# Patient Record
Sex: MALE | Race: WHITE | NOT HISPANIC OR LATINO
[De-identification: names, ages, dates, MRNs, and addresses within clinical notes are randomized per-mention and may not be internally consistent; named-entity substitution may affect disease eponyms.]

---

## 2017-01-24 ENCOUNTER — RECORD ABSTRACTING (OUTPATIENT)
Age: 62
End: 2017-01-24

## 2017-01-24 DIAGNOSIS — Z84.0 FAMILY HISTORY OF DISEASES OF THE SKIN AND SUBCUTANEOUS TISSUE: ICD-10-CM

## 2017-01-24 DIAGNOSIS — M19.90 UNSPECIFIED OSTEOARTHRITIS, UNSPECIFIED SITE: ICD-10-CM

## 2017-01-24 DIAGNOSIS — E11.9 TYPE 2 DIABETES MELLITUS W/OUT COMPLICATIONS: ICD-10-CM

## 2017-01-24 DIAGNOSIS — Z78.9 OTHER SPECIFIED HEALTH STATUS: ICD-10-CM

## 2017-01-24 DIAGNOSIS — R21 RASH AND OTHER NONSPECIFIC SKIN ERUPTION: ICD-10-CM

## 2017-02-05 ENCOUNTER — INPATIENT (INPATIENT)
Facility: HOSPITAL | Age: 62
LOS: 2 days | Discharge: HOME HEALTH | End: 2017-02-08
Attending: INTERNAL MEDICINE | Admitting: OBSTETRICS & GYNECOLOGY
Payer: MEDICARE

## 2017-02-05 VITALS
DIASTOLIC BLOOD PRESSURE: 77 MMHG | SYSTOLIC BLOOD PRESSURE: 135 MMHG | RESPIRATION RATE: 18 BRPM | HEART RATE: 96 BPM | TEMPERATURE: 98 F | OXYGEN SATURATION: 100 %

## 2017-02-05 DIAGNOSIS — Z95.9 PRESENCE OF CARDIAC AND VASCULAR IMPLANT AND GRAFT, UNSPECIFIED: Chronic | ICD-10-CM

## 2017-02-05 DIAGNOSIS — Z95.1 PRESENCE OF AORTOCORONARY BYPASS GRAFT: Chronic | ICD-10-CM

## 2017-02-05 LAB
BASOPHILS # BLD AUTO: 0.1 K/UL — SIGNIFICANT CHANGE UP (ref 0–0.2)
BASOPHILS NFR BLD AUTO: 0.7 % — SIGNIFICANT CHANGE UP (ref 0–2)
D DIMER BLD IA.RAPID-MCNC: 194 NG/ML DDU — SIGNIFICANT CHANGE UP
EOSINOPHIL # BLD AUTO: 0.2 K/UL — SIGNIFICANT CHANGE UP (ref 0–0.5)
EOSINOPHIL NFR BLD AUTO: 1.8 % — SIGNIFICANT CHANGE UP (ref 0–6)
HCT VFR BLD CALC: 40.9 % — SIGNIFICANT CHANGE UP (ref 39–50)
HGB BLD-MCNC: 13.7 G/DL — SIGNIFICANT CHANGE UP (ref 13–17)
LYMPHOCYTES # BLD AUTO: 1.2 K/UL — SIGNIFICANT CHANGE UP (ref 1–3.3)
LYMPHOCYTES # BLD AUTO: 10 % — LOW (ref 13–44)
MCHC RBC-ENTMCNC: 26.3 PG — LOW (ref 27–34)
MCHC RBC-ENTMCNC: 33.5 GM/DL — SIGNIFICANT CHANGE UP (ref 32–36)
MCV RBC AUTO: 78.5 FL — LOW (ref 80–100)
MONOCYTES # BLD AUTO: 0.4 K/UL — SIGNIFICANT CHANGE UP (ref 0–0.9)
MONOCYTES NFR BLD AUTO: 3.6 % — SIGNIFICANT CHANGE UP (ref 2–14)
NEUTROPHILS # BLD AUTO: 9.8 K/UL — HIGH (ref 1.8–7.4)
NEUTROPHILS NFR BLD AUTO: 83.9 % — HIGH (ref 43–77)
PLATELET # BLD AUTO: 172 K/UL — SIGNIFICANT CHANGE UP (ref 150–400)
RBC # BLD: 5.21 M/UL — SIGNIFICANT CHANGE UP (ref 4.2–5.8)
RBC # FLD: 14.6 % — HIGH (ref 10.3–14.5)
TROPONIN I SERPL-MCNC: <0.015 NG/ML — SIGNIFICANT CHANGE UP (ref 0.01–0.04)
TROPONIN I SERPL-MCNC: <0.015 NG/ML — SIGNIFICANT CHANGE UP (ref 0.01–0.04)
WBC # BLD: 11.7 K/UL — HIGH (ref 3.8–10.5)
WBC # FLD AUTO: 11.7 K/UL — HIGH (ref 3.8–10.5)

## 2017-02-05 PROCEDURE — 93010 ELECTROCARDIOGRAM REPORT: CPT

## 2017-02-05 PROCEDURE — 99285 EMERGENCY DEPT VISIT HI MDM: CPT

## 2017-02-05 PROCEDURE — 71010: CPT | Mod: 26

## 2017-02-05 RX ORDER — INSULIN GLARGINE 100 [IU]/ML
25 INJECTION, SOLUTION SUBCUTANEOUS
Qty: 0 | Refills: 0 | Status: DISCONTINUED | OUTPATIENT
Start: 2017-02-05 | End: 2017-02-08

## 2017-02-05 RX ORDER — INSULIN LISPRO 100/ML
VIAL (ML) SUBCUTANEOUS
Qty: 0 | Refills: 0 | Status: DISCONTINUED | OUTPATIENT
Start: 2017-02-05 | End: 2017-02-08

## 2017-02-05 RX ORDER — ATORVASTATIN CALCIUM 80 MG/1
40 TABLET, FILM COATED ORAL AT BEDTIME
Qty: 0 | Refills: 0 | Status: DISCONTINUED | OUTPATIENT
Start: 2017-02-05 | End: 2017-02-08

## 2017-02-05 RX ORDER — MONTELUKAST 4 MG/1
10 TABLET, CHEWABLE ORAL AT BEDTIME
Qty: 0 | Refills: 0 | Status: DISCONTINUED | OUTPATIENT
Start: 2017-02-05 | End: 2017-02-08

## 2017-02-05 RX ORDER — DEXTROSE 50 % IN WATER 50 %
12.5 SYRINGE (ML) INTRAVENOUS ONCE
Qty: 0 | Refills: 0 | Status: DISCONTINUED | OUTPATIENT
Start: 2017-02-05 | End: 2017-02-08

## 2017-02-05 RX ORDER — DEXTROSE 50 % IN WATER 50 %
25 SYRINGE (ML) INTRAVENOUS ONCE
Qty: 0 | Refills: 0 | Status: DISCONTINUED | OUTPATIENT
Start: 2017-02-05 | End: 2017-02-08

## 2017-02-05 RX ORDER — ENOXAPARIN SODIUM 100 MG/ML
40 INJECTION SUBCUTANEOUS EVERY 24 HOURS
Qty: 0 | Refills: 0 | Status: DISCONTINUED | OUTPATIENT
Start: 2017-02-05 | End: 2017-02-08

## 2017-02-05 RX ORDER — LOSARTAN POTASSIUM 100 MG/1
25 TABLET, FILM COATED ORAL DAILY
Qty: 0 | Refills: 0 | Status: DISCONTINUED | OUTPATIENT
Start: 2017-02-05 | End: 2017-02-08

## 2017-02-05 RX ORDER — NITROGLYCERIN 6.5 MG
1 CAPSULE, EXTENDED RELEASE ORAL ONCE
Qty: 0 | Refills: 0 | Status: DISCONTINUED | OUTPATIENT
Start: 2017-02-05 | End: 2017-02-08

## 2017-02-05 RX ORDER — DEXTROSE 50 % IN WATER 50 %
1 SYRINGE (ML) INTRAVENOUS ONCE
Qty: 0 | Refills: 0 | Status: DISCONTINUED | OUTPATIENT
Start: 2017-02-05 | End: 2017-02-08

## 2017-02-05 RX ORDER — SERTRALINE 25 MG/1
50 TABLET, FILM COATED ORAL
Qty: 0 | Refills: 0 | Status: DISCONTINUED | OUTPATIENT
Start: 2017-02-05 | End: 2017-02-08

## 2017-02-05 RX ORDER — CLONAZEPAM 1 MG
0.5 TABLET ORAL
Qty: 0 | Refills: 0 | Status: DISCONTINUED | OUTPATIENT
Start: 2017-02-05 | End: 2017-02-08

## 2017-02-05 RX ORDER — NITROGLYCERIN 6.5 MG
0.4 CAPSULE, EXTENDED RELEASE ORAL
Qty: 0 | Refills: 0 | Status: DISCONTINUED | OUTPATIENT
Start: 2017-02-05 | End: 2017-02-08

## 2017-02-05 RX ORDER — METOPROLOL TARTRATE 50 MG
100 TABLET ORAL DAILY
Qty: 0 | Refills: 0 | Status: DISCONTINUED | OUTPATIENT
Start: 2017-02-05 | End: 2017-02-08

## 2017-02-05 RX ORDER — PANTOPRAZOLE SODIUM 20 MG/1
40 TABLET, DELAYED RELEASE ORAL
Qty: 0 | Refills: 0 | Status: DISCONTINUED | OUTPATIENT
Start: 2017-02-05 | End: 2017-02-08

## 2017-02-05 RX ORDER — ONDANSETRON 8 MG/1
4 TABLET, FILM COATED ORAL ONCE
Qty: 0 | Refills: 0 | Status: COMPLETED | OUTPATIENT
Start: 2017-02-05 | End: 2017-02-05

## 2017-02-05 RX ORDER — CLOPIDOGREL BISULFATE 75 MG/1
75 TABLET, FILM COATED ORAL DAILY
Qty: 0 | Refills: 0 | Status: DISCONTINUED | OUTPATIENT
Start: 2017-02-05 | End: 2017-02-08

## 2017-02-05 RX ORDER — ACETAMINOPHEN 500 MG
650 TABLET ORAL EVERY 6 HOURS
Qty: 0 | Refills: 0 | Status: DISCONTINUED | OUTPATIENT
Start: 2017-02-05 | End: 2017-02-08

## 2017-02-05 RX ORDER — FLUTICASONE PROPIONATE AND SALMETEROL 50; 250 UG/1; UG/1
1 POWDER ORAL; RESPIRATORY (INHALATION)
Qty: 0 | Refills: 0 | Status: DISCONTINUED | OUTPATIENT
Start: 2017-02-05 | End: 2017-02-08

## 2017-02-05 RX ORDER — RANOLAZINE 500 MG/1
1000 TABLET, FILM COATED, EXTENDED RELEASE ORAL
Qty: 0 | Refills: 0 | Status: DISCONTINUED | OUTPATIENT
Start: 2017-02-05 | End: 2017-02-08

## 2017-02-05 RX ORDER — SODIUM CHLORIDE 9 MG/ML
3 INJECTION INTRAMUSCULAR; INTRAVENOUS; SUBCUTANEOUS ONCE
Qty: 0 | Refills: 0 | Status: COMPLETED | OUTPATIENT
Start: 2017-02-05 | End: 2017-02-05

## 2017-02-05 RX ORDER — TRAZODONE HCL 50 MG
150 TABLET ORAL AT BEDTIME
Qty: 0 | Refills: 0 | Status: DISCONTINUED | OUTPATIENT
Start: 2017-02-05 | End: 2017-02-08

## 2017-02-05 RX ORDER — ASPIRIN/CALCIUM CARB/MAGNESIUM 324 MG
325 TABLET ORAL ONCE
Qty: 0 | Refills: 0 | Status: COMPLETED | OUTPATIENT
Start: 2017-02-05 | End: 2017-02-05

## 2017-02-05 RX ORDER — MORPHINE SULFATE 50 MG/1
4 CAPSULE, EXTENDED RELEASE ORAL ONCE
Qty: 0 | Refills: 0 | Status: DISCONTINUED | OUTPATIENT
Start: 2017-02-05 | End: 2017-02-05

## 2017-02-05 RX ORDER — ASPIRIN/CALCIUM CARB/MAGNESIUM 324 MG
81 TABLET ORAL DAILY
Qty: 0 | Refills: 0 | Status: DISCONTINUED | OUTPATIENT
Start: 2017-02-05 | End: 2017-02-07

## 2017-02-05 RX ORDER — GLUCAGON INJECTION, SOLUTION 0.5 MG/.1ML
1 INJECTION, SOLUTION SUBCUTANEOUS ONCE
Qty: 0 | Refills: 0 | Status: DISCONTINUED | OUTPATIENT
Start: 2017-02-05 | End: 2017-02-08

## 2017-02-05 RX ORDER — SODIUM CHLORIDE 9 MG/ML
1000 INJECTION, SOLUTION INTRAVENOUS
Qty: 0 | Refills: 0 | Status: DISCONTINUED | OUTPATIENT
Start: 2017-02-05 | End: 2017-02-08

## 2017-02-05 RX ORDER — FUROSEMIDE 40 MG
40 TABLET ORAL
Qty: 0 | Refills: 0 | Status: DISCONTINUED | OUTPATIENT
Start: 2017-02-05 | End: 2017-02-08

## 2017-02-05 RX ADMIN — Medication 325 MILLIGRAM(S): at 10:37

## 2017-02-05 RX ADMIN — ONDANSETRON 4 MILLIGRAM(S): 8 TABLET, FILM COATED ORAL at 12:05

## 2017-02-05 RX ADMIN — RANOLAZINE 1000 MILLIGRAM(S): 500 TABLET, FILM COATED, EXTENDED RELEASE ORAL at 23:51

## 2017-02-05 RX ADMIN — SODIUM CHLORIDE 3 MILLILITER(S): 9 INJECTION INTRAMUSCULAR; INTRAVENOUS; SUBCUTANEOUS at 10:37

## 2017-02-05 RX ADMIN — MORPHINE SULFATE 4 MILLIGRAM(S): 50 CAPSULE, EXTENDED RELEASE ORAL at 12:35

## 2017-02-05 RX ADMIN — RANOLAZINE 1000 MILLIGRAM(S): 500 TABLET, FILM COATED, EXTENDED RELEASE ORAL at 23:48

## 2017-02-05 RX ADMIN — PANTOPRAZOLE SODIUM 40 MILLIGRAM(S): 20 TABLET, DELAYED RELEASE ORAL at 16:34

## 2017-02-05 RX ADMIN — Medication 2: at 18:27

## 2017-02-05 RX ADMIN — MONTELUKAST 10 MILLIGRAM(S): 4 TABLET, CHEWABLE ORAL at 21:50

## 2017-02-05 RX ADMIN — INSULIN GLARGINE 25 UNIT(S): 100 INJECTION, SOLUTION SUBCUTANEOUS at 23:47

## 2017-02-05 RX ADMIN — LOSARTAN POTASSIUM 25 MILLIGRAM(S): 100 TABLET, FILM COATED ORAL at 16:33

## 2017-02-05 RX ADMIN — ENOXAPARIN SODIUM 40 MILLIGRAM(S): 100 INJECTION SUBCUTANEOUS at 18:28

## 2017-02-05 RX ADMIN — Medication 150 MILLIGRAM(S): at 23:46

## 2017-02-05 RX ADMIN — Medication 0.4 MILLIGRAM(S): at 20:03

## 2017-02-05 RX ADMIN — Medication 650 MILLIGRAM(S): at 21:13

## 2017-02-05 RX ADMIN — MORPHINE SULFATE 4 MILLIGRAM(S): 50 CAPSULE, EXTENDED RELEASE ORAL at 12:05

## 2017-02-05 RX ADMIN — SERTRALINE 50 MILLIGRAM(S): 25 TABLET, FILM COATED ORAL at 20:40

## 2017-02-05 RX ADMIN — Medication 40 MILLIGRAM(S): at 18:18

## 2017-02-05 RX ADMIN — Medication 650 MILLIGRAM(S): at 20:48

## 2017-02-05 RX ADMIN — Medication 0.4 MILLIGRAM(S): at 10:37

## 2017-02-05 RX ADMIN — Medication 0.5 MILLIGRAM(S): at 18:18

## 2017-02-05 RX ADMIN — ATORVASTATIN CALCIUM 40 MILLIGRAM(S): 80 TABLET, FILM COATED ORAL at 21:50

## 2017-02-05 RX ADMIN — Medication 100 MILLIGRAM(S): at 16:34

## 2017-02-05 NOTE — ED PROVIDER NOTE - CONSTITUTIONAL, MLM
normal... Well appearing, well nourished, awake, alert, oriented to person, place, time/situation and in no apparent distress. Obese, no acute distress

## 2017-02-05 NOTE — H&P ADULT - PMH
COPD (chronic obstructive pulmonary disease)    Coronary artery disease involving coronary bypass graft of native heart with angina pectoris    DM (diabetes mellitus)    HTN (hypertension)

## 2017-02-05 NOTE — H&P ADULT - NSHPLABSRESULTS_GEN_ALL_CORE
13.7   11.7  )-----------( 172      ( 05 Feb 2017 10:17 )             40.9       CBC Full  -  ( 05 Feb 2017 10:17 )  WBC Count : 11.7 K/uL  Hemoglobin : 13.7 g/dL  Hematocrit : 40.9 %  Platelet Count - Automated : 172 K/uL  Mean Cell Volume : 78.5 fl  Mean Cell Hemoglobin : 26.3 pg  Mean Cell Hemoglobin Concentration : 33.5 gm/dL  Auto Neutrophil # : 9.8 K/uL  Auto Lymphocyte # : 1.2 K/uL  Auto Monocyte # : 0.4 K/uL  Auto Eosinophil # : 0.2 K/uL  Auto Basophil # : 0.1 K/uL  Auto Neutrophil % : 83.9 %  Auto Lymphocyte % : 10.0 %  Auto Monocyte % : 3.6 %  Auto Eosinophil % : 1.8 %  Auto Basophil % : 0.7 %      05 Feb 2017 12:21    139    |  104    |  12     ----------------------------<  186    4.1     |  24     |  0.92     Ca    8.3        05 Feb 2017 12:21    TPro  6.7    /  Alb  3.3    /  TBili  0.3    /  DBili  x      /  AST  14     /  ALT  29     /  AlkPhos  60     05 Feb 2017 12:21      PT/INR - ( 05 Feb 2017 12:21 )   PT: 12.8 sec;   INR: 1.16 ratio         PTT - ( 05 Feb 2017 12:21 )  PTT:26.8 sec        LIVER FUNCTIONS - ( 05 Feb 2017 12:21 )  Alb: 3.3 g/dL / Pro: 6.7 gm/dL / ALK PHOS: 60 U/L / ALT: 29 U/L / AST: 14 U/L / GGT: x                 CARDIAC MARKERS ( 05 Feb 2017 12:21 )  <0.015 ng/mL / x     / x     / x     / x

## 2017-02-05 NOTE — ED PROVIDER NOTE - OBJECTIVE STATEMENT
62 y/o M PMHx 3 vessel CABG, 2 stents, DM, HTN, HLD, COPD, presents to the ED c/o CP since today morning. The pt provides he started having the pain about 2 hours ago, which was across the chest, which was achy in nature, not associated with radiation or n/v. The pt notes that he has never had this type of pain before, even when he got the stents. Pt quit smoking 16 years ago, quit etoh 6 years ago. PMD Dr. Ruggiero, Cards Dr. Richards. No h/o abd pain, nvd, cough, headache, chills, fever, dizziness or urinary incontinence.

## 2017-02-05 NOTE — ED PROVIDER NOTE - NS ED MD SCRIBE ATTENDING SCRIBE SECTIONS
PHYSICAL EXAM/RESULTS/HIV/HISTORY OF PRESENT ILLNESS/PAST MEDICAL/SURGICAL/SOCIAL HISTORY/REVIEW OF SYSTEMS

## 2017-02-05 NOTE — ED PROVIDER NOTE - PMH
<<----- Click to add NO pertinent Past Medical History COPD (chronic obstructive pulmonary disease)    DM (diabetes mellitus)    HTN (hypertension)

## 2017-02-05 NOTE — H&P ADULT - ASSESSMENT
IMP:    60 y/o male with HTN, DM, HL, depression/anxiety and CAD s/p 3V CABG and PCI x 2 presents with constant CP with negative w/u thus far    Plan:    CP--cont wtih ASA/plavix/BBx/statin and ARB--Trend Trop--check TTE--Cards consult with dr warren    HTN--cont with BP meds    HL--cont with statin    DM--cont with lantus and FS with insulin coverage--DASH/DM diet    Depression/anxiety--cont SSRI and benzo    DVT prophy--LMWH and ambulation IMP:    62 y/o male with HTN, DM, HL, depression/anxiety and CAD s/p 3V CABG and PCI x 2 presents with constant CP with negative w/u thus far    Plan:    CP--cont wtih ASA/plavix/BBx/statin and ARB--Trend Trop--check TTE--Cards consult with dr warren    HTN--cont with BP meds    HL--cont with statin    DM--cont with lantus and FS with insulin coverage--DASH/DM diet.  Hold synjardy while in hospital    Depression/anxiety--cont SSRI and benzo    DVT prophy--LMWH and ambulation

## 2017-02-05 NOTE — H&P ADULT - NSHPPHYSICALEXAM_GEN_ALL_CORE
Vital Signs Last 24 Hrs  T(C): 36.8, Max: 36.8 (02-05 @ 09:56)  T(F): 98.2, Max: 98.2 (02-05 @ 09:56)  HR: 89 (89 - 96)  BP: 121/60 (121/60 - 135/77)  BP(mean): --  RR: 18 (18 - 18)  SpO2: 97% (97% - 100%)

## 2017-02-06 LAB
ANION GAP SERPL CALC-SCNC: 8 MMOL/L — SIGNIFICANT CHANGE UP (ref 5–17)
BASOPHILS # BLD AUTO: 0.1 K/UL — SIGNIFICANT CHANGE UP (ref 0–0.2)
BASOPHILS NFR BLD AUTO: 0.7 % — SIGNIFICANT CHANGE UP (ref 0–2)
BUN SERPL-MCNC: 15 MG/DL — SIGNIFICANT CHANGE UP (ref 7–23)
CALCIUM SERPL-MCNC: 8.6 MG/DL — SIGNIFICANT CHANGE UP (ref 8.5–10.1)
CHLORIDE SERPL-SCNC: 102 MMOL/L — SIGNIFICANT CHANGE UP (ref 96–108)
CO2 SERPL-SCNC: 31 MMOL/L — SIGNIFICANT CHANGE UP (ref 22–31)
CREAT SERPL-MCNC: 0.94 MG/DL — SIGNIFICANT CHANGE UP (ref 0.5–1.3)
EOSINOPHIL # BLD AUTO: 0.1 K/UL — SIGNIFICANT CHANGE UP (ref 0–0.5)
EOSINOPHIL NFR BLD AUTO: 1.6 % — SIGNIFICANT CHANGE UP (ref 0–6)
GLUCOSE SERPL-MCNC: 99 MG/DL — SIGNIFICANT CHANGE UP (ref 70–99)
HBA1C BLD-MCNC: 7.1 % — HIGH (ref 4–5.6)
HCT VFR BLD CALC: 39 % — SIGNIFICANT CHANGE UP (ref 39–50)
HGB BLD-MCNC: 12.5 G/DL — LOW (ref 13–17)
LYMPHOCYTES # BLD AUTO: 2.3 K/UL — SIGNIFICANT CHANGE UP (ref 1–3.3)
LYMPHOCYTES # BLD AUTO: 25.5 % — SIGNIFICANT CHANGE UP (ref 13–44)
MCHC RBC-ENTMCNC: 25.8 PG — LOW (ref 27–34)
MCHC RBC-ENTMCNC: 32.1 GM/DL — SIGNIFICANT CHANGE UP (ref 32–36)
MCV RBC AUTO: 80.6 FL — SIGNIFICANT CHANGE UP (ref 80–100)
MONOCYTES # BLD AUTO: 0.7 K/UL — SIGNIFICANT CHANGE UP (ref 0–0.9)
MONOCYTES NFR BLD AUTO: 7.8 % — SIGNIFICANT CHANGE UP (ref 2–14)
NEUTROPHILS # BLD AUTO: 5.8 K/UL — SIGNIFICANT CHANGE UP (ref 1.8–7.4)
NEUTROPHILS NFR BLD AUTO: 64.3 % — SIGNIFICANT CHANGE UP (ref 43–77)
NT-PROBNP SERPL-SCNC: 74 PG/ML — SIGNIFICANT CHANGE UP (ref 0–125)
PLATELET # BLD AUTO: 147 K/UL — LOW (ref 150–400)
POTASSIUM SERPL-MCNC: 3.9 MMOL/L — SIGNIFICANT CHANGE UP (ref 3.5–5.3)
POTASSIUM SERPL-SCNC: 3.9 MMOL/L — SIGNIFICANT CHANGE UP (ref 3.5–5.3)
RBC # BLD: 4.84 M/UL — SIGNIFICANT CHANGE UP (ref 4.2–5.8)
RBC # FLD: 14.9 % — HIGH (ref 10.3–14.5)
SODIUM SERPL-SCNC: 141 MMOL/L — SIGNIFICANT CHANGE UP (ref 135–145)
TROPONIN I SERPL-MCNC: <0.015 NG/ML — SIGNIFICANT CHANGE UP (ref 0.01–0.04)
TROPONIN I SERPL-MCNC: <0.015 NG/ML — SIGNIFICANT CHANGE UP (ref 0.01–0.04)
WBC # BLD: 9 K/UL — SIGNIFICANT CHANGE UP (ref 3.8–10.5)
WBC # FLD AUTO: 9 K/UL — SIGNIFICANT CHANGE UP (ref 3.8–10.5)

## 2017-02-06 PROCEDURE — 93010 ELECTROCARDIOGRAM REPORT: CPT

## 2017-02-06 PROCEDURE — 71010: CPT | Mod: 26

## 2017-02-06 PROCEDURE — 93306 TTE W/DOPPLER COMPLETE: CPT | Mod: 26

## 2017-02-06 RX ADMIN — PANTOPRAZOLE SODIUM 40 MILLIGRAM(S): 20 TABLET, DELAYED RELEASE ORAL at 06:37

## 2017-02-06 RX ADMIN — Medication 40 MILLIGRAM(S): at 18:21

## 2017-02-06 RX ADMIN — SERTRALINE 50 MILLIGRAM(S): 25 TABLET, FILM COATED ORAL at 18:21

## 2017-02-06 RX ADMIN — MONTELUKAST 10 MILLIGRAM(S): 4 TABLET, CHEWABLE ORAL at 21:31

## 2017-02-06 RX ADMIN — Medication 150 MILLIGRAM(S): at 21:31

## 2017-02-06 RX ADMIN — FLUTICASONE PROPIONATE AND SALMETEROL 1 DOSE(S): 50; 250 POWDER ORAL; RESPIRATORY (INHALATION) at 12:02

## 2017-02-06 RX ADMIN — Medication 100 MILLIGRAM(S): at 06:37

## 2017-02-06 RX ADMIN — RANOLAZINE 1000 MILLIGRAM(S): 500 TABLET, FILM COATED, EXTENDED RELEASE ORAL at 18:21

## 2017-02-06 RX ADMIN — Medication: at 12:51

## 2017-02-06 RX ADMIN — Medication 0.5 MILLIGRAM(S): at 06:37

## 2017-02-06 RX ADMIN — Medication 0.5 MILLIGRAM(S): at 18:21

## 2017-02-06 RX ADMIN — Medication 81 MILLIGRAM(S): at 11:06

## 2017-02-06 RX ADMIN — ATORVASTATIN CALCIUM 40 MILLIGRAM(S): 80 TABLET, FILM COATED ORAL at 21:31

## 2017-02-06 RX ADMIN — FLUTICASONE PROPIONATE AND SALMETEROL 1 DOSE(S): 50; 250 POWDER ORAL; RESPIRATORY (INHALATION) at 18:33

## 2017-02-06 RX ADMIN — LOSARTAN POTASSIUM 25 MILLIGRAM(S): 100 TABLET, FILM COATED ORAL at 06:32

## 2017-02-06 RX ADMIN — ENOXAPARIN SODIUM 40 MILLIGRAM(S): 100 INJECTION SUBCUTANEOUS at 18:22

## 2017-02-06 RX ADMIN — SERTRALINE 50 MILLIGRAM(S): 25 TABLET, FILM COATED ORAL at 06:34

## 2017-02-06 RX ADMIN — Medication 40 MILLIGRAM(S): at 06:38

## 2017-02-06 RX ADMIN — CLOPIDOGREL BISULFATE 75 MILLIGRAM(S): 75 TABLET, FILM COATED ORAL at 11:06

## 2017-02-06 NOTE — CONSULT NOTE ADULT - SUBJECTIVE AND OBJECTIVE BOX
HPI:  60 y/o male with HTN, DM, HL, depression/anxiety and CAD s/p 3V CABG at [facility] in May 2016 and PCI & staent 2016 & Oct 2016 , he has ad YUE to [place] to LAD & YUE to RCA  presents with c/o chest pain for one day, this pain  was constant  non radiating and was associated with SOB. This pain was like a pressure   & lasted for a few hours & resolved. On admission his  Trop negative  EKG--NSR no acute ST elevation. CXR--no infiltrates or edema.  d-dimer negative. Pt had negative CTA chest as out pt recently. he was given ASA/nitro and mso4 He is now  awake and alert, NAD, he states  he gets SOB with mild to moderate exertion & at times has chest tightness with exertion. He is now pain free & is comfortable.  He denies any cough or fever. No recent travel , this pain was not pleuritic or positional.       PAST MEDICAL & SURGICAL HISTORY:  Coronary artery disease involving coronary bypass graft of native heart with angina pectoris  COPD (chronic obstructive pulmonary disease)  DM (diabetes mellitus)  HTN (hypertension)  S/P several  stents last YUE to RCA Oct 2016  S/P CABG x 3      PREVIOUS DIAGNOSTIC TESTING:      ECHO  FINDINGS:    STRESS  FINDINGS:    CATHETERIZATION  FINDINGS:    MEDICATIONS  (STANDING):  nitroglycerin    2% Ointment 1Inch(s) Transdermal once  LORazepam     Tablet 1milliGRAM(s) Oral once  aspirin  chewable 81milliGRAM(s) Oral daily  losartan 25milliGRAM(s) Oral daily  ranolazine 1000milliGRAM(s) Oral two times a day  clonazePAM Tablet 0.5milliGRAM(s) Oral two times a day  sertraline 50milliGRAM(s) Oral two times a day  traZODone 150milliGRAM(s) Oral at bedtime  insulin glargine Injectable (LANTUS) 25Unit(s) SubCutaneous two times a day  atorvastatin 40milliGRAM(s) Oral at bedtime  clopidogrel Tablet 75milliGRAM(s) Oral daily  metoprolol succinate ER 100milliGRAM(s) Oral daily  fluticasone / salmeterol 100-50 MICROgram(s) Diskus 1Dose(s) Inhalation two times a day  furosemide    Tablet 40milliGRAM(s) Oral two times a day  montelukast 10milliGRAM(s) Oral at bedtime  pantoprazole    Tablet 40milliGRAM(s) Oral before breakfast  enoxaparin Injectable 40milliGRAM(s) SubCutaneous every 24 hours  insulin lispro (HumaLOG) corrective regimen sliding scale  SubCutaneous three times a day before meals  dextrose 5%. 1000milliLiter(s) IV Continuous <Continuous>  dextrose 50% Injectable 12.5Gram(s) IV Push once  dextrose 50% Injectable 25Gram(s) IV Push once  dextrose 50% Injectable 25Gram(s) IV Push once    MEDICATIONS  (PRN):  nitroglycerin     SubLingual 0.4milliGRAM(s) SubLingual every 5 minutes PRN Chest Pain  acetaminophen   Tablet 650milliGRAM(s) Oral every 6 hours PRN For Temp greater than 38 C (100.4 F)  acetaminophen   Tablet. 650milliGRAM(s) Oral every 6 hours PRN Mild Pain (1 - 3)  dextrose Gel 1Dose(s) Oral once PRN Blood Glucose LESS THAN 70 milliGRAM(s)/deciliter  glucagon  Injectable 1milliGRAM(s) IntraMuscular once PRN Glucose LESS THAN 70 milligrams/deciliter      FAMILY HISTORY:  Father  at 62 with colon cancer & mother at 83 with COPD      SOCIAL HISTORY:  He denies any h/o smoking or ETOH consumption    REVIEW OF SYSTEM:  Pertinent items are noted in HPI.  Constitutional	Negative for chills, fevers, sweats.    Eyes: 	Negative for visual disturbance.  Ears, nose, mouth, throat, and face: Negative for epistaxis, nasal congestion, sore throat and tinnitus.  Neck:	Negative for enlargement, pain and difficulty in swallowing  Respiration : Negative for cough, pleuritic chest pain and wheezing  Cardiovascular: Notable for chest pain & dyspnea as described above.    Gastrointestinal : Negative for abdominal pain, diarrhea, nausea and vomiting  Genitourinary: Negative for dysuria, frequency and urinary incontinence .  Skin: Negative for  rash, pruritus, swelling, dryness .  	  Hematologic/lymphatic: Negative for bleeding and easy bruising  Musculoskeletal: Negative for arthralgias, back pain and muscle weakness.  Neurological: Negative for dizziness, headaches, seizures and tremors  Behavioral/Psych: Negative for mood change, depression.  Endocrine:	Negative for blurry vision, polydipsia and polyuria, diaphoresis.   Allergic/Immunologic:	Negative for anaphylaxis, angioedema and urticaria.      Vital Signs Last 24 Hrs  T(C): 36.6, Max: 36.8 ( @ 09:56)  T(F): 97.9, Max: 98.2 ( @ 09:56)  HR: 61 (60 - 96)  BP: 98/58 (90/57 - 135/77)  BP(mean): --  RR: 18 (18 - 18)  SpO2: 99% (97% - 100%)    I&O's Summary    PHYSICAL EXAM  General Appearance: cooperative, no acute distress,   HEENT: PERRL, conjunctiva clear, EOM's intact, non injected pharynx, no exudate, TM   normal  Neck: Supple, , no adenopathy, thyroid: not enlarged, no carotid bruit or JVD  Back: Symmetric, no  tenderness soft tissue tenderness  Lungs: Clear to auscultation bilateral adventitious breath sounds, normal   expiratory phase  Heart: Regular rate and rhythm, S1, S2 normal, no murmur, rub or gallop  Abdomen: Soft, non-tender, bowel sounds active , no hepatosplenomegaly  Extremities: no cyanosis or edema, no joint swelling  Skin: Skin color, texture normal, no rashes   Neurologic: Alert and oriented X3 , cranial nerves intact, sensory and motor normal,        INTERPRETATION OF TELEMETRY: NSR    ECG: NSR  NSST changes V1 to V3        LABS:                          12.5   9.0   )-----------( 147      ( 2017 06:19 )             39.0     2017 06:19    141    |  102    |  15     ----------------------------<  99     3.9     |  31     |  0.94     Ca    8.6        2017 06:19    TPro  6.7    /  Alb  3.3    /  TBili  0.3    /  DBili  x      /  AST  14     /  ALT  29     /  AlkPhos  60     2017 12:21    CARDIAC MARKERS ( 2017 03:27 )  <0.015 ng/mL / x     / x     / x     / x      CARDIAC MARKERS ( 2017 19:42 )  <0.015 ng/mL / x     / x     / x     / x      CARDIAC MARKERS ( 2017 15:31 )  <0.015 ng/mL / x     / x     / x     / x      CARDIAC MARKERS ( 2017 12:21 )  <0.015 ng/mL / x     / x     / x     / x            Pro BNP  --  @ 14:08  D Dimer  194  @ 14:08    PT/INR - ( 2017 12:21 )   PT: 12.8 sec;   INR: 1.16 ratio         PTT - ( 2017 12:21 )  PTT:26.8 sec          RADIOLOGY & ADDITIONAL STUDIES:    Comparison:  Chest x-ray 2016    Sternal wires again noted. The cardiacsilhouette is within normal   limits. The lungs are clear. No pleural effusion.    IMPRESSION: No acute disease unchanged

## 2017-02-06 NOTE — CONSULT NOTE ADULT - ASSESSMENT
1 Recurrent chest pain some of the episodes of are suggestive of angina.  2 dyspnea with mild to moderate exertion.  3 CAD s/p CABG & multiple stents .  4 HTN  Suggest  Observe patient on telemetry.  Continue with current cardiac medications.  Follow-up with CPK, troponin I and EKG.  Due to recurrent chest pain or SOB, Patient was advised cardiac catheterization, i had  lengthy discussion with the patient and  risks benefits and alternatives were discussed at length with the patient. Patient consents for the procedure.    Discussed with Dr Arrieta.

## 2017-02-06 NOTE — PROGRESS NOTE ADULT - SUBJECTIVE AND OBJECTIVE BOX
PMD- Dr Ruggiero	    History of Present Illness:  Chief Complaint/Reason for Admission: Chest Pain	  History of Present Illness: 	  60 y/o male with HTN, DM, HL, depression/anxiety and CAD s/p 3V CABG at [facility] in May 2016 and PCI by dr Arrieta presents with constant CP since 0400 today. Constant, non radiating and SOB. Trop negative x 1. EKG--NSR no acute ST elevation. CXR--no infiltrates or edema.  d-dimer negative. Pt had negative CTA chest as outpt recently. he was given ASA/nitro and mso4  On my exam, awake and alert, NAD, c/o CP. denies fevers, chills or hemoptysis. + near syncope     2/6- chart reviewed. Pt c/o intermittent midsternal chest pain at rest, worse w/ palpation.  Sob w/ exertion.      REVIEW OF SYSTEMS:  General: No weakness, fevers, chills  HEENT: No HA, neck pain, no visual changes, no hearing loss   Resp: No cough, wheezing, hemoptysis; No shortness of breath  CV: +chest pain, dyspnea w/ exertion  GI: No abdominal pain, no n/v/d, no blood in stool  : No f/u/d  Neuro: No weakness or numbness  MSK: No myalgias, arthralgias  SKIN: No itching, rashes, lesions  All other ROS negative unless indicated above.      Physical Exam:  Vital Signs Last 24 Hrs T(C): 36.3, Max: 36.8 (02-05 @ 16:41) T(F): 97.4, Max: 98.2 (02-05 @ 16:41) HR: 60 (60 - 89) BP: 103/- (90/57 - 124/64) RR: 18 (18 - 18) SpO2: 98% (97% - 99%)   PHYSICAL EXAM: General: NAD, comfortable Neuro: AAOx3, no focal deficits HEENT: NCAT, PERRLA, EOMI,  Neck: Soft and supple, No JVD Respiratory: CTA b/l, no w/r/r Cardiovascular: S1 and S2, RRR, no m/g/r Chest: midsternal scar, tenderness w/ palpation Gastrointestinal: +BS, soft, NTND, no rebound/guarding Extremities: No c/c/e Vascular: 2+ peripheral pulses Musculoskeletal: 5/5 strength b/l UE and LE, sensation intact Skin: No rashes    LABS: All Labs Reviewed:                      12.5  9.0   )-----------( 147      ( 06 Feb 2017 06:19 )            39.0   06 Feb 2017 06:19  141    |  102    |  15    ----------------------------<  99    3.9     |  31     |  0.94   Ca    8.6        06 Feb 2017 06:19  TPro  6.7    /  Alb  3.3    /  TBili  0.3    /  DBili  x      /  AST  14     /  ALT  29     /  AlkPhos  60     05 Feb 2017 12:21  PT/INR - ( 05 Feb 2017 12:21 )   PT: 12.8 sec;   INR: 1.16 ratio      PTT - ( 05 Feb 2017 12:21 )  PTT:26.8 sec CARDIAC MARKERS ( 06 Feb 2017 12:02 ) <0.015 ng/mL / x     / x     / x     / x     CARDIAC MARKERS ( 06 Feb 2017 03:27 ) <0.015 ng/mL / x     / x     / x     / x     CARDIAC MARKERS ( 05 Feb 2017 19:42 ) <0.015 ng/mL / x     / x     / x     / x     CARDIAC MARKERS ( 05 Feb 2017 15:31 ) <0.015 ng/mL / x     / x     / x     / x     CARDIAC MARKERS ( 05 Feb 2017 12:21 ) <0.015 ng/mL / x     / x     / x     / x      Normal sinus rhythm 95bpm Cannot rule out Inferior infarct (cited on or before 02-OCT-2016) Nonspecific ST abnormality Abnormal ECG When compared with ECG of 04-DEC-2    CHEST SINGLE VIEW FRONTAL     02/05/2017  No acute disease unchanged.   	     Home Medications:  * Patient Currently Takes Medications as of 05-Feb-2017 15:23 documented in Prescription Writer ·  Plavix 75 mg oral tablet: Last Dose Taken:  , 1 tab(s) orally once a day ·  Ranexa 1000 mg oral tablet, extended release: Last Dose Taken:  , 1 tab(s) orally 2 times a day ·  Singulair 10 mg oral tablet: Last Dose Taken:  , 1 tab(s) orally once a day (at bedtime) ·  aspirin 81 mg oral tablet, chewable: Last Dose Taken:  , 1 tab(s) orally once a day ·  Synjardy 12.5 mg-1000 mg oral tablet: Last Dose Taken:  , 1 tab(s) orally 2 times a day ·  Protonix 40 mg oral delayed release tablet: Last Dose Taken:  , 1 tab(s) orally once a day ·  Cozaar 25 mg oral tablet: Last Dose Taken:  , 1 tab(s) orally once a day ·  clonazePAM 0.5 mg oral tablet: Last Dose Taken:  , 0.5 milligram(s) orally 2 times a day ·  Lipitor 40 mg oral tablet: Last Dose Taken:  , 1 tab(s) orally once a day (at bedtime) ·  Lasix 40 mg oral tablet: Last Dose Taken:  , 1 tab(s) orally 2 times a day ·  traZODone 150 mg oral tablet: Last Dose Taken:  , 1 tab(s) orally once a day (at bedtime) ·  metoprolol succinate 100 mg oral tablet, extended release: Last Dose Taken:  , 1 tab(s) orally once a day ·  sertraline 50 mg oral tablet: Last Dose Taken:  , 1 tab(s) orally 2 times a day ·  Lantus 100 units/mL subcutaneous solution: Last Dose Taken:  , 25 unit(s) subcutaneous 2 times a day ·  Symbicort 80 mcg-4.5 mcg/inh inhalation aerosol: Last Dose Taken:  , 2 puff(s) inhaled once a day  MEDICATIONS  (STANDING): nitroglycerin    2% Ointment 1Inch(s) Transdermal once LORazepam     Tablet 1milliGRAM(s) Oral once aspirin  chewable 81milliGRAM(s) Oral daily losartan 25milliGRAM(s) Oral daily ranolazine 1000milliGRAM(s) Oral two times a day clonazePAM Tablet 0.5milliGRAM(s) Oral two times a day sertraline 50milliGRAM(s) Oral two times a day traZODone 150milliGRAM(s) Oral at bedtime insulin glargine Injectable (LANTUS) 25Unit(s) SubCutaneous two times a day atorvastatin 40milliGRAM(s) Oral at bedtime clopidogrel Tablet 75milliGRAM(s) Oral daily metoprolol succinate ER 100milliGRAM(s) Oral daily fluticasone / salmeterol 100-50 MICROgram(s) Diskus 1Dose(s) Inhalation two times a day furosemide    Tablet 40milliGRAM(s) Oral two times a day montelukast 10milliGRAM(s) Oral at bedtime pantoprazole    Tablet 40milliGRAM(s) Oral before breakfast enoxaparin Injectable 40milliGRAM(s) SubCutaneous every 24 hours insulin lispro (HumaLOG) corrective regimen sliding scale  SubCutaneous three times a day before meals  MEDICATIONS  (PRN): nitroglycerin     SubLingual 0.4milliGRAM(s) SubLingual every 5 minutes PRN Chest Pain acetaminophen   Tablet 650milliGRAM(s) Oral every 6 hours PRN For Temp greater than 38 C (100.4 F) acetaminophen   Tablet. 650milliGRAM(s) Oral every 6 hours PRN Mild Pain (1 - 3) dextrose Gel 1Dose(s) Oral once PRN Blood Glucose LESS THAN 70 milliGRAM(s)/deciliter glucagon  Injectable 1milliGRAM(s) IntraMuscular once PRN Glucose LESS THAN 70 milligrams/deciliter

## 2017-02-06 NOTE — CHART NOTE - NSCHARTNOTEFT_GEN_A_CORE
called by rn for patient having chest pain     60 y/o male with HTN, DM, HL, depression/anxiety and CAD s/p 3V CABG and PCI x 2 presents with constant CP    patient seen and evaluated bedside. Patient described pain as 8/10 non radiating and increasing with deep breaths. states that pain is "like my ribs are coming apart" like he had when had cardiac surgery back in may 2016.     physical   general - aaox3   cv- +s1/s2  pulm- cta b/l no wheezing no rhonchi  abd- soft nontender bs+    Vital Signs Last 24 Hrs  T(C): 36.4, Max: 36.8 (02-05 @ 09:56)  T(F): 97.6, Max: 98.2 (02-05 @ 09:56)  HR: 60 (60 - 96)  BP: 114/70 (114/70 - 135/77)  BP(mean): --  RR: 18 (18 - 18)  SpO2: 97% (97% - 100%)    assessment and plan   #chest pain   - cxr stat  - ekg stat- unchanged from previous ekg  - pain management via nsaids or morphine as needed  - currently patient is not in pain     d/w Dr Moses - Attending Hospitalist and Dr Minor PGY3 called by rn for patient having chest pain     62 y/o male with HTN, DM, HL, depression/anxiety and CAD s/p 3V CABG and PCI x 2 presents with constant CP    patient seen and evaluated bedside. Patient described pain as 8/10 non radiating and increasing with deep breaths. states that pain is "like my ribs are coming apart" like he had when had cardiac surgery back in may 2016. chest pain reproducible on exam with palpation of sternal area and ribs.    physical   general - aaox3   cv- +s1/s2  pulm- cta b/l no wheezing no rhonchi  abd- soft nontender bs+    Vital Signs Last 24 Hrs  T(C): 36.4, Max: 36.8 (02-05 @ 09:56)  T(F): 97.6, Max: 98.2 (02-05 @ 09:56)  HR: 60 (60 - 96)  BP: 114/70 (114/70 - 135/77)  BP(mean): --  RR: 18 (18 - 18)  SpO2: 97% (97% - 100%)    assessment and plan   #chest pain   - cxr stat  - ekg stat- unchanged from previous ekg  - pain management via nsaids or morphine as needed  - currently patient is not in pain     d/w Dr Moses - Attending Hospitalist and Dr Minor PGY3

## 2017-02-06 NOTE — PROGRESS NOTE ADULT - ASSESSMENT
62 y/o male with HTN, DM, HL, depression/anxiety and CAD s/p 3V CABG and PCI x 2 presents with constant CP with negative     *chest pain r/o ACS  -in pt w/ CAD s/p CABG 5/2016,  PCI w/ YUE July and Oct. 2016  - troponins negative  - continue aspirin, plavix, statin, BB, ARB  - Cardio f/u appreciated- plan for cath     *HTN  - controlled, cont home meds    *T2D  - cont lantus and ISS (hold home med)   - diabetic diet, fingersticks     *depression/anxiety--cont SSRI and benzo    *dvt px-LMWH and ambulation

## 2017-02-07 DIAGNOSIS — I25.709 ATHEROSCLEROSIS OF CORONARY ARTERY BYPASS GRAFT(S), UNSPECIFIED, WITH UNSPECIFIED ANGINA PECTORIS: ICD-10-CM

## 2017-02-07 LAB
ANION GAP SERPL CALC-SCNC: 8 MMOL/L — SIGNIFICANT CHANGE UP (ref 5–17)
BLD GP AB SCN SERPL QL: SIGNIFICANT CHANGE UP
BUN SERPL-MCNC: 18 MG/DL — SIGNIFICANT CHANGE UP (ref 7–23)
CALCIUM SERPL-MCNC: 9 MG/DL — SIGNIFICANT CHANGE UP (ref 8.5–10.1)
CHLORIDE SERPL-SCNC: 104 MMOL/L — SIGNIFICANT CHANGE UP (ref 96–108)
CO2 SERPL-SCNC: 31 MMOL/L — SIGNIFICANT CHANGE UP (ref 22–31)
CREAT SERPL-MCNC: 1.01 MG/DL — SIGNIFICANT CHANGE UP (ref 0.5–1.3)
GLUCOSE SERPL-MCNC: 145 MG/DL — HIGH (ref 70–99)
HCT VFR BLD CALC: 39.5 % — SIGNIFICANT CHANGE UP (ref 39–50)
HGB BLD-MCNC: 12.6 G/DL — LOW (ref 13–17)
MCHC RBC-ENTMCNC: 25.7 PG — LOW (ref 27–34)
MCHC RBC-ENTMCNC: 31.9 GM/DL — LOW (ref 32–36)
MCV RBC AUTO: 80.4 FL — SIGNIFICANT CHANGE UP (ref 80–100)
PLATELET # BLD AUTO: 148 K/UL — LOW (ref 150–400)
POTASSIUM SERPL-MCNC: 4.1 MMOL/L — SIGNIFICANT CHANGE UP (ref 3.5–5.3)
POTASSIUM SERPL-SCNC: 4.1 MMOL/L — SIGNIFICANT CHANGE UP (ref 3.5–5.3)
RBC # BLD: 4.91 M/UL — SIGNIFICANT CHANGE UP (ref 4.2–5.8)
RBC # FLD: 14.9 % — HIGH (ref 10.3–14.5)
SODIUM SERPL-SCNC: 143 MMOL/L — SIGNIFICANT CHANGE UP (ref 135–145)
TYPE + AB SCN PNL BLD: SIGNIFICANT CHANGE UP
WBC # BLD: 7.5 K/UL — SIGNIFICANT CHANGE UP (ref 3.8–10.5)
WBC # FLD AUTO: 7.5 K/UL — SIGNIFICANT CHANGE UP (ref 3.8–10.5)

## 2017-02-07 PROCEDURE — 93010 ELECTROCARDIOGRAM REPORT: CPT

## 2017-02-07 RX ORDER — ASPIRIN/CALCIUM CARB/MAGNESIUM 324 MG
325 TABLET ORAL DAILY
Qty: 0 | Refills: 0 | Status: DISCONTINUED | OUTPATIENT
Start: 2017-02-07 | End: 2017-02-07

## 2017-02-07 RX ORDER — SODIUM CHLORIDE 9 MG/ML
1000 INJECTION INTRAMUSCULAR; INTRAVENOUS; SUBCUTANEOUS
Qty: 0 | Refills: 0 | Status: DISCONTINUED | OUTPATIENT
Start: 2017-02-07 | End: 2017-02-08

## 2017-02-07 RX ORDER — ASPIRIN/CALCIUM CARB/MAGNESIUM 324 MG
325 TABLET ORAL DAILY
Qty: 0 | Refills: 0 | Status: DISCONTINUED | OUTPATIENT
Start: 2017-02-07 | End: 2017-02-08

## 2017-02-07 RX ADMIN — Medication 81 MILLIGRAM(S): at 10:37

## 2017-02-07 RX ADMIN — INSULIN GLARGINE 25 UNIT(S): 100 INJECTION, SOLUTION SUBCUTANEOUS at 21:19

## 2017-02-07 RX ADMIN — Medication 40 MILLIGRAM(S): at 05:50

## 2017-02-07 RX ADMIN — RANOLAZINE 1000 MILLIGRAM(S): 500 TABLET, FILM COATED, EXTENDED RELEASE ORAL at 16:58

## 2017-02-07 RX ADMIN — Medication 150 MILLIGRAM(S): at 21:20

## 2017-02-07 RX ADMIN — RANOLAZINE 1000 MILLIGRAM(S): 500 TABLET, FILM COATED, EXTENDED RELEASE ORAL at 05:49

## 2017-02-07 RX ADMIN — Medication 2: at 17:50

## 2017-02-07 RX ADMIN — Medication 0.5 MILLIGRAM(S): at 05:52

## 2017-02-07 RX ADMIN — Medication 100 MILLIGRAM(S): at 05:50

## 2017-02-07 RX ADMIN — SERTRALINE 50 MILLIGRAM(S): 25 TABLET, FILM COATED ORAL at 16:58

## 2017-02-07 RX ADMIN — MONTELUKAST 10 MILLIGRAM(S): 4 TABLET, CHEWABLE ORAL at 21:30

## 2017-02-07 RX ADMIN — CLOPIDOGREL BISULFATE 75 MILLIGRAM(S): 75 TABLET, FILM COATED ORAL at 10:37

## 2017-02-07 RX ADMIN — PANTOPRAZOLE SODIUM 40 MILLIGRAM(S): 20 TABLET, DELAYED RELEASE ORAL at 06:07

## 2017-02-07 RX ADMIN — Medication 0.5 MILLIGRAM(S): at 16:58

## 2017-02-07 RX ADMIN — ATORVASTATIN CALCIUM 40 MILLIGRAM(S): 80 TABLET, FILM COATED ORAL at 21:36

## 2017-02-07 RX ADMIN — SERTRALINE 50 MILLIGRAM(S): 25 TABLET, FILM COATED ORAL at 05:49

## 2017-02-07 RX ADMIN — SODIUM CHLORIDE 75 MILLILITER(S): 9 INJECTION INTRAMUSCULAR; INTRAVENOUS; SUBCUTANEOUS at 11:20

## 2017-02-07 RX ADMIN — ENOXAPARIN SODIUM 40 MILLIGRAM(S): 100 INJECTION SUBCUTANEOUS at 21:35

## 2017-02-07 RX ADMIN — Medication 40 MILLIGRAM(S): at 16:58

## 2017-02-07 RX ADMIN — LOSARTAN POTASSIUM 25 MILLIGRAM(S): 100 TABLET, FILM COATED ORAL at 05:49

## 2017-02-07 NOTE — PROGRESS NOTE ADULT - ASSESSMENT
Right femoral cath site with Perclose closure. No bleeding, no hematoma. Pedal pulses palpable    60 y/o male with HTN, DM, HL, depression/anxiety and CAD s/p 3V CABG at [facility] in May 2016 and PCI & stent July 2016 & Oct 2016 , he has ad YUE to [place] to LAD & YUE to RCA  presents with c/o chest pain for one day, this pain  was constant  non radiating and was associated with SOB.   Now S/P  PCI of OM2

## 2017-02-07 NOTE — PROGRESS NOTE ADULT - ASSESSMENT
60 y/o male with HTN, DM, HL, depression/anxiety and CAD s/p 3V CABG and PCI x 2 presents with constant CP with negative     *chest pain r/o ACS  -in pt w/ CAD s/p CABG 5/2016,  PCI w/ YUE July and Oct. 2016  - troponins negative  - continue aspirin, plavix, statin, BB, ARB  - Cardio f/u appreciated- plan for cath     *HTN  - controlled, cont home meds    *T2D  - cont lantus and ISS (hold home med)   - diabetic diet, fingersticks     *depression/anxiety--cont SSRI and benzo    *dvt px-LMWH and ambulation 62 y/o male with HTN, DM, HL, depression/anxiety and CAD s/p 3V CABG and PCI x 2 presents with constant CP with negative     *chest pain S/P PCI of OM2   -in pt w/ CAD s/p CABG 5/2016,  PCI w/ YUE July and Oct. 2016  - troponins negative  - continue aspirin, plavix, statin, BB, ARB  - Discussed with Dr warren increase ASA 325mg  - cardio FU appreciated     *HTN  - controlled, cont home meds    *T2D  - cont lantus and ISS (hold home med)   - diabetic diet, fingersticks     *depression/anxiety--cont SSRI and benzo    *dvt px-LMWH and ambulation

## 2017-02-07 NOTE — PROGRESS NOTE ADULT - SUBJECTIVE AND OBJECTIVE BOX
60 y/o male with HTN, DM, HL, depression/anxiety and CAD s/p 3V CABG at [facility] in May 2016 and PCI & staent July 2016 & Oct 2016 , he has ad YUE to [place] to LAD & YUE to [facility]  presents with c/o chest pain for one day, this pain  was constant  non radiating and was associated with SOB. This pain was like a pressure   & lasted for a few hours & resolved. On admission his  Trop negative  EKG--NSR no acute ST elevation. CXR--no infiltrates or edema.  d-dimer negative. Pt had negative CTA chest as out pt recently. he was given ASA/nitro and mso4 He is now  awake and alert, NAD, he states  he gets SOB with mild to moderate exertion & at times has chest tightness with exertion. He is now pain free & is comfortable.  He denies any cough or fever. No recent travel , this pain was not pleuritic or positional.  02/07/17 He feels better, but he still has chest tightness on & off . he is in NSR on tele & is for cardiac cath today. He is pain free now.      PAST MEDICAL & SURGICAL HISTORY:  Coronary artery disease involving coronary bypass graft of native heart with angina pectoris  COPD (chronic obstructive pulmonary disease)  DM (diabetes mellitus)  HTN (hypertension)  S/P arterial stent  S/P CABG x 3          MEDICATIONS  (STANDING):  nitroglycerin    2% Ointment 1Inch(s) Transdermal once  LORazepam     Tablet 1milliGRAM(s) Oral once  aspirin  chewable 81milliGRAM(s) Oral daily  losartan 25milliGRAM(s) Oral daily  ranolazine 1000milliGRAM(s) Oral two times a day  clonazePAM Tablet 0.5milliGRAM(s) Oral two times a day  sertraline 50milliGRAM(s) Oral two times a day  traZODone 150milliGRAM(s) Oral at bedtime  insulin glargine Injectable (LANTUS) 25Unit(s) SubCutaneous two times a day  atorvastatin 40milliGRAM(s) Oral at bedtime  clopidogrel Tablet 75milliGRAM(s) Oral daily  metoprolol succinate ER 100milliGRAM(s) Oral daily  fluticasone / salmeterol 100-50 MICROgram(s) Diskus 1Dose(s) Inhalation two times a day  furosemide    Tablet 40milliGRAM(s) Oral two times a day  montelukast 10milliGRAM(s) Oral at bedtime  pantoprazole    Tablet 40milliGRAM(s) Oral before breakfast  enoxaparin Injectable 40milliGRAM(s) SubCutaneous every 24 hours  insulin lispro (HumaLOG) corrective regimen sliding scale  SubCutaneous three times a day before meals  dextrose 5%. 1000milliLiter(s) IV Continuous <Continuous>  dextrose 50% Injectable 12.5Gram(s) IV Push once  dextrose 50% Injectable 25Gram(s) IV Push once  dextrose 50% Injectable 25Gram(s) IV Push once    MEDICATIONS  (PRN):  nitroglycerin     SubLingual 0.4milliGRAM(s) SubLingual every 5 minutes PRN Chest Pain  acetaminophen   Tablet 650milliGRAM(s) Oral every 6 hours PRN For Temp greater than 38 C (100.4 F)  acetaminophen   Tablet. 650milliGRAM(s) Oral every 6 hours PRN Mild Pain (1 - 3)  dextrose Gel 1Dose(s) Oral once PRN Blood Glucose LESS THAN 70 milliGRAM(s)/deciliter  glucagon  Injectable 1milliGRAM(s) IntraMuscular once PRN Glucose LESS THAN 70 milligrams/deciliter      ROS:     A comprehensive review of systems was performed and pertinent items are noted in the history above. A detailed ROS is as follows:  Constitutional	Negative for chills, fevers, sweats.    Eyes: 	Negative for visual disturbance.  Ears, nose, mouth, throat, and face: Negative for epistaxis, nasal congestion, sore throat and tinnitus.  Neck:	Negative for enlargement, pain and difficulty in swallowing  Respiration : Negative for cough, dyspnea on exertion, pleuritic chest pain and wheezing  Cardiovascular: Negative  dyspnea and palpitations , but he has episodes of chest tightness.    Gastrointestinal : Negative for abdominal pain, diarrhea, nausea and vomiting  Genitourinary: Negative for dysuria, frequency and urinary incontinence .  Skin: Negative for  rash, pruritus, swelling, dryness .  	  Hematologic/lymphatic: Negative for bleeding and easy bruising  Musculoskeletal: Negative for arthralgias, back pain and muscle weakness.  Neurological: Negative for dizziness, headaches, seizures and tremors  Behavioral/Psych: Negative for mood change, depression.  Endocrine:	Negative for blurry vision, polydipsia and polyuria, diaphoresis.   Allergic/Immunologic:	Negative for anaphylaxis, angioedema and urticaria.          Vital Signs Last 24 Hrs  T(C): 36.3, Max: 36.4 (02-06 @ 16:49)  T(F): 97.4, Max: 97.5 (02-06 @ 16:49)  HR: 66 (60 - 66)  BP: 97/56 (90/52 - 105/61)  BP(mean): --  RR: 16 (16 - 18)  SpO2: 95% (94% - 98%)        PHYSICAL EXAM:    General Appearance:   alert, well appearing, and in no distress and oriented to person, place, and time  HEENT:    PERRLA, conjunctiva clear, EOMs intact, non injected pharynx, no exudate, TM normal.   Neck:    Supple, no adenopathy, thyroid: not enlarged. No JVD. Carotid Bruit: absent bilateral  Back:    Symmetric, no costovertebral angle fullness or tenderness, no soft tissue tenderness.  Chest:   clear to auscultation, no wheezes, rales or rhonchi, symmetric air entry, no tachypnea, retractions or cyanosis  Heart:    CVS exam BP noted to be well controlled today.  S1, S2 normal, no gallop, no murmur.  Abdomen:    Soft, non-tender, bowel sounds active, no hepatosplenomegaly. No palpable masses. No aortic pulsation. Aorta not palpable, No bruit audible.  Extremities:     no cyanosis, no edema, no joint swelling. Radial pulses normal, femoral artery pulses normal, pedal pulses normal both DP's and PT's   Skin:     Skin color, texture normal, no rashes .  Neurologic:     Alert and oriented X3 , cranial nerves intact, sensory and motor normal.      INTERPRETATION OF TELEMETRY: NSR    ECG:Pending         LABS:                          12.6   7.5   )-----------( 148      ( 07 Feb 2017 05:17 )             39.5     07 Feb 2017 05:17    143    |  104    |  18     ----------------------------<  145    4.1     |  31     |  1.01     Ca    9.0        07 Feb 2017 05:17    TPro  6.7    /  Alb  3.3    /  TBili  0.3    /  DBili  x      /  AST  14     /  ALT  29     /  AlkPhos  60     05 Feb 2017 12:21    CARDIAC MARKERS ( 06 Feb 2017 12:02 )  <0.015 ng/mL / x     / x     / x     / x      CARDIAC MARKERS ( 06 Feb 2017 03:27 )  <0.015 ng/mL / x     / x     / x     / x      CARDIAC MARKERS ( 05 Feb 2017 19:42 )  <0.015 ng/mL / x     / x     / x     / x      CARDIAC MARKERS ( 05 Feb 2017 15:31 )  <0.015 ng/mL / x     / x     / x     / x      CARDIAC MARKERS ( 05 Feb 2017 12:21 )  <0.015 ng/mL / x     / x     / x     / x            Pro BNP  74 02-06 @ 12:02  D Dimer  -- 02-06 @ 12:02  Pro BNP  -- 02-05 @ 14:08  D Dimer  194 02-05 @ 14:08    PT/INR - ( 05 Feb 2017 12:21 )   PT: 12.8 sec;   INR: 1.16 ratio         PTT - ( 05 Feb 2017 12:21 )  PTT:26.8 sec          RADIOLOGY & ADDITIONAL STUDIES:  Patient is a 61y old  Male who presents with a chief complaint of Chest Pain (05 Feb 2017 15:43)      HPI:  60 y/o male with HTN, DM, HL, depression/anxiety and CAD s/p 3V CABG at [facility] in May 2016 and PCI by dr warren presents with constant CP since 0400 today. Constant, non radiating and SOB. Trop negative x 1. EKG--NSR no acute ST elevation. CXR--no infiltrates or edema.  d-dimer negative. Pt had negative CTA chest as outpt recently. he was given ASA/nitro and mso4  On my exam, awake and alert, NAD, c/o CP. denies fevers, chills or hemoptysis. + near syncope (05 Feb 2017 15:43)      PAST MEDICAL & SURGICAL HISTORY:  Coronary artery disease involving coronary bypass graft of native heart with angina pectoris  COPD (chronic obstructive pulmonary disease)  DM (diabetes mellitus)  HTN (hypertension)  S/P arterial stent  S/P CABG x 3      PREVIOUS DIAGNOSTIC TESTING:      ECHO  FINDINGS:    STRESS  FINDINGS:    CATHETERIZATION  FINDINGS:    MEDICATIONS  (STANDING):  nitroglycerin    2% Ointment 1Inch(s) Transdermal once  LORazepam     Tablet 1milliGRAM(s) Oral once  aspirin  chewable 81milliGRAM(s) Oral daily  losartan 25milliGRAM(s) Oral daily  ranolazine 1000milliGRAM(s) Oral two times a day  clonazePAM Tablet 0.5milliGRAM(s) Oral two times a day  sertraline 50milliGRAM(s) Oral two times a day  traZODone 150milliGRAM(s) Oral at bedtime  insulin glargine Injectable (LANTUS) 25Unit(s) SubCutaneous two times a day  atorvastatin 40milliGRAM(s) Oral at bedtime  clopidogrel Tablet 75milliGRAM(s) Oral daily  metoprolol succinate ER 100milliGRAM(s) Oral daily  fluticasone / salmeterol 100-50 MICROgram(s) Diskus 1Dose(s) Inhalation two times a day  furosemide    Tablet 40milliGRAM(s) Oral two times a day  montelukast 10milliGRAM(s) Oral at bedtime  pantoprazole    Tablet 40milliGRAM(s) Oral before breakfast  enoxaparin Injectable 40milliGRAM(s) SubCutaneous every 24 hours  insulin lispro (HumaLOG) corrective regimen sliding scale  SubCutaneous three times a day before meals  dextrose 5%. 1000milliLiter(s) IV Continuous <Continuous>  dextrose 50% Injectable 12.5Gram(s) IV Push once  dextrose 50% Injectable 25Gram(s) IV Push once  dextrose 50% Injectable 25Gram(s) IV Push once    MEDICATIONS  (PRN):  nitroglycerin     SubLingual 0.4milliGRAM(s) SubLingual every 5 minutes PRN Chest Pain  acetaminophen   Tablet 650milliGRAM(s) Oral every 6 hours PRN For Temp greater than 38 C (100.4 F)  acetaminophen   Tablet. 650milliGRAM(s) Oral every 6 hours PRN Mild Pain (1 - 3)  dextrose Gel 1Dose(s) Oral once PRN Blood Glucose LESS THAN 70 milliGRAM(s)/deciliter  glucagon  Injectable 1milliGRAM(s) IntraMuscular once PRN Glucose LESS THAN 70 milligrams/deciliter      ROS:     A comprehensive review of systems was performed and pertinent items are noted in the history above. A detailed ROS is as follows:  Constitutional	Negative for chills, fevers, sweats.    Eyes: 	Negative for visual disturbance.  Ears, nose, mouth, throat, and face: Negative for epistaxis, nasal congestion, sore throat and tinnitus.  Neck:	Negative for enlargement, pain and difficulty in swallowing  Respiration : Negative for cough, dyspnea on exertion, pleuritic chest pain and wheezing  Cardiovascular: Negative for chest pain, dyspnea and palpitations    Gastrointestinal : Negative for abdominal pain, diarrhea, nausea and vomiting  Genitourinary: Negative for dysuria, frequency and urinary incontinence .  Skin: Negative for  rash, pruritus, swelling, dryness .  	  Hematologic/lymphatic: Negative for bleeding and easy bruising  Musculoskeletal: Negative for arthralgias, back pain and muscle weakness.  Neurological: Negative for dizziness, headaches, seizures and tremors  Behavioral/Psych: Negative for mood change, depression.  Endocrine:	Negative for blurry vision, polydipsia and polyuria, diaphoresis.   Allergic/Immunologic:	Negative for anaphylaxis, angioedema and urticaria.          Vital Signs Last 24 Hrs  T(C): 36.3, Max: 36.4 (02-06 @ 16:49)  T(F): 97.4, Max: 97.5 (02-06 @ 16:49)  HR: 66 (60 - 66)  BP: 97/56 (90/52 - 105/61)  BP(mean): --  RR: 16 (16 - 18)  SpO2: 95% (94% - 98%)    I&O's Summary      PHYSICAL EXAM:    General Appearance:   alert, well appearing, and in no distress and oriented to person, place, and time  HEENT:    PERRLA, conjunctiva clear, EOMs intact, non injected pharynx, no exudate, TM normal.   Neck:    Supple, no adenopathy, thyroid: not enlarged. No JVD. Carotid Bruit: absent bilateral  Back:    Symmetric, no costovertebral angle fullness or tenderness, no soft tissue tenderness.  Chest:   clear to auscultation, no wheezes, rales or rhonchi, symmetric air entry, no tachypnea, retractions or cyanosis  Heart:    CVS exam BP noted to be well controlled today.  S1, S2 normal, no gallop, no murmur.  Abdomen:    Soft, non-tender, bowel sounds active, no hepatosplenomegaly. No palpable masses. No aortic pulsation. Aorta not palpable, No bruit audible.  Extremities:     no cyanosis, no edema, no joint swelling. Radial pulses normal, femoral artery pulses normal, pedal pulses normal both DP's and PT's   Skin:     Skin color, texture normal, no rashes .  Neurologic:     Alert and oriented X3 , cranial nerves intact, sensory and motor normal.      INTERPRETATION OF TELEMETRY:        ECG:        LABS:                          12.6   7.5   )-----------( 148      ( 07 Feb 2017 05:17 )             39.5     07 Feb 2017 05:17    143    |  104    |  18     ----------------------------<  145    4.1     |  31     |  1.01     Ca    9.0        07 Feb 2017 05:17    TPro  6.7    /  Alb  3.3    /  TBili  0.3    /  DBili  x      /  AST  14     /  ALT  29     /  AlkPhos  60     05 Feb 2017 12:21    CARDIAC MARKERS ( 06 Feb 2017 12:02 )  <0.015 ng/mL / x     / x     / x     / x      CARDIAC MARKERS ( 06 Feb 2017 03:27 )  <0.015 ng/mL / x     / x     / x     / x      CARDIAC MARKERS ( 05 Feb 2017 19:42 )  <0.015 ng/mL / x     / x     / x     / x      CARDIAC MARKERS ( 05 Feb 2017 15:31 )  <0.015 ng/mL / x     / x     / x     / x      CARDIAC MARKERS ( 05 Feb 2017 12:21 )  <0.015 ng/mL / x     / x     / x     / x            Pro BNP  74 02-06 @ 12:02  D Dimer  -- 02-06 @ 12:02  Pro BNP  -- 02-05 @ 14:08  D Dimer  194 02-05 @ 14:08    PT/INR - ( 05 Feb 2017 12:21 )   PT: 12.8 sec;   INR: 1.16 ratio         PTT - ( 05 Feb 2017 12:21 )  PTT:26.8 sec          RADIOLOGY & ADDITIONAL STUDIES:

## 2017-02-08 ENCOUNTER — TRANSCRIPTION ENCOUNTER (OUTPATIENT)
Age: 62
End: 2017-02-08

## 2017-02-08 VITALS — HEART RATE: 71 BPM | SYSTOLIC BLOOD PRESSURE: 102 MMHG | DIASTOLIC BLOOD PRESSURE: 60 MMHG

## 2017-02-08 LAB
ANION GAP SERPL CALC-SCNC: 7 MMOL/L — SIGNIFICANT CHANGE UP (ref 5–17)
BASOPHILS # BLD AUTO: 0 K/UL — SIGNIFICANT CHANGE UP (ref 0–0.2)
BASOPHILS NFR BLD AUTO: 0.6 % — SIGNIFICANT CHANGE UP (ref 0–2)
BUN SERPL-MCNC: 19 MG/DL — SIGNIFICANT CHANGE UP (ref 7–23)
CALCIUM SERPL-MCNC: 8.9 MG/DL — SIGNIFICANT CHANGE UP (ref 8.5–10.1)
CHLORIDE SERPL-SCNC: 104 MMOL/L — SIGNIFICANT CHANGE UP (ref 96–108)
CO2 SERPL-SCNC: 32 MMOL/L — HIGH (ref 22–31)
CREAT SERPL-MCNC: 0.98 MG/DL — SIGNIFICANT CHANGE UP (ref 0.5–1.3)
EOSINOPHIL # BLD AUTO: 0.1 K/UL — SIGNIFICANT CHANGE UP (ref 0–0.5)
EOSINOPHIL NFR BLD AUTO: 1.8 % — SIGNIFICANT CHANGE UP (ref 0–6)
GLUCOSE SERPL-MCNC: 172 MG/DL — HIGH (ref 70–99)
HCT VFR BLD CALC: 36.6 % — LOW (ref 39–50)
HGB BLD-MCNC: 11.9 G/DL — LOW (ref 13–17)
LYMPHOCYTES # BLD AUTO: 1.1 K/UL — SIGNIFICANT CHANGE UP (ref 1–3.3)
LYMPHOCYTES # BLD AUTO: 14.9 % — SIGNIFICANT CHANGE UP (ref 13–44)
MAGNESIUM SERPL-MCNC: 1.8 MG/DL — SIGNIFICANT CHANGE UP (ref 1.8–2.4)
MCHC RBC-ENTMCNC: 26 PG — LOW (ref 27–34)
MCHC RBC-ENTMCNC: 32.6 GM/DL — SIGNIFICANT CHANGE UP (ref 32–36)
MCV RBC AUTO: 79.7 FL — LOW (ref 80–100)
MONOCYTES # BLD AUTO: 0.7 K/UL — SIGNIFICANT CHANGE UP (ref 0–0.9)
MONOCYTES NFR BLD AUTO: 9.2 % — SIGNIFICANT CHANGE UP (ref 2–14)
NEUTROPHILS # BLD AUTO: 5.2 K/UL — SIGNIFICANT CHANGE UP (ref 1.8–7.4)
NEUTROPHILS NFR BLD AUTO: 73.5 % — SIGNIFICANT CHANGE UP (ref 43–77)
PHOSPHATE SERPL-MCNC: 3.4 MG/DL — SIGNIFICANT CHANGE UP (ref 2.5–4.5)
PLATELET # BLD AUTO: 134 K/UL — LOW (ref 150–400)
POTASSIUM SERPL-MCNC: 4.2 MMOL/L — SIGNIFICANT CHANGE UP (ref 3.5–5.3)
POTASSIUM SERPL-SCNC: 4.2 MMOL/L — SIGNIFICANT CHANGE UP (ref 3.5–5.3)
RBC # BLD: 4.59 M/UL — SIGNIFICANT CHANGE UP (ref 4.2–5.8)
RBC # FLD: 14.9 % — HIGH (ref 10.3–14.5)
SODIUM SERPL-SCNC: 143 MMOL/L — SIGNIFICANT CHANGE UP (ref 135–145)
WBC # BLD: 7.1 K/UL — SIGNIFICANT CHANGE UP (ref 3.8–10.5)
WBC # FLD AUTO: 7.1 K/UL — SIGNIFICANT CHANGE UP (ref 3.8–10.5)

## 2017-02-08 PROCEDURE — 93010 ELECTROCARDIOGRAM REPORT: CPT

## 2017-02-08 RX ORDER — TRAZODONE HCL 50 MG
1 TABLET ORAL
Qty: 0 | Refills: 0 | COMMUNITY

## 2017-02-08 RX ORDER — ISOSORBIDE MONONITRATE 60 MG/1
30 TABLET, EXTENDED RELEASE ORAL DAILY
Qty: 0 | Refills: 0 | Status: DISCONTINUED | OUTPATIENT
Start: 2017-02-08 | End: 2017-02-08

## 2017-02-08 RX ORDER — MONTELUKAST 4 MG/1
1 TABLET, CHEWABLE ORAL
Qty: 0 | Refills: 0 | COMMUNITY

## 2017-02-08 RX ORDER — ASPIRIN/CALCIUM CARB/MAGNESIUM 324 MG
81 TABLET ORAL DAILY
Qty: 0 | Refills: 0 | Status: DISCONTINUED | OUTPATIENT
Start: 2017-02-08 | End: 2017-02-08

## 2017-02-08 RX ADMIN — Medication 81 MILLIGRAM(S): at 11:59

## 2017-02-08 RX ADMIN — INSULIN GLARGINE 25 UNIT(S): 100 INJECTION, SOLUTION SUBCUTANEOUS at 08:07

## 2017-02-08 RX ADMIN — Medication 40 MILLIGRAM(S): at 05:43

## 2017-02-08 RX ADMIN — RANOLAZINE 1000 MILLIGRAM(S): 500 TABLET, FILM COATED, EXTENDED RELEASE ORAL at 05:42

## 2017-02-08 RX ADMIN — Medication 2: at 11:57

## 2017-02-08 RX ADMIN — Medication 40 MILLIGRAM(S): at 17:29

## 2017-02-08 RX ADMIN — RANOLAZINE 1000 MILLIGRAM(S): 500 TABLET, FILM COATED, EXTENDED RELEASE ORAL at 17:29

## 2017-02-08 RX ADMIN — LOSARTAN POTASSIUM 25 MILLIGRAM(S): 100 TABLET, FILM COATED ORAL at 08:07

## 2017-02-08 RX ADMIN — Medication 2: at 08:06

## 2017-02-08 RX ADMIN — Medication 2: at 17:29

## 2017-02-08 RX ADMIN — PANTOPRAZOLE SODIUM 40 MILLIGRAM(S): 20 TABLET, DELAYED RELEASE ORAL at 08:08

## 2017-02-08 RX ADMIN — SERTRALINE 50 MILLIGRAM(S): 25 TABLET, FILM COATED ORAL at 17:28

## 2017-02-08 RX ADMIN — Medication 100 MILLIGRAM(S): at 05:43

## 2017-02-08 RX ADMIN — SERTRALINE 50 MILLIGRAM(S): 25 TABLET, FILM COATED ORAL at 05:43

## 2017-02-08 RX ADMIN — CLOPIDOGREL BISULFATE 75 MILLIGRAM(S): 75 TABLET, FILM COATED ORAL at 11:58

## 2017-02-08 NOTE — DISCHARGE NOTE ADULT - CARE PLAN
Principal Discharge DX:	Chest pain, unspecified type  Goal:	med management.  Instructions for follow-up, activity and diet:	Follow up with your cardiologist.  Secondary Diagnosis:	Chronic obstructive pulmonary disease, unspecified COPD type  Goal:	continue home meds.  Instructions for follow-up, activity and diet:	follow up with your pcp and pulmonologist.  Secondary Diagnosis:	Coronary artery disease involving coronary bypass graft of native heart with angina pectoris  Goal:	med management  Instructions for follow-up, activity and diet:	Follow up with your cardiologist.  Secondary Diagnosis:	Essential hypertension  Goal:	med management  Instructions for follow-up, activity and diet:	Follow up with your pcp.  Secondary Diagnosis:	S/P CABG x 3  Goal:	med management  Instructions for follow-up, activity and diet:	Follow up with your cardiologist.  Secondary Diagnosis:	DM (diabetes mellitus)  Goal:	med management  Instructions for follow-up, activity and diet:	Follow up with your cardiologist.

## 2017-02-08 NOTE — PROGRESS NOTE ADULT - SUBJECTIVE AND OBJECTIVE BOX
Cardiology NP     Patient is a 61y old  Male who presents with a chief complaint of Chest Pain (05 Feb 2017 15:43)      HPI:  60 y/o male with HTN, DM, HL, depression/anxiety and CAD s/p 3V CABG at [facility] in May 2016 and PCI by dr warren presents with constant CP since 0400 today. Constant, non radiating and SOB. Trop negative x 1. EKG--NSR no acute ST elevation. CXR--no infiltrates or edema.  d-dimer negative. Pt had negative CTA chest as outpt recently. he was given ASA/nitro and mso4  On my exam, awake and alert, NAD, c/o CP. denies fevers, chills or hemoptysis. + near syncope (05 Feb 2017 15:43)      PAST MEDICAL & SURGICAL HISTORY:  Coronary artery disease involving coronary bypass graft of native heart with angina pectoris  COPD (chronic obstructive pulmonary disease)  DM (diabetes mellitus)  HTN (hypertension)  S/P arterial stent  S/P CABG x 3      MEDICATIONS  (STANDING):  nitroglycerin    2% Ointment 1Inch(s) Transdermal once  LORazepam     Tablet 1milliGRAM(s) Oral once  losartan 25milliGRAM(s) Oral daily  ranolazine 1000milliGRAM(s) Oral two times a day  clonazePAM Tablet 0.5milliGRAM(s) Oral two times a day  sertraline 50milliGRAM(s) Oral two times a day  traZODone 150milliGRAM(s) Oral at bedtime  insulin glargine Injectable (LANTUS) 25Unit(s) SubCutaneous two times a day  atorvastatin 40milliGRAM(s) Oral at bedtime  clopidogrel Tablet 75milliGRAM(s) Oral daily  metoprolol succinate ER 100milliGRAM(s) Oral daily  fluticasone / salmeterol 100-50 MICROgram(s) Diskus 1Dose(s) Inhalation two times a day  furosemide    Tablet 40milliGRAM(s) Oral two times a day  montelukast 10milliGRAM(s) Oral at bedtime  pantoprazole    Tablet 40milliGRAM(s) Oral before breakfast  enoxaparin Injectable 40milliGRAM(s) SubCutaneous every 24 hours  insulin lispro (HumaLOG) corrective regimen sliding scale  SubCutaneous three times a day before meals  dextrose 5%. 1000milliLiter(s) IV Continuous <Continuous>  dextrose 50% Injectable 12.5Gram(s) IV Push once  dextrose 50% Injectable 25Gram(s) IV Push once  dextrose 50% Injectable 25Gram(s) IV Push once  sodium chloride 0.9%. 1000milliLiter(s) IV Continuous <Continuous>  aspirin enteric coated 325milliGRAM(s) Oral daily    MEDICATIONS  (PRN):  nitroglycerin     SubLingual 0.4milliGRAM(s) SubLingual every 5 minutes PRN Chest Pain  acetaminophen   Tablet 650milliGRAM(s) Oral every 6 hours PRN For Temp greater than 38 C (100.4 F)  acetaminophen   Tablet. 650milliGRAM(s) Oral every 6 hours PRN Mild Pain (1 - 3)  dextrose Gel 1Dose(s) Oral once PRN Blood Glucose LESS THAN 70 milliGRAM(s)/deciliter  glucagon  Injectable 1milliGRAM(s) IntraMuscular once PRN Glucose LESS THAN 70 milligrams/deciliter      Allergies    No Known Allergies    Intolerances        REVIEW OF SYSTEMS: As mentioned in HPI all others Negative     Vital Signs Last 24 Hrs  T(C): 36.2, Max: 36.3 (02-07 @ 08:00)  T(F): 97.1, Max: 97.4 (02-07 @ 08:00)  HR: 69 (60 - 71)  BP: 109/67 (89/46 - 121/67)  BP(mean): 77 (22 - 83)  RR: 17 (14 - 19)  SpO2: 94% (92% - 100%)    PHYSICAL EXAM:    NERVOUS SYSTEM:  Alert & Oriented X3,   CHEST/LUNG: Clear to percussion bilaterally; No rales, rhonchi, wheezing, or rubs  HEART: Regular rate and rhythm; No murmurs, rubs, or gallops  ABDOMEN: Soft, Nontender, Nondistended; Bowel sounds present  EXTREMITIES:  2+ Peripheral Pulses, No clubbing, cyanosis, or edema  SKIN: right femoral cath site without bleeding or hematoma.    LABS:                        11.9   7.1   )-----------( 134      ( 08 Feb 2017 04:56 )             36.6     08 Feb 2017 04:56    143    |  104    |  19     ----------------------------<  172    4.2     |  32     |  0.98     Ca    8.9        08 Feb 2017 04:56  Phos  3.4       08 Feb 2017 04:56  Mg     1.8       08 Feb 2017 04:56

## 2017-02-08 NOTE — DISCHARGE NOTE ADULT - CARE PROVIDER_API CALL
Antonino Lozoya (MD), Cardiovascular Disease; Internal Medicine  [address] [place] [address]  Phone: [phone]  Fax: [phone]    Stanley Ruggiero), Internal Medicine  [address] [place] [address]  Phone: [phone]  Fax: [phone]

## 2017-02-08 NOTE — DISCHARGE NOTE ADULT - PLAN OF CARE
med management. Follow up with your cardiologist. continue home meds. follow up with your pcp and pulmonologist. med management Follow up with your pcp.

## 2017-02-08 NOTE — PROGRESS NOTE ADULT - SUBJECTIVE AND OBJECTIVE BOX
62 y/o male with HTN, DM, HL, depression/anxiety and CAD s/p 3V CABG at [facility] in May 2016 and PCI & staent July 2016 & Oct 2016 , he has ad YUE to [place] to LAD & YUE to RCA  presents with c/o chest pain for one day, this pain  was constant  non radiating and was associated with SOB. This pain was like a pressure   & lasted for a few hours & resolved. On admission his  Trop negative  EKG--NSR no acute ST elevation. CXR--no infiltrates or edema.  d-dimer negative. Pt had negative CTA chest as out pt recently. he was given ASA/nitro and mso4 He is now  awake and alert, NAD, he states  he gets SOB with mild to moderate exertion & at times has chest tightness with exertion. He is now pain free & is comfortable.  He denies any cough or fever. No recent travel , this pain was not pleuritic or positional.  02/07/17 He feels better, but he still has chest tightness on & off . he is in NSR on tele & is for cardiac cath today. He is pain free now.  02/08/17 He is s/p stent yesterday, he is stable. No further chest pain or SOB. He is in NSR on tele.      PAST MEDICAL & SURGICAL HISTORY:  Coronary artery disease involving coronary bypass graft of native heart with angina pectoris  COPD (chronic obstructive pulmonary disease)  DM (diabetes mellitus)  HTN (hypertension)  S/P arterial stent  S/P CABG x 3          MEDICATIONS  (STANDING):  LORazepam     Tablet 1milliGRAM(s) Oral once  losartan 25milliGRAM(s) Oral daily  ranolazine 1000milliGRAM(s) Oral two times a day  clonazePAM Tablet 0.5milliGRAM(s) Oral two times a day  sertraline 50milliGRAM(s) Oral two times a day  traZODone 150milliGRAM(s) Oral at bedtime  insulin glargine Injectable (LANTUS) 25Unit(s) SubCutaneous two times a day  atorvastatin 40milliGRAM(s) Oral at bedtime  clopidogrel Tablet 75milliGRAM(s) Oral daily  metoprolol succinate ER 100milliGRAM(s) Oral daily  fluticasone / salmeterol 100-50 MICROgram(s) Diskus 1Dose(s) Inhalation two times a day  furosemide    Tablet 40milliGRAM(s) Oral two times a day  montelukast 10milliGRAM(s) Oral at bedtime  pantoprazole    Tablet 40milliGRAM(s) Oral before breakfast  enoxaparin Injectable 40milliGRAM(s) SubCutaneous every 24 hours  insulin lispro (HumaLOG) corrective regimen sliding scale  SubCutaneous three times a day before meals  dextrose 5%. 1000milliLiter(s) IV Continuous <Continuous>  dextrose 50% Injectable 12.5Gram(s) IV Push once  dextrose 50% Injectable 25Gram(s) IV Push once  dextrose 50% Injectable 25Gram(s) IV Push once  sodium chloride 0.9%. 1000milliLiter(s) IV Continuous <Continuous>  isosorbide   mononitrate ER Tablet (IMDUR) 30milliGRAM(s) Oral daily  aspirin enteric coated 81milliGRAM(s) Oral daily    MEDICATIONS  (PRN):  nitroglycerin     SubLingual 0.4milliGRAM(s) SubLingual every 5 minutes PRN Chest Pain  acetaminophen   Tablet 650milliGRAM(s) Oral every 6 hours PRN For Temp greater than 38 C (100.4 F)  acetaminophen   Tablet. 650milliGRAM(s) Oral every 6 hours PRN Mild Pain (1 - 3)  dextrose Gel 1Dose(s) Oral once PRN Blood Glucose LESS THAN 70 milliGRAM(s)/deciliter  glucagon  Injectable 1milliGRAM(s) IntraMuscular once PRN Glucose LESS THAN 70 milligrams/deciliter      ROS:     A comprehensive review of systems was performed and pertinent items are noted in the history above. A detailed ROS is as follows:  Constitutional	Negative for chills, fevers, sweats.    Eyes: 	Negative for visual disturbance.  Ears, nose, mouth, throat, and face: Negative for epistaxis, nasal congestion, sore throat and tinnitus.  Neck:	Negative for enlargement, pain and difficulty in swallowing  Respiration : Negative for cough, dyspnea on exertion, pleuritic chest pain and wheezing  Cardiovascular: Negative for chest pain, dyspnea and palpitations    Gastrointestinal : Negative for abdominal pain, diarrhea, nausea and vomiting  Genitourinary: Negative for dysuria, frequency and urinary incontinence .  Skin: Negative for  rash, pruritus, swelling, dryness .  	  Hematologic/lymphatic: Negative for bleeding and easy bruising  Musculoskeletal: Negative for arthralgias, back pain and muscle weakness.  Neurological: Negative for dizziness, headaches, seizures and tremors  Behavioral/Psych: Negative for mood change, depression.  Endocrine:	Negative for blurry vision, polydipsia and polyuria, diaphoresis.   Allergic/Immunologic:	Negative for anaphylaxis, angioedema and urticaria.          Vital Signs Last 24 Hrs  T(C): 36.2, Max: 36.3 (02-07 @ 17:06)  T(F): 97.1, Max: 97.4 (02-07 @ 17:06)  HR: 79 (60 - 79)  BP: 109/67 (89/46 - 121/67)  BP(mean): 77 (22 - 83)  RR: 18 (14 - 19)  SpO2: 98% (92% - 100%)          PHYSICAL EXAM:    General Appearance:   alert, well appearing, and in no distress and oriented to person, place, and time  HEENT:    PERRLA, conjunctiva clear, EOMs intact, non injected pharynx, no exudate, TM normal.   Neck:    Supple, no adenopathy, thyroid: not enlarged. No JVD. Carotid Bruit: absent bilateral  Back:    Symmetric, no costovertebral angle fullness or tenderness, no soft tissue tenderness.  Chest:   clear to auscultation, no wheezes, rales or rhonchi, symmetric air entry, no tachypnea, retractions or cyanosis  Heart:    CVS exam BP noted to be well controlled today.  S1, S2 normal, no gallop, no murmur.  Abdomen:    Soft, non-tender, bowel sounds active, no hepatosplenomegaly. No palpable masses. No aortic pulsation. Aorta not palpable, No bruit audible.  Extremities:     no cyanosis, no edema, no joint swelling. Radial pulses normal, femoral artery pulses normal, pedal pulses normal both DP's and PT's  , right groin normal.  Skin:     Skin color, texture normal, no rashes .  Neurologic:     Alert and oriented X3 , cranial nerves intact, sensory and motor normal.      INTERPRETATION OF TELEMETRY: NSR            LABS:                          11.9   7.1   )-----------( 134      ( 08 Feb 2017 04:56 )             36.6     08 Feb 2017 04:56    143    |  104    |  19     ----------------------------<  172    4.2     |  32     |  0.98     Ca    8.9        08 Feb 2017 04:56  Phos  3.4       08 Feb 2017 04:56  Mg     1.8       08 Feb 2017 04:56      CARDIAC MARKERS ( 06 Feb 2017 12:02 )  <0.015 ng/mL / x     / x     / x     / x        ECHO   The left atrium is normal.   Estimated left ventricular ejection fraction is 55-60 %.   The left ventricle is normal in size.   Trace mitral regurgitation is present.   A pericardial effusion is not present.   Pleural effusion - no evidence of pleural effusion.   Pulmonic valve not well seen, probably normal pulmonic valve function.   The right atrium is normal.   The right ventricle is normal in size.   Trace tricuspid valve regurgitation is present.    Pro BNP  74 02-06 @ 12:02  D Dimer  -- 02-06 @ 12:02  Pro BNP  -- 02-05 @ 14:08  D Dimer  194 02-05 @ 14:08              RADIOLOGY & ADDITIONAL STUDIES:

## 2017-02-08 NOTE — DISCHARGE NOTE ADULT - MEDICATION SUMMARY - MEDICATIONS TO TAKE
I will START or STAY ON the medications listed below when I get home from the hospital:    aspirin 81 mg oral tablet, chewable  -- 1 tab(s) by mouth once a day  -- Indication: For Coronary artery disease involving coronary bypass graft of native heart with angina pectoris    Cozaar 25 mg oral tablet  -- 1 tab(s) by mouth once a day  -- Indication: For Coronary artery disease involving coronary bypass graft of native heart with angina pectoris    Ranexa 1000 mg oral tablet, extended release  -- 1 tab(s) by mouth 2 times a day  -- Indication: For Chest pain    clonazePAM 0.5 mg oral tablet  -- 0.5 milligram(s) by mouth 2 times a day  -- Indication: For Depression/anxiety.    sertraline 50 mg oral tablet  -- 1 tab(s) by mouth 2 times a day  -- Indication: For Depression/anxiety.    Lantus 100 units/mL subcutaneous solution  -- 25 unit(s) subcutaneous 2 times a day  -- Indication: For DM (diabetes mellitus)    Synjardy 12.5 mg-1000 mg oral tablet  -- 1 tab(s) by mouth 2 times a day  -- Indication: For DM (diabetes mellitus)    Lipitor 40 mg oral tablet  -- 1 tab(s) by mouth once a day (at bedtime)  -- Indication: For Hyperlipidemia    Plavix 75 mg oral tablet  -- 1 tab(s) by mouth once a day  -- Indication: For Coronary artery disease involving coronary bypass graft of native heart with angina pectoris    metoprolol succinate 100 mg oral tablet, extended release  -- 1 tab(s) by mouth once a day  -- Indication: For Coronary artery disease involving coronary bypass graft of native heart with angina pectoris    Symbicort 80 mcg-4.5 mcg/inh inhalation aerosol  -- 2 puff(s) inhaled once a day  -- Indication: For COPD (chronic obstructive pulmonary disease)    Lasix 40 mg oral tablet  -- 1 tab(s) by mouth 2 times a day  -- Indication: For Coronary artery disease involving coronary bypass graft of native heart with angina pectoris    Protonix 40 mg oral delayed release tablet  -- 1 tab(s) by mouth once a day  -- Indication: For gerd

## 2017-02-08 NOTE — PROGRESS NOTE ADULT - ASSESSMENT
Patient now s/p PCI /YUE to the OM2    - Procedure ,outcome and followup care reviewed with patient  - AM labs and EKG reviewed  - continue ASA/ Plavix  - continue statin, ARB, B'blocker  - possible DC home today   - f/u with Dr Lozoya in 4-7 days  -

## 2017-02-08 NOTE — DISCHARGE NOTE ADULT - MEDICATION SUMMARY - MEDICATIONS TO CHANGE
I will SWITCH the dose or number of times a day I take the medications listed below when I get home from the hospital:    Singulair 10 mg oral tablet  -- 1 tab(s) by mouth once a day (at bedtime)

## 2017-02-08 NOTE — DISCHARGE NOTE ADULT - PATIENT PORTAL LINK FT
“You can access the [employer] Patient Portal, offered by [facility], by registering with the following website: [email or web address]”

## 2017-02-08 NOTE — DISCHARGE NOTE ADULT - SECONDARY DIAGNOSIS.
Chronic obstructive pulmonary disease, unspecified COPD type Coronary artery disease involving coronary bypass graft of native heart with angina pectoris Essential hypertension S/P CABG x 3 DM (diabetes mellitus)

## 2017-02-08 NOTE — PROGRESS NOTE ADULT - SUBJECTIVE AND OBJECTIVE BOX
PMD- Dr Ruggiero Cardio - Dr Lozoya/Dr Arrieta	    History of Present Illness:  Chief Complaint/Reason for Admission: Chest Pain	  History of Present Illness: 	  60 y/o male with HTN, DM, HL, depression/anxiety and CAD s/p 3V CABG at [facility] in May 2016 and PCI by dr Arrieta presents with constant CP since 0400 today. Constant, non radiating and SOB. Trop negative x 1. EKG--NSR no acute ST elevation. CXR--no infiltrates or edema.  d-dimer negative. Pt had negative CTA chest as outpt recently. he was given ASA/nitro and mso4  On my exam, awake and alert, NAD, c/o CP. denies fevers, chills or hemoptysis. + near syncope     2/6- chart reviewed. Pt c/o intermittent midsternal chest pain at rest, worse w/ palpation.  Sob w/ exertion.      2/7 - seen in PACU S/P revascularization of left cirmcumflex. Patient has no complaints.     2/8: Pt post stent placement.  Feels very weak and fatigued.  Pt is not comfortable going home.  Unable to determine ambulation status at this time.  Pt on multiple potential sedating medications.  No fever, chills, + dizziness, weakness, and lethargy.        REVIEW OF SYSTEMS:  General: + weakness/fatigue/dizziness, no fevers, chills  HEENT: No HA, neck pain, no visual changes, no hearing loss   Resp: No cough, wheezing, hemoptysis; No shortness of breath  CV: +chest pain, dyspnea w/ exertion  GI: No abdominal pain, no n/v/d, no blood in stool  : No f/u/d  Neuro: No weakness or numbness  MSK: No myalgias, arthralgias  SKIN: No itching, rashes, lesions  All other ROS negative unless indicated above.      Physical Exam:  Vital Signs Last 24 Hrs T(C): 36.2, Max: 36.3 (02-07 @ 17:06) T(F): 97.1, Max: 97.4 (02-07 @ 17:06) HR: 77 (60 - 79) BP: 98/69 (89/46 - 121/67) BP(mean): 75 (22 - 83) RR: 22 (14 - 22) SpO2: 93% (92% - 98%)  PHYSICAL EXAM: General: NAD, comfortable Neuro: AAOx3, no focal deficits HEENT: NCAT, PERRLA, EOMI,  Neck: Soft and supple, No JVD Respiratory: CTA b/l, no w/r/r Cardiovascular: S1 and S2, RRR, no m/g/r Chest: midsternal scar, tenderness w/ palpation Gastrointestinal: +BS, soft, NTND, no rebound/guarding Extremities: No c/c/e Vascular: 2+ peripheral pulses Musculoskeletal: 5/5 strength b/l UE and LE, sensation intact Skin: No rashes    LABS: All Labs Reviewed:                        11.9  7.1   )-----------( 134      ( 08 Feb 2017 04:56 )            36.6   08 Feb 2017 04:56  143    |  104    |  19    ----------------------------<  172   4.2     |  32     |  0.98   Ca    8.9        08 Feb 2017 04:56 Phos  3.4       08 Feb 2017 04:56 Mg     1.8       08 Feb 2017 04:56   CAPILLARY BLOOD GLUCOSE         Normal sinus rhythm 95bpm Cannot rule out Inferior infarct (cited on or before 02-OCT-2016) Nonspecific ST abnormality Abnormal ECG When compared with ECG of 04-DEC-2    CHEST SINGLE VIEW FRONTAL     02/05/2017  No acute disease unchanged.   	  MEDICATIONS  (STANDING):  losartan 25milliGRAM(s) Oral daily  ranolazine 1000milliGRAM(s) Oral two times a day  clonazePAM Tablet 0.5milliGRAM(s) Oral two times a day  sertraline 50milliGRAM(s) Oral two times a day  insulin glargine Injectable (LANTUS) 25Unit(s) SubCutaneous two times a day  atorvastatin 40milliGRAM(s) Oral at bedtime  clopidogrel Tablet 75milliGRAM(s) Oral daily  metoprolol succinate ER 100milliGRAM(s) Oral daily  fluticasone / salmeterol 100-50 MICROgram(s) Diskus 1Dose(s) Inhalation two times a day  furosemide    Tablet 40milliGRAM(s) Oral two times a day  pantoprazole    Tablet 40milliGRAM(s) Oral before breakfast  enoxaparin Injectable 40milliGRAM(s) SubCutaneous every 24 hours  insulin lispro (HumaLOG) corrective regimen sliding scale  SubCutaneous three times a day before meals  dextrose 5%. 1000milliLiter(s) IV Continuous <Continuous>  dextrose 50% Injectable 12.5Gram(s) IV Push once  dextrose 50% Injectable 25Gram(s) IV Push once  dextrose 50% Injectable 25Gram(s) IV Push once  sodium chloride 0.9%. 1000milliLiter(s) IV Continuous <Continuous>  isosorbide   mononitrate ER Tablet (IMDUR) 30milliGRAM(s) Oral daily  aspirin enteric coated 81milliGRAM(s) Oral daily    MEDICATIONS  (PRN):  nitroglycerin     SubLingual 0.4milliGRAM(s) SubLingual every 5 minutes PRN Chest Pain  acetaminophen   Tablet 650milliGRAM(s) Oral every 6 hours PRN For Temp greater than 38 C (100.4 F)  acetaminophen   Tablet. 650milliGRAM(s) Oral every 6 hours PRN Mild Pain (1 - 3)  dextrose Gel 1Dose(s) Oral once PRN Blood Glucose LESS THAN 70 milliGRAM(s)/deciliter  glucagon  Injectable 1milliGRAM(s) IntraMuscular once PRN Glucose LESS THAN 70 milligrams/deciliter        Assessment and Plan:   · Assessment		  		  60 y/o male with HTN, DM, HL, depression/anxiety and CAD s/p 3V CABG and PCI x 2 presents with constant CP:    *chest pain S/P PCI of OM2   -in pt w/ CAD s/p CABG 5/2016,  PCI w/ YUE July and Oct. 2016  - troponins negative  - continue aspirin, plavix, statin, BB, ARB  - Discussed with Dr arrieta increase ASA 325mg  - cardio FU appreciated   -imdur added.  Todays dose held secondary to hypotensionn.  - f/u with Dr Lozoya in 4-7 day    weakness/lethargy: Deconditioning vs med induced  -PT consult  -hold benzos and PM trazadone.  -hold pm montelukast    *HTN  - controlled, cont home meds    *T2D  - cont lantus and ISS (hold home med)   - diabetic diet, fingersticks     *depression/anxiety--cont SSRI and hold benzos    *dvt px-LMWH and ambulation    dispo:  -pending PT consult.  Pt too weak/lethargic for discharge today.  60 y/o male with HTN, DM, HL, depression/anxiety and CAD s/p 3V CABG and PCI x 2 presents with constant CP with negative     *chest pain r/o ACS  -in pt w/ CAD s/p CABG 5/2016,  PCI w/ YUE July and Oct. 2016  - troponins negative  - continue aspirin, plavix, statin, BB, ARB  - Cardio f/u appreciated- plan for cath     *HTN  - controlled, cont home meds    *T2D  - cont lantus and ISS (hold home med)   - diabetic diet, fingersticks     *depression/anxiety--cont SSRI and benzo    *dvt px-LMWH and ambulation    Attending Attestation:   I was physically present for the key portions of the evaluation and management (E/M) service provided.  I agree with the above history, physical, and plan which I have reviewed and edited where appropriate.     3540 minutes spent on total encounter; more than 50% of the visit was spent counseling and/or coordinating care by the attending physician.

## 2017-02-08 NOTE — DISCHARGE NOTE ADULT - HOSPITAL COURSE
60 y/o male with HTN, DM, HL, depression/anxiety and CAD s/p 3V CABG at [facility] in May 2016 and PCI by dr Arrieta presents with constant CP since 0400 today. Constant, non radiating and SOB. Trop negative x 1. EKG--NSR no acute ST elevation. CXR--no infiltrates or edema.  d-dimer negative. Pt had negative CTA chest as outpt recently. he was given ASA/nitro and mso4  On my exam, awake and alert, NAD, c/o CP. denies fevers, chills or hemoptysis. + near syncope     2/6- chart reviewed. Pt c/o intermittent midsternal chest pain at rest, worse w/ palpation.  Sob w/ exertion.      2/7 - seen in PACU S/P revascularization of left cirmcumflex. Patient has no complaints.     2/8: Pt post stent placement.  Feels very weak and fatigued.  Pt is not comfortable going home.  Unable to determine ambulation status at this time.  Pt on multiple potential sedating medications.  No fever, chills, + dizziness, weakness, and lethargy.

## 2017-02-08 NOTE — PROGRESS NOTE ADULT - ASSESSMENT
No further angina. He is s/p stent.   CAD s/p CABG & multiple stents .   HTN  Suggest  Observe patient on telemetry.  Continue with current cardiac medications.  Start Imdur 30 mg PO QD  he was educated on meds / diet.  Ambulate..   Discussed with Dr Arrieta.

## 2017-02-10 PROBLEM — E11.9 TYPE 2 DIABETES MELLITUS WITHOUT COMPLICATIONS: Chronic | Status: ACTIVE | Noted: 2017-02-05

## 2017-02-10 PROBLEM — I10 ESSENTIAL (PRIMARY) HYPERTENSION: Chronic | Status: ACTIVE | Noted: 2017-02-05

## 2017-02-10 PROBLEM — J44.9 CHRONIC OBSTRUCTIVE PULMONARY DISEASE, UNSPECIFIED: Chronic | Status: ACTIVE | Noted: 2017-02-05

## 2017-02-13 ENCOUNTER — APPOINTMENT (OUTPATIENT)
Dept: DERMATOLOGY | Facility: CLINIC | Age: 62
End: 2017-02-13

## 2017-02-13 DIAGNOSIS — Z79.84 LONG TERM (CURRENT) USE OF ORAL HYPOGLYCEMIC DRUGS: ICD-10-CM

## 2017-02-13 DIAGNOSIS — F41.8 OTHER SPECIFIED ANXIETY DISORDERS: ICD-10-CM

## 2017-02-13 DIAGNOSIS — Z95.1 PRESENCE OF AORTOCORONARY BYPASS GRAFT: ICD-10-CM

## 2017-02-13 DIAGNOSIS — R07.9 CHEST PAIN, UNSPECIFIED: ICD-10-CM

## 2017-02-13 DIAGNOSIS — Z95.5 PRESENCE OF CORONARY ANGIOPLASTY IMPLANT AND GRAFT: ICD-10-CM

## 2017-02-13 DIAGNOSIS — I10 ESSENTIAL (PRIMARY) HYPERTENSION: ICD-10-CM

## 2017-02-13 DIAGNOSIS — Z79.4 LONG TERM (CURRENT) USE OF INSULIN: ICD-10-CM

## 2017-02-13 DIAGNOSIS — Z87.891 PERSONAL HISTORY OF NICOTINE DEPENDENCE: ICD-10-CM

## 2017-02-13 DIAGNOSIS — Z79.02 LONG TERM (CURRENT) USE OF ANTITHROMBOTICS/ANTIPLATELETS: ICD-10-CM

## 2017-02-13 DIAGNOSIS — Z79.51 LONG TERM (CURRENT) USE OF INHALED STEROIDS: ICD-10-CM

## 2017-02-13 DIAGNOSIS — J44.9 CHRONIC OBSTRUCTIVE PULMONARY DISEASE, UNSPECIFIED: ICD-10-CM

## 2017-02-13 DIAGNOSIS — I25.119 ATHEROSCLEROTIC HEART DISEASE OF NATIVE CORONARY ARTERY WITH UNSPECIFIED ANGINA PECTORIS: ICD-10-CM

## 2017-02-13 DIAGNOSIS — E11.9 TYPE 2 DIABETES MELLITUS WITHOUT COMPLICATIONS: ICD-10-CM

## 2017-02-14 ENCOUNTER — APPOINTMENT (OUTPATIENT)
Dept: DERMATOLOGY | Facility: CLINIC | Age: 62
End: 2017-02-14

## 2017-02-14 ENCOUNTER — RECORD ABSTRACTING (OUTPATIENT)
Age: 62
End: 2017-02-14

## 2017-02-17 ENCOUNTER — APPOINTMENT (OUTPATIENT)
Dept: DERMATOLOGY | Facility: CLINIC | Age: 62
End: 2017-02-17

## 2017-02-17 RX ORDER — PEN NEEDLE, DIABETIC 29 G X1/2"
32G X 4 MM NEEDLE, DISPOSABLE MISCELLANEOUS
Qty: 100 | Refills: 0 | Status: ACTIVE | COMMUNITY
Start: 2016-12-07

## 2017-02-17 RX ORDER — PREDNISONE 5 MG/1
5 TABLET ORAL
Qty: 30 | Refills: 0 | Status: DISCONTINUED | COMMUNITY
Start: 2017-01-31

## 2017-02-17 RX ORDER — PREDNISONE 10 MG/1
10 TABLET ORAL
Qty: 20 | Refills: 0 | Status: DISCONTINUED | COMMUNITY
Start: 2016-09-09

## 2017-02-17 RX ORDER — AZITHROMYCIN 250 MG/1
250 TABLET, FILM COATED ORAL
Qty: 6 | Refills: 0 | Status: DISCONTINUED | COMMUNITY
Start: 2016-09-22

## 2017-02-17 RX ORDER — SIMVASTATIN 80 MG/1
80 TABLET, FILM COATED ORAL
Refills: 0 | Status: DISCONTINUED | COMMUNITY
End: 2017-02-17

## 2017-02-17 RX ORDER — METHYLPREDNISOLONE 4 MG/1
4 TABLET ORAL
Qty: 21 | Refills: 0 | Status: DISCONTINUED | COMMUNITY
Start: 2017-01-20

## 2017-02-17 RX ORDER — SERTRALINE HYDROCHLORIDE 100 MG/1
100 TABLET, FILM COATED ORAL
Qty: 30 | Refills: 0 | Status: DISCONTINUED | COMMUNITY
Start: 2017-01-14

## 2017-02-20 ENCOUNTER — APPOINTMENT (OUTPATIENT)
Dept: DERMATOLOGY | Facility: CLINIC | Age: 62
End: 2017-02-20

## 2017-02-27 ENCOUNTER — APPOINTMENT (OUTPATIENT)
Dept: DERMATOLOGY | Facility: CLINIC | Age: 62
End: 2017-02-27

## 2017-03-04 ENCOUNTER — EMERGENCY (EMERGENCY)
Facility: HOSPITAL | Age: 62
LOS: 0 days | Discharge: HOME | End: 2017-03-04
Attending: EMERGENCY MEDICINE | Admitting: EMERGENCY MEDICINE
Payer: MEDICARE

## 2017-03-04 VITALS
HEART RATE: 60 BPM | DIASTOLIC BLOOD PRESSURE: 79 MMHG | RESPIRATION RATE: 18 BRPM | OXYGEN SATURATION: 100 % | SYSTOLIC BLOOD PRESSURE: 108 MMHG | TEMPERATURE: 98 F

## 2017-03-04 VITALS
SYSTOLIC BLOOD PRESSURE: 109 MMHG | HEART RATE: 58 BPM | TEMPERATURE: 98 F | OXYGEN SATURATION: 100 % | RESPIRATION RATE: 18 BRPM | HEIGHT: 67 IN | WEIGHT: 220.02 LBS | DIASTOLIC BLOOD PRESSURE: 81 MMHG

## 2017-03-04 DIAGNOSIS — Z95.9 PRESENCE OF CARDIAC AND VASCULAR IMPLANT AND GRAFT, UNSPECIFIED: Chronic | ICD-10-CM

## 2017-03-04 DIAGNOSIS — E11.65 TYPE 2 DIABETES MELLITUS WITH HYPERGLYCEMIA: ICD-10-CM

## 2017-03-04 DIAGNOSIS — I25.10 ATHEROSCLEROTIC HEART DISEASE OF NATIVE CORONARY ARTERY WITHOUT ANGINA PECTORIS: ICD-10-CM

## 2017-03-04 DIAGNOSIS — I10 ESSENTIAL (PRIMARY) HYPERTENSION: ICD-10-CM

## 2017-03-04 DIAGNOSIS — Z95.1 PRESENCE OF AORTOCORONARY BYPASS GRAFT: Chronic | ICD-10-CM

## 2017-03-04 DIAGNOSIS — Z95.1 PRESENCE OF AORTOCORONARY BYPASS GRAFT: ICD-10-CM

## 2017-03-04 DIAGNOSIS — J44.9 CHRONIC OBSTRUCTIVE PULMONARY DISEASE, UNSPECIFIED: ICD-10-CM

## 2017-03-04 LAB
ALBUMIN SERPL ELPH-MCNC: 4 G/DL — SIGNIFICANT CHANGE UP (ref 3.3–5)
ALP SERPL-CCNC: 82 U/L — SIGNIFICANT CHANGE UP (ref 40–120)
ALT FLD-CCNC: 33 U/L — SIGNIFICANT CHANGE UP (ref 12–78)
ANION GAP SERPL CALC-SCNC: 10 MMOL/L — SIGNIFICANT CHANGE UP (ref 5–17)
AST SERPL-CCNC: 25 U/L — SIGNIFICANT CHANGE UP (ref 15–37)
BASOPHILS # BLD AUTO: 0.1 K/UL — SIGNIFICANT CHANGE UP (ref 0–0.2)
BASOPHILS NFR BLD AUTO: 0.7 % — SIGNIFICANT CHANGE UP (ref 0–2)
BILIRUB SERPL-MCNC: 0.5 MG/DL — SIGNIFICANT CHANGE UP (ref 0.2–1.2)
BUN SERPL-MCNC: 14 MG/DL — SIGNIFICANT CHANGE UP (ref 7–23)
CALCIUM SERPL-MCNC: 8.7 MG/DL — SIGNIFICANT CHANGE UP (ref 8.5–10.1)
CHLORIDE SERPL-SCNC: 104 MMOL/L — SIGNIFICANT CHANGE UP (ref 96–108)
CO2 SERPL-SCNC: 26 MMOL/L — SIGNIFICANT CHANGE UP (ref 22–31)
CREAT SERPL-MCNC: 1.14 MG/DL — SIGNIFICANT CHANGE UP (ref 0.5–1.3)
EOSINOPHIL # BLD AUTO: 0.1 K/UL — SIGNIFICANT CHANGE UP (ref 0–0.5)
EOSINOPHIL NFR BLD AUTO: 1 % — SIGNIFICANT CHANGE UP (ref 0–6)
GLUCOSE SERPL-MCNC: 229 MG/DL — HIGH (ref 70–99)
HCT VFR BLD CALC: 37.7 % — LOW (ref 39–50)
HGB BLD-MCNC: 12.6 G/DL — LOW (ref 13–17)
LIDOCAIN IGE QN: 104 U/L — SIGNIFICANT CHANGE UP (ref 73–393)
LYMPHOCYTES # BLD AUTO: 1.2 K/UL — SIGNIFICANT CHANGE UP (ref 1–3.3)
LYMPHOCYTES # BLD AUTO: 12.2 % — LOW (ref 13–44)
MAGNESIUM SERPL-MCNC: 1.9 MG/DL — SIGNIFICANT CHANGE UP (ref 1.8–2.4)
MCHC RBC-ENTMCNC: 26.1 PG — LOW (ref 27–34)
MCHC RBC-ENTMCNC: 33.4 GM/DL — SIGNIFICANT CHANGE UP (ref 32–36)
MCV RBC AUTO: 78.1 FL — LOW (ref 80–100)
MONOCYTES # BLD AUTO: 0.6 K/UL — SIGNIFICANT CHANGE UP (ref 0–0.9)
MONOCYTES NFR BLD AUTO: 6.7 % — SIGNIFICANT CHANGE UP (ref 2–14)
NEUTROPHILS # BLD AUTO: 7.6 K/UL — HIGH (ref 1.8–7.4)
NEUTROPHILS NFR BLD AUTO: 79.4 % — HIGH (ref 43–77)
PLATELET # BLD AUTO: 169 K/UL — SIGNIFICANT CHANGE UP (ref 150–400)
POTASSIUM SERPL-MCNC: 3.9 MMOL/L — SIGNIFICANT CHANGE UP (ref 3.5–5.3)
POTASSIUM SERPL-SCNC: 3.9 MMOL/L — SIGNIFICANT CHANGE UP (ref 3.5–5.3)
PROT SERPL-MCNC: 7.6 GM/DL — SIGNIFICANT CHANGE UP (ref 6–8.3)
RBC # BLD: 4.83 M/UL — SIGNIFICANT CHANGE UP (ref 4.2–5.8)
RBC # FLD: 14.6 % — HIGH (ref 10.3–14.5)
SODIUM SERPL-SCNC: 140 MMOL/L — SIGNIFICANT CHANGE UP (ref 135–145)
TROPONIN I SERPL-MCNC: <0.015 NG/ML — SIGNIFICANT CHANGE UP (ref 0.01–0.04)
WBC # BLD: 9.5 K/UL — SIGNIFICANT CHANGE UP (ref 3.8–10.5)
WBC # FLD AUTO: 9.5 K/UL — SIGNIFICANT CHANGE UP (ref 3.8–10.5)

## 2017-03-04 PROCEDURE — 71010: CPT | Mod: 26

## 2017-03-04 PROCEDURE — 70450 CT HEAD/BRAIN W/O DYE: CPT | Mod: 26

## 2017-03-04 PROCEDURE — 99284 EMERGENCY DEPT VISIT MOD MDM: CPT

## 2017-03-04 NOTE — ED PROVIDER NOTE - DETAILS:
I, Amber Maldonado, performed the initial face to face bedside interview with this patient regarding history of present illness, review of symptoms and relevant past medical, social and family history.  I completed an independent physical examination.  I was the initial provider who evaluated this patient.   The history, relevant review of systems, past medical and surgical history, medical decision making, and physical examination was documented by the scribe in my presence and I attest to the accuracy of the documentation.

## 2017-03-04 NOTE — ED PROVIDER NOTE - NEUROLOGICAL, MLM
Alert and oriented, no focal deficits, no motor or sensory deficits. Alert and oriented, no focal deficits, no motor or sensory deficits. CN II to XII intact, no shaking or seizure like activity.

## 2017-03-04 NOTE — ED ADULT TRIAGE NOTE - CHIEF COMPLAINT QUOTE
pt woke up sweaty and checked his BGM and said it was 60, then he took sugar tablets at home, PTA BGM was 100.

## 2017-03-04 NOTE — ED PROVIDER NOTE - MUSCULOSKELETAL, MLM
Spine appears normal, range of motion is not limited, no muscle or joint tenderness No reproducible chest wall tenderness. Spine appears normal, range of motion is not limited, no muscle or joint tenderness

## 2017-03-04 NOTE — ED PROVIDER NOTE - NS ED MD SCRIBE ATTENDING SCRIBE SECTIONS
PROGRESS NOTE/HIV/PAST MEDICAL/SURGICAL/SOCIAL HISTORY/INTAKE ASSESSMENT/SCREENINGS/PHYSICAL EXAM/DISPOSITION/HISTORY OF PRESENT ILLNESS/RESULTS/CONSULTATIONS/SHIFT CHANGE/REVIEW OF SYSTEMS

## 2017-03-04 NOTE — ED PROVIDER NOTE - CONSTITUTIONAL, MLM
normal... Well appearing, well nourished, awake, alert, oriented to person, place, time/situation and in no apparent distress. Well appearing, well nourished, obese, awake, alert, oriented to person, place, time/situation and in no apparent distress.

## 2017-03-04 NOTE — ED PROVIDER NOTE - CHPI ED SYMPTOMS NEG
no chills/no diaphoresis/no vomiting/no dizziness/no cough/no back pain/no shortness of breath/no syncope/no nausea/no fever no diaphoresis/no vomiting/no dizziness/no fever/no cough/no syncope/no nausea/no chills/no back pain

## 2017-03-04 NOTE — ED PROVIDER NOTE - OBJECTIVE STATEMENT
61 y/o M PMHx DM, HTN, HLD, anxiety, on plavix, ASA,  has stents, presents to the ED c/o right wrist shaking and hypoglycemia. The pt provides that he had an episode of hypoglycemia at 6pm, and tonight he had an episode of right hand shakes. Pt notes his sugar was 114. Pt states that he has occasional on and off cp.  No h/o syncope, loc, headache, fever, chills, dizziness, abd pain, nvd, cough, sob, or urinary incontinence.

## 2017-03-06 ENCOUNTER — APPOINTMENT (OUTPATIENT)
Dept: DERMATOLOGY | Facility: CLINIC | Age: 62
End: 2017-03-06

## 2017-03-13 ENCOUNTER — APPOINTMENT (OUTPATIENT)
Dept: DERMATOLOGY | Facility: CLINIC | Age: 62
End: 2017-03-13

## 2017-03-16 ENCOUNTER — RX RENEWAL (OUTPATIENT)
Age: 62
End: 2017-03-16

## 2017-03-20 ENCOUNTER — APPOINTMENT (OUTPATIENT)
Dept: DERMATOLOGY | Facility: CLINIC | Age: 62
End: 2017-03-20

## 2017-03-27 ENCOUNTER — APPOINTMENT (OUTPATIENT)
Dept: DERMATOLOGY | Facility: CLINIC | Age: 62
End: 2017-03-27

## 2017-03-27 ENCOUNTER — RX RENEWAL (OUTPATIENT)
Age: 62
End: 2017-03-27

## 2017-03-31 ENCOUNTER — RX RENEWAL (OUTPATIENT)
Age: 62
End: 2017-03-31

## 2017-04-03 ENCOUNTER — APPOINTMENT (OUTPATIENT)
Dept: DERMATOLOGY | Facility: CLINIC | Age: 62
End: 2017-04-03

## 2017-04-10 ENCOUNTER — APPOINTMENT (OUTPATIENT)
Dept: DERMATOLOGY | Facility: CLINIC | Age: 62
End: 2017-04-10

## 2017-04-11 ENCOUNTER — EMERGENCY (EMERGENCY)
Facility: HOSPITAL | Age: 62
LOS: 0 days | Discharge: LEFT BEFORE DISCHARGE | End: 2017-04-12
Attending: EMERGENCY MEDICINE | Admitting: EMERGENCY MEDICINE
Payer: MEDICARE

## 2017-04-11 VITALS — HEIGHT: 67 IN | WEIGHT: 237 LBS

## 2017-04-11 DIAGNOSIS — Z95.1 PRESENCE OF AORTOCORONARY BYPASS GRAFT: Chronic | ICD-10-CM

## 2017-04-11 DIAGNOSIS — Z95.1 PRESENCE OF AORTOCORONARY BYPASS GRAFT: ICD-10-CM

## 2017-04-11 DIAGNOSIS — R60.0 LOCALIZED EDEMA: ICD-10-CM

## 2017-04-11 DIAGNOSIS — I25.10 ATHEROSCLEROTIC HEART DISEASE OF NATIVE CORONARY ARTERY WITHOUT ANGINA PECTORIS: ICD-10-CM

## 2017-04-11 DIAGNOSIS — Z95.9 PRESENCE OF CARDIAC AND VASCULAR IMPLANT AND GRAFT, UNSPECIFIED: Chronic | ICD-10-CM

## 2017-04-11 DIAGNOSIS — R06.02 SHORTNESS OF BREATH: ICD-10-CM

## 2017-04-11 DIAGNOSIS — Z95.5 PRESENCE OF CORONARY ANGIOPLASTY IMPLANT AND GRAFT: ICD-10-CM

## 2017-04-11 DIAGNOSIS — E78.5 HYPERLIPIDEMIA, UNSPECIFIED: ICD-10-CM

## 2017-04-11 LAB
ALBUMIN SERPL ELPH-MCNC: 3.4 G/DL — SIGNIFICANT CHANGE UP (ref 3.3–5)
ALP SERPL-CCNC: 72 U/L — SIGNIFICANT CHANGE UP (ref 40–120)
ALT FLD-CCNC: 24 U/L — SIGNIFICANT CHANGE UP (ref 12–78)
ANION GAP SERPL CALC-SCNC: 8 MMOL/L — SIGNIFICANT CHANGE UP (ref 5–17)
APTT BLD: 27.5 SEC — SIGNIFICANT CHANGE UP (ref 27.5–37.4)
AST SERPL-CCNC: 20 U/L — SIGNIFICANT CHANGE UP (ref 15–37)
BASOPHILS # BLD AUTO: 0 K/UL — SIGNIFICANT CHANGE UP (ref 0–0.2)
BASOPHILS NFR BLD AUTO: 0.7 % — SIGNIFICANT CHANGE UP (ref 0–2)
BILIRUB SERPL-MCNC: 0.4 MG/DL — SIGNIFICANT CHANGE UP (ref 0.2–1.2)
BUN SERPL-MCNC: 11 MG/DL — SIGNIFICANT CHANGE UP (ref 7–23)
CALCIUM SERPL-MCNC: 8.7 MG/DL — SIGNIFICANT CHANGE UP (ref 8.5–10.1)
CHLORIDE SERPL-SCNC: 108 MMOL/L — SIGNIFICANT CHANGE UP (ref 96–108)
CK SERPL-CCNC: 153 U/L — SIGNIFICANT CHANGE UP (ref 26–308)
CO2 SERPL-SCNC: 27 MMOL/L — SIGNIFICANT CHANGE UP (ref 22–31)
CREAT SERPL-MCNC: 0.91 MG/DL — SIGNIFICANT CHANGE UP (ref 0.5–1.3)
EOSINOPHIL # BLD AUTO: 0.1 K/UL — SIGNIFICANT CHANGE UP (ref 0–0.5)
EOSINOPHIL NFR BLD AUTO: 1.7 % — SIGNIFICANT CHANGE UP (ref 0–6)
GLUCOSE SERPL-MCNC: 86 MG/DL — SIGNIFICANT CHANGE UP (ref 70–99)
HCT VFR BLD CALC: 27.8 % — LOW (ref 39–50)
HGB BLD-MCNC: 9.5 G/DL — LOW (ref 13–17)
INR BLD: 1.03 RATIO — SIGNIFICANT CHANGE UP (ref 0.88–1.16)
LYMPHOCYTES # BLD AUTO: 1.2 K/UL — SIGNIFICANT CHANGE UP (ref 1–3.3)
LYMPHOCYTES # BLD AUTO: 25.8 % — SIGNIFICANT CHANGE UP (ref 13–44)
MCHC RBC-ENTMCNC: 27.2 PG — SIGNIFICANT CHANGE UP (ref 27–34)
MCHC RBC-ENTMCNC: 34.1 GM/DL — SIGNIFICANT CHANGE UP (ref 32–36)
MCV RBC AUTO: 79.9 FL — LOW (ref 80–100)
MONOCYTES # BLD AUTO: 0.6 K/UL — SIGNIFICANT CHANGE UP (ref 0–0.9)
MONOCYTES NFR BLD AUTO: 11.8 % — SIGNIFICANT CHANGE UP (ref 2–14)
NEUTROPHILS # BLD AUTO: 2.9 K/UL — SIGNIFICANT CHANGE UP (ref 1.8–7.4)
NEUTROPHILS NFR BLD AUTO: 60 % — SIGNIFICANT CHANGE UP (ref 43–77)
NT-PROBNP SERPL-SCNC: 172 PG/ML — HIGH (ref 0–125)
PLAT MORPH BLD: NORMAL — SIGNIFICANT CHANGE UP
PLATELET # BLD AUTO: 125 K/UL — LOW (ref 150–400)
POTASSIUM SERPL-MCNC: 3.9 MMOL/L — SIGNIFICANT CHANGE UP (ref 3.5–5.3)
POTASSIUM SERPL-SCNC: 3.9 MMOL/L — SIGNIFICANT CHANGE UP (ref 3.5–5.3)
PROT SERPL-MCNC: 6.8 GM/DL — SIGNIFICANT CHANGE UP (ref 6–8.3)
PROTHROM AB SERPL-ACNC: 11.1 SEC — SIGNIFICANT CHANGE UP (ref 9.8–12.7)
RBC # BLD: 3.48 M/UL — LOW (ref 4.2–5.8)
RBC # FLD: 16.3 % — HIGH (ref 10.3–14.5)
RBC BLD AUTO: SIGNIFICANT CHANGE UP
SODIUM SERPL-SCNC: 143 MMOL/L — SIGNIFICANT CHANGE UP (ref 135–145)
TROPONIN I SERPL-MCNC: <0.015 NG/ML — SIGNIFICANT CHANGE UP (ref 0.01–0.04)
WBC # BLD: 4.8 K/UL — SIGNIFICANT CHANGE UP (ref 3.8–10.5)
WBC # FLD AUTO: 4.8 K/UL — SIGNIFICANT CHANGE UP (ref 3.8–10.5)

## 2017-04-11 PROCEDURE — 71010: CPT | Mod: 26

## 2017-04-11 PROCEDURE — 93010 ELECTROCARDIOGRAM REPORT: CPT

## 2017-04-11 PROCEDURE — 99284 EMERGENCY DEPT VISIT MOD MDM: CPT | Mod: 25

## 2017-04-11 RX ORDER — SODIUM CHLORIDE 9 MG/ML
3 INJECTION INTRAMUSCULAR; INTRAVENOUS; SUBCUTANEOUS ONCE
Qty: 0 | Refills: 0 | Status: COMPLETED | OUTPATIENT
Start: 2017-04-11 | End: 2017-04-11

## 2017-04-11 RX ADMIN — SODIUM CHLORIDE 3 MILLILITER(S): 9 INJECTION INTRAMUSCULAR; INTRAVENOUS; SUBCUTANEOUS at 22:53

## 2017-04-11 NOTE — ED ADULT NURSE NOTE - OBJECTIVE STATEMENT
patient presents in ED with multiple complaints; complains of bilateral LE swelling; chest pain ; SOB

## 2017-04-12 VITALS
DIASTOLIC BLOOD PRESSURE: 65 MMHG | OXYGEN SATURATION: 100 % | SYSTOLIC BLOOD PRESSURE: 125 MMHG | RESPIRATION RATE: 18 BRPM | TEMPERATURE: 98 F | HEART RATE: 72 BPM

## 2017-04-12 LAB
APPEARANCE UR: CLEAR — SIGNIFICANT CHANGE UP
BILIRUB UR-MCNC: NEGATIVE — SIGNIFICANT CHANGE UP
COLOR SPEC: YELLOW — SIGNIFICANT CHANGE UP
DIFF PNL FLD: NEGATIVE — SIGNIFICANT CHANGE UP
GLUCOSE UR QL: NEGATIVE MG/DL — SIGNIFICANT CHANGE UP
KETONES UR-MCNC: NEGATIVE — SIGNIFICANT CHANGE UP
LEUKOCYTE ESTERASE UR-ACNC: NEGATIVE — SIGNIFICANT CHANGE UP
NITRITE UR-MCNC: NEGATIVE — SIGNIFICANT CHANGE UP
PH UR: 6.5 — SIGNIFICANT CHANGE UP (ref 4.8–8)
PROT UR-MCNC: NEGATIVE MG/DL — SIGNIFICANT CHANGE UP
SP GR SPEC: 1 — LOW (ref 1.01–1.02)
TROPONIN I SERPL-MCNC: <0.015 NG/ML — SIGNIFICANT CHANGE UP (ref 0.01–0.04)
TROPONIN I SERPL-MCNC: <0.015 NG/ML — SIGNIFICANT CHANGE UP (ref 0.01–0.04)
UROBILINOGEN FLD QL: NEGATIVE MG/DL — SIGNIFICANT CHANGE UP

## 2017-04-12 PROCEDURE — 71260 CT THORAX DX C+: CPT | Mod: 26

## 2017-04-12 PROCEDURE — 74177 CT ABD & PELVIS W/CONTRAST: CPT | Mod: 26

## 2017-04-12 RX ORDER — CLONAZEPAM 1 MG
1 TABLET ORAL ONCE
Qty: 0 | Refills: 0 | Status: DISCONTINUED | OUTPATIENT
Start: 2017-04-12 | End: 2017-04-12

## 2017-04-12 RX ADMIN — Medication 1 MILLIGRAM(S): at 05:33

## 2017-04-12 NOTE — ED ADULT NURSE REASSESSMENT NOTE - NS ED NURSE REASSESS COMMENT FT1
patient eloped. alert and oriented did not want to stay. patient left before speaking with an MD. IV was removed prior to elopement. patient ambulated out of ED with steady gait.

## 2017-04-12 NOTE — ED PROVIDER NOTE - CONSTITUTIONAL, MLM
normal... White male adult appears older than stated age. No respiratory distress, no sentence shortening, mildly ill appearing.

## 2017-04-12 NOTE — ED PROVIDER NOTE - NS ED MD SCRIBE ATTENDING SCRIBE SECTIONS
PAST MEDICAL/SURGICAL/SOCIAL HISTORY/PHYSICAL EXAM/REVIEW OF SYSTEMS/DISPOSITION/PROGRESS NOTE/RESULTS/HISTORY OF PRESENT ILLNESS

## 2017-04-12 NOTE — ED PROVIDER NOTE - MEDICAL DECISION MAKING DETAILS
63 yo M, PMH CAD, DM, COPD, ambulatory c/o'ing painless, nondiscolored edema LLE > RLE & of LUE, + recent wt. gain.  Labs, CXR, CT C/A/P. 61 yo M, PMH CAD, DM, COPD, ambulatory c/o'ing painless, nondiscolored edema LLE > RLE & of LUE, + recent wt. gain.  + recent cough,  no recent worsening of chr. SOB.  Labs (incl. serial Deysi, BNP), EKG, CXR, CT C/A/P.

## 2017-04-12 NOTE — ED PROVIDER NOTE - PROGRESS NOTE DETAILS
Received signout from Dr. Cuenca at 0600.  Pt waiting for CT chest, abdomen results.  Pt just eloped from emergency department, took out IV and walked out, prior to results.

## 2017-04-12 NOTE — ED PROVIDER NOTE - OBJECTIVE STATEMENT
Exam begun 22:25. 61 y/o WM hx of CAD, HTN, HLD, s/p CABG, w/ coronary stents, DM, COPD, presents with c/o LUE, LE edema. +Chronic SOB w/o worsening, MARRERO, no orthopnea. No recent CP, abd pain, fever, loss of appetite. +Increased dry cough. Reports 10 pound weight gain in past month. PCP Dr. Ruggiero, Cardio Dr. Lozoya. allergy: penicillin

## 2017-04-12 NOTE — ED PROVIDER NOTE - DETAILS:
The scribe's documentation has been prepared under my direction and personally reviewed by me in its entirety. I confirm that the note above accurately reflects all work, treatment, procedures, and medical decision making performed by me (Dr. Mclaughlin).

## 2017-04-12 NOTE — ED ADULT NURSE REASSESSMENT NOTE - NS ED NURSE REASSESS COMMENT FT1
patient resting comfortably. Cardiac Monitoring in place. patient updated on plan of care. denies any pain at this time. patient complained about feeling anxious; communicated with Dr. Cuenca; Klonopin administered as ordered. will continue to monitor.

## 2017-04-12 NOTE — ED PROVIDER NOTE - MUSCULOSKELETAL, MLM
Spine appears normal, range of motion is not limited, no muscle or joint tenderness. DE [place] FUENTE x4. No focal tenderness. Mild distal LUE edema. No right arm or RUE edema. Mild b/l pre-tibial ankle and feet edema, left greater than right.

## 2017-04-17 ENCOUNTER — APPOINTMENT (OUTPATIENT)
Dept: DERMATOLOGY | Facility: CLINIC | Age: 62
End: 2017-04-17

## 2017-05-31 ENCOUNTER — APPOINTMENT (OUTPATIENT)
Dept: DERMATOLOGY | Facility: CLINIC | Age: 62
End: 2017-05-31

## 2017-05-31 VITALS — WEIGHT: 221 LBS | BODY MASS INDEX: 34.69 KG/M2 | HEIGHT: 67 IN

## 2017-05-31 DIAGNOSIS — L82.1 OTHER SEBORRHEIC KERATOSIS: ICD-10-CM

## 2017-05-31 RX ORDER — CLOBETASOL PROPIONATE 0.5 MG/ML
0.05 SOLUTION TOPICAL TWICE DAILY
Qty: 50 | Refills: 3 | Status: ACTIVE | COMMUNITY
Start: 2017-02-17 | End: 1900-01-01

## 2017-05-31 RX ORDER — METOPROLOL TARTRATE 50 MG/1
50 TABLET, FILM COATED ORAL
Qty: 60 | Refills: 0 | Status: DISCONTINUED | COMMUNITY
Start: 2017-05-15

## 2017-05-31 RX ORDER — HYDROCODONE BITARTRATE AND ACETAMINOPHEN 5; 325 MG/1; MG/1
5-325 TABLET ORAL
Qty: 10 | Refills: 0 | Status: DISCONTINUED | COMMUNITY
Start: 2017-03-01

## 2017-05-31 RX ORDER — SULFAMETHOXAZOLE AND TRIMETHOPRIM 800; 160 MG/1; MG/1
800-160 TABLET ORAL
Qty: 14 | Refills: 0 | Status: DISCONTINUED | COMMUNITY
Start: 2017-03-07

## 2017-05-31 RX ORDER — CLONAZEPAM 0.5 MG/1
0.5 TABLET ORAL
Qty: 60 | Refills: 0 | Status: DISCONTINUED | COMMUNITY
Start: 2017-05-13

## 2017-06-05 ENCOUNTER — APPOINTMENT (OUTPATIENT)
Dept: DERMATOLOGY | Facility: CLINIC | Age: 62
End: 2017-06-05

## 2017-06-12 ENCOUNTER — APPOINTMENT (OUTPATIENT)
Dept: DERMATOLOGY | Facility: CLINIC | Age: 62
End: 2017-06-12

## 2017-06-19 ENCOUNTER — APPOINTMENT (OUTPATIENT)
Dept: DERMATOLOGY | Facility: CLINIC | Age: 62
End: 2017-06-19

## 2017-06-26 ENCOUNTER — APPOINTMENT (OUTPATIENT)
Dept: DERMATOLOGY | Facility: CLINIC | Age: 62
End: 2017-06-26

## 2017-07-03 ENCOUNTER — APPOINTMENT (OUTPATIENT)
Dept: DERMATOLOGY | Facility: CLINIC | Age: 62
End: 2017-07-03

## 2017-07-17 ENCOUNTER — APPOINTMENT (OUTPATIENT)
Dept: DERMATOLOGY | Facility: CLINIC | Age: 62
End: 2017-07-17

## 2017-07-17 RX ORDER — INSULIN ASPART 100 [IU]/ML
100 INJECTION, SOLUTION INTRAVENOUS; SUBCUTANEOUS
Qty: 15 | Refills: 0 | Status: COMPLETED | COMMUNITY
Start: 2016-08-29 | End: 2017-07-17

## 2017-07-17 RX ORDER — METOPROLOL SUCCINATE 100 MG/1
100 TABLET, EXTENDED RELEASE ORAL
Qty: 30 | Refills: 0 | Status: COMPLETED | COMMUNITY
Start: 2016-10-14 | End: 2017-07-17

## 2017-07-17 RX ORDER — SERTRALINE HYDROCHLORIDE 50 MG/1
50 TABLET, FILM COATED ORAL
Qty: 30 | Refills: 0 | Status: COMPLETED | COMMUNITY
Start: 2016-10-06 | End: 2017-07-17

## 2017-07-17 RX ORDER — CLONAZEPAM 0.5 MG/1
0.5 TABLET, ORALLY DISINTEGRATING ORAL
Qty: 30 | Refills: 0 | Status: COMPLETED | COMMUNITY
Start: 2016-08-20 | End: 2017-07-17

## 2017-07-17 RX ORDER — CLINDAMYCIN HYDROCHLORIDE 150 MG/1
150 CAPSULE ORAL
Qty: 28 | Refills: 0 | Status: COMPLETED | COMMUNITY
Start: 2017-05-10 | End: 2017-07-17

## 2017-07-17 RX ORDER — CLOBETASOL PROPIONATE 0.5 MG/G
0.05 OINTMENT TOPICAL
Qty: 60 | Refills: 0 | Status: COMPLETED | COMMUNITY
Start: 2015-12-15 | End: 2017-07-17

## 2017-07-17 RX ORDER — PANTOPRAZOLE SODIUM 40 MG/1
40 TABLET, DELAYED RELEASE ORAL
Refills: 0 | Status: COMPLETED | COMMUNITY
End: 2017-07-17

## 2017-07-17 RX ORDER — CLOBETASOL PROPIONATE 0.5 MG/G
0.05 GEL TOPICAL
Qty: 1 | Refills: 1 | Status: COMPLETED | COMMUNITY
Start: 2017-03-27 | End: 2017-07-17

## 2017-07-17 RX ORDER — ASPIRIN 325 MG/1
325 TABLET, FILM COATED ORAL
Refills: 0 | Status: DISCONTINUED | COMMUNITY
End: 2017-07-17

## 2017-07-17 RX ORDER — INSULIN GLARGINE 100 [IU]/ML
100 INJECTION, SOLUTION SUBCUTANEOUS
Qty: 75 | Refills: 0 | Status: COMPLETED | COMMUNITY
Start: 2016-11-15 | End: 2017-07-17

## 2017-07-17 RX ORDER — INSULIN ASPART 100 [IU]/ML
INJECTION, SOLUTION INTRAVENOUS; SUBCUTANEOUS
Refills: 0 | Status: COMPLETED | COMMUNITY
End: 2017-07-17

## 2017-07-17 RX ORDER — METOPROLOL SUCCINATE 200 MG/1
200 TABLET, EXTENDED RELEASE ORAL
Refills: 0 | Status: COMPLETED | COMMUNITY
End: 2017-07-17

## 2017-07-17 RX ORDER — RANOLAZINE 1000 MG/1
1000 TABLET, FILM COATED, EXTENDED RELEASE ORAL
Qty: 60 | Refills: 0 | Status: COMPLETED | COMMUNITY
Start: 2016-11-22 | End: 2017-07-17

## 2017-07-17 RX ORDER — METFORMIN HYDROCHLORIDE 1000 MG/1
1000 TABLET, EXTENDED RELEASE ORAL
Qty: 90 | Refills: 0 | Status: COMPLETED | COMMUNITY
Start: 2016-09-19 | End: 2017-07-17

## 2017-07-24 ENCOUNTER — APPOINTMENT (OUTPATIENT)
Dept: DERMATOLOGY | Facility: CLINIC | Age: 62
End: 2017-07-24

## 2017-07-31 ENCOUNTER — APPOINTMENT (OUTPATIENT)
Dept: DERMATOLOGY | Facility: CLINIC | Age: 62
End: 2017-07-31
Payer: MEDICARE

## 2017-07-31 PROCEDURE — 96910 PHOTCHMTX TAR&UVB/PTRLTM&UVB: CPT

## 2017-08-07 ENCOUNTER — APPOINTMENT (OUTPATIENT)
Dept: DERMATOLOGY | Facility: CLINIC | Age: 62
End: 2017-08-07
Payer: MEDICARE

## 2017-08-07 PROCEDURE — 96910 PHOTCHMTX TAR&UVB/PTRLTM&UVB: CPT

## 2017-08-14 ENCOUNTER — APPOINTMENT (OUTPATIENT)
Dept: DERMATOLOGY | Facility: CLINIC | Age: 62
End: 2017-08-14
Payer: MEDICARE

## 2017-08-14 PROCEDURE — 96910 PHOTCHMTX TAR&UVB/PTRLTM&UVB: CPT

## 2017-08-21 ENCOUNTER — APPOINTMENT (OUTPATIENT)
Dept: DERMATOLOGY | Facility: CLINIC | Age: 62
End: 2017-08-21
Payer: MEDICARE

## 2017-08-21 PROCEDURE — 96910 PHOTCHMTX TAR&UVB/PTRLTM&UVB: CPT

## 2017-08-28 ENCOUNTER — APPOINTMENT (OUTPATIENT)
Dept: DERMATOLOGY | Facility: CLINIC | Age: 62
End: 2017-08-28
Payer: MEDICARE

## 2017-08-28 PROCEDURE — 96910 PHOTCHMTX TAR&UVB/PTRLTM&UVB: CPT

## 2017-08-30 ENCOUNTER — EMERGENCY (EMERGENCY)
Facility: HOSPITAL | Age: 62
LOS: 0 days | Discharge: HOME | End: 2017-08-30
Attending: EMERGENCY MEDICINE | Admitting: EMERGENCY MEDICINE
Payer: MEDICARE

## 2017-08-30 VITALS
RESPIRATION RATE: 16 BRPM | SYSTOLIC BLOOD PRESSURE: 123 MMHG | OXYGEN SATURATION: 99 % | TEMPERATURE: 99 F | DIASTOLIC BLOOD PRESSURE: 70 MMHG | HEART RATE: 71 BPM

## 2017-08-30 VITALS — WEIGHT: 225.09 LBS

## 2017-08-30 DIAGNOSIS — M79.673 PAIN IN UNSPECIFIED FOOT: ICD-10-CM

## 2017-08-30 DIAGNOSIS — I10 ESSENTIAL (PRIMARY) HYPERTENSION: ICD-10-CM

## 2017-08-30 DIAGNOSIS — M79.675 PAIN IN LEFT TOE(S): ICD-10-CM

## 2017-08-30 DIAGNOSIS — Z95.1 PRESENCE OF AORTOCORONARY BYPASS GRAFT: ICD-10-CM

## 2017-08-30 DIAGNOSIS — I25.709 ATHEROSCLEROSIS OF CORONARY ARTERY BYPASS GRAFT(S), UNSPECIFIED, WITH UNSPECIFIED ANGINA PECTORIS: ICD-10-CM

## 2017-08-30 DIAGNOSIS — Z95.1 PRESENCE OF AORTOCORONARY BYPASS GRAFT: Chronic | ICD-10-CM

## 2017-08-30 DIAGNOSIS — M79.674 PAIN IN RIGHT TOE(S): ICD-10-CM

## 2017-08-30 DIAGNOSIS — Z79.4 LONG TERM (CURRENT) USE OF INSULIN: ICD-10-CM

## 2017-08-30 DIAGNOSIS — Z79.02 LONG TERM (CURRENT) USE OF ANTITHROMBOTICS/ANTIPLATELETS: ICD-10-CM

## 2017-08-30 DIAGNOSIS — Z79.82 LONG TERM (CURRENT) USE OF ASPIRIN: ICD-10-CM

## 2017-08-30 DIAGNOSIS — J44.9 CHRONIC OBSTRUCTIVE PULMONARY DISEASE, UNSPECIFIED: ICD-10-CM

## 2017-08-30 DIAGNOSIS — E11.9 TYPE 2 DIABETES MELLITUS WITHOUT COMPLICATIONS: ICD-10-CM

## 2017-08-30 DIAGNOSIS — Z95.9 PRESENCE OF CARDIAC AND VASCULAR IMPLANT AND GRAFT, UNSPECIFIED: Chronic | ICD-10-CM

## 2017-08-30 PROCEDURE — 99283 EMERGENCY DEPT VISIT LOW MDM: CPT

## 2017-08-30 NOTE — ED ADULT TRIAGE NOTE - CHIEF COMPLAINT QUOTE
pt states that he is a diabetic, has been seen at the podiatrist office multiple times for color changes to two toes, one on each foot, pt states podiatrist states the feet are normal however pt is concerned and would like to be evalued

## 2017-08-30 NOTE — ED STATDOCS - PROGRESS NOTE DETAILS
63 yo male with a PMH of DM, CABG, CAD, 3 stents, on plavix, HTN presents with left 4th toe pain and right 3rd toe pain. Seen by podiatrist 2 days ago and had xray which were unremarkable. No erythema, color changes noted b/l. 2+ DP pulses b/l. -Tyrone Saleh PA-C

## 2017-08-30 NOTE — ED STATDOCS - ATTENDING CONTRIBUTION TO CARE
I, Tae Jewell, performed the initial face to face bedside interview with this patient regarding history of present illness, review of symptoms and relevant past medical, social and family history.  I completed an independent physical examination.  I was the initial provider who evaluated this patient. I have signed out the follow up of any pending tests (i.e. labs, radiological studies) to the ACP.  I have communicated the patient’s plan of care and disposition with the ACP.  The history, relevant review of systems, past medical and surgical history, medical decision making, and physical examination was documented by the scribe in my presence and I attest to the accuracy of the documentation.

## 2017-08-30 NOTE — ED STATDOCS - OBJECTIVE STATEMENT
61 y/o M with PMHx of DM presents to the ED c/o pain to 4th digit on left foot and 3rd digit on right foot starting a month ago. Pt states that these toes are darker in color than the other toes.  Pt saw his podiatrist and was told everything was fine. Pt was concerned that pain was persistent. Pt then called his endocrinologist who told him to come to the ED. Dr. Ramos, podiatry in [facility].

## 2017-09-05 ENCOUNTER — APPOINTMENT (OUTPATIENT)
Dept: DERMATOLOGY | Facility: CLINIC | Age: 62
End: 2017-09-05
Payer: MEDICARE

## 2017-09-05 PROCEDURE — 96910 PHOTCHMTX TAR&UVB/PTRLTM&UVB: CPT

## 2017-09-11 ENCOUNTER — APPOINTMENT (OUTPATIENT)
Dept: DERMATOLOGY | Facility: CLINIC | Age: 62
End: 2017-09-11
Payer: MEDICARE

## 2017-09-11 PROCEDURE — 96910 PHOTCHMTX TAR&UVB/PTRLTM&UVB: CPT

## 2017-09-12 ENCOUNTER — APPOINTMENT (OUTPATIENT)
Dept: DERMATOLOGY | Facility: CLINIC | Age: 62
End: 2017-09-12

## 2017-09-19 ENCOUNTER — APPOINTMENT (OUTPATIENT)
Dept: DERMATOLOGY | Facility: CLINIC | Age: 62
End: 2017-09-19
Payer: MEDICARE

## 2017-09-19 PROCEDURE — 96910 PHOTCHMTX TAR&UVB/PTRLTM&UVB: CPT

## 2017-09-25 ENCOUNTER — APPOINTMENT (OUTPATIENT)
Dept: DERMATOLOGY | Facility: CLINIC | Age: 62
End: 2017-09-25

## 2017-09-26 ENCOUNTER — APPOINTMENT (OUTPATIENT)
Dept: DERMATOLOGY | Facility: CLINIC | Age: 62
End: 2017-09-26

## 2017-09-27 ENCOUNTER — APPOINTMENT (OUTPATIENT)
Dept: DERMATOLOGY | Facility: CLINIC | Age: 62
End: 2017-09-27
Payer: MEDICARE

## 2017-09-27 PROCEDURE — 96910 PHOTCHMTX TAR&UVB/PTRLTM&UVB: CPT

## 2017-10-02 ENCOUNTER — APPOINTMENT (OUTPATIENT)
Dept: DERMATOLOGY | Facility: CLINIC | Age: 62
End: 2017-10-02
Payer: MEDICARE

## 2017-10-02 PROCEDURE — 96910 PHOTCHMTX TAR&UVB/PTRLTM&UVB: CPT

## 2017-10-06 ENCOUNTER — APPOINTMENT (OUTPATIENT)
Dept: DERMATOLOGY | Facility: CLINIC | Age: 62
End: 2017-10-06
Payer: MEDICARE

## 2017-10-06 DIAGNOSIS — L81.4 OTHER MELANIN HYPERPIGMENTATION: ICD-10-CM

## 2017-10-06 PROCEDURE — 99213 OFFICE O/P EST LOW 20 MIN: CPT

## 2017-10-16 ENCOUNTER — APPOINTMENT (OUTPATIENT)
Dept: DERMATOLOGY | Facility: CLINIC | Age: 62
End: 2017-10-16
Payer: MEDICARE

## 2017-10-16 DIAGNOSIS — D48.5 NEOPLASM OF UNCERTAIN BEHAVIOR OF SKIN: ICD-10-CM

## 2017-10-16 PROCEDURE — 96910 PHOTCHMTX TAR&UVB/PTRLTM&UVB: CPT

## 2017-10-16 PROCEDURE — 11302 SHAVE SKIN LESION 1.1-2.0 CM: CPT | Mod: 59

## 2017-10-16 RX ORDER — LOSARTAN POTASSIUM 25 MG/1
25 TABLET, FILM COATED ORAL
Qty: 30 | Refills: 0 | Status: DISCONTINUED | COMMUNITY
Start: 2016-11-29 | End: 2017-10-16

## 2017-10-16 RX ORDER — CLOBETASOL PROPIONATE 0.05 %
0.05 SOLUTION, NON-ORAL TOPICAL
Refills: 0 | Status: DISCONTINUED | COMMUNITY
End: 2017-10-16

## 2017-10-16 RX ORDER — ASPIRIN 81 MG
81 TABLET, DELAYED RELEASE (ENTERIC COATED) ORAL
Refills: 0 | Status: DISCONTINUED | COMMUNITY
End: 2017-10-16

## 2017-10-16 RX ORDER — CLOBETASOL PROPIONATE 0.05 G/100ML
0.05 SHAMPOO TOPICAL
Refills: 0 | Status: DISCONTINUED | COMMUNITY
End: 2017-10-16

## 2017-10-20 LAB — CORE LAB BIOPSY: NORMAL

## 2017-10-23 ENCOUNTER — APPOINTMENT (OUTPATIENT)
Dept: DERMATOLOGY | Facility: CLINIC | Age: 62
End: 2017-10-23
Payer: MEDICARE

## 2017-10-23 PROCEDURE — 96910 PHOTCHMTX TAR&UVB/PTRLTM&UVB: CPT

## 2017-10-30 ENCOUNTER — APPOINTMENT (OUTPATIENT)
Dept: DERMATOLOGY | Facility: CLINIC | Age: 62
End: 2017-10-30

## 2017-11-01 ENCOUNTER — APPOINTMENT (OUTPATIENT)
Dept: DERMATOLOGY | Facility: CLINIC | Age: 62
End: 2017-11-01
Payer: MEDICARE

## 2017-11-01 PROCEDURE — 96910 PHOTCHMTX TAR&UVB/PTRLTM&UVB: CPT

## 2017-11-01 RX ORDER — ALFUZOSIN HYDROCHLORIDE 10 MG/1
10 TABLET, EXTENDED RELEASE ORAL
Qty: 30 | Refills: 0 | Status: COMPLETED | COMMUNITY
Start: 2017-05-24 | End: 2017-11-01

## 2017-11-06 ENCOUNTER — APPOINTMENT (OUTPATIENT)
Dept: DERMATOLOGY | Facility: CLINIC | Age: 62
End: 2017-11-06
Payer: MEDICARE

## 2017-11-06 PROCEDURE — 96910 PHOTCHMTX TAR&UVB/PTRLTM&UVB: CPT

## 2017-11-08 ENCOUNTER — TRANSCRIPTION ENCOUNTER (OUTPATIENT)
Age: 62
End: 2017-11-08

## 2017-11-08 ENCOUNTER — EMERGENCY (EMERGENCY)
Facility: HOSPITAL | Age: 62
LOS: 0 days | Discharge: HOME | End: 2017-11-08
Attending: EMERGENCY MEDICINE | Admitting: EMERGENCY MEDICINE
Payer: MEDICARE

## 2017-11-08 VITALS
HEIGHT: 67 IN | DIASTOLIC BLOOD PRESSURE: 72 MMHG | RESPIRATION RATE: 17 BRPM | TEMPERATURE: 98 F | SYSTOLIC BLOOD PRESSURE: 111 MMHG | HEART RATE: 70 BPM | OXYGEN SATURATION: 99 % | WEIGHT: 220.02 LBS

## 2017-11-08 VITALS
HEART RATE: 113 BPM | TEMPERATURE: 98 F | DIASTOLIC BLOOD PRESSURE: 61 MMHG | RESPIRATION RATE: 17 BRPM | SYSTOLIC BLOOD PRESSURE: 108 MMHG | OXYGEN SATURATION: 100 %

## 2017-11-08 DIAGNOSIS — Z95.1 PRESENCE OF AORTOCORONARY BYPASS GRAFT: Chronic | ICD-10-CM

## 2017-11-08 DIAGNOSIS — Z95.9 PRESENCE OF CARDIAC AND VASCULAR IMPLANT AND GRAFT, UNSPECIFIED: Chronic | ICD-10-CM

## 2017-11-08 LAB
ALBUMIN SERPL ELPH-MCNC: 3.9 G/DL — SIGNIFICANT CHANGE UP (ref 3.3–5)
ALP SERPL-CCNC: 52 U/L — SIGNIFICANT CHANGE UP (ref 40–120)
ALT FLD-CCNC: 30 U/L — SIGNIFICANT CHANGE UP (ref 12–78)
ANION GAP SERPL CALC-SCNC: 8 MMOL/L — SIGNIFICANT CHANGE UP (ref 5–17)
AST SERPL-CCNC: 25 U/L — SIGNIFICANT CHANGE UP (ref 15–37)
BASOPHILS # BLD AUTO: 0.1 K/UL — SIGNIFICANT CHANGE UP (ref 0–0.2)
BASOPHILS NFR BLD AUTO: 0.8 % — SIGNIFICANT CHANGE UP (ref 0–2)
BILIRUB SERPL-MCNC: 0.6 MG/DL — SIGNIFICANT CHANGE UP (ref 0.2–1.2)
BUN SERPL-MCNC: 17 MG/DL — SIGNIFICANT CHANGE UP (ref 7–23)
CALCIUM SERPL-MCNC: 9.1 MG/DL — SIGNIFICANT CHANGE UP (ref 8.5–10.1)
CHLORIDE SERPL-SCNC: 105 MMOL/L — SIGNIFICANT CHANGE UP (ref 96–108)
CK SERPL-CCNC: 108 U/L — SIGNIFICANT CHANGE UP (ref 26–308)
CO2 SERPL-SCNC: 27 MMOL/L — SIGNIFICANT CHANGE UP (ref 22–31)
CREAT SERPL-MCNC: 1.12 MG/DL — SIGNIFICANT CHANGE UP (ref 0.5–1.3)
EOSINOPHIL # BLD AUTO: 0.1 K/UL — SIGNIFICANT CHANGE UP (ref 0–0.5)
EOSINOPHIL NFR BLD AUTO: 1.3 % — SIGNIFICANT CHANGE UP (ref 0–6)
GLUCOSE SERPL-MCNC: 75 MG/DL — SIGNIFICANT CHANGE UP (ref 70–99)
HCT VFR BLD CALC: 37.7 % — LOW (ref 39–50)
HGB BLD-MCNC: 12.6 G/DL — LOW (ref 13–17)
LYMPHOCYTES # BLD AUTO: 1.2 K/UL — SIGNIFICANT CHANGE UP (ref 1–3.3)
LYMPHOCYTES # BLD AUTO: 17.7 % — SIGNIFICANT CHANGE UP (ref 13–44)
MCHC RBC-ENTMCNC: 26.8 PG — LOW (ref 27–34)
MCHC RBC-ENTMCNC: 33.3 GM/DL — SIGNIFICANT CHANGE UP (ref 32–36)
MCV RBC AUTO: 80.3 FL — SIGNIFICANT CHANGE UP (ref 80–100)
MONOCYTES # BLD AUTO: 0.6 K/UL — SIGNIFICANT CHANGE UP (ref 0–0.9)
MONOCYTES NFR BLD AUTO: 8.4 % — SIGNIFICANT CHANGE UP (ref 2–14)
NEUTROPHILS # BLD AUTO: 4.8 K/UL — SIGNIFICANT CHANGE UP (ref 1.8–7.4)
NEUTROPHILS NFR BLD AUTO: 71.8 % — SIGNIFICANT CHANGE UP (ref 43–77)
NT-PROBNP SERPL-SCNC: 34 PG/ML — SIGNIFICANT CHANGE UP (ref 0–125)
PLATELET # BLD AUTO: 170 K/UL — SIGNIFICANT CHANGE UP (ref 150–400)
POTASSIUM SERPL-MCNC: 3.5 MMOL/L — SIGNIFICANT CHANGE UP (ref 3.5–5.3)
POTASSIUM SERPL-SCNC: 3.5 MMOL/L — SIGNIFICANT CHANGE UP (ref 3.5–5.3)
PROT SERPL-MCNC: 7.7 GM/DL — SIGNIFICANT CHANGE UP (ref 6–8.3)
RBC # BLD: 4.69 M/UL — SIGNIFICANT CHANGE UP (ref 4.2–5.8)
RBC # FLD: 13.5 % — SIGNIFICANT CHANGE UP (ref 10.3–14.5)
SODIUM SERPL-SCNC: 140 MMOL/L — SIGNIFICANT CHANGE UP (ref 135–145)
TROPONIN I SERPL-MCNC: <0.015 NG/ML — SIGNIFICANT CHANGE UP (ref 0.01–0.04)
WBC # BLD: 6.7 K/UL — SIGNIFICANT CHANGE UP (ref 3.8–10.5)
WBC # FLD AUTO: 6.7 K/UL — SIGNIFICANT CHANGE UP (ref 3.8–10.5)

## 2017-11-08 PROCEDURE — 71020: CPT | Mod: 26

## 2017-11-08 PROCEDURE — 93010 ELECTROCARDIOGRAM REPORT: CPT

## 2017-11-08 PROCEDURE — 99285 EMERGENCY DEPT VISIT HI MDM: CPT

## 2017-11-08 RX ORDER — CLONAZEPAM 1 MG
1 TABLET ORAL ONCE
Qty: 0 | Refills: 0 | Status: DISCONTINUED | OUTPATIENT
Start: 2017-11-08 | End: 2017-11-08

## 2017-11-08 RX ORDER — CLONAZEPAM 1 MG
0.5 TABLET ORAL ONCE
Qty: 0 | Refills: 0 | Status: DISCONTINUED | OUTPATIENT
Start: 2017-11-08 | End: 2017-11-08

## 2017-11-08 RX ADMIN — Medication 0.5 MILLIGRAM(S): at 13:06

## 2017-11-08 NOTE — ED PROVIDER NOTE - PROGRESS NOTE DETAILS
AJM: labs normal. admit to tele for acs rule out AJM: pt refusing to stay for admission. verbalized understanding of leaving risks MI, stroke, death, significantly disability. pt refuses to stay

## 2017-11-08 NOTE — ED PROVIDER NOTE - CONSTITUTIONAL, MLM
normal... Anxious appearing, elderly male, awake, alert, oriented to person, place, time/situation and in no apparent distress.

## 2017-11-08 NOTE — ED PROVIDER NOTE - OBJECTIVE STATEMENT
63 y/o M with a PMHx of CABGx3, CAD with stent R circumflex, DM, COPD, HTN, anxiety, presents to the ED c/o chest pressure over the past x4 days. Pt states that he has been experiencing worsening SOB, anxiety, MARRERO, with dizziness, heavy sensation. Pt states that his sx are intermittent, currently anxious, concerned there is an issue with his heart again and denies fever, cough, chills, NVD, abd pain, extremity swelling or any other acute c/o at this time. Pt states that he failed stress test requiring him to receive stent. Pt states that he feels as if his BGL is low.

## 2017-11-08 NOTE — ED PROVIDER NOTE - MEDICAL DECISION MAKING DETAILS
65 y/o M c/o chest pressure starting x4 days ago, PMHx CABGx3, CAD with stent with plans to receive EKG, labs, CBC, r/o anxiety vs ACS for cardiac workup

## 2017-11-10 DIAGNOSIS — I25.10 ATHEROSCLEROTIC HEART DISEASE OF NATIVE CORONARY ARTERY WITHOUT ANGINA PECTORIS: ICD-10-CM

## 2017-11-10 DIAGNOSIS — Z95.1 PRESENCE OF AORTOCORONARY BYPASS GRAFT: ICD-10-CM

## 2017-11-10 DIAGNOSIS — Z95.5 PRESENCE OF CORONARY ANGIOPLASTY IMPLANT AND GRAFT: ICD-10-CM

## 2017-11-10 DIAGNOSIS — F41.9 ANXIETY DISORDER, UNSPECIFIED: ICD-10-CM

## 2017-11-10 DIAGNOSIS — J44.9 CHRONIC OBSTRUCTIVE PULMONARY DISEASE, UNSPECIFIED: ICD-10-CM

## 2017-11-10 DIAGNOSIS — Z79.84 LONG TERM (CURRENT) USE OF ORAL HYPOGLYCEMIC DRUGS: ICD-10-CM

## 2017-11-10 DIAGNOSIS — Z79.82 LONG TERM (CURRENT) USE OF ASPIRIN: ICD-10-CM

## 2017-11-10 DIAGNOSIS — E11.9 TYPE 2 DIABETES MELLITUS WITHOUT COMPLICATIONS: ICD-10-CM

## 2017-11-10 DIAGNOSIS — I10 ESSENTIAL (PRIMARY) HYPERTENSION: ICD-10-CM

## 2017-11-10 DIAGNOSIS — R07.9 CHEST PAIN, UNSPECIFIED: ICD-10-CM

## 2017-11-10 DIAGNOSIS — Z79.02 LONG TERM (CURRENT) USE OF ANTITHROMBOTICS/ANTIPLATELETS: ICD-10-CM

## 2017-11-10 DIAGNOSIS — R06.02 SHORTNESS OF BREATH: ICD-10-CM

## 2017-11-13 ENCOUNTER — APPOINTMENT (OUTPATIENT)
Dept: DERMATOLOGY | Facility: CLINIC | Age: 62
End: 2017-11-13
Payer: MEDICARE

## 2017-11-13 PROCEDURE — 96910 PHOTCHMTX TAR&UVB/PTRLTM&UVB: CPT

## 2017-11-20 ENCOUNTER — APPOINTMENT (OUTPATIENT)
Dept: DERMATOLOGY | Facility: CLINIC | Age: 62
End: 2017-11-20

## 2017-11-27 ENCOUNTER — APPOINTMENT (OUTPATIENT)
Dept: DERMATOLOGY | Facility: CLINIC | Age: 62
End: 2017-11-27
Payer: MEDICARE

## 2017-11-27 PROCEDURE — 96910 PHOTCHMTX TAR&UVB/PTRLTM&UVB: CPT

## 2017-12-04 ENCOUNTER — APPOINTMENT (OUTPATIENT)
Dept: DERMATOLOGY | Facility: CLINIC | Age: 62
End: 2017-12-04
Payer: MEDICARE

## 2017-12-04 PROCEDURE — 96910 PHOTCHMTX TAR&UVB/PTRLTM&UVB: CPT

## 2017-12-05 RX ORDER — CLOBETASOL PROPIONATE 0.5 MG/G
0.05 GEL TOPICAL
Qty: 1 | Refills: 1 | Status: ACTIVE | COMMUNITY
Start: 2017-12-05

## 2017-12-11 ENCOUNTER — APPOINTMENT (OUTPATIENT)
Dept: DERMATOLOGY | Facility: CLINIC | Age: 62
End: 2017-12-11

## 2017-12-18 ENCOUNTER — APPOINTMENT (OUTPATIENT)
Dept: DERMATOLOGY | Facility: CLINIC | Age: 62
End: 2017-12-18
Payer: MEDICARE

## 2017-12-18 VITALS — WEIGHT: 225 LBS | BODY MASS INDEX: 35.31 KG/M2 | HEIGHT: 67 IN

## 2017-12-18 DIAGNOSIS — L40.9 PSORIASIS, UNSPECIFIED: ICD-10-CM

## 2017-12-18 PROCEDURE — 99213 OFFICE O/P EST LOW 20 MIN: CPT

## 2017-12-18 RX ORDER — BUDESONIDE AND FORMOTEROL FUMARATE DIHYDRATE 80; 4.5 UG/1; UG/1
80-4.5 AEROSOL RESPIRATORY (INHALATION)
Qty: 10 | Refills: 0 | Status: COMPLETED | COMMUNITY
Start: 2016-12-05 | End: 2017-12-18

## 2017-12-18 RX ORDER — ALBUTEROL SULFATE 90 UG/1
108 (90 BASE) AEROSOL, METERED RESPIRATORY (INHALATION)
Qty: 18 | Refills: 0 | Status: COMPLETED | COMMUNITY
Start: 2016-10-11 | End: 2017-12-18

## 2017-12-18 RX ORDER — MONTELUKAST SODIUM 10 MG/1
10 TABLET, FILM COATED ORAL
Refills: 0 | Status: COMPLETED | COMMUNITY
End: 2017-12-18

## 2017-12-18 RX ORDER — OXYCODONE HYDROCHLORIDE AND ACETAMINOPHEN 5; 325 MG/1; MG/1
5-325 TABLET ORAL
Refills: 0 | Status: COMPLETED | COMMUNITY
End: 2017-12-18

## 2017-12-18 RX ORDER — CLOBETASOL PROPIONATE 0.5 MG/G
0.05 OINTMENT TOPICAL TWICE DAILY
Qty: 60 | Refills: 2 | Status: DISCONTINUED | COMMUNITY
Start: 2017-03-13 | End: 2017-12-18

## 2017-12-18 RX ORDER — QUETIAPINE FUMARATE 100 MG/1
100 TABLET ORAL
Qty: 30 | Refills: 0 | Status: COMPLETED | COMMUNITY
Start: 2017-04-15 | End: 2017-12-18

## 2018-02-13 ENCOUNTER — EMERGENCY (EMERGENCY)
Facility: HOSPITAL | Age: 63
LOS: 0 days | Discharge: HOME | End: 2018-02-13
Attending: EMERGENCY MEDICINE | Admitting: EMERGENCY MEDICINE
Payer: MEDICARE

## 2018-02-13 VITALS
SYSTOLIC BLOOD PRESSURE: 124 MMHG | DIASTOLIC BLOOD PRESSURE: 73 MMHG | RESPIRATION RATE: 16 BRPM | OXYGEN SATURATION: 100 % | HEART RATE: 81 BPM | TEMPERATURE: 99 F | WEIGHT: 220.02 LBS | HEIGHT: 67 IN

## 2018-02-13 VITALS
SYSTOLIC BLOOD PRESSURE: 123 MMHG | DIASTOLIC BLOOD PRESSURE: 78 MMHG | RESPIRATION RATE: 16 BRPM | OXYGEN SATURATION: 98 % | HEART RATE: 80 BPM | TEMPERATURE: 99 F

## 2018-02-13 DIAGNOSIS — Z95.9 PRESENCE OF CARDIAC AND VASCULAR IMPLANT AND GRAFT, UNSPECIFIED: Chronic | ICD-10-CM

## 2018-02-13 DIAGNOSIS — Z95.1 PRESENCE OF AORTOCORONARY BYPASS GRAFT: Chronic | ICD-10-CM

## 2018-02-13 DIAGNOSIS — I25.810 ATHEROSCLEROSIS OF CORONARY ARTERY BYPASS GRAFT(S) WITHOUT ANGINA PECTORIS: ICD-10-CM

## 2018-02-13 DIAGNOSIS — J44.9 CHRONIC OBSTRUCTIVE PULMONARY DISEASE, UNSPECIFIED: ICD-10-CM

## 2018-02-13 DIAGNOSIS — J10.1 INFLUENZA DUE TO OTHER IDENTIFIED INFLUENZA VIRUS WITH OTHER RESPIRATORY MANIFESTATIONS: ICD-10-CM

## 2018-02-13 DIAGNOSIS — I10 ESSENTIAL (PRIMARY) HYPERTENSION: ICD-10-CM

## 2018-02-13 DIAGNOSIS — Z79.4 LONG TERM (CURRENT) USE OF INSULIN: ICD-10-CM

## 2018-02-13 DIAGNOSIS — Z95.5 PRESENCE OF CORONARY ANGIOPLASTY IMPLANT AND GRAFT: ICD-10-CM

## 2018-02-13 DIAGNOSIS — E11.9 TYPE 2 DIABETES MELLITUS WITHOUT COMPLICATIONS: ICD-10-CM

## 2018-02-13 DIAGNOSIS — Z79.82 LONG TERM (CURRENT) USE OF ASPIRIN: ICD-10-CM

## 2018-02-13 DIAGNOSIS — R05 COUGH: ICD-10-CM

## 2018-02-13 LAB
ALBUMIN SERPL ELPH-MCNC: 3.9 G/DL — SIGNIFICANT CHANGE UP (ref 3.3–5)
ALP SERPL-CCNC: 49 U/L — SIGNIFICANT CHANGE UP (ref 40–120)
ALT FLD-CCNC: 29 U/L — SIGNIFICANT CHANGE UP (ref 12–78)
ANION GAP SERPL CALC-SCNC: 8 MMOL/L — SIGNIFICANT CHANGE UP (ref 5–17)
APTT BLD: 26.7 SEC — LOW (ref 27.5–37.4)
AST SERPL-CCNC: 24 U/L — SIGNIFICANT CHANGE UP (ref 15–37)
BASOPHILS # BLD AUTO: 0 K/UL — SIGNIFICANT CHANGE UP (ref 0–0.2)
BASOPHILS NFR BLD AUTO: 0.7 % — SIGNIFICANT CHANGE UP (ref 0–2)
BILIRUB SERPL-MCNC: 0.5 MG/DL — SIGNIFICANT CHANGE UP (ref 0.2–1.2)
BUN SERPL-MCNC: 11 MG/DL — SIGNIFICANT CHANGE UP (ref 7–23)
CALCIUM SERPL-MCNC: 8.9 MG/DL — SIGNIFICANT CHANGE UP (ref 8.5–10.1)
CHLORIDE SERPL-SCNC: 103 MMOL/L — SIGNIFICANT CHANGE UP (ref 96–108)
CK SERPL-CCNC: 283 U/L — SIGNIFICANT CHANGE UP (ref 26–308)
CO2 SERPL-SCNC: 26 MMOL/L — SIGNIFICANT CHANGE UP (ref 22–31)
CREAT SERPL-MCNC: 1.11 MG/DL — SIGNIFICANT CHANGE UP (ref 0.5–1.3)
EOSINOPHIL # BLD AUTO: 0.1 K/UL — SIGNIFICANT CHANGE UP (ref 0–0.5)
EOSINOPHIL NFR BLD AUTO: 2 % — SIGNIFICANT CHANGE UP (ref 0–6)
FLUBV RNA SPEC QL NAA+PROBE: DETECTED
GLUCOSE SERPL-MCNC: 251 MG/DL — HIGH (ref 70–99)
HCT VFR BLD CALC: 36.5 % — LOW (ref 39–50)
HGB BLD-MCNC: 11.9 G/DL — LOW (ref 13–17)
INR BLD: 1.13 RATIO — SIGNIFICANT CHANGE UP (ref 0.88–1.16)
LYMPHOCYTES # BLD AUTO: 0.6 K/UL — LOW (ref 1–3.3)
LYMPHOCYTES # BLD AUTO: 9.4 % — LOW (ref 13–44)
MCHC RBC-ENTMCNC: 25.3 PG — LOW (ref 27–34)
MCHC RBC-ENTMCNC: 32.6 GM/DL — SIGNIFICANT CHANGE UP (ref 32–36)
MCV RBC AUTO: 77.8 FL — LOW (ref 80–100)
MONOCYTES # BLD AUTO: 0.8 K/UL — SIGNIFICANT CHANGE UP (ref 0–0.9)
MONOCYTES NFR BLD AUTO: 11.9 % — SIGNIFICANT CHANGE UP (ref 2–14)
NEUTROPHILS # BLD AUTO: 5.2 K/UL — SIGNIFICANT CHANGE UP (ref 1.8–7.4)
NEUTROPHILS NFR BLD AUTO: 76 % — SIGNIFICANT CHANGE UP (ref 43–77)
NT-PROBNP SERPL-SCNC: 199 PG/ML — HIGH (ref 0–125)
PLATELET # BLD AUTO: 123 K/UL — LOW (ref 150–400)
POTASSIUM SERPL-MCNC: 3.8 MMOL/L — SIGNIFICANT CHANGE UP (ref 3.5–5.3)
POTASSIUM SERPL-SCNC: 3.8 MMOL/L — SIGNIFICANT CHANGE UP (ref 3.5–5.3)
PROT SERPL-MCNC: 7.7 GM/DL — SIGNIFICANT CHANGE UP (ref 6–8.3)
PROTHROM AB SERPL-ACNC: 12.2 SEC — SIGNIFICANT CHANGE UP (ref 9.8–12.7)
RAPID RVP RESULT: DETECTED
RBC # BLD: 4.69 M/UL — SIGNIFICANT CHANGE UP (ref 4.2–5.8)
RBC # FLD: 13.8 % — SIGNIFICANT CHANGE UP (ref 10.3–14.5)
SODIUM SERPL-SCNC: 137 MMOL/L — SIGNIFICANT CHANGE UP (ref 135–145)
TROPONIN I SERPL-MCNC: <0.015 NG/ML — SIGNIFICANT CHANGE UP (ref 0.01–0.04)
WBC # BLD: 6.8 K/UL — SIGNIFICANT CHANGE UP (ref 3.8–10.5)
WBC # FLD AUTO: 6.8 K/UL — SIGNIFICANT CHANGE UP (ref 3.8–10.5)

## 2018-02-13 PROCEDURE — 99285 EMERGENCY DEPT VISIT HI MDM: CPT | Mod: 25

## 2018-02-13 PROCEDURE — 99283 EMERGENCY DEPT VISIT LOW MDM: CPT | Mod: 25

## 2018-02-13 PROCEDURE — 71045 X-RAY EXAM CHEST 1 VIEW: CPT | Mod: 26

## 2018-02-13 PROCEDURE — 99053 MED SERV 10PM-8AM 24 HR FAC: CPT

## 2018-02-13 PROCEDURE — 93010 ELECTROCARDIOGRAM REPORT: CPT

## 2018-02-13 RX ORDER — CLONAZEPAM 1 MG
0.5 TABLET ORAL
Qty: 0 | Refills: 0 | COMMUNITY

## 2018-02-13 RX ORDER — IPRATROPIUM/ALBUTEROL SULFATE 18-103MCG
3 AEROSOL WITH ADAPTER (GRAM) INHALATION
Qty: 0 | Refills: 0 | Status: COMPLETED | OUTPATIENT
Start: 2018-02-13 | End: 2018-02-13

## 2018-02-13 RX ORDER — SERTRALINE 25 MG/1
1 TABLET, FILM COATED ORAL
Qty: 0 | Refills: 0 | COMMUNITY

## 2018-02-13 RX ORDER — ASPIRIN/CALCIUM CARB/MAGNESIUM 324 MG
325 TABLET ORAL ONCE
Qty: 0 | Refills: 0 | Status: COMPLETED | OUTPATIENT
Start: 2018-02-13 | End: 2018-02-13

## 2018-02-13 RX ORDER — BUDESONIDE AND FORMOTEROL FUMARATE DIHYDRATE 160; 4.5 UG/1; UG/1
2 AEROSOL RESPIRATORY (INHALATION)
Qty: 0 | Refills: 0 | COMMUNITY

## 2018-02-13 RX ORDER — MORPHINE SULFATE 50 MG/1
4 CAPSULE, EXTENDED RELEASE ORAL ONCE
Qty: 0 | Refills: 0 | Status: DISCONTINUED | OUTPATIENT
Start: 2018-02-13 | End: 2018-02-13

## 2018-02-13 RX ORDER — METOPROLOL TARTRATE 50 MG
1 TABLET ORAL
Qty: 0 | Refills: 0 | COMMUNITY

## 2018-02-13 RX ORDER — ASPIRIN/CALCIUM CARB/MAGNESIUM 324 MG
1 TABLET ORAL
Qty: 0 | Refills: 0 | COMMUNITY

## 2018-02-13 RX ORDER — SODIUM CHLORIDE 9 MG/ML
3 INJECTION INTRAMUSCULAR; INTRAVENOUS; SUBCUTANEOUS ONCE
Qty: 0 | Refills: 0 | Status: COMPLETED | OUTPATIENT
Start: 2018-02-13 | End: 2018-02-13

## 2018-02-13 RX ORDER — RANOLAZINE 500 MG/1
1 TABLET, FILM COATED, EXTENDED RELEASE ORAL
Qty: 0 | Refills: 0 | COMMUNITY

## 2018-02-13 RX ORDER — SODIUM CHLORIDE 9 MG/ML
1000 INJECTION INTRAMUSCULAR; INTRAVENOUS; SUBCUTANEOUS ONCE
Qty: 0 | Refills: 0 | Status: COMPLETED | OUTPATIENT
Start: 2018-02-13 | End: 2018-02-13

## 2018-02-13 RX ORDER — LOSARTAN POTASSIUM 100 MG/1
1 TABLET, FILM COATED ORAL
Qty: 0 | Refills: 0 | COMMUNITY

## 2018-02-13 RX ORDER — ONDANSETRON 8 MG/1
4 TABLET, FILM COATED ORAL ONCE
Qty: 0 | Refills: 0 | Status: COMPLETED | OUTPATIENT
Start: 2018-02-13 | End: 2018-02-13

## 2018-02-13 RX ORDER — INSULIN GLARGINE 100 [IU]/ML
25 INJECTION, SOLUTION SUBCUTANEOUS
Qty: 0 | Refills: 0 | COMMUNITY

## 2018-02-13 RX ADMIN — SODIUM CHLORIDE 1000 MILLILITER(S): 9 INJECTION INTRAMUSCULAR; INTRAVENOUS; SUBCUTANEOUS at 06:05

## 2018-02-13 RX ADMIN — Medication 125 MILLIGRAM(S): at 05:51

## 2018-02-13 RX ADMIN — Medication 3 MILLILITER(S): at 05:00

## 2018-02-13 RX ADMIN — Medication 3 MILLILITER(S): at 05:45

## 2018-02-13 RX ADMIN — ONDANSETRON 4 MILLIGRAM(S): 8 TABLET, FILM COATED ORAL at 05:51

## 2018-02-13 RX ADMIN — Medication 325 MILLIGRAM(S): at 05:51

## 2018-02-13 RX ADMIN — MORPHINE SULFATE 4 MILLIGRAM(S): 50 CAPSULE, EXTENDED RELEASE ORAL at 05:51

## 2018-02-13 RX ADMIN — Medication 75 MILLIGRAM(S): at 08:04

## 2018-02-13 RX ADMIN — SODIUM CHLORIDE 3 MILLILITER(S): 9 INJECTION INTRAMUSCULAR; INTRAVENOUS; SUBCUTANEOUS at 05:52

## 2018-02-13 RX ADMIN — Medication 3 MILLILITER(S): at 05:20

## 2018-02-13 NOTE — ED ADULT NURSE REASSESSMENT NOTE - NS ED NURSE REASSESS COMMENT FT1
Patient received in bed 5 sitting in chair. Alert and oriented. Says he feels horrible. Advised patient he was positive for the flu and that he will start on tamiflu and go home. VSS. Sat 97%.

## 2018-02-13 NOTE — ED PROVIDER NOTE - OBJECTIVE STATEMENT
61 yo male biba from home with ho copd, cad, stents on plavix, pw acute onset of myalgias, headache and increased cough. Pt states he saw his pulmonologist 2 days ago and was feeling well until tonight.

## 2018-02-13 NOTE — ED PROVIDER NOTE - PROGRESS NOTE DETAILS
Sign out from Dr. JARAMILLO Flu B positive.  Pt nontoxic appearing in no acute distress, sats 98%.  Advised to call pcp or pulmonologist tomm for f/u and to return if worse.  Pt agrees with discharge and plan.

## 2018-02-13 NOTE — ED ADULT NURSE NOTE - OBJECTIVE STATEMENT
Pt reports to Ed complaining of cough and generalized body aches. Pt states that he was at Dr. Stock's office yesterday, but they did not test him for the flu.

## 2018-02-13 NOTE — ED PROVIDER NOTE - MEDICAL DECISION MAKING DETAILS
pt with increased cough and myalgias, yovanny influenza will lab, ekg, chest xray and rvp, albuterol and steroids

## 2018-02-28 ENCOUNTER — APPOINTMENT (OUTPATIENT)
Dept: DERMATOLOGY | Facility: CLINIC | Age: 63
End: 2018-02-28

## 2018-03-21 ENCOUNTER — EMERGENCY (EMERGENCY)
Facility: HOSPITAL | Age: 63
LOS: 0 days | Discharge: HOME | End: 2018-03-21
Attending: EMERGENCY MEDICINE | Admitting: EMERGENCY MEDICINE
Payer: MEDICARE

## 2018-03-21 VITALS
SYSTOLIC BLOOD PRESSURE: 122 MMHG | OXYGEN SATURATION: 98 % | DIASTOLIC BLOOD PRESSURE: 74 MMHG | HEART RATE: 68 BPM | RESPIRATION RATE: 16 BRPM

## 2018-03-21 VITALS — HEIGHT: 67 IN | WEIGHT: 218.04 LBS

## 2018-03-21 DIAGNOSIS — E11.65 TYPE 2 DIABETES MELLITUS WITH HYPERGLYCEMIA: ICD-10-CM

## 2018-03-21 DIAGNOSIS — Z95.5 PRESENCE OF CORONARY ANGIOPLASTY IMPLANT AND GRAFT: ICD-10-CM

## 2018-03-21 DIAGNOSIS — I10 ESSENTIAL (PRIMARY) HYPERTENSION: ICD-10-CM

## 2018-03-21 DIAGNOSIS — Z95.1 PRESENCE OF AORTOCORONARY BYPASS GRAFT: Chronic | ICD-10-CM

## 2018-03-21 DIAGNOSIS — E78.5 HYPERLIPIDEMIA, UNSPECIFIED: ICD-10-CM

## 2018-03-21 DIAGNOSIS — E86.0 DEHYDRATION: ICD-10-CM

## 2018-03-21 DIAGNOSIS — Z79.4 LONG TERM (CURRENT) USE OF INSULIN: ICD-10-CM

## 2018-03-21 DIAGNOSIS — I25.10 ATHEROSCLEROTIC HEART DISEASE OF NATIVE CORONARY ARTERY WITHOUT ANGINA PECTORIS: ICD-10-CM

## 2018-03-21 DIAGNOSIS — J44.9 CHRONIC OBSTRUCTIVE PULMONARY DISEASE, UNSPECIFIED: ICD-10-CM

## 2018-03-21 DIAGNOSIS — Z95.9 PRESENCE OF CARDIAC AND VASCULAR IMPLANT AND GRAFT, UNSPECIFIED: Chronic | ICD-10-CM

## 2018-03-21 LAB
ALBUMIN SERPL ELPH-MCNC: 3.7 G/DL — SIGNIFICANT CHANGE UP (ref 3.3–5)
ALP SERPL-CCNC: 51 U/L — SIGNIFICANT CHANGE UP (ref 40–120)
ALT FLD-CCNC: 22 U/L — SIGNIFICANT CHANGE UP (ref 12–78)
ANION GAP SERPL CALC-SCNC: 6 MMOL/L — SIGNIFICANT CHANGE UP (ref 5–17)
APPEARANCE UR: CLEAR — SIGNIFICANT CHANGE UP
AST SERPL-CCNC: 14 U/L — LOW (ref 15–37)
BASE EXCESS BLDV CALC-SCNC: 2.5 MMOL/L — HIGH (ref -2–2)
BASOPHILS # BLD AUTO: 0.02 K/UL — SIGNIFICANT CHANGE UP (ref 0–0.2)
BASOPHILS NFR BLD AUTO: 0.3 % — SIGNIFICANT CHANGE UP (ref 0–2)
BILIRUB SERPL-MCNC: 0.4 MG/DL — SIGNIFICANT CHANGE UP (ref 0.2–1.2)
BILIRUB UR-MCNC: NEGATIVE — SIGNIFICANT CHANGE UP
BUN SERPL-MCNC: 17 MG/DL — SIGNIFICANT CHANGE UP (ref 7–23)
CALCIUM SERPL-MCNC: 9 MG/DL — SIGNIFICANT CHANGE UP (ref 8.5–10.1)
CHLORIDE SERPL-SCNC: 102 MMOL/L — SIGNIFICANT CHANGE UP (ref 96–108)
CO2 SERPL-SCNC: 28 MMOL/L — SIGNIFICANT CHANGE UP (ref 22–31)
COLOR SPEC: YELLOW — SIGNIFICANT CHANGE UP
CREAT SERPL-MCNC: 1.25 MG/DL — SIGNIFICANT CHANGE UP (ref 0.5–1.3)
DIFF PNL FLD: NEGATIVE — SIGNIFICANT CHANGE UP
EOSINOPHIL # BLD AUTO: 0.06 K/UL — SIGNIFICANT CHANGE UP (ref 0–0.5)
EOSINOPHIL NFR BLD AUTO: 0.8 % — SIGNIFICANT CHANGE UP (ref 0–6)
GLUCOSE BLDC GLUCOMTR-MCNC: 292 MG/DL — HIGH (ref 70–99)
GLUCOSE BLDC GLUCOMTR-MCNC: 335 MG/DL — HIGH (ref 70–99)
GLUCOSE BLDC GLUCOMTR-MCNC: 89 MG/DL — SIGNIFICANT CHANGE UP (ref 70–99)
GLUCOSE SERPL-MCNC: 331 MG/DL — HIGH (ref 70–99)
GLUCOSE UR QL: 1000 MG/DL
HCO3 BLDV-SCNC: 28 MMOL/L — SIGNIFICANT CHANGE UP (ref 21–29)
HCT VFR BLD CALC: 36.5 % — LOW (ref 39–50)
HGB BLD-MCNC: 11.9 G/DL — LOW (ref 13–17)
IMM GRANULOCYTES NFR BLD AUTO: 0.6 % — SIGNIFICANT CHANGE UP (ref 0–1.5)
KETONES UR-MCNC: NEGATIVE — SIGNIFICANT CHANGE UP
LACTATE SERPL-SCNC: 1.9 MMOL/L — SIGNIFICANT CHANGE UP (ref 0.7–2)
LACTATE SERPL-SCNC: 2.7 MMOL/L — HIGH (ref 0.7–2)
LACTATE SERPL-SCNC: 2.8 MMOL/L — HIGH (ref 0.7–2)
LACTATE SERPL-SCNC: 2.8 MMOL/L — HIGH (ref 0.7–2)
LEUKOCYTE ESTERASE UR-ACNC: NEGATIVE — SIGNIFICANT CHANGE UP
LYMPHOCYTES # BLD AUTO: 1.1 K/UL — SIGNIFICANT CHANGE UP (ref 1–3.3)
LYMPHOCYTES # BLD AUTO: 15.5 % — SIGNIFICANT CHANGE UP (ref 13–44)
MCHC RBC-ENTMCNC: 25.8 PG — LOW (ref 27–34)
MCHC RBC-ENTMCNC: 32.6 GM/DL — SIGNIFICANT CHANGE UP (ref 32–36)
MCV RBC AUTO: 79 FL — LOW (ref 80–100)
MONOCYTES # BLD AUTO: 0.46 K/UL — SIGNIFICANT CHANGE UP (ref 0–0.9)
MONOCYTES NFR BLD AUTO: 6.5 % — SIGNIFICANT CHANGE UP (ref 2–14)
NEUTROPHILS # BLD AUTO: 5.4 K/UL — SIGNIFICANT CHANGE UP (ref 1.8–7.4)
NEUTROPHILS NFR BLD AUTO: 76.3 % — SIGNIFICANT CHANGE UP (ref 43–77)
NITRITE UR-MCNC: NEGATIVE — SIGNIFICANT CHANGE UP
NRBC # BLD: 0 /100 WBCS — SIGNIFICANT CHANGE UP (ref 0–0)
PCO2 BLDV: 51 MMHG — HIGH (ref 35–50)
PH BLDV: 7.36 — SIGNIFICANT CHANGE UP (ref 7.35–7.45)
PH UR: 6.5 — SIGNIFICANT CHANGE UP (ref 5–8)
PLATELET # BLD AUTO: 130 K/UL — LOW (ref 150–400)
PO2 BLDV: 48 MMHG — HIGH (ref 25–45)
POTASSIUM SERPL-MCNC: 4.4 MMOL/L — SIGNIFICANT CHANGE UP (ref 3.5–5.3)
POTASSIUM SERPL-SCNC: 4.4 MMOL/L — SIGNIFICANT CHANGE UP (ref 3.5–5.3)
PROT SERPL-MCNC: 7 GM/DL — SIGNIFICANT CHANGE UP (ref 6–8.3)
PROT UR-MCNC: NEGATIVE MG/DL — SIGNIFICANT CHANGE UP
RBC # BLD: 4.62 M/UL — SIGNIFICANT CHANGE UP (ref 4.2–5.8)
RBC # FLD: 14.8 % — HIGH (ref 10.3–14.5)
SAO2 % BLDV: 78 % — SIGNIFICANT CHANGE UP (ref 67–88)
SODIUM SERPL-SCNC: 136 MMOL/L — SIGNIFICANT CHANGE UP (ref 135–145)
SP GR SPEC: 1.01 — SIGNIFICANT CHANGE UP (ref 1.01–1.02)
UROBILINOGEN FLD QL: NEGATIVE MG/DL — SIGNIFICANT CHANGE UP
WBC # BLD: 7.08 K/UL — SIGNIFICANT CHANGE UP (ref 3.8–10.5)
WBC # FLD AUTO: 7.08 K/UL — SIGNIFICANT CHANGE UP (ref 3.8–10.5)

## 2018-03-21 PROCEDURE — 71046 X-RAY EXAM CHEST 2 VIEWS: CPT | Mod: 26

## 2018-03-21 PROCEDURE — 99284 EMERGENCY DEPT VISIT MOD MDM: CPT

## 2018-03-21 RX ORDER — INSULIN NPH HUM/REG INSULIN HM 70-30/ML
25 VIAL (ML) SUBCUTANEOUS
Qty: 1 | Refills: 0
Start: 2018-03-21

## 2018-03-21 RX ORDER — INSULIN NPH HUM/REG INSULIN HM 70-30/ML
45 VIAL (ML) SUBCUTANEOUS
Qty: 0 | Refills: 0 | DISCHARGE
Start: 2018-03-21

## 2018-03-21 RX ORDER — SODIUM CHLORIDE 9 MG/ML
2000 INJECTION INTRAMUSCULAR; INTRAVENOUS; SUBCUTANEOUS ONCE
Qty: 0 | Refills: 0 | Status: COMPLETED | OUTPATIENT
Start: 2018-03-21 | End: 2018-03-21

## 2018-03-21 RX ORDER — AZITHROMYCIN 500 MG/1
500 TABLET, FILM COATED ORAL ONCE
Qty: 0 | Refills: 0 | Status: COMPLETED | OUTPATIENT
Start: 2018-03-21 | End: 2018-03-21

## 2018-03-21 RX ORDER — AZITHROMYCIN 500 MG/1
1 TABLET, FILM COATED ORAL
Qty: 3 | Refills: 0 | OUTPATIENT
Start: 2018-03-21 | End: 2018-03-23

## 2018-03-21 RX ORDER — INSULIN LISPRO 100/ML
35 VIAL (ML) SUBCUTANEOUS ONCE
Qty: 0 | Refills: 0 | Status: COMPLETED | OUTPATIENT
Start: 2018-03-21 | End: 2018-03-21

## 2018-03-21 RX ORDER — SODIUM CHLORIDE 9 MG/ML
2000 INJECTION, SOLUTION INTRAVENOUS ONCE
Qty: 0 | Refills: 0 | Status: COMPLETED | OUTPATIENT
Start: 2018-03-21 | End: 2018-03-21

## 2018-03-21 RX ADMIN — SODIUM CHLORIDE 2000 MILLILITER(S): 9 INJECTION, SOLUTION INTRAVENOUS at 09:02

## 2018-03-21 RX ADMIN — Medication 35 UNIT(S): at 10:59

## 2018-03-21 RX ADMIN — AZITHROMYCIN 255 MILLIGRAM(S): 500 TABLET, FILM COATED ORAL at 13:47

## 2018-03-21 RX ADMIN — SODIUM CHLORIDE 2000 MILLILITER(S): 9 INJECTION INTRAMUSCULAR; INTRAVENOUS; SUBCUTANEOUS at 13:47

## 2018-03-21 RX ADMIN — Medication 35 UNIT(S): at 12:44

## 2018-03-21 NOTE — ED ADULT NURSE NOTE - OBJECTIVE STATEMENT
ran out of lantus and humalog  free samples given by endocrinologist dr perlman, patient states he contacted MD and did not get return call.  patient was in [facility] recently for manic/depressive episode.  states he has been compliant with going to therapist which has helped him.  Does admit to depression, no suicidal thoughts at this time.

## 2018-03-21 NOTE — ED ADULT TRIAGE NOTE - CHIEF COMPLAINT QUOTE
Pt reports that his glucometer is showing high BGM. Pt denies symptoms at this time. Pt reports that he ran out of samples of lantus and humalog two days ago and was unable to see MD.

## 2018-03-21 NOTE — ED PROVIDER NOTE - OBJECTIVE STATEMENT
62 y/o M hx IDDM, HTN, HLD, CAD s/p CABG/stents, COPD p/w hyperglycemia. Pt reports he ahd insurance change and could not afford lantus/humalog, was taking samples from Endo Dr Campbell, and ran out 2-3 days ago. Admits to mild polydipsia/polyuria, denies F/C/S/N/V/D, CP/SOB. Pt reports cough for the last 2 weeks, was seeing pulm and given breathing treamtnets/steroids

## 2018-03-21 NOTE — ED PROVIDER NOTE - PROGRESS NOTE DETAILS
Lactate within normal range after fluid replacement and patient requesting discharge.  Pt tolerating PO in ED. Jaswinder Jiménez DO

## 2018-03-21 NOTE — ED ADULT NURSE REASSESSMENT NOTE - COMFORT CARE
4pm rounding complete, vitals done no other asistance needed
plan of care explained/ambulated to bathroom/meal provided
meal provided/plan of care explained/assisted to bathroom
plan of care explained/assisted to bedpan
plan of care explained/po fluids offered/assisted to bathroom/meal provided

## 2018-03-22 LAB
B-OH-BUTYR SERPL-SCNC: 0.1 MMOL/L — SIGNIFICANT CHANGE UP
CULTURE RESULTS: SIGNIFICANT CHANGE UP
SPECIMEN SOURCE: SIGNIFICANT CHANGE UP

## 2018-04-09 ENCOUNTER — APPOINTMENT (OUTPATIENT)
Dept: DERMATOLOGY | Facility: CLINIC | Age: 63
End: 2018-04-09

## 2018-04-16 ENCOUNTER — APPOINTMENT (OUTPATIENT)
Dept: DERMATOLOGY | Facility: CLINIC | Age: 63
End: 2018-04-16

## 2018-06-23 ENCOUNTER — INPATIENT (INPATIENT)
Facility: HOSPITAL | Age: 63
LOS: 2 days | Discharge: HOME | End: 2018-06-26
Attending: INTERNAL MEDICINE | Admitting: INTERNAL MEDICINE
Payer: MEDICARE

## 2018-06-23 VITALS — WEIGHT: 220.02 LBS | HEIGHT: 67 IN

## 2018-06-23 DIAGNOSIS — Z95.1 PRESENCE OF AORTOCORONARY BYPASS GRAFT: Chronic | ICD-10-CM

## 2018-06-23 DIAGNOSIS — Z95.9 PRESENCE OF CARDIAC AND VASCULAR IMPLANT AND GRAFT, UNSPECIFIED: Chronic | ICD-10-CM

## 2018-06-23 LAB
ALBUMIN SERPL ELPH-MCNC: 3.7 G/DL — SIGNIFICANT CHANGE UP (ref 3.3–5)
ALP SERPL-CCNC: 61 U/L — SIGNIFICANT CHANGE UP (ref 40–120)
ALT FLD-CCNC: 28 U/L — SIGNIFICANT CHANGE UP (ref 12–78)
ANION GAP SERPL CALC-SCNC: 12 MMOL/L — SIGNIFICANT CHANGE UP (ref 5–17)
APTT BLD: 23.3 SEC — LOW (ref 27.5–37.4)
AST SERPL-CCNC: 20 U/L — SIGNIFICANT CHANGE UP (ref 15–37)
BASOPHILS # BLD AUTO: 0.02 K/UL — SIGNIFICANT CHANGE UP (ref 0–0.2)
BASOPHILS NFR BLD AUTO: 0.3 % — SIGNIFICANT CHANGE UP (ref 0–2)
BILIRUB SERPL-MCNC: 0.4 MG/DL — SIGNIFICANT CHANGE UP (ref 0.2–1.2)
BUN SERPL-MCNC: 15 MG/DL — SIGNIFICANT CHANGE UP (ref 7–23)
CALCIUM SERPL-MCNC: 8.8 MG/DL — SIGNIFICANT CHANGE UP (ref 8.5–10.1)
CHLORIDE SERPL-SCNC: 103 MMOL/L — SIGNIFICANT CHANGE UP (ref 96–108)
CO2 SERPL-SCNC: 22 MMOL/L — SIGNIFICANT CHANGE UP (ref 22–31)
CREAT SERPL-MCNC: 1.35 MG/DL — HIGH (ref 0.5–1.3)
EOSINOPHIL # BLD AUTO: 0.07 K/UL — SIGNIFICANT CHANGE UP (ref 0–0.5)
EOSINOPHIL NFR BLD AUTO: 0.9 % — SIGNIFICANT CHANGE UP (ref 0–6)
GLUCOSE BLDC GLUCOMTR-MCNC: 151 MG/DL — HIGH (ref 70–99)
GLUCOSE BLDC GLUCOMTR-MCNC: 221 MG/DL — HIGH (ref 70–99)
GLUCOSE SERPL-MCNC: 277 MG/DL — HIGH (ref 70–99)
HCT VFR BLD CALC: 33 % — LOW (ref 39–50)
HGB BLD-MCNC: 11.1 G/DL — LOW (ref 13–17)
IMM GRANULOCYTES NFR BLD AUTO: 0.5 % — SIGNIFICANT CHANGE UP (ref 0–1.5)
INR BLD: 1.1 RATIO — SIGNIFICANT CHANGE UP (ref 0.88–1.16)
LYMPHOCYTES # BLD AUTO: 0.89 K/UL — LOW (ref 1–3.3)
LYMPHOCYTES # BLD AUTO: 11.6 % — LOW (ref 13–44)
MCHC RBC-ENTMCNC: 25.5 PG — LOW (ref 27–34)
MCHC RBC-ENTMCNC: 33.6 GM/DL — SIGNIFICANT CHANGE UP (ref 32–36)
MCV RBC AUTO: 75.7 FL — LOW (ref 80–100)
MONOCYTES # BLD AUTO: 0.61 K/UL — SIGNIFICANT CHANGE UP (ref 0–0.9)
MONOCYTES NFR BLD AUTO: 7.9 % — SIGNIFICANT CHANGE UP (ref 2–14)
NEUTROPHILS # BLD AUTO: 6.05 K/UL — SIGNIFICANT CHANGE UP (ref 1.8–7.4)
NEUTROPHILS NFR BLD AUTO: 78.8 % — HIGH (ref 43–77)
NRBC # BLD: 0 /100 WBCS — SIGNIFICANT CHANGE UP (ref 0–0)
NT-PROBNP SERPL-SCNC: 47 PG/ML — SIGNIFICANT CHANGE UP (ref 0–125)
PLATELET # BLD AUTO: 147 K/UL — LOW (ref 150–400)
POTASSIUM SERPL-MCNC: 3.3 MMOL/L — LOW (ref 3.5–5.3)
POTASSIUM SERPL-SCNC: 3.3 MMOL/L — LOW (ref 3.5–5.3)
PROT SERPL-MCNC: 7.3 GM/DL — SIGNIFICANT CHANGE UP (ref 6–8.3)
PROTHROM AB SERPL-ACNC: 11.9 SEC — SIGNIFICANT CHANGE UP (ref 9.8–12.7)
RBC # BLD: 4.36 M/UL — SIGNIFICANT CHANGE UP (ref 4.2–5.8)
RBC # FLD: 13.8 % — SIGNIFICANT CHANGE UP (ref 10.3–14.5)
SODIUM SERPL-SCNC: 137 MMOL/L — SIGNIFICANT CHANGE UP (ref 135–145)
TROPONIN I SERPL-MCNC: 0.04 NG/ML — SIGNIFICANT CHANGE UP (ref 0.01–0.04)
TROPONIN I SERPL-MCNC: 0.06 NG/ML — HIGH (ref 0.01–0.04)
TROPONIN I SERPL-MCNC: 0.06 NG/ML — HIGH (ref 0.01–0.04)
WBC # BLD: 7.68 K/UL — SIGNIFICANT CHANGE UP (ref 3.8–10.5)
WBC # FLD AUTO: 7.68 K/UL — SIGNIFICANT CHANGE UP (ref 3.8–10.5)

## 2018-06-23 PROCEDURE — 71045 X-RAY EXAM CHEST 1 VIEW: CPT | Mod: 26

## 2018-06-23 PROCEDURE — 71275 CT ANGIOGRAPHY CHEST: CPT | Mod: 26

## 2018-06-23 PROCEDURE — 99285 EMERGENCY DEPT VISIT HI MDM: CPT

## 2018-06-23 PROCEDURE — 93010 ELECTROCARDIOGRAM REPORT: CPT

## 2018-06-23 RX ORDER — SENNA PLUS 8.6 MG/1
2 TABLET ORAL AT BEDTIME
Qty: 0 | Refills: 0 | Status: DISCONTINUED | OUTPATIENT
Start: 2018-06-23 | End: 2018-06-26

## 2018-06-23 RX ORDER — PANTOPRAZOLE SODIUM 20 MG/1
40 TABLET, DELAYED RELEASE ORAL
Qty: 0 | Refills: 0 | Status: DISCONTINUED | OUTPATIENT
Start: 2018-06-23 | End: 2018-06-26

## 2018-06-23 RX ORDER — SODIUM CHLORIDE 9 MG/ML
1000 INJECTION, SOLUTION INTRAVENOUS
Qty: 0 | Refills: 0 | Status: DISCONTINUED | OUTPATIENT
Start: 2018-06-23 | End: 2018-06-26

## 2018-06-23 RX ORDER — ATORVASTATIN CALCIUM 80 MG/1
40 TABLET, FILM COATED ORAL AT BEDTIME
Qty: 0 | Refills: 0 | Status: DISCONTINUED | OUTPATIENT
Start: 2018-06-23 | End: 2018-06-26

## 2018-06-23 RX ORDER — INSULIN GLARGINE 100 [IU]/ML
25 INJECTION, SOLUTION SUBCUTANEOUS EVERY MORNING
Qty: 0 | Refills: 0 | Status: DISCONTINUED | OUTPATIENT
Start: 2018-06-24 | End: 2018-06-26

## 2018-06-23 RX ORDER — INSULIN GLARGINE 100 [IU]/ML
25 INJECTION, SOLUTION SUBCUTANEOUS AT BEDTIME
Qty: 0 | Refills: 0 | Status: DISCONTINUED | OUTPATIENT
Start: 2018-06-23 | End: 2018-06-26

## 2018-06-23 RX ORDER — DEXTROSE 50 % IN WATER 50 %
25 SYRINGE (ML) INTRAVENOUS ONCE
Qty: 0 | Refills: 0 | Status: DISCONTINUED | OUTPATIENT
Start: 2018-06-23 | End: 2018-06-26

## 2018-06-23 RX ORDER — DOCUSATE SODIUM 100 MG
100 CAPSULE ORAL THREE TIMES A DAY
Qty: 0 | Refills: 0 | Status: DISCONTINUED | OUTPATIENT
Start: 2018-06-23 | End: 2018-06-26

## 2018-06-23 RX ORDER — FUROSEMIDE 40 MG
1 TABLET ORAL
Qty: 0 | Refills: 0 | COMMUNITY

## 2018-06-23 RX ORDER — TRAZODONE HCL 50 MG
150 TABLET ORAL AT BEDTIME
Qty: 0 | Refills: 0 | Status: DISCONTINUED | OUTPATIENT
Start: 2018-06-23 | End: 2018-06-26

## 2018-06-23 RX ORDER — TAMSULOSIN HYDROCHLORIDE 0.4 MG/1
0.8 CAPSULE ORAL AT BEDTIME
Qty: 0 | Refills: 0 | Status: DISCONTINUED | OUTPATIENT
Start: 2018-06-23 | End: 2018-06-26

## 2018-06-23 RX ORDER — MONTELUKAST 4 MG/1
10 TABLET, CHEWABLE ORAL DAILY
Qty: 0 | Refills: 0 | Status: DISCONTINUED | OUTPATIENT
Start: 2018-06-23 | End: 2018-06-26

## 2018-06-23 RX ORDER — HEPARIN SODIUM 5000 [USP'U]/ML
5000 INJECTION INTRAVENOUS; SUBCUTANEOUS EVERY 12 HOURS
Qty: 0 | Refills: 0 | Status: DISCONTINUED | OUTPATIENT
Start: 2018-06-23 | End: 2018-06-24

## 2018-06-23 RX ORDER — METFORMIN HYDROCHLORIDE 850 MG/1
2 TABLET ORAL
Qty: 0 | Refills: 0 | COMMUNITY

## 2018-06-23 RX ORDER — DEXTROSE 50 % IN WATER 50 %
15 SYRINGE (ML) INTRAVENOUS ONCE
Qty: 0 | Refills: 0 | Status: DISCONTINUED | OUTPATIENT
Start: 2018-06-23 | End: 2018-06-26

## 2018-06-23 RX ORDER — SODIUM CHLORIDE 9 MG/ML
3 INJECTION INTRAMUSCULAR; INTRAVENOUS; SUBCUTANEOUS ONCE
Qty: 0 | Refills: 0 | Status: COMPLETED | OUTPATIENT
Start: 2018-06-23 | End: 2018-06-23

## 2018-06-23 RX ORDER — ONDANSETRON 8 MG/1
4 TABLET, FILM COATED ORAL EVERY 6 HOURS
Qty: 0 | Refills: 0 | Status: DISCONTINUED | OUTPATIENT
Start: 2018-06-23 | End: 2018-06-26

## 2018-06-23 RX ORDER — IPRATROPIUM BROMIDE 0.2 MG/ML
500 SOLUTION, NON-ORAL INHALATION EVERY 6 HOURS
Qty: 0 | Refills: 0 | Status: DISCONTINUED | OUTPATIENT
Start: 2018-06-23 | End: 2018-06-26

## 2018-06-23 RX ORDER — NITROGLYCERIN 6.5 MG
0.4 CAPSULE, EXTENDED RELEASE ORAL
Qty: 0 | Refills: 0 | Status: DISCONTINUED | OUTPATIENT
Start: 2018-06-23 | End: 2018-06-26

## 2018-06-23 RX ORDER — FENOFIBRATE,MICRONIZED 130 MG
145 CAPSULE ORAL DAILY
Qty: 0 | Refills: 0 | Status: DISCONTINUED | OUTPATIENT
Start: 2018-06-23 | End: 2018-06-26

## 2018-06-23 RX ORDER — SODIUM CHLORIDE 9 MG/ML
1000 INJECTION INTRAMUSCULAR; INTRAVENOUS; SUBCUTANEOUS
Qty: 0 | Refills: 0 | Status: DISCONTINUED | OUTPATIENT
Start: 2018-06-23 | End: 2018-06-24

## 2018-06-23 RX ORDER — INSULIN LISPRO 100/ML
VIAL (ML) SUBCUTANEOUS
Qty: 0 | Refills: 0 | Status: DISCONTINUED | OUTPATIENT
Start: 2018-06-23 | End: 2018-06-26

## 2018-06-23 RX ORDER — DEXTROSE 50 % IN WATER 50 %
12.5 SYRINGE (ML) INTRAVENOUS ONCE
Qty: 0 | Refills: 0 | Status: DISCONTINUED | OUTPATIENT
Start: 2018-06-23 | End: 2018-06-26

## 2018-06-23 RX ORDER — CLOPIDOGREL BISULFATE 75 MG/1
75 TABLET, FILM COATED ORAL DAILY
Qty: 0 | Refills: 0 | Status: DISCONTINUED | OUTPATIENT
Start: 2018-06-23 | End: 2018-06-26

## 2018-06-23 RX ORDER — IPRATROPIUM/ALBUTEROL SULFATE 18-103MCG
3 AEROSOL WITH ADAPTER (GRAM) INHALATION EVERY 6 HOURS
Qty: 0 | Refills: 0 | Status: DISCONTINUED | OUTPATIENT
Start: 2018-06-23 | End: 2018-06-26

## 2018-06-23 RX ORDER — METOPROLOL TARTRATE 50 MG
1 TABLET ORAL
Qty: 0 | Refills: 0 | COMMUNITY

## 2018-06-23 RX ORDER — ISOSORBIDE MONONITRATE 60 MG/1
30 TABLET, EXTENDED RELEASE ORAL DAILY
Qty: 0 | Refills: 0 | Status: DISCONTINUED | OUTPATIENT
Start: 2018-06-23 | End: 2018-06-26

## 2018-06-23 RX ORDER — ASPIRIN/CALCIUM CARB/MAGNESIUM 324 MG
162 TABLET ORAL ONCE
Qty: 0 | Refills: 0 | Status: COMPLETED | OUTPATIENT
Start: 2018-06-23 | End: 2018-06-23

## 2018-06-23 RX ORDER — BUDESONIDE AND FORMOTEROL FUMARATE DIHYDRATE 160; 4.5 UG/1; UG/1
2 AEROSOL RESPIRATORY (INHALATION)
Qty: 0 | Refills: 0 | Status: DISCONTINUED | OUTPATIENT
Start: 2018-06-23 | End: 2018-06-26

## 2018-06-23 RX ORDER — DULOXETINE HYDROCHLORIDE 30 MG/1
30 CAPSULE, DELAYED RELEASE ORAL DAILY
Qty: 0 | Refills: 0 | Status: DISCONTINUED | OUTPATIENT
Start: 2018-06-23 | End: 2018-06-26

## 2018-06-23 RX ORDER — POTASSIUM CHLORIDE 20 MEQ
20 PACKET (EA) ORAL ONCE
Qty: 0 | Refills: 0 | Status: COMPLETED | OUTPATIENT
Start: 2018-06-23 | End: 2018-06-23

## 2018-06-23 RX ORDER — SODIUM CHLORIDE 9 MG/ML
500 INJECTION INTRAMUSCULAR; INTRAVENOUS; SUBCUTANEOUS ONCE
Qty: 0 | Refills: 0 | Status: COMPLETED | OUTPATIENT
Start: 2018-06-23 | End: 2018-06-23

## 2018-06-23 RX ORDER — GLUCAGON INJECTION, SOLUTION 0.5 MG/.1ML
1 INJECTION, SOLUTION SUBCUTANEOUS ONCE
Qty: 0 | Refills: 0 | Status: DISCONTINUED | OUTPATIENT
Start: 2018-06-23 | End: 2018-06-26

## 2018-06-23 RX ORDER — METOPROLOL TARTRATE 50 MG
50 TABLET ORAL DAILY
Qty: 0 | Refills: 0 | Status: DISCONTINUED | OUTPATIENT
Start: 2018-06-23 | End: 2018-06-26

## 2018-06-23 RX ADMIN — Medication 162 MILLIGRAM(S): at 14:04

## 2018-06-23 RX ADMIN — Medication 20 MILLIEQUIVALENT(S): at 14:07

## 2018-06-23 RX ADMIN — SODIUM CHLORIDE 50 MILLILITER(S): 9 INJECTION INTRAMUSCULAR; INTRAVENOUS; SUBCUTANEOUS at 23:50

## 2018-06-23 RX ADMIN — SODIUM CHLORIDE 3 MILLILITER(S): 9 INJECTION INTRAMUSCULAR; INTRAVENOUS; SUBCUTANEOUS at 13:47

## 2018-06-23 RX ADMIN — Medication 30 MILLIGRAM(S): at 22:14

## 2018-06-23 RX ADMIN — SODIUM CHLORIDE 500 MILLILITER(S): 9 INJECTION INTRAMUSCULAR; INTRAVENOUS; SUBCUTANEOUS at 13:31

## 2018-06-23 RX ADMIN — TAMSULOSIN HYDROCHLORIDE 0.8 MILLIGRAM(S): 0.4 CAPSULE ORAL at 22:14

## 2018-06-23 RX ADMIN — ATORVASTATIN CALCIUM 40 MILLIGRAM(S): 80 TABLET, FILM COATED ORAL at 22:14

## 2018-06-23 NOTE — H&P ADULT - NSHPLABSRESULTS_GEN_ALL_CORE
Lab Results:  CBC  CBC Full  -  ( 23 Jun 2018 13:17 )  WBC Count : 7.68 K/uL  Hemoglobin : 11.1 g/dL  Hematocrit : 33.0 %  Platelet Count - Automated : 147 K/uL  Mean Cell Volume : 75.7 fl  Mean Cell Hemoglobin : 25.5 pg  Mean Cell Hemoglobin Concentration : 33.6 gm/dL  Auto Neutrophil # : 6.05 K/uL  Auto Lymphocyte # : 0.89 K/uL  Auto Monocyte # : 0.61 K/uL  Auto Eosinophil # : 0.07 K/uL  Auto Basophil # : 0.02 K/uL  Auto Neutrophil % : 78.8 %  Auto Lymphocyte % : 11.6 %  Auto Monocyte % : 7.9 %  Auto Eosinophil % : 0.9 %  Auto Basophil % : 0.3 %    .		Differential:	[] Automated		[] Manual  Chemistry                        11.1   7.68  )-----------( 147      ( 23 Jun 2018 13:17 )             33.0     06-23    137  |  103  |  15  ----------------------------<  277<H>  3.3<L>   |  22  |  1.35<H>    Ca    8.8      23 Jun 2018 13:17    TPro  7.3  /  Alb  3.7  /  TBili  0.4  /  DBili  x   /  AST  20  /  ALT  28  /  AlkPhos  61  06-23    LIVER FUNCTIONS - ( 23 Jun 2018 13:17 )  Alb: 3.7 g/dL / Pro: 7.3 gm/dL / ALK PHOS: 61 U/L / ALT: 28 U/L / AST: 20 U/L / GGT: x           PT/INR - ( 23 Jun 2018 13:17 )   PT: 11.9 sec;   INR: 1.10 ratio         PTT - ( 23 Jun 2018 13:17 )  PTT:23.3 sec      RADIOLOGY RESULTS:    EKG- Sinus tachycardia @128bpm, nonspecific ST/T wave changes    < from: CT Angio Chest PE Protocol w/ IV Cont (06.23.18 @ 16:17) >        IMPRESSION:        1.   No evidence of pulmonary emboli        2.   Otherwise unremarkable CTscan of the chest.     < end of copied text >

## 2018-06-23 NOTE — ED ADULT TRIAGE NOTE - CHIEF COMPLAINT QUOTE
pt states " I don't know whats wrong with me, I get short of breath and shake whenever I do a little work. My eyelid is falling down ( points to right eye), this has been going on for a few months. "

## 2018-06-23 NOTE — ED STATDOCS - PROGRESS NOTE DETAILS
MONROE Scribe for Attending Tino" 62 y/o M with a PMHx of COPD, [facility]- Coronary artery disease involving coronary bypass graft of native heart with angina pectoris, DM, HTN, CAD w/stents- last recent stent one year ago presents to the ED c/o sudden onset of shaking two weeks ago, today with +dyspnea on exertion, SOB at rest. Former smoker-last use 15 years ago. Alcohol use - last use 5 years ago. Patient with obvious distress. Will send pt to the main ED for evaluation.

## 2018-06-23 NOTE — ED PROVIDER NOTE - MEDICAL DECISION MAKING DETAILS
No wheezing, or hypoxia.  SInus tachycardia on EKG and tele.  Labs with elevated troponin.  Pending CTA chest, and admission.  Signout to Dr. Murray.

## 2018-06-23 NOTE — H&P ADULT - ASSESSMENT
62 y/o male with HTN, DM, HL, depression/anxiety and CAD s/p 3V CABG and PCI x 2 presents with constant CP with negative     *Chest pain, elevated troponin and Dyspnea R/O ACS vs ?COPD  - in pt w/ CAD s/p CABG 5/2016,  PCI w/ YUE July and Oct. 2016; PCI of OM2 feb 2018  - CTA - No PE  - continue aspirin, plavix, statin, BB, ARB  - cardio and Pulm consult    - serial troponin and EKG  - FU BNP  - FU ECHO  - Nebs ATC and PRN  - Hold off on Steroids  - no wheezing.    *ARF  -continue IVF  -monitor BUN/Cr    *Hypokalemia  -replete and monitor    *HTN  - controlled, cont home meds    *T2D  - cont lantus and ISS (hold home med)   - diabetic diet, fingersticks     *depression/anxiety  -cont SSRI and benzo    *dvt px-LMWH and ambulation

## 2018-06-23 NOTE — H&P ADULT - HISTORY OF PRESENT ILLNESS
62 y/o male with HTN, DM, HL, depression/anxiety and CAD s/p 3V CABG at [facility] in May 2016 and PCI by dr Arrieta presents to the ER complaining of Chest pain andSOB for about 2 weeks. Pt states that he feels short of breath on exertion. Pt currently uses inhalers 3 times a day. Patient denies any Wheezing, orthopnea, palpitations, N/V.    In the ER, patient received IVF, potassium and ASA. Currently has no CP or dyspnea. Currently sitting comfortably on RA with 100% pulse ox

## 2018-06-23 NOTE — ED PROVIDER NOTE - CARE PLAN
Principal Discharge DX:	COPD (chronic obstructive pulmonary disease) Principal Discharge DX:	Elevated troponin I level

## 2018-06-23 NOTE — H&P ADULT - NSHPREVIEWOFSYSTEMS_GEN_ALL_CORE
REVIEW OF SYSTEMS:    CONSTITUTIONAL: No weakness, fevers or chills  EYES/ENT: No visual changes; vertigo or throat pain   NECK: No pain or stiffness  RESPIRATORY: No cough, wheezing, hemoptysis or +shortness of breath  CARDIOVASCULAR: No chest pain or palpitations  GASTROINTESTINAL: No abdominal or epigastric pain. No nausea, vomiting, or hematemesis; No diarrhea or constipation. No melena or hematochezia.  GENITOURINARY: No dysuria, urinary frequency or hematuria  NEUROLOGICAL: No numbness or weakness  EXTREMITIES: No swelling or tenderness  SKIN: No itching, burning, rashes, or lesions   All other review of systems is negative unless indicated above.

## 2018-06-24 DIAGNOSIS — I21.4 NON-ST ELEVATION (NSTEMI) MYOCARDIAL INFARCTION: ICD-10-CM

## 2018-06-24 LAB
ANION GAP SERPL CALC-SCNC: 6 MMOL/L — SIGNIFICANT CHANGE UP (ref 5–17)
APTT BLD: 40.9 SEC — HIGH (ref 27.5–37.4)
BUN SERPL-MCNC: 10 MG/DL — SIGNIFICANT CHANGE UP (ref 7–23)
CALCIUM SERPL-MCNC: 8.4 MG/DL — LOW (ref 8.5–10.1)
CHLORIDE SERPL-SCNC: 109 MMOL/L — HIGH (ref 96–108)
CHOLEST SERPL-MCNC: 110 MG/DL — SIGNIFICANT CHANGE UP (ref 10–199)
CO2 SERPL-SCNC: 26 MMOL/L — SIGNIFICANT CHANGE UP (ref 22–31)
CREAT SERPL-MCNC: 0.9 MG/DL — SIGNIFICANT CHANGE UP (ref 0.5–1.3)
GLUCOSE BLDC GLUCOMTR-MCNC: 165 MG/DL — HIGH (ref 70–99)
GLUCOSE BLDC GLUCOMTR-MCNC: 170 MG/DL — HIGH (ref 70–99)
GLUCOSE BLDC GLUCOMTR-MCNC: 180 MG/DL — HIGH (ref 70–99)
GLUCOSE BLDC GLUCOMTR-MCNC: 203 MG/DL — HIGH (ref 70–99)
GLUCOSE SERPL-MCNC: 165 MG/DL — HIGH (ref 70–99)
HBA1C BLD-MCNC: 8.6 % — HIGH (ref 4–5.6)
HCT VFR BLD CALC: 31.8 % — LOW (ref 39–50)
HCT VFR BLD CALC: 33 % — LOW (ref 39–50)
HDLC SERPL-MCNC: 29 MG/DL — LOW (ref 40–125)
HGB BLD-MCNC: 10.3 G/DL — LOW (ref 13–17)
HGB BLD-MCNC: 10.9 G/DL — LOW (ref 13–17)
LIPID PNL WITH DIRECT LDL SERPL: 63 MG/DL — SIGNIFICANT CHANGE UP
MAGNESIUM SERPL-MCNC: 1.6 MG/DL — SIGNIFICANT CHANGE UP (ref 1.6–2.6)
MCHC RBC-ENTMCNC: 25.1 PG — LOW (ref 27–34)
MCHC RBC-ENTMCNC: 25.8 PG — LOW (ref 27–34)
MCHC RBC-ENTMCNC: 32.4 GM/DL — SIGNIFICANT CHANGE UP (ref 32–36)
MCHC RBC-ENTMCNC: 33 GM/DL — SIGNIFICANT CHANGE UP (ref 32–36)
MCV RBC AUTO: 77.4 FL — LOW (ref 80–100)
MCV RBC AUTO: 78.2 FL — LOW (ref 80–100)
NRBC # BLD: 0 /100 WBCS — SIGNIFICANT CHANGE UP (ref 0–0)
NRBC # BLD: 0 /100 WBCS — SIGNIFICANT CHANGE UP (ref 0–0)
NT-PROBNP SERPL-SCNC: 116 PG/ML — SIGNIFICANT CHANGE UP (ref 0–125)
PHOSPHATE SERPL-MCNC: 2.8 MG/DL — SIGNIFICANT CHANGE UP (ref 2.5–4.5)
PLATELET # BLD AUTO: 135 K/UL — LOW (ref 150–400)
PLATELET # BLD AUTO: 160 K/UL — SIGNIFICANT CHANGE UP (ref 150–400)
POTASSIUM SERPL-MCNC: 4 MMOL/L — SIGNIFICANT CHANGE UP (ref 3.5–5.3)
POTASSIUM SERPL-SCNC: 4 MMOL/L — SIGNIFICANT CHANGE UP (ref 3.5–5.3)
RBC # BLD: 4.11 M/UL — LOW (ref 4.2–5.8)
RBC # BLD: 4.22 M/UL — SIGNIFICANT CHANGE UP (ref 4.2–5.8)
RBC # FLD: 14.4 % — SIGNIFICANT CHANGE UP (ref 10.3–14.5)
RBC # FLD: 14.6 % — HIGH (ref 10.3–14.5)
SODIUM SERPL-SCNC: 141 MMOL/L — SIGNIFICANT CHANGE UP (ref 135–145)
TOTAL CHOLESTEROL/HDL RATIO MEASUREMENT: 3.8 RATIO — SIGNIFICANT CHANGE UP (ref 3.4–9.6)
TRIGL SERPL-MCNC: 92 MG/DL — SIGNIFICANT CHANGE UP (ref 10–149)
TROPONIN I SERPL-MCNC: 0.02 NG/ML — SIGNIFICANT CHANGE UP (ref 0.01–0.04)
TROPONIN I SERPL-MCNC: <0.015 NG/ML — SIGNIFICANT CHANGE UP (ref 0.01–0.04)
TSH SERPL-MCNC: 1.26 UIU/ML — SIGNIFICANT CHANGE UP (ref 0.36–3.74)
WBC # BLD: 4.3 K/UL — SIGNIFICANT CHANGE UP (ref 3.8–10.5)
WBC # BLD: 5.76 K/UL — SIGNIFICANT CHANGE UP (ref 3.8–10.5)
WBC # FLD AUTO: 4.3 K/UL — SIGNIFICANT CHANGE UP (ref 3.8–10.5)
WBC # FLD AUTO: 5.76 K/UL — SIGNIFICANT CHANGE UP (ref 3.8–10.5)

## 2018-06-24 RX ORDER — HEPARIN SODIUM 5000 [USP'U]/ML
INJECTION INTRAVENOUS; SUBCUTANEOUS
Qty: 25000 | Refills: 0 | Status: DISCONTINUED | OUTPATIENT
Start: 2018-06-24 | End: 2018-06-25

## 2018-06-24 RX ORDER — HEPARIN SODIUM 5000 [USP'U]/ML
5000 INJECTION INTRAVENOUS; SUBCUTANEOUS ONCE
Qty: 0 | Refills: 0 | Status: COMPLETED | OUTPATIENT
Start: 2018-06-24 | End: 2018-06-24

## 2018-06-24 RX ORDER — HEPARIN SODIUM 5000 [USP'U]/ML
6000 INJECTION INTRAVENOUS; SUBCUTANEOUS EVERY 6 HOURS
Qty: 0 | Refills: 0 | Status: DISCONTINUED | OUTPATIENT
Start: 2018-06-24 | End: 2018-06-25

## 2018-06-24 RX ADMIN — CLOPIDOGREL BISULFATE 75 MILLIGRAM(S): 75 TABLET, FILM COATED ORAL at 12:19

## 2018-06-24 RX ADMIN — Medication 3 MILLILITER(S): at 01:41

## 2018-06-24 RX ADMIN — ATORVASTATIN CALCIUM 40 MILLIGRAM(S): 80 TABLET, FILM COATED ORAL at 21:24

## 2018-06-24 RX ADMIN — Medication 2: at 08:02

## 2018-06-24 RX ADMIN — HEPARIN SODIUM 1000 UNIT(S)/HR: 5000 INJECTION INTRAVENOUS; SUBCUTANEOUS at 13:52

## 2018-06-24 RX ADMIN — Medication 30 MILLIGRAM(S): at 05:34

## 2018-06-24 RX ADMIN — HEPARIN SODIUM 5000 UNIT(S): 5000 INJECTION INTRAVENOUS; SUBCUTANEOUS at 13:52

## 2018-06-24 RX ADMIN — Medication 2: at 12:12

## 2018-06-24 RX ADMIN — MONTELUKAST 10 MILLIGRAM(S): 4 TABLET, CHEWABLE ORAL at 12:22

## 2018-06-24 RX ADMIN — DULOXETINE HYDROCHLORIDE 30 MILLIGRAM(S): 30 CAPSULE, DELAYED RELEASE ORAL at 15:49

## 2018-06-24 RX ADMIN — ISOSORBIDE MONONITRATE 30 MILLIGRAM(S): 60 TABLET, EXTENDED RELEASE ORAL at 12:19

## 2018-06-24 RX ADMIN — HEPARIN SODIUM 1200 UNIT(S)/HR: 5000 INJECTION INTRAVENOUS; SUBCUTANEOUS at 21:47

## 2018-06-24 RX ADMIN — Medication 500 MICROGRAM(S): at 20:08

## 2018-06-24 RX ADMIN — TAMSULOSIN HYDROCHLORIDE 0.8 MILLIGRAM(S): 0.4 CAPSULE ORAL at 21:24

## 2018-06-24 RX ADMIN — Medication 500 MICROGRAM(S): at 08:45

## 2018-06-24 RX ADMIN — Medication 145 MILLIGRAM(S): at 12:20

## 2018-06-24 RX ADMIN — INSULIN GLARGINE 25 UNIT(S): 100 INJECTION, SOLUTION SUBCUTANEOUS at 12:15

## 2018-06-24 RX ADMIN — HEPARIN SODIUM 5000 UNIT(S): 5000 INJECTION INTRAVENOUS; SUBCUTANEOUS at 05:34

## 2018-06-24 RX ADMIN — Medication 1 TABLET(S): at 12:19

## 2018-06-24 RX ADMIN — Medication 100 MILLIGRAM(S): at 05:34

## 2018-06-24 RX ADMIN — PANTOPRAZOLE SODIUM 40 MILLIGRAM(S): 20 TABLET, DELAYED RELEASE ORAL at 05:56

## 2018-06-24 RX ADMIN — Medication 500 MICROGRAM(S): at 13:57

## 2018-06-24 RX ADMIN — Medication 50 MILLIGRAM(S): at 05:34

## 2018-06-24 RX ADMIN — Medication 4: at 17:16

## 2018-06-24 RX ADMIN — BUDESONIDE AND FORMOTEROL FUMARATE DIHYDRATE 2 PUFF(S): 160; 4.5 AEROSOL RESPIRATORY (INHALATION) at 20:46

## 2018-06-24 RX ADMIN — Medication 30 MILLIGRAM(S): at 17:16

## 2018-06-24 RX ADMIN — INSULIN GLARGINE 25 UNIT(S): 100 INJECTION, SOLUTION SUBCUTANEOUS at 21:24

## 2018-06-24 NOTE — PROGRESS NOTE ADULT - SUBJECTIVE AND OBJECTIVE BOX
HOSPITALIST PROGRESS NOTE:  SUBJECTIVE:  PCP:   PCP: Dr Ruggiero  cardio- Dr Lozoya  Pulm: Dr Asencio    Chief Complaint: Patient is a 63y old  Male who presents with a chief complaint of Chest Pain and Dyspnea (23 Jun 2018 20:25)    HPI:  60 y/o male with HTN, DM, HL, depression/anxiety and CAD s/p 3V CABG at [facility] in May 2016 and PCI by dr Arrieta presents to the ER complaining of Chest pain andSOB for about 2 weeks. Pt states that he feels short of breath on exertion. Pt currently uses inhalers 3 times a day. Patient denies any Wheezing, orthopnea, palpitations, N/V.    In the ER, patient received IVF, potassium and ASA. Currently has no CP or dyspnea. Currently sitting comfortably on RA with 100% pulse ox (23 Jun 2018 20:25)    6/24:  Above REviewed    Allergies:  penicillin (Unknown)    REVIEW OF SYSTEMS:  See HPI. All other review of systems is negative unless indicated above.     OBJECTIVE  Physical Exam:  Vital Signs:  Height (cm): 170.18 (06-23 @ 12:51)  Weight (kg): 99.8 (06-23 @ 12:51)  BMI (kg/m2): 34.5 (06-23 @ 12:51)  BSA (m2): 2.11 (06-23 @ 12:51)  Vital Signs Last 24 Hrs  T(C): 36.3 (24 Jun 2018 04:33), Max: 36.7 (23 Jun 2018 23:03)  T(F): 97.4 (24 Jun 2018 04:33), Max: 98 (23 Jun 2018 23:03)  HR: 80 (24 Jun 2018 04:33) (68 - 127)  BP: 135/65 (24 Jun 2018 04:33) (117/70 - 135/65)  BP(mean): --  RR: 18 (24 Jun 2018 04:33) (17 - 26)  SpO2: 100% (24 Jun 2018 04:33) (100% - 100%)  I&O's Summary      Constitutional: NAD, awake and alert, well-developed  Neurological: AAO x 3, no focal deficits  HEENT: PERRLA, EOMI, MMM  Neck: Soft and supple, No LAD, No JVD  Respiratory: Breath sounds are clear bilaterally, No wheezing, rales or rhonchi  Cardiovascular: S1 and S2, regular rate and rhythm; no Murmurs, gallops or rubs  Gastrointestinal: Bowel Sounds present, soft, nontender, nondistended, no guarding, no rebound tenderness  Back: No CVA tenderness   Extremities: No peripheral edema  Vascular: 2+ peripheral pulses  Musculoskeletal: 5/5 strength b/l upper and lower extremities  Skin: No rashes  Breast: Deferred  Rectal: Deferred    MEDICATIONS  (STANDING):  atorvastatin 40 milliGRAM(s) Oral at bedtime  buDESOnide 160 MICROgram(s)/formoterol 4.5 MICROgram(s) Inhaler 2 Puff(s) Inhalation two times a day  busPIRone 30 milliGRAM(s) Oral two times a day  clopidogrel Tablet 75 milliGRAM(s) Oral daily  dextrose 5%. 1000 milliLiter(s) (50 mL/Hr) IV Continuous <Continuous>  dextrose 50% Injectable 12.5 Gram(s) IV Push once  dextrose 50% Injectable 25 Gram(s) IV Push once  dextrose 50% Injectable 25 Gram(s) IV Push once  docusate sodium 100 milliGRAM(s) Oral three times a day  DULoxetine 30 milliGRAM(s) Oral daily  fenofibrate Tablet 145 milliGRAM(s) Oral daily  heparin  Injectable 5000 Unit(s) SubCutaneous every 12 hours  insulin glargine Injectable (LANTUS) 25 Unit(s) SubCutaneous every morning  insulin glargine Injectable (LANTUS) 25 Unit(s) SubCutaneous at bedtime  insulin lispro (HumaLOG) corrective regimen sliding scale   SubCutaneous three times a day before meals  ipratropium    for Nebulization 500 MICROGram(s) Nebulizer every 6 hours  isosorbide   mononitrate ER Tablet (IMDUR) 30 milliGRAM(s) Oral daily  metoprolol succinate ER 50 milliGRAM(s) Oral daily  montelukast 10 milliGRAM(s) Oral daily  multivitamin 1 Tablet(s) Oral daily  pantoprazole    Tablet 40 milliGRAM(s) Oral before breakfast  sodium chloride 0.9%. 1000 milliLiter(s) (50 mL/Hr) IV Continuous <Continuous>  tamsulosin 0.8 milliGRAM(s) Oral at bedtime  traZODone 150 milliGRAM(s) Oral at bedtime      LABS: All Labs Reviewed:                        10.3   4.30  )-----------( 135      ( 24 Jun 2018 05:55 )             31.8     06-24    141  |  109<H>  |  10  ----------------------------<  165<H>  4.0   |  26  |  0.90    Ca    8.4<L>      24 Jun 2018 05:55  Phos  2.8     06-24  Mg     1.6     06-24    TPro  7.3  /  Alb  3.7  /  TBili  0.4  /  DBili  x   /  AST  20  /  ALT  28  /  AlkPhos  61  06-23    PT/INR - ( 23 Jun 2018 13:17 )   PT: 11.9 sec;   INR: 1.10 ratio         PTT - ( 23 Jun 2018 13:17 )  PTT:23.3 sec  CARDIAC MARKERS ( 24 Jun 2018 05:55 )  0.018 ng/mL / x     / x     / x     / x      CARDIAC MARKERS ( 23 Jun 2018 22:27 )  0.036 ng/mL / x     / x     / x     / x      CARDIAC MARKERS ( 23 Jun 2018 16:22 )  0.063 ng/mL / x     / x     / x     / x      CARDIAC MARKERS ( 23 Jun 2018 13:17 )  0.056 ng/mL / x     / x     / x     / x          RADIOLOGY/EKG:    Labs, Radiology, Cardiology, and Other Results: Lab Results:  CBC  CBC Full  -  ( 23 Jun 2018 13:17 )  WBC Count : 7.68 K/uL  Hemoglobin : 11.1 g/dL  Hematocrit : 33.0 %  Platelet Count - Automated : 147 K/uL  Mean Cell Volume : 75.7 fl  Mean Cell Hemoglobin : 25.5 pg  Mean Cell Hemoglobin Concentration : 33.6 gm/dL  Auto Neutrophil # : 6.05 K/uL  Auto Lymphocyte # : 0.89 K/uL  Auto Monocyte # : 0.61 K/uL  Auto Eosinophil # : 0.07 K/uL  Auto Basophil # : 0.02 K/uL  Auto Neutrophil % : 78.8 %  Auto Lymphocyte % : 11.6 %  Auto Monocyte % : 7.9 %  Auto Eosinophil % : 0.9 %  Auto Basophil % : 0.3 %   .  Differential: [] Automated  [] Manual  Chemistry                        11.1   7.68  )-----------( 147      ( 23 Jun 2018 13:17 )             33.0    06-23   137  |  103  |  15  ----------------------------<  277<H>  3.3<L>   |  22  |  1.35<H>   Ca    8.8      23 Jun 2018 13:17   TPro  7.3  /  Alb  3.7  /  TBili  0.4  /  DBili  x   /  AST  20  /  ALT  28  /  AlkPhos  61  06-23   LIVER FUNCTIONS - ( 23 Jun 2018 13:17 )  Alb: 3.7 g/dL / Pro: 7.3 gm/dL / ALK PHOS: 61 U/L / ALT: 28 U/L / AST: 20 U/L / GGT: x          PT/INR - ( 23 Jun 2018 13:17 )   PT: 11.9 sec;   INR: 1.10 ratio        PTT - ( 23 Jun 2018 13:17 )  PTT:23.3 sec    RADIOLOGY RESULTS:   EKG- Sinus tachycardia @128bpm, nonspecific ST/T wave changes   < from: CT Angio Chest PE Protocol w/ IV Cont (06.23.18 @ 16:17) >     IMPRESSION:       1.   No evidence of pulmonary emboli       2.   Otherwise unremarkable CTscan of the chest.    < end of copied text > HOSPITALIST PROGRESS NOTE:  SUBJECTIVE:  PCP:   PCP: Dr Ruggiero  cardio- Dr Lozoya  Pulm: Dr Asencio    Chief Complaint: Patient is a 63y old  Male who presents with a chief complaint of Chest Pain and Dyspnea (23 Jun 2018 20:25)    HPI:  62 y/o male with HTN, DM, HL, depression/anxiety and CAD s/p 3V CABG at [facility] in May 2016 and PCI by dr Arrieta presents to the ER complaining of Chest pain andSOB for about 2 weeks. Pt states that he feels short of breath on exertion. Pt currently uses inhalers 3 times a day. Patient denies any Wheezing, orthopnea, palpitations, N/V.    In the ER, patient received IVF, potassium and ASA. Currently has no CP or dyspnea. Currently sitting comfortably on RA with 100% pulse ox (23 Jun 2018 20:25)    6/24:  Above REviewed. NO further CP or dyspnea, tele no alarms    Allergies:  penicillin (Unknown)    REVIEW OF SYSTEMS:  See HPI. All other review of systems is negative unless indicated above.     OBJECTIVE  Physical Exam:  Vital Signs Last 24 Hrs  T(C): 36.3 (24 Jun 2018 04:33), Max: 36.7 (23 Jun 2018 23:03)  T(F): 97.4 (24 Jun 2018 04:33), Max: 98 (23 Jun 2018 23:03)  HR: 80 (24 Jun 2018 04:33) (68 - 127)  BP: 135/65 (24 Jun 2018 04:33) (117/70 - 135/65)  BP(mean): --  RR: 18 (24 Jun 2018 04:33) (17 - 26)  SpO2: 100% (24 Jun 2018 04:33) (100% - 100%)    Constitutional: NAD, awake and alert, well-developed  Neurological: AAO x 3, no focal deficits  HEENT: PERRLA, EOMI, MMM  Neck: Soft and supple, No LAD, No JVD  Respiratory: Breath sounds are clear bilaterally, No wheezing, rales or rhonchi  Cardiovascular: S1 and S2, regular rate and rhythm; no Murmurs, gallops or rubs  Gastrointestinal: Bowel Sounds present, soft, nontender, nondistended, no guarding, no rebound tenderness  Back: No CVA tenderness   Extremities: No peripheral edema  Vascular: 2+ peripheral pulses  Musculoskeletal: 5/5 strength b/l upper and lower extremities  Skin: No rashes  Breast: Deferred  Rectal: Deferred    MEDICATIONS  (STANDING):  atorvastatin 40 milliGRAM(s) Oral at bedtime  buDESOnide 160 MICROgram(s)/formoterol 4.5 MICROgram(s) Inhaler 2 Puff(s) Inhalation two times a day  busPIRone 30 milliGRAM(s) Oral two times a day  clopidogrel Tablet 75 milliGRAM(s) Oral daily  dextrose 5%. 1000 milliLiter(s) (50 mL/Hr) IV Continuous <Continuous>  dextrose 50% Injectable 12.5 Gram(s) IV Push once  dextrose 50% Injectable 25 Gram(s) IV Push once  dextrose 50% Injectable 25 Gram(s) IV Push once  docusate sodium 100 milliGRAM(s) Oral three times a day  DULoxetine 30 milliGRAM(s) Oral daily  fenofibrate Tablet 145 milliGRAM(s) Oral daily  heparin  Injectable 5000 Unit(s) SubCutaneous every 12 hours  insulin glargine Injectable (LANTUS) 25 Unit(s) SubCutaneous every morning  insulin glargine Injectable (LANTUS) 25 Unit(s) SubCutaneous at bedtime  insulin lispro (HumaLOG) corrective regimen sliding scale   SubCutaneous three times a day before meals  ipratropium    for Nebulization 500 MICROGram(s) Nebulizer every 6 hours  isosorbide   mononitrate ER Tablet (IMDUR) 30 milliGRAM(s) Oral daily  metoprolol succinate ER 50 milliGRAM(s) Oral daily  montelukast 10 milliGRAM(s) Oral daily  multivitamin 1 Tablet(s) Oral daily  pantoprazole    Tablet 40 milliGRAM(s) Oral before breakfast  sodium chloride 0.9%. 1000 milliLiter(s) (50 mL/Hr) IV Continuous <Continuous>  tamsulosin 0.8 milliGRAM(s) Oral at bedtime  traZODone 150 milliGRAM(s) Oral at bedtime    Lab Results:  CBC  CBC Full  -  ( 24 Jun 2018 05:55 )  WBC Count : 4.30 K/uL  Hemoglobin : 10.3 g/dL  Hematocrit : 31.8 %  Platelet Count - Automated : 135 K/uL  Mean Cell Volume : 77.4 fl  Mean Cell Hemoglobin : 25.1 pg  Mean Cell Hemoglobin Concentration : 32.4 gm/dL  Auto Neutrophil # : x  Auto Lymphocyte # : x  Auto Monocyte # : x  Auto Eosinophil # : x  Auto Basophil # : x  Auto Neutrophil % : x  Auto Lymphocyte % : x  Auto Monocyte % : x  Auto Eosinophil % : x  Auto Basophil % : x    .		Differential:	[] Automated		[] Manual  Chemistry                        10.3   4.30  )-----------( 135      ( 24 Jun 2018 05:55 )             31.8     06-24    141  |  109<H>  |  10  ----------------------------<  165<H>  4.0   |  26  |  0.90    Ca    8.4<L>      24 Jun 2018 05:55  Phos  2.8     06-24  Mg     1.6     06-24    TPro  7.3  /  Alb  3.7  /  TBili  0.4  /  DBili  x   /  AST  20  /  ALT  28  /  AlkPhos  61  06-23    LIVER FUNCTIONS - ( 23 Jun 2018 13:17 )  Alb: 3.7 g/dL / Pro: 7.3 gm/dL / ALK PHOS: 61 U/L / ALT: 28 U/L / AST: 20 U/L / GGT: x           PT/INR - ( 23 Jun 2018 13:17 )   PT: 11.9 sec;   INR: 1.10 ratio         CARDIAC MARKERS ( 24 Jun 2018 05:55 )  0.018 ng/mL / x     / x     / x     / x      CARDIAC MARKERS ( 23 Jun 2018 22:27 )  0.036 ng/mL / x     / x     / x     / x      CARDIAC MARKERS ( 23 Jun 2018 16:22 )  0.063 ng/mL / x     / x     / x     / x      CARDIAC MARKERS ( 23 Jun 2018 13:17 )  0.056 ng/mL / x     / x     / x     / x          RADIOLOGY/EKG:      	EKG- Sinus tachycardia @128bpm, nonspecific ST/T wave changes    	< from: CT Angio Chest PE Protocol w/ IV Cont (06.23.18 @ 16:17) >        	IMPRESSION:    	    1.   No evidence of pulmonary emboli    	    2.   Otherwise unremarkable CTscan of the chest.     	< end of copied text >

## 2018-06-24 NOTE — CONSULT NOTE ADULT - ASSESSMENT
60 y/o male with HTN, DM, HL, depression/anxiety and CAD s/p 3V CABG at [facility] in May 2016 and PCI by dr Arrieta presents to the ER complaining of Chest pain and SOB for about 2 weeks.  TnI only mildly elevated, however, symptoms have been occurring for 2 weeks and they may be trending down.

## 2018-06-24 NOTE — PROGRESS NOTE ADULT - ASSESSMENT
*Chest pain, elevated troponin and Dyspnea R/O ACS vs ?COPD  - in pt w/ CAD s/p CABG 5/2016,  PCI w/ YUE July and Oct. 2016; PCI of OM2 feb 2018  - CTA - No PE  - continue aspirin, plavix, statin, BB, ARB  - cardio and Pulm consult    - serial troponin and EKG  - FU BNP  - FU ECHO  - Nebs ATC and PRN  - Hold off on Steroids  - no wheezing.    *ARF  -continue IVF  -monitor BUN/Cr    *Hypokalemia  -replete and monitor    *HTN  - controlled, cont home meds    *T2D  - cont lantus and ISS (hold home med)   - diabetic diet, fingersticks     *depression/anxiety  -cont SSRI and benzo    *dvt px-LMWH and ambulation *Chest pain, elevated troponin and Dyspnea R/O ACS vs ?COPD  - in pt w/ CAD s/p CABG 5/2016,  PCI w/ YUE July and Oct. 2016; PCI of OM2 feb 2018  - CTA - No PE  - continue aspirin, plavix, statin, BB, ARB  - Troponin peaked at .06  -   - cardio and Pulm consult    - FU ECHO  - Nebs ATC and PRN  - Hold off on Steroids  - no wheezing.    *ARF - Resolved   -S/P IVF  -monitor BUN/Cr    *Hypokalemia  -replete and monitor    *HTN  - controlled, cont home meds    *T2D  - cont lantus and ISS (hold home med)   - diabetic diet, fingersticks     *depression/anxiety  -cont SSRI and benzo    *dvt px-LMWH and ambulation

## 2018-06-24 NOTE — CONSULT NOTE ADULT - SUBJECTIVE AND OBJECTIVE BOX
CARDIOLOGY AND ELECTROPHYSIOLOGY ASSESSMENT    HPI:  60 y/o male with HTN, DM, HL, depression/anxiety and CAD s/p 3V CABG at [facility] in May 2016 and PCI by dr rArieta presents to the ER complaining of Chest pain andSOB for about 2 weeks. Pt states that he feels short of breath on exertion. Pt currently uses inhalers 3 times a day. Patient denies any Wheezing, orthopnea, palpitations, N/V.    In the ER, patient received IVF, potassium and ASA. Currently has no CP or dyspnea. Currently sitting comfortably on RA with 100% pulse ox (23 Jun 2018 20:25)      PAST MEDICAL & SURGICAL HISTORY:  Coronary artery disease involving coronary bypass graft of native heart with angina pectoris  COPD (chronic obstructive pulmonary disease)  DM (diabetes mellitus)  HTN (hypertension)  S/P arterial stent  S/P CABG x 3      MEDICATIONS  (STANDING):  atorvastatin 40 milliGRAM(s) Oral at bedtime  buDESOnide 160 MICROgram(s)/formoterol 4.5 MICROgram(s) Inhaler 2 Puff(s) Inhalation two times a day  busPIRone 30 milliGRAM(s) Oral two times a day  clopidogrel Tablet 75 milliGRAM(s) Oral daily  dextrose 5%. 1000 milliLiter(s) (50 mL/Hr) IV Continuous <Continuous>  dextrose 50% Injectable 12.5 Gram(s) IV Push once  dextrose 50% Injectable 25 Gram(s) IV Push once  dextrose 50% Injectable 25 Gram(s) IV Push once  docusate sodium 100 milliGRAM(s) Oral three times a day  DULoxetine 30 milliGRAM(s) Oral daily  fenofibrate Tablet 145 milliGRAM(s) Oral daily  heparin  Injectable 5000 Unit(s) SubCutaneous every 12 hours  insulin glargine Injectable (LANTUS) 25 Unit(s) SubCutaneous every morning  insulin glargine Injectable (LANTUS) 25 Unit(s) SubCutaneous at bedtime  insulin lispro (HumaLOG) corrective regimen sliding scale   SubCutaneous three times a day before meals  ipratropium    for Nebulization 500 MICROGram(s) Nebulizer every 6 hours  isosorbide   mononitrate ER Tablet (IMDUR) 30 milliGRAM(s) Oral daily  metoprolol succinate ER 50 milliGRAM(s) Oral daily  montelukast 10 milliGRAM(s) Oral daily  multivitamin 1 Tablet(s) Oral daily  pantoprazole    Tablet 40 milliGRAM(s) Oral before breakfast  tamsulosin 0.8 milliGRAM(s) Oral at bedtime  traZODone 150 milliGRAM(s) Oral at bedtime    MEDICATIONS  (PRN):  ALBUTerol/ipratropium for Nebulization 3 milliLiter(s) Nebulizer every 6 hours PRN Shortness of Breath and/or Wheezing  dextrose 40% Gel 15 Gram(s) Oral once PRN Blood Glucose LESS THAN 70 milliGRAM(s)/deciliter  glucagon  Injectable 1 milliGRAM(s) IntraMuscular once PRN Glucose LESS THAN 70 milligrams/deciliter  nitroglycerin     SubLingual 0.4 milliGRAM(s) SubLingual every 5 minutes PRN Chest Pain  ondansetron Injectable 4 milliGRAM(s) IV Push every 6 hours PRN Nausea  senna 2 Tablet(s) Oral at bedtime PRN Constipation      Allergies    penicillin (Unknown)    Intolerances        SOCIAL HISTORY: Denies tobacco, etoh abuse or illicit drug use    FAMILY HISTORY:  No pertinent family history in first degree relatives      Vital Signs Last 24 Hrs  T(C): 36.3 (24 Jun 2018 04:33), Max: 36.7 (23 Jun 2018 23:03)  T(F): 97.4 (24 Jun 2018 04:33), Max: 98 (23 Jun 2018 23:03)  HR: 72 (24 Jun 2018 08:45) (68 - 127)  BP: 135/65 (24 Jun 2018 04:33) (117/70 - 135/65)  BP(mean): --  RR: 18 (24 Jun 2018 04:33) (17 - 26)  SpO2: 100% (24 Jun 2018 04:33) (100% - 100%)    REVIEW OF SYSTEMS:    CONSTITUTIONAL:  As per HPI.  HEENT:  Eyes:  No diplopia or blurred vision. ENT:  No earache, sore throat or runny nose.  CARDIOVASCULAR:  No pressure, squeezing, strangling, tightness, heaviness or aching about the chest, neck, axilla or epigastrium.  RESPIRATORY:  No cough, shortness of breath, PND or orthopnea.  GASTROINTESTINAL:  No nausea, vomiting or diarrhea.  GENITOURINARY:  No dysuria, frequency or urgency.  MUSCULOSKELETAL:  As per HPI.  SKIN:  No change in skin, hair or nails.  NEUROLOGIC:  No paresthesias, fasciculations, seizures or weakness.  PSYCHIATRIC:  No disorder of thought or mood.  ENDOCRINE:  No heat or cold intolerance, polyuria or polydipsia.  HEMATOLOGICAL:  No easy bruising or bleedings:  .     PHYSICAL EXAMINATION:    GENERAL APPEARANCE:  Pt. is not currently dyspneic, in no distress. Pt. is alert, oriented, and pleasant.  HEENT:  Pupils are normal and react normally. No icterus. Mucous membranes well colored.  NECK:  Supple. No lymphadenopathy. Jugular venous pressure not elevated. Carotids equal.   HEART:   The cardiac impulse has a normal quality. There are no murmurs, rubs or gallops noted  CHEST:  Chest is clear to auscultation. Normal respiratory effort.  ABDOMEN:  Soft and nontender.   EXTREMITIES:  There is no edema.   SKIN:  No rash or significant lesions are noted.    I&O's Summary      LABS:                        10.3   4.30  )-----------( 135      ( 24 Jun 2018 05:55 )             31.8   06-24    141  |  109<H>  |  10  ----------------------------<  165<H>  4.0   |  26  |  0.90    Ca    8.4<L>      24 Jun 2018 05:55  Phos  2.8     06-24  Mg     1.6     06-24    TPro  7.3  /  Alb  3.7  /  TBili  0.4  /  DBili  x   /  AST  20  /  ALT  28  /  AlkPhos  61  06-23  LIVER FUNCTIONS - ( 23 Jun 2018 13:17 )  Alb: 3.7 g/dL / Pro: 7.3 gm/dL / ALK PHOS: 61 U/L / ALT: 28 U/L / AST: 20 U/L / GGT: x           PT/INR - ( 23 Jun 2018 13:17 )   PT: 11.9 sec;   INR: 1.10 ratio         PTT - ( 23 Jun 2018 13:17 )  PTT:23.3 secCARDIAC MARKERS ( 24 Jun 2018 05:55 )  0.018 ng/mL / x     / x     / x     / x      CARDIAC MARKERS ( 23 Jun 2018 22:27 )  0.036 ng/mL / x     / x     / x     / x      CARDIAC MARKERS ( 23 Jun 2018 16:22 )  0.063 ng/mL / x     / x     / x     / x      CARDIAC MARKERS ( 23 Jun 2018 13:17 )  0.056 ng/mL / x     / x     / x     / x        EKG:    < from: 12 Lead ECG (06.23.18 @ 12:58) >  Ventricular Rate 128 BPM    Atrial Rate 128 BPM    P-R Interval 138 ms    QRS Duration 88 ms    Q-T Interval 318 ms    QTC Calculation(Bezet) 464 ms    P Axis 34 degrees    R Axis 26 degrees    T Axis 81 degrees    Diagnosis Line Sinus tachycardia  Possible Left atrial enlargement  Borderline ECG  Confirmed by EDWIN BILL MD ([id number]) on 6/23/2018 6:37:00 PM    < end of copied text >    TELEMETRY:    NSR    CARDIAC TESTS:    < from: Transthoracic Echocardiogram (02.06.17 @ 10:23) >   Impression     Summary     Fibrocalcific changes noted to the aortic valve leaflets with preserved   excursion.   The left atrium is normal.   Estimated left ventricular ejection fraction is 55-60 %.   The left ventricle is normal in size.   Trace mitral regurgitation is present.   A pericardial effusion is not present.   Pleural effusion - no evidence of pleural effusion.   Pulmonic valve not well seen, probably normal pulmonic valve function.   The right atrium is normal.   The right ventricle is normal in size.   Trace tricuspid valve regurgitation is present.     Signature     ----------------------------------------------------------------   Electronically signed by Ninoska Sandoval MD(Interpreting   physician) on 02/07/2017 05:58 PM   ----------------------------------------------------------------    < end of copied text >        RADIOLOGY & ADDITIONAL STUDIES:

## 2018-06-25 LAB
ANION GAP SERPL CALC-SCNC: 6 MMOL/L — SIGNIFICANT CHANGE UP (ref 5–17)
APTT BLD: 43.6 SEC — HIGH (ref 27.5–37.4)
BUN SERPL-MCNC: 11 MG/DL — SIGNIFICANT CHANGE UP (ref 7–23)
CALCIUM SERPL-MCNC: 8.6 MG/DL — SIGNIFICANT CHANGE UP (ref 8.5–10.1)
CHLORIDE SERPL-SCNC: 109 MMOL/L — HIGH (ref 96–108)
CO2 SERPL-SCNC: 26 MMOL/L — SIGNIFICANT CHANGE UP (ref 22–31)
CREAT SERPL-MCNC: 0.94 MG/DL — SIGNIFICANT CHANGE UP (ref 0.5–1.3)
GLUCOSE BLDC GLUCOMTR-MCNC: 107 MG/DL — HIGH (ref 70–99)
GLUCOSE BLDC GLUCOMTR-MCNC: 221 MG/DL — HIGH (ref 70–99)
GLUCOSE BLDC GLUCOMTR-MCNC: 254 MG/DL — HIGH (ref 70–99)
GLUCOSE BLDC GLUCOMTR-MCNC: 293 MG/DL — HIGH (ref 70–99)
GLUCOSE SERPL-MCNC: 216 MG/DL — HIGH (ref 70–99)
HCT VFR BLD CALC: 34.2 % — LOW (ref 39–50)
HGB BLD-MCNC: 11 G/DL — LOW (ref 13–17)
MAGNESIUM SERPL-MCNC: 1.8 MG/DL — SIGNIFICANT CHANGE UP (ref 1.6–2.6)
MCHC RBC-ENTMCNC: 25.2 PG — LOW (ref 27–34)
MCHC RBC-ENTMCNC: 32.2 GM/DL — SIGNIFICANT CHANGE UP (ref 32–36)
MCV RBC AUTO: 78.4 FL — LOW (ref 80–100)
NRBC # BLD: 0 /100 WBCS — SIGNIFICANT CHANGE UP (ref 0–0)
PHOSPHATE SERPL-MCNC: 3.2 MG/DL — SIGNIFICANT CHANGE UP (ref 2.5–4.5)
PLATELET # BLD AUTO: 132 K/UL — LOW (ref 150–400)
POTASSIUM SERPL-MCNC: 4.4 MMOL/L — SIGNIFICANT CHANGE UP (ref 3.5–5.3)
POTASSIUM SERPL-SCNC: 4.4 MMOL/L — SIGNIFICANT CHANGE UP (ref 3.5–5.3)
RBC # BLD: 4.36 M/UL — SIGNIFICANT CHANGE UP (ref 4.2–5.8)
RBC # FLD: 14.3 % — SIGNIFICANT CHANGE UP (ref 10.3–14.5)
SODIUM SERPL-SCNC: 141 MMOL/L — SIGNIFICANT CHANGE UP (ref 135–145)
WBC # BLD: 4.29 K/UL — SIGNIFICANT CHANGE UP (ref 3.8–10.5)
WBC # FLD AUTO: 4.29 K/UL — SIGNIFICANT CHANGE UP (ref 3.8–10.5)

## 2018-06-25 PROCEDURE — 93306 TTE W/DOPPLER COMPLETE: CPT | Mod: 26

## 2018-06-25 PROCEDURE — 93010 ELECTROCARDIOGRAM REPORT: CPT

## 2018-06-25 RX ADMIN — INSULIN GLARGINE 25 UNIT(S): 100 INJECTION, SOLUTION SUBCUTANEOUS at 21:05

## 2018-06-25 RX ADMIN — Medication 6: at 17:09

## 2018-06-25 RX ADMIN — Medication 3 MILLILITER(S): at 19:56

## 2018-06-25 RX ADMIN — Medication 50 MILLIGRAM(S): at 06:15

## 2018-06-25 RX ADMIN — DULOXETINE HYDROCHLORIDE 30 MILLIGRAM(S): 30 CAPSULE, DELAYED RELEASE ORAL at 13:18

## 2018-06-25 RX ADMIN — PANTOPRAZOLE SODIUM 40 MILLIGRAM(S): 20 TABLET, DELAYED RELEASE ORAL at 06:15

## 2018-06-25 RX ADMIN — Medication 500 MICROGRAM(S): at 13:29

## 2018-06-25 RX ADMIN — Medication 4: at 08:03

## 2018-06-25 RX ADMIN — BUDESONIDE AND FORMOTEROL FUMARATE DIHYDRATE 2 PUFF(S): 160; 4.5 AEROSOL RESPIRATORY (INHALATION) at 19:57

## 2018-06-25 RX ADMIN — ATORVASTATIN CALCIUM 40 MILLIGRAM(S): 80 TABLET, FILM COATED ORAL at 21:02

## 2018-06-25 RX ADMIN — Medication 30 MILLIGRAM(S): at 06:16

## 2018-06-25 RX ADMIN — HEPARIN SODIUM 1400 UNIT(S)/HR: 5000 INJECTION INTRAVENOUS; SUBCUTANEOUS at 06:10

## 2018-06-25 RX ADMIN — MONTELUKAST 10 MILLIGRAM(S): 4 TABLET, CHEWABLE ORAL at 13:18

## 2018-06-25 RX ADMIN — Medication 1 TABLET(S): at 13:18

## 2018-06-25 RX ADMIN — ISOSORBIDE MONONITRATE 30 MILLIGRAM(S): 60 TABLET, EXTENDED RELEASE ORAL at 13:18

## 2018-06-25 RX ADMIN — CLOPIDOGREL BISULFATE 75 MILLIGRAM(S): 75 TABLET, FILM COATED ORAL at 08:03

## 2018-06-25 RX ADMIN — Medication 145 MILLIGRAM(S): at 13:18

## 2018-06-25 RX ADMIN — INSULIN GLARGINE 25 UNIT(S): 100 INJECTION, SOLUTION SUBCUTANEOUS at 08:20

## 2018-06-25 NOTE — PROGRESS NOTE ADULT - SUBJECTIVE AND OBJECTIVE BOX
Patient is a 63y old  Male who presents with a chief complaint of Chest Pain and Dyspnea .      HPI:  62 y/o male with HTN, DM, HL, depression/anxiety and CAD s/p 3V CABG at [facility] in May 2016 and PCI & staent July 2016 & Oct 2016 , he has ad YUE to LIMA to LAD & YUE to RCA  July 2016 and October 2016 here at drug-eluting stent to RCA and in March 2017 he had a  drug-eluting stent to OM circumflex,  He now presents with complains of increasing shortness of breath on exertion for the past 2 weeks any symptoms advised and gradually worsening. He also states that when he walks he gets short of breath and has chest tightness. On the day of admission he had chest pain lasting for approximately 15-20 minutes. This pain was not associated with any diaphoresis or dizziness. Since admission he has been pain-free and has been comfortable. His troponin I were mildly elevated. He is now comfortable and is in normal sinus rhythm on telemetry.     CT Angio Chest PE Protocol w/ IV Cont (06.23.18 >  No convincingpulmonary emboli are identified.  This evaluation includes   the main pulmonary artery, its right and left branches, and their   segmental branches. Subsegmental branches are incompletely evaluated but   where seen appear patent.    The lungs are clear.  No pleural fluid is seen.  The trachea and major   bronchi appear patent.    No pathologically enlarged axillary, hilar or mediastinal lymph nodes are   found.  Status post median sternotomy. The heart size is normal.   The   mediastinum, chest wall and thoracic spine appear otherwise unremarkable.    The thyroid gland appears intact.      Limited evaluation of the upper abdomen is unremarkable.      IMPRESSION:        1.   No evidence of pulmonary emboli        2.   Otherwise unremarkable CTscan of the chest.       t >        PAST MEDICAL & SURGICAL HISTORY:  Coronary artery disease involving coronary bypass graft of native heart with angina pectoris  COPD (chronic obstructive pulmonary disease)  DM (diabetes mellitus)  HTN (hypertension)  S/P multiple stents  last was placed in March 2017  S/P CABG x 3          MEDICATIONS  (STANDING):  atorvastatin 40 milliGRAM(s) Oral at bedtime  buDESOnide 160 MICROgram(s)/formoterol 4.5 MICROgram(s) Inhaler 2 Puff(s) Inhalation two times a day  busPIRone 30 milliGRAM(s) Oral two times a day  clopidogrel Tablet 75 milliGRAM(s) Oral daily  dextrose 5%. 1000 milliLiter(s) (50 mL/Hr) IV Continuous <Continuous>  dextrose 50% Injectable 12.5 Gram(s) IV Push once  dextrose 50% Injectable 25 Gram(s) IV Push once  dextrose 50% Injectable 25 Gram(s) IV Push once  docusate sodium 100 milliGRAM(s) Oral three times a day  DULoxetine 30 milliGRAM(s) Oral daily  fenofibrate Tablet 145 milliGRAM(s) Oral daily  heparin  Infusion.  Unit(s)/Hr (10 mL/Hr) IV Continuous <Continuous>  insulin glargine Injectable (LANTUS) 25 Unit(s) SubCutaneous every morning  insulin glargine Injectable (LANTUS) 25 Unit(s) SubCutaneous at bedtime  insulin lispro (HumaLOG) corrective regimen sliding scale   SubCutaneous three times a day before meals  ipratropium    for Nebulization 500 MICROGram(s) Nebulizer every 6 hours  isosorbide   mononitrate ER Tablet (IMDUR) 30 milliGRAM(s) Oral daily  metoprolol succinate ER 50 milliGRAM(s) Oral daily  montelukast 10 milliGRAM(s) Oral daily  multivitamin 1 Tablet(s) Oral daily  pantoprazole    Tablet 40 milliGRAM(s) Oral before breakfast  tamsulosin 0.8 milliGRAM(s) Oral at bedtime  traZODone 150 milliGRAM(s) Oral at bedtime    MEDICATIONS  (PRN):  ALBUTerol/ipratropium for Nebulization 3 milliLiter(s) Nebulizer every 6 hours PRN Shortness of Breath and/or Wheezing  dextrose 40% Gel 15 Gram(s) Oral once PRN Blood Glucose LESS THAN 70 milliGRAM(s)/deciliter  glucagon  Injectable 1 milliGRAM(s) IntraMuscular once PRN Glucose LESS THAN 70 milligrams/deciliter  heparin  Injectable 6000 Unit(s) IV Push every 6 hours PRN For aPTT less than 40  nitroglycerin     SubLingual 0.4 milliGRAM(s) SubLingual every 5 minutes PRN Chest Pain  ondansetron Injectable 4 milliGRAM(s) IV Push every 6 hours PRN Nausea  senna 2 Tablet(s) Oral at bedtime PRN Constipation          REVIEW OF SYSTEM:  Pertinent items are noted in HPI.  Constitutional	Negative for chills, fevers, sweats.    Eyes: 	Negative for visual disturbance.  Ears, nose, mouth, throat, and face: Negative for epistaxis, nasal congestion, sore throat and tinnitus.  Neck:	Negative for enlargement, pain and difficulty in swallowing  Respiration : Negative for cough, dyspnea on exertion, pleuritic chest pain and wheezing  Cardiovascular: Negative for chest pain, dyspnea and palpitations    Gastrointestinal : Negative for abdominal pain, diarrhea, nausea and vomiting  Genitourinary: Negative for dysuria, frequency and urinary incontinence .  Skin: Negative for  rash, pruritus, swelling, dryness .  	  Hematologic/lymphatic: Negative for bleeding and easy bruising  Musculoskeletal: Negative for arthralgias, back pain and muscle weakness.  Neurological: Negative for dizziness, headaches, seizures and tremors  Behavioral/Psych: Negative for mood change, depression.  Endocrine:	Negative for blurry vision, polydipsia and polyuria, diaphoresis.   Allergic/Immunologic:	Negative for anaphylaxis, angioedema and urticaria.      Vital Signs Last 24 Hrs  T(C): 37 (25 Jun 2018 05:02), Max: 37 (25 Jun 2018 05:02)  T(F): 98.6 (25 Jun 2018 05:02), Max: 98.6 (25 Jun 2018 05:02)  HR: 70 (25 Jun 2018 05:02) (66 - 83)  BP: 135/75 (25 Jun 2018 05:02) (117/68 - 135/75)  BP(mean): --  RR: 18 (25 Jun 2018 05:02) (17 - 18)  SpO2: 96% (25 Jun 2018 05:02) (96% - 96%)    I&O's Summary    PHYSICAL EXAM  General Appearance: cooperative, no acute distress,   HEENT: PERRL, conjunctiva clear, EOM's intact, non injected pharynx, no exudate .  Neck: Supple, , no adenopathy, thyroid: not enlarged, no carotid bruit or JVD  Back: Symmetric, no  tenderness,no soft tissue tenderness  Lungs: Clear to auscultation bilateral,no adventitious breath sounds, normal   expiratory phase  Heart: Regular rate and rhythm, S1, S2 normal, grade 1/6 holosystolic murmur, rub or gallop  Abdomen: Soft, non-tender, bowel sounds active , no hepatosplenomegaly  Extremities: no cyanosis or edema, no joint swelling  Skin: Skin color, texture normal, no rashes   Neurologic: Alert and oriented X3 , cranial nerves intact, sensory and motor normal,        INTERPRETATION OF TELEMETRY: NSR    ECG: NSR NSSST changes        LABS:                          11.0   4.29  )-----------( 132      ( 25 Jun 2018 04:10 )             34.2     06-25    141  |  109<H>  |  11  ----------------------------<  216<H>  4.4   |  26  |  0.94    Ca    8.6      25 Jun 2018 04:10  Phos  3.2     06-25  Mg     1.8     06-25    TPro  7.3  /  Alb  3.7  /  TBili  0.4  /  DBili  x   /  AST  20  /  ALT  28  /  AlkPhos  61  06-23    CARDIAC MARKERS ( 24 Jun 2018 14:21 )  <0.015 ng/mL / x     / x     / x     / x      CARDIAC MARKERS ( 24 Jun 2018 05:55 )  0.018 ng/mL / x     / x     / x     / x      CARDIAC MARKERS ( 23 Jun 2018 22:27 )  0.036 ng/mL / x     / x     / x     / x      CARDIAC MARKERS ( 23 Jun 2018 16:22 )  0.063 ng/mL / x     / x     / x     / x      CARDIAC MARKERS ( 23 Jun 2018 13:17 )  0.056 ng/mL / x     / x     / x     / x          Lipid Panel  110  29  63  92    Pro BNP  116 06-24 @ 05:55  D Dimer  -- 06-24 @ 05:55  Pro BNP  47 06-23 @ 13:17  D Dimer  -- 06-23 @ 13:17    PT/INR - ( 23 Jun 2018 13:17 )   PT: 11.9 sec;   INR: 1.10 ratio         PTT - ( 25 Jun 2018 04:10 )  PTT:43.6 sec

## 2018-06-25 NOTE — CDI QUERY NOTE - NSCDIOTHERTXTBX_GEN_ALL_CORE_HH
(1) Cardiology  consult document " NSTEMI"  If you agree with this diagnosis please document it in the medical record, if you disagree please document that as well  Thanks

## 2018-06-25 NOTE — PROGRESS NOTE ADULT - SUBJECTIVE AND OBJECTIVE BOX
HOSPITALIST PROGRESS NOTE:  SUBJECTIVE:  PCP:   PCP: Dr Ruggiero  cardio- Dr Lozoya  Pulm: Dr Asencio    Chief Complaint: Patient is a 63y old  Male who presents with a chief complaint of Chest Pain and Dyspnea (23 Jun 2018 20:25)    HPI:  62 y/o male with HTN, DM, HL, depression/anxiety and CAD s/p 3V CABG at [facility] in May 2016 and PCI by dr Arrieta presents to the ER complaining of Chest pain andSOB for about 2 weeks. Pt states that he feels short of breath on exertion. Pt currently uses inhalers 3 times a day. Patient denies any Wheezing, orthopnea, palpitations, N/V.    In the ER, patient received IVF, potassium and ASA. Currently has no CP or dyspnea. Currently sitting comfortably on RA with 100% pulse ox (23 Jun 2018 20:25)    6/24:  Above REviewed. NO further CP or dyspnea, tele no alarms  6/25:  Seen by Dr Reece yesterday and started on Heparin drip for possible NSTEMI. patient had LHC this am which showed small vessel diease. Currently patient doesnt have any dyspnea or CP    Allergies:  penicillin (Unknown)    REVIEW OF SYSTEMS:  See HPI. All other review of systems is negative unless indicated above.     OBJECTIVE  Physical Exam:  ICU Vital Signs Last 24 Hrs  T(C): 36.6 (25 Jun 2018 16:53), Max: 37 (25 Jun 2018 05:02)  T(F): 97.8 (25 Jun 2018 16:53), Max: 98.6 (25 Jun 2018 05:02)  HR: 76 (25 Jun 2018 16:53) (66 - 83)  BP: 112/59 (25 Jun 2018 16:53) (111/75 - 135/75)  BP(mean): 86 (25 Jun 2018 10:15) (86 - 86)  ABP: --  ABP(mean): --  RR: 18 (25 Jun 2018 13:18) (16 - 18)  SpO2: 97% (25 Jun 2018 16:53) (95% - 100%)      Constitutional: NAD, awake and alert, well-developed  Neurological: AAO x 3, no focal deficits  HEENT: PERRLA, EOMI, MMM  Neck: Soft and supple, No LAD, No JVD  Respiratory: Breath sounds are clear bilaterally, No wheezing, rales or rhonchi  Cardiovascular: S1 and S2, regular rate and rhythm; no Murmurs, gallops or rubs  Gastrointestinal: Bowel Sounds present, soft, nontender, nondistended, no guarding, no rebound tenderness  Back: No CVA tenderness   Extremities: No peripheral edema  Vascular: 2+ peripheral pulses  Musculoskeletal: 5/5 strength b/l upper and lower extremities  Skin: No rashes  Breast: Deferred  Rectal: Deferred    MEDICATIONS  (STANDING):  atorvastatin 40 milliGRAM(s) Oral at bedtime  buDESOnide 160 MICROgram(s)/formoterol 4.5 MICROgram(s) Inhaler 2 Puff(s) Inhalation two times a day  busPIRone 30 milliGRAM(s) Oral two times a day  clopidogrel Tablet 75 milliGRAM(s) Oral daily  dextrose 5%. 1000 milliLiter(s) (50 mL/Hr) IV Continuous <Continuous>  dextrose 50% Injectable 12.5 Gram(s) IV Push once  dextrose 50% Injectable 25 Gram(s) IV Push once  dextrose 50% Injectable 25 Gram(s) IV Push once  docusate sodium 100 milliGRAM(s) Oral three times a day  DULoxetine 30 milliGRAM(s) Oral daily  fenofibrate Tablet 145 milliGRAM(s) Oral daily  heparin  Injectable 5000 Unit(s) SubCutaneous every 12 hours  insulin glargine Injectable (LANTUS) 25 Unit(s) SubCutaneous every morning  insulin glargine Injectable (LANTUS) 25 Unit(s) SubCutaneous at bedtime  insulin lispro (HumaLOG) corrective regimen sliding scale   SubCutaneous three times a day before meals  ipratropium    for Nebulization 500 MICROGram(s) Nebulizer every 6 hours  isosorbide   mononitrate ER Tablet (IMDUR) 30 milliGRAM(s) Oral daily  metoprolol succinate ER 50 milliGRAM(s) Oral daily  montelukast 10 milliGRAM(s) Oral daily  multivitamin 1 Tablet(s) Oral daily  pantoprazole    Tablet 40 milliGRAM(s) Oral before breakfast  sodium chloride 0.9%. 1000 milliLiter(s) (50 mL/Hr) IV Continuous <Continuous>  tamsulosin 0.8 milliGRAM(s) Oral at bedtime  traZODone 150 milliGRAM(s) Oral at bedtime    Lab Results:  CBC  CBC Full  -  ( 25 Jun 2018 04:10 )  WBC Count : 4.29 K/uL  Hemoglobin : 11.0 g/dL  Hematocrit : 34.2 %  Platelet Count - Automated : 132 K/uL  Mean Cell Volume : 78.4 fl  Mean Cell Hemoglobin : 25.2 pg  Mean Cell Hemoglobin Concentration : 32.2 gm/dL  Auto Neutrophil # : x  Auto Lymphocyte # : x  Auto Monocyte # : x  Auto Eosinophil # : x  Auto Basophil # : x  Auto Neutrophil % : x  Auto Lymphocyte % : x  Auto Monocyte % : x  Auto Eosinophil % : x  Auto Basophil % : x    .		Differential:	[] Automated		[] Manual  Chemistry                        11.0   4.29  )-----------( 132      ( 25 Jun 2018 04:10 )             34.2     06-25    141  |  109<H>  |  11  ----------------------------<  216<H>  4.4   |  26  |  0.94    Ca    8.6      25 Jun 2018 04:10  Phos  3.2     06-25  Mg     1.8     06-25        PTT - ( 25 Jun 2018 04:10 )  PTT:43.6 sec  RADIOLOGY RESULTS:     CARDIAC MARKERS ( 24 Jun 2018 05:55 )  0.018 ng/mL / x     / x     / x     / x      CARDIAC MARKERS ( 23 Jun 2018 22:27 )  0.036 ng/mL / x     / x     / x     / x      CARDIAC MARKERS ( 23 Jun 2018 16:22 )  0.063 ng/mL / x     / x     / x     / x      CARDIAC MARKERS ( 23 Jun 2018 13:17 )  0.056 ng/mL / x     / x     / x     / x          RADIOLOGY/EKG:      	EKG- Sinus tachycardia @128bpm, nonspecific ST/T wave changes    	< from: CT Angio Chest PE Protocol w/ IV Cont (06.23.18 @ 16:17) >        	IMPRESSION:    	    1.   No evidence of pulmonary emboli    	    2.   Otherwise unremarkable CTscan of the chest.     	< end of copied text >

## 2018-06-25 NOTE — CONSULT NOTE ADULT - SUBJECTIVE AND OBJECTIVE BOX
Patient is a 63y old  Male who presents with a chief complaint of Chest Pain and Dyspnea (23 Jun 2018 20:25)      HPI:  62 y/o male with HTN, DM, HL, depression/anxiety and CAD s/p 3V CABG at [facility] in May 2016 and PCI by dr Arrieta presents to the ER complaining of Chest pain andSOB for about 2 weeks. Pt states that he feels short of breath on exertion. Pt currently uses inhalers 3 times a day. Patient denies any Wheezing, orthopnea, palpitations, N/V.  In the ER, patient received IVF, potassium and ASA. Currently has no CP or dyspnea. Currently sitting comfortably on RA with 100% pulse ox (23 Jun 2018 20:25)  To have a LHC today     PAST MEDICAL & SURGICAL HISTORY:  Coronary artery disease involving coronary bypass graft of native heart with angina pectoris  COPD (chronic obstructive pulmonary disease)  DM (diabetes mellitus)  HTN (hypertension)  S/P arterial stent  S/P CABG x 3      PREVIOUS DIAGNOSTIC TESTING:      MEDICATIONS  (STANDING):  atorvastatin 40 milliGRAM(s) Oral at bedtime  buDESOnide 160 MICROgram(s)/formoterol 4.5 MICROgram(s) Inhaler 2 Puff(s) Inhalation two times a day  busPIRone 30 milliGRAM(s) Oral two times a day  clopidogrel Tablet 75 milliGRAM(s) Oral daily  dextrose 5%. 1000 milliLiter(s) (50 mL/Hr) IV Continuous <Continuous>  dextrose 50% Injectable 12.5 Gram(s) IV Push once  dextrose 50% Injectable 25 Gram(s) IV Push once  dextrose 50% Injectable 25 Gram(s) IV Push once  docusate sodium 100 milliGRAM(s) Oral three times a day  DULoxetine 30 milliGRAM(s) Oral daily  fenofibrate Tablet 145 milliGRAM(s) Oral daily  heparin  Infusion.  Unit(s)/Hr (10 mL/Hr) IV Continuous <Continuous>  insulin glargine Injectable (LANTUS) 25 Unit(s) SubCutaneous every morning  insulin glargine Injectable (LANTUS) 25 Unit(s) SubCutaneous at bedtime  insulin lispro (HumaLOG) corrective regimen sliding scale   SubCutaneous three times a day before meals  ipratropium    for Nebulization 500 MICROGram(s) Nebulizer every 6 hours  isosorbide   mononitrate ER Tablet (IMDUR) 30 milliGRAM(s) Oral daily  metoprolol succinate ER 50 milliGRAM(s) Oral daily  montelukast 10 milliGRAM(s) Oral daily  multivitamin 1 Tablet(s) Oral daily  pantoprazole    Tablet 40 milliGRAM(s) Oral before breakfast  tamsulosin 0.8 milliGRAM(s) Oral at bedtime  traZODone 150 milliGRAM(s) Oral at bedtime    MEDICATIONS  (PRN):  ALBUTerol/ipratropium for Nebulization 3 milliLiter(s) Nebulizer every 6 hours PRN Shortness of Breath and/or Wheezing  dextrose 40% Gel 15 Gram(s) Oral once PRN Blood Glucose LESS THAN 70 milliGRAM(s)/deciliter  glucagon  Injectable 1 milliGRAM(s) IntraMuscular once PRN Glucose LESS THAN 70 milligrams/deciliter  heparin  Injectable 6000 Unit(s) IV Push every 6 hours PRN For aPTT less than 40  nitroglycerin     SubLingual 0.4 milliGRAM(s) SubLingual every 5 minutes PRN Chest Pain  ondansetron Injectable 4 milliGRAM(s) IV Push every 6 hours PRN Nausea  senna 2 Tablet(s) Oral at bedtime PRN Constipation      FAMILY HISTORY:  No pertinent family history in first degree relatives      SOCIAL HISTORY:  ***    REVIEW OF SYSTEM:  Chest pain/tightness, dyspnea, otherwise 12 point ROS negative     Vital Signs Last 24 Hrs  T(C): 37 (25 Jun 2018 05:02), Max: 37 (25 Jun 2018 05:02)  T(F): 98.6 (25 Jun 2018 05:02), Max: 98.6 (25 Jun 2018 05:02)  HR: 70 (25 Jun 2018 05:02) (66 - 84)  BP: 135/75 (25 Jun 2018 05:02) (117/68 - 135/75)  BP(mean): --  RR: 18 (25 Jun 2018 05:02) (17 - 18)  SpO2: 96% (25 Jun 2018 05:02) (96% - 96%)    I&O's Summary    PHYSICAL EXAM  General Appearance: cooperative, no acute distress,   HEENT: PERRL, conjunctiva clear, EOM's intact, non injected pharynx, no exudate, TM   normal  Neck: Supple, , no adenopathy, thyroid: not enlarged, no carotid bruit or JVD  Back: Symmetric, no  tenderness,no soft tissue tenderness  Lungs: Coarse at the bases   Heart: Regular rate and rhythm, S1, S2 normal, no murmur, rub or gallop  Abdomen: Soft, non-tender, bowel sounds active , no hepatosplenomegaly  Extremities: no cyanosis or edema, no joint swelling  Skin: Skin color, texture normal, no rashes   Neurologic: Alert and oriented X3 , cranial nerves intact, sensory and motor normal,    ECG:    LABS:                          11.0   4.29  )-----------( 132      ( 25 Jun 2018 04:10 )             34.2     06-25    141  |  109<H>  |  11  ----------------------------<  216<H>  4.4   |  26  |  0.94    Ca    8.6      25 Jun 2018 04:10  Phos  3.2     06-25  Mg     1.8     06-25    TPro  7.3  /  Alb  3.7  /  TBili  0.4  /  DBili  x   /  AST  20  /  ALT  28  /  AlkPhos  61  06-23    CARDIAC MARKERS ( 24 Jun 2018 14:21 )  <0.015 ng/mL / x     / x     / x     / x      CARDIAC MARKERS ( 24 Jun 2018 05:55 )  0.018 ng/mL / x     / x     / x     / x      CARDIAC MARKERS ( 23 Jun 2018 22:27 )  0.036 ng/mL / x     / x     / x     / x      CARDIAC MARKERS ( 23 Jun 2018 16:22 )  0.063 ng/mL / x     / x     / x     / x      CARDIAC MARKERS ( 23 Jun 2018 13:17 )  0.056 ng/mL / x     / x     / x     / x          Lipid Panel  110  29  63  92    Pro BNP  116 06-24 @ 05:55  D Dimer  -- 06-24 @ 05:55  Pro BNP  47 06-23 @ 13:17  D Dimer  -- 06-23 @ 13:17    PT/INR - ( 23 Jun 2018 13:17 )   PT: 11.9 sec;   INR: 1.10 ratio         PTT - ( 25 Jun 2018 04:10 )  PTT:43.6 sec          RADIOLOGY & ADDITIONAL STUDIES:  FINDINGS: Comparison is made to prior CT chest of April 12, 2017.    No convincing pulmonary emboli are identified.  This evaluation includes   the main pulmonary artery, its right and left branches, and their   segmental branches. Subsegmental branches are incompletely evaluated but   where seen appear patent.    The lungs are clear.  No pleural fluid is seen.  The trachea and major   bronchi appear patent.    No pathologically enlarged axillary, hilar or mediastinal lymph nodes are   found.  Status post median sternotomy. The heart size is normal.   The   mediastinum, chest wall and thoracic spine appear otherwise unremarkable.    The thyroid gland appears intact.      Limited evaluation of the upper abdomen is unremarkable.      IMPRESSION:        1.   No evidence of pulmonary emboli        2.   Otherwise unremarkable CT scan of the chest. Patient is a 63y old  Male who presents with a chief complaint of Chest Pain and Dyspnea (23 Jun 2018 20:25)      HPI:  60 y/o male with HTN, DM, HL, depression/anxiety and CAD s/p 3V CABG at [facility] in May [facility] and PCI by dr Arrieta presents to the ER complaining of Chest pain andSOB for about 2 weeks. Pt states that he feels short of breath on exertion. Pt currently uses inhalers 3 times a day. Patient denies any Wheezing, orthopnea, palpitations, N/V.  In the ER, patient received IVF, potassium and ASA. Currently has no CP or dyspnea. Currently sitting comfortably on RA with 100% pulse ox (23 Jun 2018 20:25)  To have a LHC today     PAST MEDICAL & SURGICAL HISTORY:  Coronary artery disease involving coronary bypass graft of native heart with angina pectoris  COPD (chronic obstructive pulmonary disease)  DM (diabetes mellitus)  HTN (hypertension)  S/P arterial stent  S/P CABG x 3      PREVIOUS DIAGNOSTIC TESTING:      MEDICATIONS  (STANDING):  atorvastatin 40 milliGRAM(s) Oral at bedtime  buDESOnide 160 MICROgram(s)/formoterol 4.5 MICROgram(s) Inhaler 2 Puff(s) Inhalation two times a day  busPIRone 30 milliGRAM(s) Oral two times a day  clopidogrel Tablet 75 milliGRAM(s) Oral daily  dextrose 5%. 1000 milliLiter(s) (50 mL/Hr) IV Continuous <Continuous>  dextrose 50% Injectable 12.5 Gram(s) IV Push once  dextrose 50% Injectable 25 Gram(s) IV Push once  dextrose 50% Injectable 25 Gram(s) IV Push once  docusate sodium 100 milliGRAM(s) Oral three times a day  DULoxetine 30 milliGRAM(s) Oral daily  fenofibrate Tablet 145 milliGRAM(s) Oral daily  heparin  Infusion.  Unit(s)/Hr (10 mL/Hr) IV Continuous <Continuous>  insulin glargine Injectable (LANTUS) 25 Unit(s) SubCutaneous every morning  insulin glargine Injectable (LANTUS) 25 Unit(s) SubCutaneous at bedtime  insulin lispro (HumaLOG) corrective regimen sliding scale   SubCutaneous three times a day before meals  ipratropium    for Nebulization 500 MICROGram(s) Nebulizer every 6 hours  isosorbide   mononitrate ER Tablet (IMDUR) 30 milliGRAM(s) Oral daily  metoprolol succinate ER 50 milliGRAM(s) Oral daily  montelukast 10 milliGRAM(s) Oral daily  multivitamin 1 Tablet(s) Oral daily  pantoprazole    Tablet 40 milliGRAM(s) Oral before breakfast  tamsulosin 0.8 milliGRAM(s) Oral at bedtime  traZODone 150 milliGRAM(s) Oral at bedtime    MEDICATIONS  (PRN):  ALBUTerol/ipratropium for Nebulization 3 milliLiter(s) Nebulizer every 6 hours PRN Shortness of Breath and/or Wheezing  dextrose 40% Gel 15 Gram(s) Oral once PRN Blood Glucose LESS THAN 70 milliGRAM(s)/deciliter  glucagon  Injectable 1 milliGRAM(s) IntraMuscular once PRN Glucose LESS THAN 70 milligrams/deciliter  heparin  Injectable 6000 Unit(s) IV Push every 6 hours PRN For aPTT less than 40  nitroglycerin     SubLingual 0.4 milliGRAM(s) SubLingual every 5 minutes PRN Chest Pain  ondansetron Injectable 4 milliGRAM(s) IV Push every 6 hours PRN Nausea  senna 2 Tablet(s) Oral at bedtime PRN Constipation      FAMILY HISTORY:  No pertinent family history in first degree relatives      SOCIAL HISTORY:  Lives at home  No occupational exposures but at home, endorses mold exposure     REVIEW OF SYSTEM:  Chest pain/tightness, dyspnea, otherwise 12 point ROS negative     Vital Signs Last 24 Hrs  T(C): 37 (25 Jun 2018 05:02), Max: 37 (25 Jun 2018 05:02)  T(F): 98.6 (25 Jun 2018 05:02), Max: 98.6 (25 Jun 2018 05:02)  HR: 70 (25 Jun 2018 05:02) (66 - 84)  BP: 135/75 (25 Jun 2018 05:02) (117/68 - 135/75)  BP(mean): --  RR: 18 (25 Jun 2018 05:02) (17 - 18)  SpO2: 96% (25 Jun 2018 05:02) (96% - 96%)    I&O's Summary    PHYSICAL EXAM  General Appearance: cooperative, no acute distress,   HEENT: PERRL, conjunctiva clear, EOM's intact, non injected pharynx, no exudate, TM   normal  Neck: Supple, , no adenopathy, thyroid: not enlarged, no carotid bruit or JVD  Back: Symmetric, no  tenderness,no soft tissue tenderness  Lungs: Coarse at the bases   Heart: Regular rate and rhythm, S1, S2 normal, no murmur, rub or gallop  Abdomen: Soft, non-tender, bowel sounds active , no hepatosplenomegaly  Extremities: no cyanosis or edema, no joint swelling  Skin: Skin color, texture normal, no rashes   Neurologic: Alert and oriented X3 , cranial nerves intact, sensory and motor normal,    ECG:    LABS:                          11.0   4.29  )-----------( 132      ( 25 Jun 2018 04:10 )             34.2     06-25    141  |  109<H>  |  11  ----------------------------<  216<H>  4.4   |  26  |  0.94    Ca    8.6      25 Jun 2018 04:10  Phos  3.2     06-25  Mg     1.8     06-25    TPro  7.3  /  Alb  3.7  /  TBili  0.4  /  DBili  x   /  AST  20  /  ALT  28  /  AlkPhos  61  06-23    CARDIAC MARKERS ( 24 Jun 2018 14:21 )  <0.015 ng/mL / x     / x     / x     / x      CARDIAC MARKERS ( 24 Jun 2018 05:55 )  0.018 ng/mL / x     / x     / x     / x      CARDIAC MARKERS ( 23 Jun 2018 22:27 )  0.036 ng/mL / x     / x     / x     / x      CARDIAC MARKERS ( 23 Jun 2018 16:22 )  0.063 ng/mL / x     / x     / x     / x      CARDIAC MARKERS ( 23 Jun 2018 13:17 )  0.056 ng/mL / x     / x     / x     / x          Lipid Panel  110  29  63  92    Pro BNP  116 06-24 @ 05:55  D Dimer  -- 06-24 @ 05:55  Pro BNP  47 06-23 @ 13:17  D Dimer  -- 06-23 @ 13:17    PT/INR - ( 23 Jun 2018 13:17 )   PT: 11.9 sec;   INR: 1.10 ratio         PTT - ( 25 Jun 2018 04:10 )  PTT:43.6 sec          RADIOLOGY & ADDITIONAL STUDIES:  FINDINGS: Comparison is made to prior CT chest of April 12, 2017.    No convincing pulmonary emboli are identified.  This evaluation includes   the main pulmonary artery, its right and left branches, and their   segmental branches. Subsegmental branches are incompletely evaluated but   where seen appear patent.    The lungs are clear.  No pleural fluid is seen.  The trachea and major   bronchi appear patent.    No pathologically enlarged axillary, hilar or mediastinal lymph nodes are   found.  Status post median sternotomy. The heart size is normal.   The   mediastinum, chest wall and thoracic spine appear otherwise unremarkable.    The thyroid gland appears intact.      Limited evaluation of the upper abdomen is unremarkable.      IMPRESSION:        1.   No evidence of pulmonary emboli        2.   Otherwise unremarkable CT scan of the chest.

## 2018-06-25 NOTE — PROGRESS NOTE ADULT - ASSESSMENT
Unstable angina possible non-STEMI. He is now comfortable and pain-free.   CAD s/p CABG & multiple stents .   HTN  Suggest  Observe patient on telemetry.  Continue with current cardiac medications.  Due to recurrent chest pain, shortness of breath possible non-STEMI, multiple skin tents in the past, advised cardiac catheterization, risks benefits and nocturnal cardiac catheterization discussed at length with patient and he consents to the procedure.  Discussed with Dr Arrieta.

## 2018-06-25 NOTE — PROGRESS NOTE ADULT - ASSESSMENT
*Chest pain, elevated troponin and Dyspnea 2ndry to ACS/Unstable Angina  - in pt w/ CAD s/p CABG 5/2016,  PCI w/ [facility] July and Oct. 2016; PCI of OM2 feb 2018  - CTA - No PE  - continue aspirin, plavix, statin, BB, ARB  - Troponin peaked at .06; S/P heparin Drip  -   - cardio and Pulm consult    - Nebs ATC and PRN  - Hold off on Steroids  - no wheezing.  - S/P LHC (6/25) -  small vessel disease  - FU ECHO    *ARF - Resolved   -S/P IVF  -monitor BUN/Cr    *Hypokalemia  -replete and monitor    *HTN  - controlled, cont home meds    *T2D  - cont lantus and ISS (hold home med)   - diabetic diet, fingersticks     *depression/anxiety  -cont SSRI and benzo    *dvt px-LMWH and ambulation

## 2018-06-25 NOTE — CONSULT NOTE ADULT - ASSESSMENT
1) Dyspnea  2) Restrictive Ventilatory Defect  3) Hypersensitivity Pneumonitis History  4) Chest Tightness  5) Abnormal CT Chest  6) LENNOX  7) History of CABG (2016 LIMA to LAD) 1) Dyspnea  2) Restrictive Ventilatory Defect  3) Hypersensitivity Pneumonitis History  4) Chest Tightness  5) Abnormal CT Chest  6) LENNOX  7) History of CABG (2016 LIMA to LAD)      62 yo M, questionable history of HP presents with recurrent chest tightness and MARRERO (limited activity)  Endorses having diffuse mold exposure history at his current home  CT Chest suggests findings of air trapping/mosaic attenuation  Continue supportive care in the interim  In addition, has an extensive cardiac history and will be undergoing cardiac cath today  Follow up Cardiology recommendations; appreciate their assistance  Continue CPAP at night for LENNOX  Goal SaO2 >89%  Will continue to monitor

## 2018-06-25 NOTE — PACU DISCHARGE NOTE - COMMENTS
Report given to ministerio Mitchell RN on [facility].  Pt. replaced on cardiac monitor and reading of SR, 60's confirmed w/ [place] [facility] telemetry monitor tech.  Pt transferred to inpt. room on [facility] on cardiac monitor via stretcher accompanied by transport tech.

## 2018-06-26 ENCOUNTER — TRANSCRIPTION ENCOUNTER (OUTPATIENT)
Age: 63
End: 2018-06-26

## 2018-06-26 VITALS
OXYGEN SATURATION: 98 % | HEART RATE: 77 BPM | DIASTOLIC BLOOD PRESSURE: 78 MMHG | TEMPERATURE: 97 F | SYSTOLIC BLOOD PRESSURE: 121 MMHG | RESPIRATION RATE: 17 BRPM

## 2018-06-26 LAB
ANION GAP SERPL CALC-SCNC: 4 MMOL/L — LOW (ref 5–17)
BUN SERPL-MCNC: 10 MG/DL — SIGNIFICANT CHANGE UP (ref 7–23)
CALCIUM SERPL-MCNC: 8.6 MG/DL — SIGNIFICANT CHANGE UP (ref 8.5–10.1)
CHLORIDE SERPL-SCNC: 108 MMOL/L — SIGNIFICANT CHANGE UP (ref 96–108)
CO2 SERPL-SCNC: 28 MMOL/L — SIGNIFICANT CHANGE UP (ref 22–31)
CREAT SERPL-MCNC: 1.01 MG/DL — SIGNIFICANT CHANGE UP (ref 0.5–1.3)
GLUCOSE BLDC GLUCOMTR-MCNC: 141 MG/DL — HIGH (ref 70–99)
GLUCOSE BLDC GLUCOMTR-MCNC: 231 MG/DL — HIGH (ref 70–99)
GLUCOSE SERPL-MCNC: 151 MG/DL — HIGH (ref 70–99)
HCT VFR BLD CALC: 35.5 % — LOW (ref 39–50)
HGB BLD-MCNC: 11.5 G/DL — LOW (ref 13–17)
MAGNESIUM SERPL-MCNC: 1.9 MG/DL — SIGNIFICANT CHANGE UP (ref 1.6–2.6)
MCHC RBC-ENTMCNC: 25.1 PG — LOW (ref 27–34)
MCHC RBC-ENTMCNC: 32.4 GM/DL — SIGNIFICANT CHANGE UP (ref 32–36)
MCV RBC AUTO: 77.3 FL — LOW (ref 80–100)
NRBC # BLD: 0 /100 WBCS — SIGNIFICANT CHANGE UP (ref 0–0)
PHOSPHATE SERPL-MCNC: 3.2 MG/DL — SIGNIFICANT CHANGE UP (ref 2.5–4.5)
PLATELET # BLD AUTO: 161 K/UL — SIGNIFICANT CHANGE UP (ref 150–400)
POTASSIUM SERPL-MCNC: 3.9 MMOL/L — SIGNIFICANT CHANGE UP (ref 3.5–5.3)
POTASSIUM SERPL-SCNC: 3.9 MMOL/L — SIGNIFICANT CHANGE UP (ref 3.5–5.3)
RBC # BLD: 4.59 M/UL — SIGNIFICANT CHANGE UP (ref 4.2–5.8)
RBC # FLD: 14.4 % — SIGNIFICANT CHANGE UP (ref 10.3–14.5)
SODIUM SERPL-SCNC: 140 MMOL/L — SIGNIFICANT CHANGE UP (ref 135–145)
WBC # BLD: 5.01 K/UL — SIGNIFICANT CHANGE UP (ref 3.8–10.5)
WBC # FLD AUTO: 5.01 K/UL — SIGNIFICANT CHANGE UP (ref 3.8–10.5)

## 2018-06-26 PROCEDURE — 93010 ELECTROCARDIOGRAM REPORT: CPT

## 2018-06-26 RX ORDER — ISOSORBIDE MONONITRATE 60 MG/1
1 TABLET, EXTENDED RELEASE ORAL
Qty: 0 | Refills: 0 | COMMUNITY

## 2018-06-26 RX ORDER — RANOLAZINE 500 MG/1
1 TABLET, FILM COATED, EXTENDED RELEASE ORAL
Qty: 60 | Refills: 0
Start: 2018-06-26 | End: 2018-07-25

## 2018-06-26 RX ORDER — RANOLAZINE 500 MG/1
500 TABLET, FILM COATED, EXTENDED RELEASE ORAL
Qty: 0 | Refills: 0 | Status: DISCONTINUED | OUTPATIENT
Start: 2018-06-26 | End: 2018-06-26

## 2018-06-26 RX ORDER — ISOSORBIDE MONONITRATE 60 MG/1
60 TABLET, EXTENDED RELEASE ORAL DAILY
Qty: 0 | Refills: 0 | Status: DISCONTINUED | OUTPATIENT
Start: 2018-06-26 | End: 2018-06-26

## 2018-06-26 RX ORDER — ISOSORBIDE MONONITRATE 60 MG/1
1 TABLET, EXTENDED RELEASE ORAL
Qty: 30 | Refills: 0
Start: 2018-06-26 | End: 2018-07-25

## 2018-06-26 RX ADMIN — CLOPIDOGREL BISULFATE 75 MILLIGRAM(S): 75 TABLET, FILM COATED ORAL at 11:20

## 2018-06-26 RX ADMIN — MONTELUKAST 10 MILLIGRAM(S): 4 TABLET, CHEWABLE ORAL at 11:20

## 2018-06-26 RX ADMIN — Medication 145 MILLIGRAM(S): at 11:20

## 2018-06-26 RX ADMIN — Medication 1 TABLET(S): at 11:20

## 2018-06-26 RX ADMIN — Medication 3 MILLILITER(S): at 02:02

## 2018-06-26 RX ADMIN — PANTOPRAZOLE SODIUM 40 MILLIGRAM(S): 20 TABLET, DELAYED RELEASE ORAL at 05:34

## 2018-06-26 RX ADMIN — Medication 4: at 11:33

## 2018-06-26 RX ADMIN — Medication 500 MICROGRAM(S): at 07:42

## 2018-06-26 RX ADMIN — Medication 50 MILLIGRAM(S): at 05:33

## 2018-06-26 RX ADMIN — INSULIN GLARGINE 25 UNIT(S): 100 INJECTION, SOLUTION SUBCUTANEOUS at 08:12

## 2018-06-26 RX ADMIN — Medication 30 MILLIGRAM(S): at 05:34

## 2018-06-26 RX ADMIN — BUDESONIDE AND FORMOTEROL FUMARATE DIHYDRATE 2 PUFF(S): 160; 4.5 AEROSOL RESPIRATORY (INHALATION) at 07:44

## 2018-06-26 RX ADMIN — DULOXETINE HYDROCHLORIDE 30 MILLIGRAM(S): 30 CAPSULE, DELAYED RELEASE ORAL at 11:20

## 2018-06-26 RX ADMIN — ISOSORBIDE MONONITRATE 60 MILLIGRAM(S): 60 TABLET, EXTENDED RELEASE ORAL at 11:20

## 2018-06-26 NOTE — PROGRESS NOTE ADULT - ASSESSMENT
He had episodes of angina prior to admission. He is  status post cardiac catheterization, he has severe coronary artery disease, LIMA to LAD, saphenous vein graft to RPDA are patent. Stent to ramus is patent. He was advised medical management.   HTN  Suggest  Continue with current cardiac medications.  Increase nitrates. I suggest Imdur 60 mg by mouth once daily.  Start Ranexa 500 mg by mouth twice a day.  Gradual ambulation.  Patient advised to lose weight.  Discussed with Dr Arrieta & hospitalist..

## 2018-06-26 NOTE — DISCHARGE NOTE ADULT - CARE PROVIDER_API CALL
Stanley Ruggiero), Internal Medicine  [address] [place] [address]  Phone: [phone]  Fax: [phone]    Antonino Lozoya (MD), Cardiovascular Disease; Internal Medicine  [address] [place] [address]  Phone: [phone]  Fax: [phone]    MELIDA Stock (), Pulmonary Disease; Sleep Medicine  [address] [place] [address]  Phone: [phone]  Fax: [phone]

## 2018-06-26 NOTE — DISCHARGE NOTE ADULT - PATIENT PORTAL LINK FT
You can access the [facility] Patient Portal, offered by [facility], by registering with the following website: [email or web address]

## 2018-06-26 NOTE — DISCHARGE NOTE ADULT - OTHER SIGNIFICANT FINDINGS
< from: CT Angio Chest PE Protocol w/ IV Cont (06.23.18 @ 16:17) >  IMPRESSION:      1.   No evidence of pulmonary emboli      2.   Otherwise unremarkable CTscan of the chest.      < end of copied text >    < from: Cardiac Cath Lab - Adult (06.25.18 @ 11:18) >     Diagnostic Conclusions     Three Vessel coronary artery disease.   Patent LIMA to LAD. Patent SVG to RPDA.   Occluded SVG to Ramus. Patent Ramus stent.   Patent stents in OM2, RCA.   Normal LV systolic function. Estimated LV ejection fraction is 60 %.   No aortic valvestenosis.    < end of copied text >    < from: Transthoracic Echocardiogram (06.25.18 @ 09:56) >   Summary     Trace mitral regurgitation is present.   Fibrocalcific changes noted to the Aortic valve leaflets with preserved   leaflet excursion. No aortic regurgitation is present.   The tricuspid valve leaflets are well seen and appear thin and pliable   with preserved leaflets excursion. No tricuspid regurgitation noted.   Pulmonic valve not well seen.   Normal appearing left atrium.   Left ventricle endocardium was well visualized with preserved systolic   function. Left ventricle size and structure are within normal   limitations.   Estimated ejection fraction is 50% via bi-plane method.   Normal appearing right atrium.   The right ventricle is normal in size, wall thickness, wall motion, and   contractility based on TAPSE and tissue doppler.   All visualized extra cardiac structures appears to be normal.   No evidence of pericardial effusion.    < end of copied text >

## 2018-06-26 NOTE — DISCHARGE NOTE ADULT - PLAN OF CARE
chest pain free, stable cardiac function take your medications as prescribed - ranexa, imdur  follow up with Dr Lozoya in 1 week optimal cardiac function Take your medications as prescribed  low fat diet  follow up with your cardiologist regularly adequate oxygenation Take your meds as indicated  follow up with your pulmonologist in 1-2 weeks

## 2018-06-26 NOTE — DISCHARGE NOTE ADULT - MEDICATION SUMMARY - MEDICATIONS TO CHANGE
I will SWITCH the dose or number of times a day I take the medications listed below when I get home from the hospital:    isosorbide mononitrate 30 mg oral tablet, extended release  -- 1 tab(s) by mouth once a day (in the morning)

## 2018-06-26 NOTE — DISCHARGE NOTE ADULT - MEDICATION SUMMARY - MEDICATIONS TO TAKE
I will START or STAY ON the medications listed below when I get home from the hospital:    alfuzosin 10 mg oral tablet, extended release  -- 1 tab(s) by mouth once a day  -- Indication: For bph    isosorbide mononitrate 60 mg oral tablet, extended release  -- 1 tab(s) by mouth once a day for unstable angina  -- Indication: For Cad    ranolazine 500 mg oral tablet, extended release  -- 1 tab(s) by mouth 2 times a day for unstable angina  -- Indication: For CAD    traZODone 150 mg oral tablet  -- 1 tab(s) by mouth once a day (at bedtime), As Needed - for insomnia  -- Indication: For depression    DULoxetine 30 mg oral delayed release capsule  -- 1 cap(s) by mouth once a day  -- Indication: For depression    NovoLIN 70/30 subcutaneous suspension  -- 25 unit(s) subcutaneous 2 times a day   -- Do not drink alcoholic beverages when taking this medication.  It is very important that you take or use this exactly as directed.  Do not skip doses or discontinue unless directed by your doctor.  Keep in refrigerator.  Do not freeze.    -- Indication: For diabetes    metFORMIN 1000 mg oral tablet  -- 1 tab(s) by mouth 2 times a day  -- Indication: For diabetes    fenofibrate 145 mg oral tablet  -- 1 tab(s) by mouth once a day  -- Indication: For CHolesterol    Lipitor 40 mg oral tablet  -- 1 tab(s) by mouth once a day (at bedtime)  -- Indication: For CHolesterol    Plavix 75 mg oral tablet  -- 1 tab(s) by mouth once a day  -- Indication: For CAD    Otezla 30 mg oral tablet  -- 1 tab(s) by mouth 2 times a day  -- Indication: For diabetes    busPIRone 30 mg oral tablet  -- 1 tab(s) by mouth once a day  -- Indication: For depression    metoprolol tartrate 50 mg oral tablet  -- 1 tab(s) by mouth once a day  -- Indication: For CAD    Symbicort 160 mcg-4.5 mcg/inh inhalation aerosol  -- 2 puff(s) inhaled 2 times a day            ******Patient states he is using it up to 6 times a day******  -- Indication: For LENNOX    Ventolin HFA 90 mcg/inh inhalation aerosol  -- 2 puff(s) inhaled 4 times a day, As Needed - for bronchospasm  -- Indication: For LENNOX    montelukast 10 mg oral tablet  -- 1 tab(s) by mouth once a day  -- Indication: For LENNOX    TheraTears ophthalmic solution  -- 1 drop(s) to each affected eye 2 times a day  -- Indication: For preventative    Protonix 40 mg oral delayed release tablet  -- 1 tab(s) by mouth once a day  -- Indication: For preventative

## 2018-06-26 NOTE — DISCHARGE NOTE ADULT - HOSPITAL COURSE
PATIENT SEEN, CHART REVIEWED  HPI: 61 year old Man with pmhx HTN, DM, HL, LENNOX, depression/anxiety and CAD s/p 3V CABG at [facility] in May 2016 and PCI presented on 6/23 to ED with c/o a 2 wk hx of worsening chest pain and SOB on exertion.   In the ED CTA was negative for PE, he had mild troponinemia for which heparin drip was initiated. He underwent LHC on 6/25 revealing small vessel disease, no intervention done; ranexa was added and imdur increased for optimal medical mgmt. He remained chest pain free and stable    Today he feels "ok" no further chest pain; no SOB/MARRERO; no palpitatiions/HA/dizziness. He remained stable for discharge home. Pt educated on the post discharge meds, follow up and procedure care. She / He is to make an bettina't to f/u with Dr Lozoya / cardiology within 1 week. Pt verbalized an understanding of and agreed with discharge plans    Vital Signs Last 24 Hrs  T(C): 36.3 (26 Jun 2018 10:37), Max: 36.8 (26 Jun 2018 05:04)  T(F): 97.4 (26 Jun 2018 10:37), Max: 98.2 (26 Jun 2018 05:04)  HR: 77 (26 Jun 2018 10:37) (66 - 78)  BP: 121/78 (26 Jun 2018 10:37) (112/59 - 134/73)  BP(mean): --  RR: 17 (26 Jun 2018 10:37) (17 - 18)  SpO2: 98% (26 Jun 2018 10:37) (94% - 98%)    PHYSICAL EXAM  General Appearance: cooperative, no acute distress,   HEENT: PERRL, conjunctiva clear, EOM's intact, non injected pharynx, no exudate, TM   normal  Neck: Supple, , no adenopathy, thyroid: not enlarged, no carotid bruit or JVD  Back: Symmetric, no  tenderness,no soft tissue tenderness  Lungs: coarse breath sounds, CTA, no wheezing/rhonchi, even an unlabored breathing   CV: RRR, S1S2, no m/c/r   Abdomen: Soft, non-tender, bowel sounds active , no HSM  Extremities: no cyanosis or edema, no joint swelling; cath access site is soft, non tender, no bruise/bleeding/hematoma  Skin: Skin color, texture normal, no rashes   Neurologic: Alert and oriented X3 , cranial nerves intact, sensory and motor normal,    A/P  *Chest pain, elevated troponin and Dyspnea d/t to Unstable Angina  - in pt w/ CAD s/p CABG 5/2016,  PCI w/ YUE July and Oct. 2016; PCI of OM2 feb 2018  - CTA - No PE  - continue aspirin, plavix, statin, BB, ARB  - Troponin peaked at .06; S/P heparin Drip  -   - cardio and Pulm consult    - Nebs ATC and PRN  - Hold off on Steroids  - no wheezing.  - S/P LHC (6/25) -  small vessel disease  - ECHO - LVEF 50%, no significant valvular disease, no wall motion abnormality    *ARF - Resolved   -S/P IVF  -monitor BUN/Cr    *Hypokalemia - resolved    *HTN  - controlled, cont home meds    *T2DM  - con't home meds   - diabetic diet, fingersticks     *depression/anxiety  -cont SSRI and benzo    *dvt px-LMWH and ambulation PATIENT SEEN, CHART REVIEWED  HPI: 61 year old Man with pmhx HTN, DM, HL, LENNOX, depression/anxiety and CAD s/p 3V CABG at [facility] in May 2016 and PCI presented on 6/23 to ED with c/o a 2 wk hx of worsening chest pain and SOB on exertion.   In the ED CTA was negative for PE, he had mild troponinemia for which heparin drip was initiated. He underwent LHC on 6/25 revealing small vessel disease, no intervention done; ranexa was added and imdur increased for optimal medical mgmt. He remained chest pain free and stable    Today he feels "ok" no further chest pain; no SOB/MARRERO; no palpitatiions/HA/dizziness. He remained stable for discharge home. Pt educated on the post discharge meds, follow up and procedure care. She / He is to make an bettina't to f/u with Dr Lozoya / cardiology within 1 week. Pt verbalized an understanding of and agreed with discharge plans    Vital Signs Last 24 Hrs  T(C): 36.3 (26 Jun 2018 10:37), Max: 36.8 (26 Jun 2018 05:04)  T(F): 97.4 (26 Jun 2018 10:37), Max: 98.2 (26 Jun 2018 05:04)  HR: 77 (26 Jun 2018 10:37) (66 - 78)  BP: 121/78 (26 Jun 2018 10:37) (112/59 - 134/73)  BP(mean): --  RR: 17 (26 Jun 2018 10:37) (17 - 18)  SpO2: 98% (26 Jun 2018 10:37) (94% - 98%)    PHYSICAL EXAM  General Appearance: cooperative, no acute distress,   HEENT: PERRL, conjunctiva clear, EOM's intact, non injected pharynx, no exudate, TM   normal  Neck: Supple, , no adenopathy, thyroid: not enlarged, no carotid bruit or JVD  Back: Symmetric, no  tenderness,no soft tissue tenderness  Lungs: coarse breath sounds, CTA, no wheezing/rhonchi, even an unlabored breathing   CV: RRR, S1S2, no m/c/r   Abdomen: Soft, non-tender, bowel sounds active , no HSM  Extremities: no cyanosis or edema, no joint swelling; cath access site is soft, non tender, no bruise/bleeding/hematoma  Skin: Skin color, texture normal, no rashes   Neurologic: Alert and oriented X3 , cranial nerves intact, sensory and motor normal,    A/P  *Chest pain, elevated troponin and Dyspnea d/t to Unstable Angina  - in pt w/ CAD s/p CABG 5/2016,  PCI w/ YUE July and Oct. 2016; PCI of OM2 feb 2018  - CTA - No PE  - continue aspirin, plavix, statin, BB, ARB  - Troponin peaked at .06; S/P heparin Drip  -   - cardio and Pulm consult    - Nebs ATC and PRN  - Hold off on Steroids  - no wheezing.  - S/P LHC (6/25) -  small vessel disease  - ECHO - LVEF 50%, no significant valvular disease, no wall motion abnormality    *ARF - Resolved   -S/P IVF  -monitor BUN/Cr    *Hypokalemia - resolved    *HTN  - controlled, cont home meds    *T2DM  - con't home meds   - diabetic diet, fingersticks     *depression/anxiety  -cont SSRI and benzo    *dvt px-LMWH and ambulation      Attending note: Patient seen and examined. Case reviewed with SILVIO Curran and agree with her assessment and plan.  Patient denies any chest pain.  2D echo: normal LV function.  Cleared by Dr Lozoya for discharge.  Discussed with patient regarding d/c plan.  His pMD was notified.  Spent more than 30 minutes to prepare the discharge. PATIENT SEEN, CHART REVIEWED  HPI: 61 year old Man with pmhx HTN, DM, HL, LENNOX, depression/anxiety and CAD s/p 3V CABG at [facility] in May 2016 and PCI presented on 6/23 to ED with c/o a 2 wk hx of worsening chest pain and SOB on exertion.   In the ED CTA was negative for PE, he had mild troponinemia for which heparin drip was initiated. He underwent LHC on 6/25 revealing small vessel disease, no intervention done; ranexa was added and imdur increased for optimal medical mgmt. He remained chest pain free and stable    Today he feels "ok" no further chest pain; no SOB/MARRERO; no palpitatiions/HA/dizziness. He remained stable for discharge home. Pt educated on the post discharge meds, follow up and procedure care. She / He is to make an bettina't to f/u with Dr Lozoya / cardiology within 1 week. Pt verbalized an understanding of and agreed with discharge plans    Vital Signs Last 24 Hrs  T(C): 36.3 (26 Jun 2018 10:37), Max: 36.8 (26 Jun 2018 05:04)  T(F): 97.4 (26 Jun 2018 10:37), Max: 98.2 (26 Jun 2018 05:04)  HR: 77 (26 Jun 2018 10:37) (66 - 78)  BP: 121/78 (26 Jun 2018 10:37) (112/59 - 134/73)  BP(mean): --  RR: 17 (26 Jun 2018 10:37) (17 - 18)  SpO2: 98% (26 Jun 2018 10:37) (94% - 98%)    PHYSICAL EXAM  General Appearance: cooperative, no acute distress,   HEENT: PERRL, conjunctiva clear, EOM's intact, non injected pharynx, no exudate, TM   normal  Neck: Supple, , no adenopathy, thyroid: not enlarged, no carotid bruit or JVD  Back: Symmetric, no  tenderness,no soft tissue tenderness  Lungs: coarse breath sounds, CTA, no wheezing/rhonchi, even an unlabored breathing   CV: RRR, S1S2, no m/c/r   Abdomen: Soft, non-tender, bowel sounds active , no HSM  Extremities: no cyanosis or edema, no joint swelling; cath access site is soft, non tender, no bruise/bleeding/hematoma  Skin: Skin color, texture normal, no rashes   Neurologic: Alert and oriented X3 , cranial nerves intact, sensory and motor normal,    A/P  *Chest pain, elevated troponin and Dyspnea d/t to NSTEMI  - in pt w/ CAD s/p CABG 5/2016,  PCI w/ YUE July and Oct. 2016; PCI of OM2 feb 2018  - CTA - No PE  - continue aspirin, plavix, statin, BB, ARB  - Troponin peaked at .06; S/P heparin Drip  -   - cardio and Pulm consult    - Nebs ATC and PRN  - Hold off on Steroids  - no wheezing.  - S/P LHC (6/25) -  small vessel disease  - ECHO - LVEF 50%, no significant valvular disease, no wall motion abnormality    *ARF - Resolved   -S/P IVF  -monitor BUN/Cr    *Hypokalemia - resolved    *HTN  - controlled, cont home meds    *T2DM  - con't home meds   - diabetic diet, fingersticks     *depression/anxiety  -cont SSRI and benzo    *dvt px-LMWH and ambulation      Attending note: Patient seen and examined. Case reviewed with SILVIO Curran and agree with her assessment and plan.  Patient denies any chest pain.  2D echo: normal LV function.  Cleared by Dr Lozoya for discharge.  Discussed with patient regarding d/c plan.  His pMD was notified.  Spent more than 30 minutes to prepare the discharge.

## 2018-06-26 NOTE — DISCHARGE NOTE ADULT - SECONDARY DIAGNOSIS.
Coronary artery disease involving coronary bypass graft of native heart with angina pectoris Chronic obstructive pulmonary disease, unspecified COPD type

## 2018-06-26 NOTE — PROGRESS NOTE ADULT - SUBJECTIVE AND OBJECTIVE BOX
HPI:  64 y/o male with HTN, DM, HL, depression/anxiety and CAD s/p 3V CABG at [facility] in May 2016 and PCI & stent  July 2016 & Oct 2016 , he has  YUE to LIMA to LAD & YUE to RCA  July 2016 and October 2016 here at drug-eluting stent to RCA and in March 2017 he had a  drug-eluting stent to OM circumflex,  He was admitted with complains of increasing shortness of breath and chest tightness with exertion. He status post cardiac catheterization ,He has severe three-vessel coronary artery disease, patent LIMA to LAD. Patent saphenous vein graft to RPDA. Occluded saphenous vein graft to ramus. Patent ramus stent. He is now pain-free and is comfortable. He denies any chest pain or shortness of breath. He is in normal sinus rhythm on telemetry.     Cardiac Cath Lab - Adult (06.25.18   pression     Diagnostic Conclusions     Three Vessel coronary artery disease.   Patent LIMA to LAD. Patent SVG to RPDA.   Occluded SVG to Ramus. Patent Ramus stent.   Patent stents in OM2, RCA.   Normal LV systolic function. Estimated LV ejection fraction is 60 %.   No aortic valvestenosis.         CT Angio Chest PE Protocol w/ IV Cont (06.23.18 >  No convincingpulmonary emboli are identified.  This evaluation includes   the main pulmonary artery, its right and left branches, and their   segmental branches. Subsegmental branches are incompletely evaluated but   where seen appear patent.    The lungs are clear.  No pleural fluid is seen.  The trachea and major   bronchi appear patent.    No pathologically enlarged axillary, hilar or mediastinal lymph nodes are   found.  Status post median sternotomy. The heart size is normal.   The   mediastinum, chest wall and thoracic spine appear otherwise unremarkable.    The thyroid gland appears intact.      Limited evaluation of the upper abdomen is unremarkable.      IMPRESSION:        1.   No evidence of pulmonary emboli        2.   Otherwise unremarkable CTscan of the chest.               PAST MEDICAL & SURGICAL HISTORY:  Coronary artery disease involving coronary bypass graft of native heart with angina pectoris  COPD (chronic obstructive pulmonary disease)  DM (diabetes mellitus)  HTN (hypertension)  S/P multiple stents  last was placed in March 2017  S/P CABG x 3    MEDICATIONS  (STANDING):  atorvastatin 40 milliGRAM(s) Oral at bedtime  buDESOnide 160 MICROgram(s)/formoterol 4.5 MICROgram(s) Inhaler 2 Puff(s) Inhalation two times a day  busPIRone 30 milliGRAM(s) Oral two times a day  clopidogrel Tablet 75 milliGRAM(s) Oral daily  dextrose 5%. 1000 milliLiter(s) (50 mL/Hr) IV Continuous <Continuous>  dextrose 50% Injectable 12.5 Gram(s) IV Push once  dextrose 50% Injectable 25 Gram(s) IV Push once  dextrose 50% Injectable 25 Gram(s) IV Push once  docusate sodium 100 milliGRAM(s) Oral three times a day  DULoxetine 30 milliGRAM(s) Oral daily  fenofibrate Tablet 145 milliGRAM(s) Oral daily  insulin glargine Injectable (LANTUS) 25 Unit(s) SubCutaneous every morning  insulin glargine Injectable (LANTUS) 25 Unit(s) SubCutaneous at bedtime  insulin lispro (HumaLOG) corrective regimen sliding scale   SubCutaneous three times a day before meals  ipratropium    for Nebulization 500 MICROGram(s) Nebulizer every 6 hours  isosorbide   mononitrate ER Tablet (IMDUR) 60 milliGRAM(s) Oral daily  metoprolol succinate ER 50 milliGRAM(s) Oral daily  montelukast 10 milliGRAM(s) Oral daily  multivitamin 1 Tablet(s) Oral daily  pantoprazole    Tablet 40 milliGRAM(s) Oral before breakfast  ranolazine 500 milliGRAM(s) Oral two times a day  tamsulosin 0.8 milliGRAM(s) Oral at bedtime  traZODone 150 milliGRAM(s) Oral at bedtime    MEDICATIONS  (PRN):  ALBUTerol/ipratropium for Nebulization 3 milliLiter(s) Nebulizer every 6 hours PRN Shortness of Breath and/or Wheezing  dextrose 40% Gel 15 Gram(s) Oral once PRN Blood Glucose LESS THAN 70 milliGRAM(s)/deciliter  glucagon  Injectable 1 milliGRAM(s) IntraMuscular once PRN Glucose LESS THAN 70 milligrams/deciliter  nitroglycerin     SubLingual 0.4 milliGRAM(s) SubLingual every 5 minutes PRN Chest Pain  ondansetron Injectable 4 milliGRAM(s) IV Push every 6 hours PRN Nausea  senna 2 Tablet(s) Oral at bedtime PRN Constipation          REVIEW OF SYSTEM:  Pertinent items are noted in HPI.  Constitutional	Negative for chills, fevers, sweats.    Eyes: 	Negative for visual disturbance.  Ears, nose, mouth, throat, and face: Negative for epistaxis, nasal congestion, sore throat and tinnitus.  Neck:	Negative for enlargement, pain and difficulty in swallowing  Respiration : Negative for cough, dyspnea on exertion, pleuritic chest pain and wheezing  Cardiovascular: Negative for chest pain, dyspnea and palpitations    Gastrointestinal : Negative for abdominal pain, diarrhea, nausea and vomiting  Genitourinary: Negative for dysuria, frequency and urinary incontinence .  Skin: Negative for  rash, pruritus, swelling, dryness .  	  Hematologic/lymphatic: Negative for bleeding and easy bruising  Musculoskeletal: Negative for arthralgias, back pain and muscle weakness.  Neurological: Negative for dizziness, headaches, seizures and tremors  Behavioral/Psych: Negative for mood change, depression.  Endocrine:	Negative for blurry vision, polydipsia and polyuria, diaphoresis.   Allergic/Immunologic:	Negative for anaphylaxis, angioedema and urticaria.      Vital Signs Last 24 Hrs  T(C): 36.8 (26 Jun 2018 05:04), Max: 36.8 (26 Jun 2018 05:04)  T(F): 98.2 (26 Jun 2018 05:04), Max: 98.2 (26 Jun 2018 05:04)  HR: 76 (26 Jun 2018 07:44) (66 - 78)  BP: 130/79 (26 Jun 2018 05:04) (111/75 - 134/73)  BP(mean): 86 (25 Jun 2018 10:15) (86 - 86)  RR: 18 (25 Jun 2018 13:18) (16 - 18)  SpO2: 96% (26 Jun 2018 05:04) (94% - 100%)    I&O's Summary    PHYSICAL EXAM  General Appearance: cooperative, no acute distress,   HEENT: PERRL, conjunctiva clear, EOM's intact, non injected pharynx, no exudate, TM   normal  Neck: Supple, , no adenopathy, thyroid: not enlarged, no carotid bruit or JVD  Back: Symmetric, no  tenderness,no soft tissue tenderness  Lungs: Clear to auscultation bilateral,no adventitious breath sounds, normal   expiratory phase  Heart: Regular rate and rhythm, S1, S2 normal, no murmur, rub or gallop  Abdomen: Soft, non-tender, bowel sounds active , no hepatosplenomegaly  Extremities: no cyanosis or edema, no joint swelling  Skin: Skin color, texture normal, no rashes   Neurologic: Alert and oriented X3 , cranial nerves intact, sensory and motor normal,        INTERPRETATION OF TELEMETRY:    ECG:        LABS:                          11.5   5.01  )-----------( 161      ( 26 Jun 2018 06:06 )             35.5     06-26    140  |  108  |  10  ----------------------------<  151<H>  3.9   |  28  |  1.01    Ca    8.6      26 Jun 2018 06:06  Phos  3.2     06-26  Mg     1.9     06-26      CARDIAC MARKERS ( 24 Jun 2018 14:21 )  <0.015 ng/mL / x     / x     / x     / x          Lipid Panel  110  29  63  92    Pro BNP  116 06-24 @ 05:55  D Dimer  -- 06-24 @ 05:55  Pro BNP  47 06-23 @ 13:17  D Dimer  -- 06-23 @ 13:17    PTT - ( 25 Jun 2018 04:10 )  PTT:43.6 sec          RADIOLOGY & ADDITIONAL STUDIES:

## 2018-06-26 NOTE — DISCHARGE NOTE ADULT - CARE PLAN
Principal Discharge DX:	Unstable angina  Goal:	chest pain free, stable cardiac function  Assessment and plan of treatment:	take your medications as prescribed - ranexa, imdur  follow up with Dr Lozoya in 1 week  Secondary Diagnosis:	Coronary artery disease involving coronary bypass graft of native heart with angina pectoris  Goal:	optimal cardiac function  Assessment and plan of treatment:	Take your medications as prescribed  low fat diet  follow up with your cardiologist regularly  Secondary Diagnosis:	Chronic obstructive pulmonary disease, unspecified COPD type  Goal:	adequate oxygenation  Assessment and plan of treatment:	Take your meds as indicated  follow up with your pulmonologist in 1-2 weeks

## 2018-07-11 DIAGNOSIS — F41.9 ANXIETY DISORDER, UNSPECIFIED: ICD-10-CM

## 2018-07-11 DIAGNOSIS — Z79.4 LONG TERM (CURRENT) USE OF INSULIN: ICD-10-CM

## 2018-07-11 DIAGNOSIS — E78.5 HYPERLIPIDEMIA, UNSPECIFIED: ICD-10-CM

## 2018-07-11 DIAGNOSIS — Z88.0 ALLERGY STATUS TO PENICILLIN: ICD-10-CM

## 2018-07-11 DIAGNOSIS — I25.10 ATHEROSCLEROTIC HEART DISEASE OF NATIVE CORONARY ARTERY WITHOUT ANGINA PECTORIS: ICD-10-CM

## 2018-07-11 DIAGNOSIS — E87.6 HYPOKALEMIA: ICD-10-CM

## 2018-07-11 DIAGNOSIS — I21.4 NON-ST ELEVATION (NSTEMI) MYOCARDIAL INFARCTION: ICD-10-CM

## 2018-07-11 DIAGNOSIS — I10 ESSENTIAL (PRIMARY) HYPERTENSION: ICD-10-CM

## 2018-07-11 DIAGNOSIS — Z95.5 PRESENCE OF CORONARY ANGIOPLASTY IMPLANT AND GRAFT: ICD-10-CM

## 2018-07-11 DIAGNOSIS — F32.9 MAJOR DEPRESSIVE DISORDER, SINGLE EPISODE, UNSPECIFIED: ICD-10-CM

## 2018-07-11 DIAGNOSIS — N17.9 ACUTE KIDNEY FAILURE, UNSPECIFIED: ICD-10-CM

## 2018-07-11 DIAGNOSIS — G47.33 OBSTRUCTIVE SLEEP APNEA (ADULT) (PEDIATRIC): ICD-10-CM

## 2018-07-11 DIAGNOSIS — M19.90 UNSPECIFIED OSTEOARTHRITIS, UNSPECIFIED SITE: ICD-10-CM

## 2018-07-11 DIAGNOSIS — R07.9 CHEST PAIN, UNSPECIFIED: ICD-10-CM

## 2018-07-11 DIAGNOSIS — Z87.891 PERSONAL HISTORY OF NICOTINE DEPENDENCE: ICD-10-CM

## 2018-07-11 DIAGNOSIS — I25.810 ATHEROSCLEROSIS OF CORONARY ARTERY BYPASS GRAFT(S) WITHOUT ANGINA PECTORIS: ICD-10-CM

## 2018-07-11 DIAGNOSIS — J44.9 CHRONIC OBSTRUCTIVE PULMONARY DISEASE, UNSPECIFIED: ICD-10-CM

## 2018-09-18 NOTE — ED ADULT NURSE NOTE - BREATH SOUNDS, RIGHT
----- Message from Bel Michael sent at 9/18/2018  3:44 PM CDT -----  Contact: self  Pt called in about wanting to see about Xray. Pt stated that he did all these testing before. Pt would like the nurse to give him a call back        Pt can be reached at [phone] ty   clear

## 2018-11-30 NOTE — ED ADULT NURSE NOTE - NS ED PATIENT SAFETY CONCERN
Refer To    SPECIALISTS           DERMATOLOGY REFER TO:  [facility], [facility], Dr. Jose Juan Mcghee, Dr. Fabrizio Fonseca, Dr. Gerard Dos Santos, Dr. Nicho Umanzor, , [address], [place] Ph: [phone]     Consultation for opinion Referral for treatment   Reason for Referral: patient with infected lesions generalized   # of Visits: 1     Signatures   Electronically signed by : TITA GONZALES MD; Oct 12 2018  8:52PM CST    
No

## 2019-05-16 NOTE — ED PROVIDER NOTE - CPE EDP GASTRO NORM
Last office visit: 19     Previous blood pressure: 91/59      Mm Hg     Previous heart rate:  60    bpm    Time of readin:45 pm     Morning medications were taken at: morning meds @ 9:00 am,     Today's blood pressure:  118/76     mm Hg    Today's heart rate:   99    bpm     Ordering Provider: Dr. Scanlon    Results sent to team # :Team 3    Additional comments:                 
normal...

## 2019-06-18 NOTE — H&P ADULT - NSHPROSALLOTHERNEGRD_GEN_ALL_CORE
Urine Pregnancy Test Result: negative
All other review of systems negative, except as noted in HPI
Detail Level: None
Performed By: LUIS

## 2019-11-07 NOTE — ED ADULT NURSE NOTE - NSSISCREENINGQ4_ED_A_ED
Patient:   SAPNA DEWEY            MRN: [id number]            FIN: [id number]              Age:   84 years     Sex:  MALE     :  35   Associated Diagnoses:   None   Author:   JORDYN DAMICO     Gastroenterology Progress Note  Chief Complaint: CVA  Reason for Consult: emesis  Subjective: EGD with gastric ulcer. Feeling constipated. Feels no appetite for food.     Vitals between:   2019 09:06:07   TO   2019 09:06:07                   LAST RESULT MINIMUM MAXIMUM  Temperature 37.2 36.1 37.2  Heart Rate 64 64 89  Respiratory Rate 16 16 18  NISBP           124 107 146  NIDBP           71 56 76  NIMBP           89 81 98  SpO2                    95 94 98    Physical Exam:   Constitutional: AOx3, NAD sitting up in bed  HEENT: NCAT, no conjunctival pallor, no scleral icterus  Neck: trachea midline  Cardiovascular: HRRR  Pulmonary: CTAB  Gastrointestinal: abd soft, nontender, nondistended  Extremities: no edema present  Skin: warm and dry  Neurological: no facial droop  Labs    No Qualifying Labs are resulted on this patient in the last 24 hours  Endoscopy:  EGD   IMPRESSION:  1) 4 mm nodule in the antrum  2) Normal esophagus  3) Normal duodenum  Gastric biopsies taken for h pylori. Normal motility pattern.  Assessment and Plan  83-year-old male past medical history of glioblastoma multiform status post surgical resection and chemotherapy in  now presents with CVA, in the rehab unit.   GI being consulted for episodes of nausea and decreased appetite. EGD  with small gastric ulcer rosa isela III. On PPI. Feeling a little better, although he is now constipated.  1. Gastric Ulcer rosa isela III: biopsied on EGD. On PPI now   2. Decreased appetite - this is likely a combination of gastric ulcer in conjunction with the stroke and subsequent hospitalization for CVA  3. Constipation   Plan:  -Continue PPI QD  -Miralax qd, I added colace BID. Encourage ambulation  -await path results. May or not  need repeat EGD for surveillance of gastric ulcer based on path results  -Stable for discharge from GI standpoint   Thank you for this consult. Please feel free to page with questions.  Attending addendum to follow.  Mitchell Krause DO  GI Fellow, PGY 4  Patient seen and independently examined with GI team agree all findings and chart personally reviewed.   I was present during the key elements of the history, examination and decision making.  I agree with the documented recommendations presented by the GI team/house staff.  Please see GI team Fellow/resident/student note for details:  Thank you allowing us to participate in the care of this interesting patient.   I independently reviewed all imaging and lab tests pertinent the consultation request.       Agree with stated  Small PUD  Symptoms likely central   Bowel regimen  Dr. Alonzo Bates,   [facility]  [phone]  (timing of the patient encounter may not correlate with documentation time)   No

## 2020-02-03 NOTE — ED PROVIDER NOTE - RESPIRATORY, MLM
Pt BIBA from girlfriend's house, pt bit by neighbors dog to R buttock, puncture wound to area, swelling noted. Pt does not know vaccination status of dog, as per pt no tetanus shot.
Breath sounds clear and equal bilaterally.

## 2020-02-21 ENCOUNTER — EMERGENCY (EMERGENCY)
Facility: HOSPITAL | Age: 65
LOS: 0 days | Discharge: LEFT BEFORE DISCHARGE | End: 2020-02-21
Attending: FAMILY MEDICINE
Payer: MEDICARE

## 2020-02-21 VITALS
OXYGEN SATURATION: 96 % | HEART RATE: 61 BPM | RESPIRATION RATE: 18 BRPM | SYSTOLIC BLOOD PRESSURE: 106 MMHG | DIASTOLIC BLOOD PRESSURE: 65 MMHG | WEIGHT: 220.02 LBS | TEMPERATURE: 98 F

## 2020-02-21 VITALS
SYSTOLIC BLOOD PRESSURE: 132 MMHG | TEMPERATURE: 98 F | RESPIRATION RATE: 16 BRPM | OXYGEN SATURATION: 99 % | DIASTOLIC BLOOD PRESSURE: 69 MMHG | HEART RATE: 70 BPM

## 2020-02-21 DIAGNOSIS — R11.0 NAUSEA: ICD-10-CM

## 2020-02-21 DIAGNOSIS — Z87.891 PERSONAL HISTORY OF NICOTINE DEPENDENCE: ICD-10-CM

## 2020-02-21 DIAGNOSIS — I25.10 ATHEROSCLEROTIC HEART DISEASE OF NATIVE CORONARY ARTERY WITHOUT ANGINA PECTORIS: ICD-10-CM

## 2020-02-21 DIAGNOSIS — R19.7 DIARRHEA, UNSPECIFIED: ICD-10-CM

## 2020-02-21 DIAGNOSIS — E11.9 TYPE 2 DIABETES MELLITUS WITHOUT COMPLICATIONS: ICD-10-CM

## 2020-02-21 DIAGNOSIS — Z95.9 PRESENCE OF CARDIAC AND VASCULAR IMPLANT AND GRAFT, UNSPECIFIED: Chronic | ICD-10-CM

## 2020-02-21 DIAGNOSIS — Z79.4 LONG TERM (CURRENT) USE OF INSULIN: ICD-10-CM

## 2020-02-21 DIAGNOSIS — Z79.51 LONG TERM (CURRENT) USE OF INHALED STEROIDS: ICD-10-CM

## 2020-02-21 DIAGNOSIS — J44.9 CHRONIC OBSTRUCTIVE PULMONARY DISEASE, UNSPECIFIED: ICD-10-CM

## 2020-02-21 DIAGNOSIS — Z79.899 OTHER LONG TERM (CURRENT) DRUG THERAPY: ICD-10-CM

## 2020-02-21 DIAGNOSIS — R39.15 URGENCY OF URINATION: ICD-10-CM

## 2020-02-21 DIAGNOSIS — F32.9 MAJOR DEPRESSIVE DISORDER, SINGLE EPISODE, UNSPECIFIED: ICD-10-CM

## 2020-02-21 DIAGNOSIS — Z95.1 PRESENCE OF AORTOCORONARY BYPASS GRAFT: ICD-10-CM

## 2020-02-21 DIAGNOSIS — R42 DIZZINESS AND GIDDINESS: ICD-10-CM

## 2020-02-21 DIAGNOSIS — I10 ESSENTIAL (PRIMARY) HYPERTENSION: ICD-10-CM

## 2020-02-21 DIAGNOSIS — Z88.0 ALLERGY STATUS TO PENICILLIN: ICD-10-CM

## 2020-02-21 DIAGNOSIS — R53.1 WEAKNESS: ICD-10-CM

## 2020-02-21 DIAGNOSIS — Z95.1 PRESENCE OF AORTOCORONARY BYPASS GRAFT: Chronic | ICD-10-CM

## 2020-02-21 DIAGNOSIS — Z95.5 PRESENCE OF CORONARY ANGIOPLASTY IMPLANT AND GRAFT: ICD-10-CM

## 2020-02-21 DIAGNOSIS — R55 SYNCOPE AND COLLAPSE: ICD-10-CM

## 2020-02-21 PROBLEM — I25.709 ATHEROSCLEROSIS OF CORONARY ARTERY BYPASS GRAFT(S), UNSPECIFIED, WITH UNSPECIFIED ANGINA PECTORIS: Chronic | Status: ACTIVE | Noted: 2017-02-05

## 2020-02-21 LAB
ALBUMIN SERPL ELPH-MCNC: 3.7 G/DL — SIGNIFICANT CHANGE UP (ref 3.3–5)
ALP SERPL-CCNC: 63 U/L — SIGNIFICANT CHANGE UP (ref 40–120)
ALT FLD-CCNC: 42 U/L — SIGNIFICANT CHANGE UP (ref 12–78)
ANION GAP SERPL CALC-SCNC: 9 MMOL/L — SIGNIFICANT CHANGE UP (ref 5–17)
APPEARANCE UR: CLEAR — SIGNIFICANT CHANGE UP
AST SERPL-CCNC: 37 U/L — SIGNIFICANT CHANGE UP (ref 15–37)
BILIRUB SERPL-MCNC: 0.8 MG/DL — SIGNIFICANT CHANGE UP (ref 0.2–1.2)
BILIRUB UR-MCNC: NEGATIVE — SIGNIFICANT CHANGE UP
BUN SERPL-MCNC: 14 MG/DL — SIGNIFICANT CHANGE UP (ref 7–23)
CALCIUM SERPL-MCNC: 8.9 MG/DL — SIGNIFICANT CHANGE UP (ref 8.5–10.1)
CHLORIDE SERPL-SCNC: 106 MMOL/L — SIGNIFICANT CHANGE UP (ref 96–108)
CO2 SERPL-SCNC: 24 MMOL/L — SIGNIFICANT CHANGE UP (ref 22–31)
COLOR SPEC: YELLOW — SIGNIFICANT CHANGE UP
CREAT SERPL-MCNC: 0.98 MG/DL — SIGNIFICANT CHANGE UP (ref 0.5–1.3)
DIFF PNL FLD: NEGATIVE — SIGNIFICANT CHANGE UP
GLUCOSE SERPL-MCNC: 124 MG/DL — HIGH (ref 70–99)
GLUCOSE UR QL: NEGATIVE MG/DL — SIGNIFICANT CHANGE UP
HCT VFR BLD CALC: 36.1 % — LOW (ref 39–50)
HGB BLD-MCNC: 12 G/DL — LOW (ref 13–17)
KETONES UR-MCNC: NEGATIVE — SIGNIFICANT CHANGE UP
LEUKOCYTE ESTERASE UR-ACNC: NEGATIVE — SIGNIFICANT CHANGE UP
MCHC RBC-ENTMCNC: 28.6 PG — SIGNIFICANT CHANGE UP (ref 27–34)
MCHC RBC-ENTMCNC: 33.2 GM/DL — SIGNIFICANT CHANGE UP (ref 32–36)
MCV RBC AUTO: 86.2 FL — SIGNIFICANT CHANGE UP (ref 80–100)
NITRITE UR-MCNC: NEGATIVE — SIGNIFICANT CHANGE UP
PH UR: 6.5 — SIGNIFICANT CHANGE UP (ref 5–8)
PLATELET # BLD AUTO: 116 K/UL — LOW (ref 150–400)
POTASSIUM SERPL-MCNC: 4.2 MMOL/L — SIGNIFICANT CHANGE UP (ref 3.5–5.3)
POTASSIUM SERPL-SCNC: 4.2 MMOL/L — SIGNIFICANT CHANGE UP (ref 3.5–5.3)
PROT SERPL-MCNC: 6.9 GM/DL — SIGNIFICANT CHANGE UP (ref 6–8.3)
PROT UR-MCNC: 30 MG/DL
RBC # BLD: 4.19 M/UL — LOW (ref 4.2–5.8)
RBC # FLD: 17.8 % — HIGH (ref 10.3–14.5)
SODIUM SERPL-SCNC: 139 MMOL/L — SIGNIFICANT CHANGE UP (ref 135–145)
SP GR SPEC: 1.01 — SIGNIFICANT CHANGE UP (ref 1.01–1.02)
TROPONIN I SERPL-MCNC: <0.015 NG/ML — SIGNIFICANT CHANGE UP (ref 0.01–0.04)
UROBILINOGEN FLD QL: NEGATIVE MG/DL — SIGNIFICANT CHANGE UP
WBC # BLD: 7.9 K/UL — SIGNIFICANT CHANGE UP (ref 3.8–10.5)
WBC # FLD AUTO: 7.9 K/UL — SIGNIFICANT CHANGE UP (ref 3.8–10.5)

## 2020-02-21 PROCEDURE — 85027 COMPLETE CBC AUTOMATED: CPT

## 2020-02-21 PROCEDURE — 36415 COLL VENOUS BLD VENIPUNCTURE: CPT

## 2020-02-21 PROCEDURE — 70450 CT HEAD/BRAIN W/O DYE: CPT

## 2020-02-21 PROCEDURE — 71045 X-RAY EXAM CHEST 1 VIEW: CPT

## 2020-02-21 PROCEDURE — 70450 CT HEAD/BRAIN W/O DYE: CPT | Mod: 26

## 2020-02-21 PROCEDURE — 71045 X-RAY EXAM CHEST 1 VIEW: CPT | Mod: 26

## 2020-02-21 PROCEDURE — 93010 ELECTROCARDIOGRAM REPORT: CPT

## 2020-02-21 PROCEDURE — G0378: CPT

## 2020-02-21 PROCEDURE — 81001 URINALYSIS AUTO W/SCOPE: CPT

## 2020-02-21 PROCEDURE — 99285 EMERGENCY DEPT VISIT HI MDM: CPT

## 2020-02-21 PROCEDURE — 93005 ELECTROCARDIOGRAM TRACING: CPT

## 2020-02-21 PROCEDURE — 84484 ASSAY OF TROPONIN QUANT: CPT | Mod: 59

## 2020-02-21 PROCEDURE — 99285 EMERGENCY DEPT VISIT HI MDM: CPT | Mod: 25

## 2020-02-21 PROCEDURE — 82962 GLUCOSE BLOOD TEST: CPT

## 2020-02-21 PROCEDURE — 80053 COMPREHEN METABOLIC PANEL: CPT

## 2020-02-21 RX ORDER — SODIUM CHLORIDE 9 MG/ML
1000 INJECTION INTRAMUSCULAR; INTRAVENOUS; SUBCUTANEOUS ONCE
Refills: 0 | Status: COMPLETED | OUTPATIENT
Start: 2020-02-21 | End: 2020-02-21

## 2020-02-21 RX ADMIN — SODIUM CHLORIDE 1000 MILLILITER(S): 9 INJECTION INTRAMUSCULAR; INTRAVENOUS; SUBCUTANEOUS at 14:54

## 2020-02-21 RX ADMIN — SODIUM CHLORIDE 1000 MILLILITER(S): 9 INJECTION INTRAMUSCULAR; INTRAVENOUS; SUBCUTANEOUS at 18:21

## 2020-02-21 NOTE — ED ADULT NURSE REASSESSMENT NOTE - NS ED NURSE REASSESS COMMENT FT1
pt states "Im fine, I don't want to be in the hospital anymore" Pt alert and oriented x4. Dr angel made aware. dr angel explained risks/benefits of leaving AMA.

## 2020-02-21 NOTE — ED ADULT NURSE NOTE - NSIMPLEMENTINTERV_GEN_ALL_ED
Implemented All Fall Risk Interventions:  [place] to call system. Call bell, personal items and telephone within reach. Instruct patient to call for assistance. Room bathroom lighting operational. Non-slip footwear when patient is off stretcher. Physically safe environment: no spills, clutter or unnecessary equipment. Stretcher in lowest position, wheels locked, appropriate side rails in place. Provide visual cue, wrist band, yellow gown, etc. Monitor gait and stability. Monitor for mental status changes and reorient to person, place, and time. Review medications for side effects contributing to fall risk. Reinforce activity limits and safety measures with patient and family.

## 2020-02-21 NOTE — ED STATDOCS - PROGRESS NOTE DETAILS
Say Valentin for attending Dr. Farfan: 66 y/o male with a PMHx of CAD with stents x3, COPD, DM, HTN presents to the ED c/o dizziness. Pt was being seen at [facility] for therapy when he became dizziness and nauseous. Denies bloody stools, dark stools. No other complaints at this time. When moving from EMS stretcher to stretcher in ED, pt unsteady on his feet. Will send pt to main ED for further evaluation.

## 2020-02-21 NOTE — ED ADULT NURSE NOTE - CHIEF COMPLAINT QUOTE
pt presents to ED due to dizziness with lightheadedness pt was being seen at [facility] for therapy when he became lightheaded denies Chest Pain

## 2020-02-21 NOTE — ED PROVIDER NOTE - PATIENT PORTAL LINK FT
You can access the FollowMyHealth Patient Portal offered by [facility] by registering at the following website: [email or web address]. By joining [employer]’s FollowMyHealth portal, you will also be able to view your health information using other applications (apps) compatible with our system.

## 2020-02-21 NOTE — ED PROVIDER NOTE - NSFOLLOWUPINSTRUCTIONS_ED_ALL_ED_FT
You are signing against medical advise. You are welcome to return at any time. Follow up with your medical doctor.

## 2020-02-21 NOTE — ED PROVIDER NOTE - OBJECTIVE STATEMENT
64 y/o male with a PMHx of DM, depression, COPD, CAD, HTN, CABG 3 stents presents to the ED c/o dizziness/lightheaded and weakness starting at 12. +nausea +SOB Pt was going to his therapist and felt fine going there but then started feeling tired. Pt takes Klonopin and has been off of it for a week until he saw the doctor yesterday who renewed his prescription. Pt has never had these symptoms before. Pt has some urinary issues where he has to urinate and gets up frequently to go to the bathroom but then can't urinate. Pt states he feels fine laying down but when he gets up he gets lightheaded again. Pt states he has been having diarrhea and a lot of gas for the past 3 week. No recent abx use.  Cardio: Dr. Reid   PCP: Dr. Ruggiero  Urology: Dr. Bhardwaj

## 2020-02-21 NOTE — ED PROVIDER NOTE - CARE PROVIDER_API CALL
Antonino Lozoya (MD)  Cardiovascular Disease; Internal Medicine  [address] [place] [address]  Phone: [phone]  Fax: [phone]  Follow Up Time:

## 2020-02-21 NOTE — ED PROVIDER NOTE - CLINICAL SUMMARY MEDICAL DECISION MAKING FREE TEXT BOX
Pt with hx of DM, cardiac stents, HTN, COPD here with sudden onset of dizziness. Pt has also been having diarrhea recently. Pt appears dry r/o orthostatic HTN, CVA, arrythmia. Plan CT, labs, and orthostatics bladder scan as pt is having difficulty urinating.

## 2020-02-21 NOTE — ED ADULT TRIAGE NOTE - CHIEF COMPLAINT QUOTE
pt presents to ED due to dizziness with lightheadedness pt was being seen at [facility] for therapy when he became lightheaded denies Chest Pain

## 2020-02-21 NOTE — ED PROVIDER NOTE - PROGRESS NOTE DETAILS
Pt states he feels better and wishes to go home. Understands risks including arrythmia, death. etc. Laura TIJERINA

## 2020-07-08 ENCOUNTER — EMERGENCY (EMERGENCY)
Facility: HOSPITAL | Age: 65
LOS: 0 days | Discharge: HOME | End: 2020-07-08
Attending: EMERGENCY MEDICINE
Payer: MEDICARE

## 2020-07-08 VITALS
SYSTOLIC BLOOD PRESSURE: 149 MMHG | DIASTOLIC BLOOD PRESSURE: 83 MMHG | RESPIRATION RATE: 18 BRPM | HEART RATE: 96 BPM | OXYGEN SATURATION: 100 %

## 2020-07-08 VITALS
SYSTOLIC BLOOD PRESSURE: 163 MMHG | OXYGEN SATURATION: 100 % | HEART RATE: 90 BPM | DIASTOLIC BLOOD PRESSURE: 86 MMHG | TEMPERATURE: 99 F | RESPIRATION RATE: 18 BRPM

## 2020-07-08 DIAGNOSIS — R53.1 WEAKNESS: ICD-10-CM

## 2020-07-08 DIAGNOSIS — Z95.5 PRESENCE OF CORONARY ANGIOPLASTY IMPLANT AND GRAFT: ICD-10-CM

## 2020-07-08 DIAGNOSIS — R50.9 FEVER, UNSPECIFIED: ICD-10-CM

## 2020-07-08 DIAGNOSIS — Z79.2 LONG TERM (CURRENT) USE OF ANTIBIOTICS: ICD-10-CM

## 2020-07-08 DIAGNOSIS — R63.0 ANOREXIA: ICD-10-CM

## 2020-07-08 DIAGNOSIS — Z95.1 PRESENCE OF AORTOCORONARY BYPASS GRAFT: Chronic | ICD-10-CM

## 2020-07-08 DIAGNOSIS — Z03.818 ENCOUNTER FOR OBSERVATION FOR SUSPECTED EXPOSURE TO OTHER BIOLOGICAL AGENTS RULED OUT: ICD-10-CM

## 2020-07-08 DIAGNOSIS — K08.89 OTHER SPECIFIED DISORDERS OF TEETH AND SUPPORTING STRUCTURES: ICD-10-CM

## 2020-07-08 DIAGNOSIS — R06.02 SHORTNESS OF BREATH: ICD-10-CM

## 2020-07-08 DIAGNOSIS — E11.9 TYPE 2 DIABETES MELLITUS WITHOUT COMPLICATIONS: ICD-10-CM

## 2020-07-08 DIAGNOSIS — J44.9 CHRONIC OBSTRUCTIVE PULMONARY DISEASE, UNSPECIFIED: ICD-10-CM

## 2020-07-08 DIAGNOSIS — R19.7 DIARRHEA, UNSPECIFIED: ICD-10-CM

## 2020-07-08 DIAGNOSIS — I10 ESSENTIAL (PRIMARY) HYPERTENSION: ICD-10-CM

## 2020-07-08 DIAGNOSIS — I25.10 ATHEROSCLEROTIC HEART DISEASE OF NATIVE CORONARY ARTERY WITHOUT ANGINA PECTORIS: ICD-10-CM

## 2020-07-08 DIAGNOSIS — Z95.9 PRESENCE OF CARDIAC AND VASCULAR IMPLANT AND GRAFT, UNSPECIFIED: Chronic | ICD-10-CM

## 2020-07-08 DIAGNOSIS — Z87.891 PERSONAL HISTORY OF NICOTINE DEPENDENCE: ICD-10-CM

## 2020-07-08 LAB
ADD ON TEST-SPECIMEN IN LAB: SIGNIFICANT CHANGE UP
ALBUMIN SERPL ELPH-MCNC: 4.2 G/DL — SIGNIFICANT CHANGE UP (ref 3.3–5)
ALP SERPL-CCNC: 63 U/L — SIGNIFICANT CHANGE UP (ref 40–120)
ALT FLD-CCNC: 45 U/L — SIGNIFICANT CHANGE UP (ref 12–78)
ANION GAP SERPL CALC-SCNC: 8 MMOL/L — SIGNIFICANT CHANGE UP (ref 5–17)
APPEARANCE UR: CLEAR — SIGNIFICANT CHANGE UP
AST SERPL-CCNC: 26 U/L — SIGNIFICANT CHANGE UP (ref 15–37)
BILIRUB SERPL-MCNC: 0.6 MG/DL — SIGNIFICANT CHANGE UP (ref 0.2–1.2)
BILIRUB UR-MCNC: NEGATIVE — SIGNIFICANT CHANGE UP
BUN SERPL-MCNC: 12 MG/DL — SIGNIFICANT CHANGE UP (ref 7–23)
CALCIUM SERPL-MCNC: 9.3 MG/DL — SIGNIFICANT CHANGE UP (ref 8.5–10.1)
CHLORIDE SERPL-SCNC: 106 MMOL/L — SIGNIFICANT CHANGE UP (ref 96–108)
CO2 SERPL-SCNC: 24 MMOL/L — SIGNIFICANT CHANGE UP (ref 22–31)
COLOR SPEC: YELLOW — SIGNIFICANT CHANGE UP
CREAT SERPL-MCNC: 0.94 MG/DL — SIGNIFICANT CHANGE UP (ref 0.5–1.3)
DIFF PNL FLD: ABNORMAL
GLUCOSE SERPL-MCNC: 184 MG/DL — HIGH (ref 70–99)
GLUCOSE UR QL: NEGATIVE MG/DL — SIGNIFICANT CHANGE UP
HCT VFR BLD CALC: 38.8 % — LOW (ref 39–50)
HGB BLD-MCNC: 12.9 G/DL — LOW (ref 13–17)
KETONES UR-MCNC: NEGATIVE — SIGNIFICANT CHANGE UP
LEUKOCYTE ESTERASE UR-ACNC: NEGATIVE — SIGNIFICANT CHANGE UP
MAGNESIUM SERPL-MCNC: 2 MG/DL — SIGNIFICANT CHANGE UP (ref 1.6–2.6)
MCHC RBC-ENTMCNC: 26.8 PG — LOW (ref 27–34)
MCHC RBC-ENTMCNC: 33.2 GM/DL — SIGNIFICANT CHANGE UP (ref 32–36)
MCV RBC AUTO: 80.7 FL — SIGNIFICANT CHANGE UP (ref 80–100)
NITRITE UR-MCNC: NEGATIVE — SIGNIFICANT CHANGE UP
NT-PROBNP SERPL-SCNC: 54 PG/ML — SIGNIFICANT CHANGE UP (ref 0–125)
PH UR: 6 — SIGNIFICANT CHANGE UP (ref 5–8)
PLATELET # BLD AUTO: 115 K/UL — LOW (ref 150–400)
POTASSIUM SERPL-MCNC: 3.5 MMOL/L — SIGNIFICANT CHANGE UP (ref 3.5–5.3)
POTASSIUM SERPL-SCNC: 3.5 MMOL/L — SIGNIFICANT CHANGE UP (ref 3.5–5.3)
PROT SERPL-MCNC: 8.1 GM/DL — SIGNIFICANT CHANGE UP (ref 6–8.3)
PROT UR-MCNC: 100 MG/DL
RBC # BLD: 4.81 M/UL — SIGNIFICANT CHANGE UP (ref 4.2–5.8)
RBC # FLD: 14.7 % — HIGH (ref 10.3–14.5)
SODIUM SERPL-SCNC: 138 MMOL/L — SIGNIFICANT CHANGE UP (ref 135–145)
SP GR SPEC: 1.01 — SIGNIFICANT CHANGE UP (ref 1.01–1.02)
TROPONIN I SERPL-MCNC: <0.015 NG/ML — SIGNIFICANT CHANGE UP (ref 0.01–0.04)
TROPONIN I SERPL-MCNC: <0.015 NG/ML — SIGNIFICANT CHANGE UP (ref 0.01–0.04)
UROBILINOGEN FLD QL: NEGATIVE MG/DL — SIGNIFICANT CHANGE UP
WBC # BLD: 6.64 K/UL — SIGNIFICANT CHANGE UP (ref 3.8–10.5)
WBC # FLD AUTO: 6.64 K/UL — SIGNIFICANT CHANGE UP (ref 3.8–10.5)

## 2020-07-08 PROCEDURE — 93005 ELECTROCARDIOGRAM TRACING: CPT

## 2020-07-08 PROCEDURE — 93010 ELECTROCARDIOGRAM REPORT: CPT

## 2020-07-08 PROCEDURE — 83880 ASSAY OF NATRIURETIC PEPTIDE: CPT

## 2020-07-08 PROCEDURE — 71045 X-RAY EXAM CHEST 1 VIEW: CPT | Mod: 26

## 2020-07-08 PROCEDURE — 84484 ASSAY OF TROPONIN QUANT: CPT | Mod: 91

## 2020-07-08 PROCEDURE — 83735 ASSAY OF MAGNESIUM: CPT

## 2020-07-08 PROCEDURE — 85027 COMPLETE CBC AUTOMATED: CPT

## 2020-07-08 PROCEDURE — 99285 EMERGENCY DEPT VISIT HI MDM: CPT

## 2020-07-08 PROCEDURE — 80053 COMPREHEN METABOLIC PANEL: CPT

## 2020-07-08 PROCEDURE — U0003: CPT

## 2020-07-08 PROCEDURE — 71045 X-RAY EXAM CHEST 1 VIEW: CPT

## 2020-07-08 PROCEDURE — 99284 EMERGENCY DEPT VISIT MOD MDM: CPT | Mod: 25

## 2020-07-08 PROCEDURE — 81001 URINALYSIS AUTO W/SCOPE: CPT

## 2020-07-08 PROCEDURE — 74177 CT ABD & PELVIS W/CONTRAST: CPT | Mod: 26

## 2020-07-08 PROCEDURE — 87040 BLOOD CULTURE FOR BACTERIA: CPT | Mod: 91

## 2020-07-08 PROCEDURE — 87086 URINE CULTURE/COLONY COUNT: CPT

## 2020-07-08 PROCEDURE — 74177 CT ABD & PELVIS W/CONTRAST: CPT

## 2020-07-08 PROCEDURE — 83605 ASSAY OF LACTIC ACID: CPT

## 2020-07-08 PROCEDURE — 36415 COLL VENOUS BLD VENIPUNCTURE: CPT

## 2020-07-08 NOTE — ED ADULT TRIAGE NOTE - CHIEF COMPLAINT QUOTE
biba to ed for complaints of SOB. PT denies chest pain and dizziness. Denies fever and chills. PT 02 sat 97% on room air. No distress.

## 2020-07-08 NOTE — ED PROVIDER NOTE - OBJECTIVE STATEMENT
66 y/o male with PMHx of CAD s/p arterial stent, CABG x3, COPD, DM, HTN presents to the ED c/o SOB. Pt endorses subjective fever the past week, feeling fatigued, decreased appetite, few episodes of watery non bloody diarrhea, dental issues on clindamycin 150 mg QID x3 days. PCP Dr. Ruggiero expressed concern for COVID 19. Pt  lives alone, but does encounter others in building laundry. Pt quit smoking 20 years ago, has not had a drink in 5 years, endorses past drug use, not current.

## 2020-07-08 NOTE — ED ADULT NURSE NOTE - OBJECTIVE STATEMENT
pt presents to Ed with complaints of multiple medical complaints. pt came to ED from [facility] ([facility]) with complaints fo SOB. upon arrival pt also endorsing recent fevers, diarrhea, SOB on exertion, pt presents to Ed with complaints of multiple medical complaints. pt came to ED from [facility] ([facility]) with complaints fo SOB. upon arrival pt also endorsing recent fevers, diarrhea, SOB on exertion, and intermittent confusion. 18g placed to left AC and 20 g placed to right hand. labs drawn. EKG performed. Tele and VS monitoring initiated.

## 2020-07-08 NOTE — ED PROVIDER NOTE - PATIENT PORTAL LINK FT
You can access the FollowMyHealth Patient Portal offered by [facility] by registering at the following website: [email or web address]. By joining [facility]’s FollowMyHealth portal, you will also be able to view your health information using other applications (apps) compatible with our system.

## 2020-07-08 NOTE — ED PROVIDER NOTE - NSFOLLOWUPINSTRUCTIONS_ED_ALL_ED_FT
ED evaluation and management discussed with the patient and family (if available) in detail.  Close PMD follow up encouraged.  Strict ED return instructions discussed in detail and patient given the opportunity to ask any questions about their discharge diagnosis and instructions. Patient verbalized understanding.     Abdominal pain improved  Follow w PMD asap

## 2020-07-09 LAB
CULTURE RESULTS: NO GROWTH — SIGNIFICANT CHANGE UP
SARS-COV-2 RNA SPEC QL NAA+PROBE: SIGNIFICANT CHANGE UP
SPECIMEN SOURCE: SIGNIFICANT CHANGE UP

## 2020-10-02 ENCOUNTER — TRANSCRIPTION ENCOUNTER (OUTPATIENT)
Age: 65
End: 2020-10-02

## 2020-10-05 ENCOUNTER — APPOINTMENT (OUTPATIENT)
Dept: CT IMAGING | Facility: CLINIC | Age: 65
End: 2020-10-05

## 2021-01-10 ENCOUNTER — TRANSCRIPTION ENCOUNTER (OUTPATIENT)
Age: 66
End: 2021-01-10

## 2021-03-29 ENCOUNTER — INPATIENT (INPATIENT)
Facility: HOSPITAL | Age: 66
LOS: 0 days | Discharge: HOME | DRG: 313 | End: 2021-03-30
Attending: INTERNAL MEDICINE | Admitting: INTERNAL MEDICINE
Payer: MEDICARE

## 2021-03-29 VITALS
TEMPERATURE: 97 F | OXYGEN SATURATION: 98 % | SYSTOLIC BLOOD PRESSURE: 118 MMHG | WEIGHT: 235.01 LBS | HEIGHT: 66 IN | DIASTOLIC BLOOD PRESSURE: 59 MMHG | HEART RATE: 65 BPM | RESPIRATION RATE: 17 BRPM

## 2021-03-29 DIAGNOSIS — Z95.9 PRESENCE OF CARDIAC AND VASCULAR IMPLANT AND GRAFT, UNSPECIFIED: Chronic | ICD-10-CM

## 2021-03-29 DIAGNOSIS — R07.9 CHEST PAIN, UNSPECIFIED: ICD-10-CM

## 2021-03-29 DIAGNOSIS — Z95.1 PRESENCE OF AORTOCORONARY BYPASS GRAFT: Chronic | ICD-10-CM

## 2021-03-29 LAB
ALBUMIN SERPL ELPH-MCNC: 3.9 G/DL — SIGNIFICANT CHANGE UP (ref 3.3–5)
ALP SERPL-CCNC: 56 U/L — SIGNIFICANT CHANGE UP (ref 40–120)
ALT FLD-CCNC: 153 U/L — HIGH (ref 12–78)
ANION GAP SERPL CALC-SCNC: 9 MMOL/L — SIGNIFICANT CHANGE UP (ref 5–17)
APPEARANCE UR: CLEAR — SIGNIFICANT CHANGE UP
APTT BLD: 28.2 SEC — SIGNIFICANT CHANGE UP (ref 27.5–35.5)
AST SERPL-CCNC: 92 U/L — HIGH (ref 15–37)
BASE EXCESS BLDV CALC-SCNC: -2.8 MMOL/L — LOW (ref -2–2)
BASOPHILS # BLD AUTO: 0.02 K/UL — SIGNIFICANT CHANGE UP (ref 0–0.2)
BASOPHILS NFR BLD AUTO: 0.4 % — SIGNIFICANT CHANGE UP (ref 0–2)
BILIRUB SERPL-MCNC: 1.1 MG/DL — SIGNIFICANT CHANGE UP (ref 0.2–1.2)
BILIRUB UR-MCNC: NEGATIVE — SIGNIFICANT CHANGE UP
BUN SERPL-MCNC: 24 MG/DL — HIGH (ref 7–23)
CALCIUM SERPL-MCNC: 9.2 MG/DL — SIGNIFICANT CHANGE UP (ref 8.5–10.1)
CHLORIDE SERPL-SCNC: 107 MMOL/L — SIGNIFICANT CHANGE UP (ref 96–108)
CO2 SERPL-SCNC: 23 MMOL/L — SIGNIFICANT CHANGE UP (ref 22–31)
COLOR SPEC: YELLOW — SIGNIFICANT CHANGE UP
CREAT SERPL-MCNC: 1.12 MG/DL — SIGNIFICANT CHANGE UP (ref 0.5–1.3)
DIFF PNL FLD: NEGATIVE — SIGNIFICANT CHANGE UP
EOSINOPHIL # BLD AUTO: 0.08 K/UL — SIGNIFICANT CHANGE UP (ref 0–0.5)
EOSINOPHIL NFR BLD AUTO: 1.4 % — SIGNIFICANT CHANGE UP (ref 0–6)
GLUCOSE SERPL-MCNC: 166 MG/DL — HIGH (ref 70–99)
GLUCOSE UR QL: 1000 MG/DL
HCO3 BLDV-SCNC: 21 MMOL/L — SIGNIFICANT CHANGE UP (ref 21–29)
HCT VFR BLD CALC: 35.3 % — LOW (ref 39–50)
HGB BLD-MCNC: 11.3 G/DL — LOW (ref 13–17)
IMM GRANULOCYTES NFR BLD AUTO: 0.4 % — SIGNIFICANT CHANGE UP (ref 0–1.5)
INR BLD: 1.15 RATIO — SIGNIFICANT CHANGE UP (ref 0.88–1.16)
KETONES UR-MCNC: ABNORMAL
LEUKOCYTE ESTERASE UR-ACNC: NEGATIVE — SIGNIFICANT CHANGE UP
LIDOCAIN IGE QN: 107 U/L — SIGNIFICANT CHANGE UP (ref 73–393)
LYMPHOCYTES # BLD AUTO: 0.84 K/UL — LOW (ref 1–3.3)
LYMPHOCYTES # BLD AUTO: 15 % — SIGNIFICANT CHANGE UP (ref 13–44)
MAGNESIUM SERPL-MCNC: 2 MG/DL — SIGNIFICANT CHANGE UP (ref 1.6–2.6)
MCHC RBC-ENTMCNC: 25.8 PG — LOW (ref 27–34)
MCHC RBC-ENTMCNC: 32 GM/DL — SIGNIFICANT CHANGE UP (ref 32–36)
MCV RBC AUTO: 80.6 FL — SIGNIFICANT CHANGE UP (ref 80–100)
MONOCYTES # BLD AUTO: 0.42 K/UL — SIGNIFICANT CHANGE UP (ref 0–0.9)
MONOCYTES NFR BLD AUTO: 7.5 % — SIGNIFICANT CHANGE UP (ref 2–14)
NEUTROPHILS # BLD AUTO: 4.21 K/UL — SIGNIFICANT CHANGE UP (ref 1.8–7.4)
NEUTROPHILS NFR BLD AUTO: 75.3 % — SIGNIFICANT CHANGE UP (ref 43–77)
NITRITE UR-MCNC: NEGATIVE — SIGNIFICANT CHANGE UP
NT-PROBNP SERPL-SCNC: 50 PG/ML — SIGNIFICANT CHANGE UP (ref 0–125)
PCO2 BLDV: 33 MMHG — LOW (ref 42–55)
PH BLDV: 7.42 — SIGNIFICANT CHANGE UP (ref 7.35–7.45)
PH UR: 5 — SIGNIFICANT CHANGE UP (ref 5–8)
PLATELET # BLD AUTO: 114 K/UL — LOW (ref 150–400)
PO2 BLDV: 133 MMHG — HIGH (ref 25–45)
POTASSIUM SERPL-MCNC: 4.3 MMOL/L — SIGNIFICANT CHANGE UP (ref 3.5–5.3)
POTASSIUM SERPL-SCNC: 4.3 MMOL/L — SIGNIFICANT CHANGE UP (ref 3.5–5.3)
PROT SERPL-MCNC: 7 GM/DL — SIGNIFICANT CHANGE UP (ref 6–8.3)
PROT UR-MCNC: 30 MG/DL
PROTHROM AB SERPL-ACNC: 13.4 SEC — SIGNIFICANT CHANGE UP (ref 10.6–13.6)
RAPID RVP RESULT: SIGNIFICANT CHANGE UP
RBC # BLD: 4.38 M/UL — SIGNIFICANT CHANGE UP (ref 4.2–5.8)
RBC # FLD: 19.6 % — HIGH (ref 10.3–14.5)
SAO2 % BLDV: 99 % — HIGH (ref 67–88)
SARS-COV-2 RNA SPEC QL NAA+PROBE: SIGNIFICANT CHANGE UP
SODIUM SERPL-SCNC: 139 MMOL/L — SIGNIFICANT CHANGE UP (ref 135–145)
SP GR SPEC: 1.02 — SIGNIFICANT CHANGE UP (ref 1.01–1.02)
TROPONIN I SERPL-MCNC: <0.015 NG/ML — SIGNIFICANT CHANGE UP (ref 0.01–0.04)
UROBILINOGEN FLD QL: NEGATIVE MG/DL — SIGNIFICANT CHANGE UP
WBC # BLD: 5.59 K/UL — SIGNIFICANT CHANGE UP (ref 3.8–10.5)
WBC # FLD AUTO: 5.59 K/UL — SIGNIFICANT CHANGE UP (ref 3.8–10.5)

## 2021-03-29 PROCEDURE — 84484 ASSAY OF TROPONIN QUANT: CPT | Mod: 91

## 2021-03-29 PROCEDURE — 99285 EMERGENCY DEPT VISIT HI MDM: CPT | Mod: CS

## 2021-03-29 PROCEDURE — 36415 COLL VENOUS BLD VENIPUNCTURE: CPT

## 2021-03-29 PROCEDURE — 83735 ASSAY OF MAGNESIUM: CPT

## 2021-03-29 PROCEDURE — 84100 ASSAY OF PHOSPHORUS: CPT

## 2021-03-29 PROCEDURE — 71045 X-RAY EXAM CHEST 1 VIEW: CPT | Mod: 26

## 2021-03-29 PROCEDURE — 99497 ADVNCD CARE PLAN 30 MIN: CPT | Mod: 25

## 2021-03-29 PROCEDURE — 76705 ECHO EXAM OF ABDOMEN: CPT

## 2021-03-29 PROCEDURE — 80048 BASIC METABOLIC PNL TOTAL CA: CPT

## 2021-03-29 PROCEDURE — 86803 HEPATITIS C AB TEST: CPT

## 2021-03-29 PROCEDURE — 85027 COMPLETE CBC AUTOMATED: CPT

## 2021-03-29 PROCEDURE — 76705 ECHO EXAM OF ABDOMEN: CPT | Mod: 26

## 2021-03-29 PROCEDURE — 99223 1ST HOSP IP/OBS HIGH 75: CPT

## 2021-03-29 PROCEDURE — 80061 LIPID PANEL: CPT

## 2021-03-29 PROCEDURE — 83036 HEMOGLOBIN GLYCOSYLATED A1C: CPT

## 2021-03-29 PROCEDURE — 82962 GLUCOSE BLOOD TEST: CPT

## 2021-03-29 PROCEDURE — 93010 ELECTROCARDIOGRAM REPORT: CPT

## 2021-03-29 PROCEDURE — 93306 TTE W/DOPPLER COMPLETE: CPT

## 2021-03-29 PROCEDURE — 0225U NFCT DS DNA&RNA 21 SARSCOV2: CPT

## 2021-03-29 PROCEDURE — 81001 URINALYSIS AUTO W/SCOPE: CPT

## 2021-03-29 PROCEDURE — 86769 SARS-COV-2 COVID-19 ANTIBODY: CPT

## 2021-03-29 RX ORDER — SODIUM CHLORIDE 9 MG/ML
1000 INJECTION, SOLUTION INTRAVENOUS
Refills: 0 | Status: DISCONTINUED | OUTPATIENT
Start: 2021-03-29 | End: 2021-03-30

## 2021-03-29 RX ORDER — ENOXAPARIN SODIUM 100 MG/ML
40 INJECTION SUBCUTANEOUS EVERY 12 HOURS
Refills: 0 | Status: DISCONTINUED | OUTPATIENT
Start: 2021-03-29 | End: 2021-03-29

## 2021-03-29 RX ORDER — FINASTERIDE 5 MG/1
5 TABLET, FILM COATED ORAL DAILY
Refills: 0 | Status: DISCONTINUED | OUTPATIENT
Start: 2021-03-29 | End: 2021-03-30

## 2021-03-29 RX ORDER — ESCITALOPRAM OXALATE 10 MG/1
20 TABLET, FILM COATED ORAL DAILY
Refills: 0 | Status: DISCONTINUED | OUTPATIENT
Start: 2021-03-29 | End: 2021-03-30

## 2021-03-29 RX ORDER — METOPROLOL TARTRATE 50 MG
1 TABLET ORAL
Qty: 0 | Refills: 0 | DISCHARGE

## 2021-03-29 RX ORDER — INSULIN LISPRO 100/ML
VIAL (ML) SUBCUTANEOUS
Refills: 0 | Status: DISCONTINUED | OUTPATIENT
Start: 2021-03-29 | End: 2021-03-30

## 2021-03-29 RX ORDER — TRAZODONE HCL 50 MG
1 TABLET ORAL
Qty: 0 | Refills: 0 | DISCHARGE

## 2021-03-29 RX ORDER — FENOFIBRATE,MICRONIZED 130 MG
1 CAPSULE ORAL
Qty: 0 | Refills: 0 | DISCHARGE

## 2021-03-29 RX ORDER — NITROGLYCERIN 6.5 MG
0.4 CAPSULE, EXTENDED RELEASE ORAL
Refills: 0 | Status: DISCONTINUED | OUTPATIENT
Start: 2021-03-29 | End: 2021-03-30

## 2021-03-29 RX ORDER — METOPROLOL TARTRATE 50 MG
50 TABLET ORAL
Refills: 0 | Status: DISCONTINUED | OUTPATIENT
Start: 2021-03-29 | End: 2021-03-30

## 2021-03-29 RX ORDER — ALBUTEROL 90 UG/1
2 AEROSOL, METERED ORAL
Qty: 0 | Refills: 0 | DISCHARGE

## 2021-03-29 RX ORDER — DULOXETINE HYDROCHLORIDE 30 MG/1
1 CAPSULE, DELAYED RELEASE ORAL
Qty: 0 | Refills: 0 | DISCHARGE

## 2021-03-29 RX ORDER — INSULIN GLARGINE 100 [IU]/ML
40 INJECTION, SOLUTION SUBCUTANEOUS AT BEDTIME
Refills: 0 | Status: DISCONTINUED | OUTPATIENT
Start: 2021-03-29 | End: 2021-03-29

## 2021-03-29 RX ORDER — GLUCAGON INJECTION, SOLUTION 0.5 MG/.1ML
1 INJECTION, SOLUTION SUBCUTANEOUS ONCE
Refills: 0 | Status: DISCONTINUED | OUTPATIENT
Start: 2021-03-29 | End: 2021-03-30

## 2021-03-29 RX ORDER — INSULIN GLARGINE 100 [IU]/ML
20 INJECTION, SOLUTION SUBCUTANEOUS
Refills: 0 | Status: DISCONTINUED | OUTPATIENT
Start: 2021-03-29 | End: 2021-03-30

## 2021-03-29 RX ORDER — APREMILAST 10-20-30MG
1 KIT ORAL
Qty: 0 | Refills: 0 | DISCHARGE

## 2021-03-29 RX ORDER — DEXTROSE 50 % IN WATER 50 %
25 SYRINGE (ML) INTRAVENOUS ONCE
Refills: 0 | Status: DISCONTINUED | OUTPATIENT
Start: 2021-03-29 | End: 2021-03-30

## 2021-03-29 RX ORDER — METHOTREXATE 2.5 MG/1
10 TABLET ORAL
Refills: 0 | Status: CANCELLED | OUTPATIENT
Start: 2021-04-05 | End: 2021-03-30

## 2021-03-29 RX ORDER — BUDESONIDE AND FORMOTEROL FUMARATE DIHYDRATE 160; 4.5 UG/1; UG/1
2 AEROSOL RESPIRATORY (INHALATION)
Qty: 0 | Refills: 0 | DISCHARGE

## 2021-03-29 RX ORDER — MONTELUKAST 4 MG/1
1 TABLET, CHEWABLE ORAL
Qty: 0 | Refills: 0 | DISCHARGE

## 2021-03-29 RX ORDER — ALFUZOSIN HYDROCHLORIDE 10 MG/1
10 TABLET, EXTENDED RELEASE ORAL AT BEDTIME
Refills: 0 | Status: DISCONTINUED | OUTPATIENT
Start: 2021-03-29 | End: 2021-03-30

## 2021-03-29 RX ORDER — ASPIRIN/CALCIUM CARB/MAGNESIUM 324 MG
325 TABLET ORAL ONCE
Refills: 0 | Status: COMPLETED | OUTPATIENT
Start: 2021-03-29 | End: 2021-03-29

## 2021-03-29 RX ORDER — ISOSORBIDE MONONITRATE 60 MG/1
30 TABLET, EXTENDED RELEASE ORAL DAILY
Refills: 0 | Status: DISCONTINUED | OUTPATIENT
Start: 2021-03-29 | End: 2021-03-30

## 2021-03-29 RX ORDER — ATORVASTATIN CALCIUM 80 MG/1
80 TABLET, FILM COATED ORAL AT BEDTIME
Refills: 0 | Status: DISCONTINUED | OUTPATIENT
Start: 2021-03-29 | End: 2021-03-30

## 2021-03-29 RX ORDER — ONDANSETRON 8 MG/1
4 TABLET, FILM COATED ORAL EVERY 6 HOURS
Refills: 0 | Status: DISCONTINUED | OUTPATIENT
Start: 2021-03-29 | End: 2021-03-30

## 2021-03-29 RX ORDER — GABAPENTIN 400 MG/1
100 CAPSULE ORAL THREE TIMES A DAY
Refills: 0 | Status: DISCONTINUED | OUTPATIENT
Start: 2021-03-29 | End: 2021-03-30

## 2021-03-29 RX ORDER — CLOPIDOGREL BISULFATE 75 MG/1
1 TABLET, FILM COATED ORAL
Qty: 0 | Refills: 0 | DISCHARGE

## 2021-03-29 RX ORDER — HEPARIN SODIUM 5000 [USP'U]/ML
5000 INJECTION INTRAVENOUS; SUBCUTANEOUS EVERY 8 HOURS
Refills: 0 | Status: DISCONTINUED | OUTPATIENT
Start: 2021-03-29 | End: 2021-03-30

## 2021-03-29 RX ORDER — DEXTROSE 50 % IN WATER 50 %
15 SYRINGE (ML) INTRAVENOUS ONCE
Refills: 0 | Status: DISCONTINUED | OUTPATIENT
Start: 2021-03-29 | End: 2021-03-30

## 2021-03-29 RX ORDER — PANTOPRAZOLE SODIUM 20 MG/1
40 TABLET, DELAYED RELEASE ORAL
Refills: 0 | Status: DISCONTINUED | OUTPATIENT
Start: 2021-03-29 | End: 2021-03-30

## 2021-03-29 RX ORDER — FLUTICASONE PROPIONATE 50 MCG
1 SPRAY, SUSPENSION NASAL
Refills: 0 | Status: DISCONTINUED | OUTPATIENT
Start: 2021-03-29 | End: 2021-03-30

## 2021-03-29 RX ORDER — ONDANSETRON 8 MG/1
4 TABLET, FILM COATED ORAL ONCE
Refills: 0 | Status: COMPLETED | OUTPATIENT
Start: 2021-03-29 | End: 2021-03-29

## 2021-03-29 RX ORDER — DEXTROSE 50 % IN WATER 50 %
12.5 SYRINGE (ML) INTRAVENOUS ONCE
Refills: 0 | Status: DISCONTINUED | OUTPATIENT
Start: 2021-03-29 | End: 2021-03-30

## 2021-03-29 RX ORDER — ASPIRIN/CALCIUM CARB/MAGNESIUM 324 MG
81 TABLET ORAL DAILY
Refills: 0 | Status: DISCONTINUED | OUTPATIENT
Start: 2021-03-30 | End: 2021-03-30

## 2021-03-29 RX ORDER — METFORMIN HYDROCHLORIDE 850 MG/1
1 TABLET ORAL
Qty: 0 | Refills: 0 | DISCHARGE

## 2021-03-29 RX ORDER — ACETAMINOPHEN 500 MG
650 TABLET ORAL EVERY 6 HOURS
Refills: 0 | Status: DISCONTINUED | OUTPATIENT
Start: 2021-03-29 | End: 2021-03-30

## 2021-03-29 RX ORDER — ATORVASTATIN CALCIUM 80 MG/1
1 TABLET, FILM COATED ORAL
Qty: 0 | Refills: 0 | DISCHARGE

## 2021-03-29 RX ORDER — SODIUM CHLORIDE 9 MG/ML
1000 INJECTION INTRAMUSCULAR; INTRAVENOUS; SUBCUTANEOUS ONCE
Refills: 0 | Status: COMPLETED | OUTPATIENT
Start: 2021-03-29 | End: 2021-03-29

## 2021-03-29 RX ADMIN — ONDANSETRON 4 MILLIGRAM(S): 8 TABLET, FILM COATED ORAL at 13:06

## 2021-03-29 RX ADMIN — SODIUM CHLORIDE 1000 MILLILITER(S): 9 INJECTION INTRAMUSCULAR; INTRAVENOUS; SUBCUTANEOUS at 13:06

## 2021-03-29 NOTE — ED PROVIDER NOTE - NS ED ROS FT
Review of Systems:  	•	CONSTITUTIONAL: no fever  	•	SKIN: no rash  	•	RESPIRATORY: no shortness of breath  	•	CARDIAC: +chest pain, +lightheadedness  	•	GI:  no abd pain, no vomiting, no diarrhea. +nausea  	•	GENITO-URINARY:  no dysuria  	•	MUSCULOSKELETAL:  no back pain  	•	NEUROLOGIC: no weakness  	•	ALLERGY: no rhinorrhea  	•	PSYSCHIATRIC: appropriate concern about symptoms

## 2021-03-29 NOTE — H&P ADULT - NSICDXPASTMEDICALHX_GEN_ALL_CORE_FT
PAST MEDICAL HISTORY:  COPD (chronic obstructive pulmonary disease)     Coronary artery disease involving coronary bypass graft of native heart with angina pectoris     DM (diabetes mellitus)     HTN (hypertension)

## 2021-03-29 NOTE — ED PROVIDER NOTE - ATTENDING CONTRIBUTION TO CARE
I, Manjit Vora MD,  performed the initial face to face bedside interview with this patient regarding history of present illness, review of symptoms and relevant past medical, social and family history.  I completed an independent physical examination.  I was the initial provider who evaluated this patient. I have signed out the follow up of any pending tests (i.e. labs, radiological studies) to the ACP.  The ACP will complete the work up, interpret tests, administer medications if necessary and reassess the patient for an appropriate disposition.  If questions arise the ACP is expected to contact me for consultation. In the current work-flow I usually don't get to speak to nor reassess patients for final disposition once I sign the case out to the ACP (Unless otherwise specifically documented by me).

## 2021-03-29 NOTE — ED PROVIDER NOTE - OBJECTIVE STATEMENT
Pertinent HPI/PMH/PSH/FHx/SHx and Review of Systems contained within  HPI:  67 y/o male p/w dull CP starting PTA, lasting 30 mins. Pt reports he was at his doctor's office this morning, became lightheaded and felt nauseous. Pt received COVID vaccine on 3/26. PCP: Dr. Ruggiero. Cardio: Dr. Lozoya.   PMH/PSH relevant for: COPD, CAD s/p CABG x3, DM, HTN  ROS negative for: fever, SOB, vomiting, diarrhea, abdominal pain, dysuria    SocialHx: Former smoker

## 2021-03-29 NOTE — H&P ADULT - NEGATIVE NEUROLOGICAL SYMPTOMS
no transient paralysis/no weakness/no paresthesias/no syncope/no vertigo/no loss of sensation/no difficulty walking/no headache/no loss of consciousness/no hemiparesis/no confusion/no facial palsy

## 2021-03-29 NOTE — H&P ADULT - NSICDXFAMILYHX_GEN_ALL_CORE_FT
FAMILY HISTORY:  Patient's father is , at 58 from colon cancer  Patient's mother is , at 83 from copd, dm

## 2021-03-29 NOTE — ED PROVIDER NOTE - PHYSICAL EXAMINATION
*GEN: No acute distress, well appearing   *HEAD: Normocephalic, Atraumatic  *EYES/NOSE: b/l Pupils symmetric & Reactive to ligth, EOMI b/l  *THROAT: airway patent, moist mucous membranes  *NECK: Neck supple  *PULMONARY: No Respiratory distress, symmetric b/l chest rise  *CARDIAC: s1s2, regular rhythm   *ABDOMEN:  Non Tender, Non Distended, soft, no guarding, no rebound, no masses   *BACK: no CVA tenderness, No midline vertebral tenderness to palpation   *EXTREMITIES: symmetric pulses, 2+ DP & radial pulses, no cyanosis, no edema   *SKIN: no rash, no bruising   *NEUROLOGIC: alert,  full active & passive ROM in all 4 extremities, dizziness induced when going from laying to sitting forward  *PSYCH: appropriate concern about symptoms, pleasant

## 2021-03-29 NOTE — ED PROVIDER NOTE - CLINICAL SUMMARY MEDICAL DECISION MAKING FREE TEXT BOX
Pt with near syncope and borderline hypotensive. Pt also had transient episode of CP. Given risk factors, will check labs, hydrate and likely admit.

## 2021-03-29 NOTE — ED PROVIDER NOTE - PROGRESS NOTE DETAILS
65 y/o M presents with near syncope; pt reports nausea, and  this morning. he went to his doctor and felt very lightheaded like he was going to pass out. Pt reports transient episode of dull Cp moderate in intensity. Had covid vaccine on friday. No other complaints at this time. -Stanley Birch PA-C

## 2021-03-29 NOTE — ED ADULT NURSE NOTE - NSIMPLEMENTINTERV_GEN_ALL_ED
Implemented All Fall with Harm Risk Interventions:  [place] to call system. Call bell, personal items and telephone within reach. Instruct patient to call for assistance. Room bathroom lighting operational. Non-slip footwear when patient is off stretcher. Physically safe environment: no spills, clutter or unnecessary equipment. Stretcher in lowest position, wheels locked, appropriate side rails in place. Provide visual cue, wrist band, yellow gown, etc. Monitor gait and stability. Monitor for mental status changes and reorient to person, place, and time. Review medications for side effects contributing to fall risk. Reinforce activity limits and safety measures with patient and family. Provide visual clues: red socks.

## 2021-03-29 NOTE — H&P ADULT - ASSESSMENT
65 yo male with a pmh/o HTN, COPD, HLD, DMII insulin dependent, CAD s/p 3V CABG and PCI who presents to ED secondary to sensation of lightheadedness associated with chest pressure, admitted due to:    Chest pain  -admit to telemetry  -EKG prn chest pain  -NTG prn chest pain  -ASA, BB, statin cont.  -pt with renal insuff. ACE held  -serial troponin  -TTE ordered   -HbA1c  -lipid panel  -cardiology consulted  -fall precautions  -K>4, Mg>2  -f/u AM cbc, bmp, mg, phos, coag  -heparin sq+intermittent pneumatic compression for DVT prophylaxis    Transaminitis  -no prior as per EMR-> RUQ US to r/o acute pathology  -hepatitis panel  -trend on serum chemistry  -likely with aspect of hepatic steatosis    Renal Insufficiency  -ACE not started  -s/p hydration in ED  -hold nephrotoxic meds  -renally adjust meds  -i/o's per routine    Anemia  -slightly lower than baseline  -monitor h/h, no s/s bleeding currently  -f/u as outpt with PMD for iron studies    DMII with Hyperglycemia  -d/w pharmacy to convert novolin 70/30 45 u bid -> ademelog 13 with meals (AISS to cover)+ Lantus 40 qhs (split 20 AM, 20 PM as takes novolin pen bid)  -HbA1c ordered  -Carb restriction  -holding oral medications, on multiple  -STAT accucheck    HTN/HLD/CAD  -cont lipitor  -cont imdur  -cont metoprolol    Anxiety  -cont buspar  -cont lexapro  -cont gabapentin    BPH  -cont alfuzosin  -cont proscar    COPD  -not on home O2  -albuterol prn  -pulse ox q 8 hrs

## 2021-03-29 NOTE — H&P ADULT - NEGATIVE OPHTHALMOLOGIC SYMPTOMS
no diplopia/no photophobia/no blurred vision L/no blurred vision R/no loss of vision L/no loss of vision R

## 2021-03-29 NOTE — H&P ADULT - CONVERSATION DETAILS
advanced care planning discussed with patient who states he is a full code and would not want cpr or intubation but rather to allow natural death. MOLST form filled out and witnessed by ED MD and ED RN and placed in chart.

## 2021-03-29 NOTE — H&P ADULT - HISTORY OF PRESENT ILLNESS
Pt is a 67 yo male with a pmh/o HTN, COPD, HLD, DMII insulin dependent, CAD s/p 3V CABG and PCI who presents to ED secondary to sensation of lightheadedness associated with chest pressure. Pt states earlier today, while at nutritionist, he stood up and felt lightheaded. Pt states sensation lasted throughout day so called EMS. Once EMS arrived began to feel chest pressure which is centralized, non radiating, not quantifiable on scale, no a/a factors, constant, associated with nausea and palpitations.

## 2021-03-29 NOTE — ED ADULT NURSE NOTE - OBJECTIVE STATEMENT
Pt reports that he was at his md office this am. Reports feeling nauseous prior to going to MD and reports feeling lightheaded with mild c/o chest pressure at MD office.  Pt denies any f/c/c.  Denies any SOB.  MD aware of borderline hypotension with SBP in the high 90's and low 100's.  IVF as ordered.

## 2021-03-30 ENCOUNTER — TRANSCRIPTION ENCOUNTER (OUTPATIENT)
Age: 66
End: 2021-03-30

## 2021-03-30 VITALS
DIASTOLIC BLOOD PRESSURE: 71 MMHG | HEART RATE: 71 BPM | SYSTOLIC BLOOD PRESSURE: 130 MMHG | RESPIRATION RATE: 19 BRPM | OXYGEN SATURATION: 98 %

## 2021-03-30 LAB
A1C WITH ESTIMATED AVERAGE GLUCOSE RESULT: 7.6 % — HIGH (ref 4–5.6)
A1C WITH ESTIMATED AVERAGE GLUCOSE RESULT: 7.7 % — HIGH (ref 4–5.6)
ADD ON TEST-SPECIMEN IN LAB: SIGNIFICANT CHANGE UP
ANION GAP SERPL CALC-SCNC: 3 MMOL/L — LOW (ref 5–17)
BUN SERPL-MCNC: 23 MG/DL — SIGNIFICANT CHANGE UP (ref 7–23)
CALCIUM SERPL-MCNC: 9.1 MG/DL — SIGNIFICANT CHANGE UP (ref 8.5–10.1)
CHLORIDE SERPL-SCNC: 110 MMOL/L — HIGH (ref 96–108)
CHOLEST SERPL-MCNC: 85 MG/DL — SIGNIFICANT CHANGE UP
CO2 SERPL-SCNC: 26 MMOL/L — SIGNIFICANT CHANGE UP (ref 22–31)
COVID-19 SPIKE DOMAIN AB INTERP: NEGATIVE — SIGNIFICANT CHANGE UP
COVID-19 SPIKE DOMAIN ANTIBODY RESULT: 0.4 U/ML — SIGNIFICANT CHANGE UP
CREAT SERPL-MCNC: 0.94 MG/DL — SIGNIFICANT CHANGE UP (ref 0.5–1.3)
ESTIMATED AVERAGE GLUCOSE: 171 MG/DL — HIGH (ref 68–114)
ESTIMATED AVERAGE GLUCOSE: 174 MG/DL — HIGH (ref 68–114)
GLUCOSE SERPL-MCNC: 103 MG/DL — HIGH (ref 70–99)
HCT VFR BLD CALC: 34.6 % — LOW (ref 39–50)
HCV AB S/CO SERPL IA: 0.08 S/CO — SIGNIFICANT CHANGE UP (ref 0–0.99)
HCV AB SERPL-IMP: SIGNIFICANT CHANGE UP
HDLC SERPL-MCNC: 29 MG/DL — LOW
HGB BLD-MCNC: 11.3 G/DL — LOW (ref 13–17)
LIPID PNL WITH DIRECT LDL SERPL: 25 MG/DL — SIGNIFICANT CHANGE UP
MAGNESIUM SERPL-MCNC: 2.2 MG/DL — SIGNIFICANT CHANGE UP (ref 1.6–2.6)
MCHC RBC-ENTMCNC: 26.1 PG — LOW (ref 27–34)
MCHC RBC-ENTMCNC: 32.7 GM/DL — SIGNIFICANT CHANGE UP (ref 32–36)
MCV RBC AUTO: 79.9 FL — LOW (ref 80–100)
NON HDL CHOLESTEROL: 56 MG/DL — SIGNIFICANT CHANGE UP
PHOSPHATE SERPL-MCNC: 3.6 MG/DL — SIGNIFICANT CHANGE UP (ref 2.5–4.5)
PLATELET # BLD AUTO: 115 K/UL — LOW (ref 150–400)
POTASSIUM SERPL-MCNC: 4.4 MMOL/L — SIGNIFICANT CHANGE UP (ref 3.5–5.3)
POTASSIUM SERPL-SCNC: 4.4 MMOL/L — SIGNIFICANT CHANGE UP (ref 3.5–5.3)
RBC # BLD: 4.33 M/UL — SIGNIFICANT CHANGE UP (ref 4.2–5.8)
RBC # FLD: 19.3 % — HIGH (ref 10.3–14.5)
SARS-COV-2 IGG+IGM SERPL QL IA: 0.4 U/ML — SIGNIFICANT CHANGE UP
SARS-COV-2 IGG+IGM SERPL QL IA: NEGATIVE — SIGNIFICANT CHANGE UP
SODIUM SERPL-SCNC: 139 MMOL/L — SIGNIFICANT CHANGE UP (ref 135–145)
TRIGL SERPL-MCNC: 156 MG/DL — HIGH
WBC # BLD: 4.58 K/UL — SIGNIFICANT CHANGE UP (ref 3.8–10.5)
WBC # FLD AUTO: 4.58 K/UL — SIGNIFICANT CHANGE UP (ref 3.8–10.5)

## 2021-03-30 PROCEDURE — 93306 TTE W/DOPPLER COMPLETE: CPT | Mod: 26

## 2021-03-30 PROCEDURE — 99239 HOSP IP/OBS DSCHRG MGMT >30: CPT

## 2021-03-30 RX ORDER — ACETAMINOPHEN 500 MG
2 TABLET ORAL
Qty: 0 | Refills: 0 | DISCHARGE
Start: 2021-03-30

## 2021-03-30 RX ORDER — MELOXICAM 15 MG/1
1 TABLET ORAL
Qty: 0 | Refills: 0 | DISCHARGE

## 2021-03-30 RX ORDER — ASPIRIN 325 MG
1 TABLET ORAL
Qty: 30 | Refills: 0 | DISCHARGE
Start: 2021-03-30 | End: 2021-04-28

## 2021-03-30 RX ORDER — ASPIRIN/CALCIUM CARB/MAGNESIUM 324 MG
1 TABLET ORAL
Qty: 30 | Refills: 0
Start: 2021-03-30 | End: 2021-04-28

## 2021-03-30 RX ADMIN — Medication 50 MILLIGRAM(S): at 09:30

## 2021-03-30 RX ADMIN — ATORVASTATIN CALCIUM 80 MILLIGRAM(S): 80 TABLET, FILM COATED ORAL at 01:06

## 2021-03-30 RX ADMIN — FINASTERIDE 5 MILLIGRAM(S): 5 TABLET, FILM COATED ORAL at 09:31

## 2021-03-30 RX ADMIN — Medication 5 MILLIGRAM(S): at 09:30

## 2021-03-30 RX ADMIN — Medication 50 MILLIGRAM(S): at 00:21

## 2021-03-30 RX ADMIN — Medication 325 MILLIGRAM(S): at 00:20

## 2021-03-30 RX ADMIN — Medication 81 MILLIGRAM(S): at 09:30

## 2021-03-30 RX ADMIN — ISOSORBIDE MONONITRATE 30 MILLIGRAM(S): 60 TABLET, EXTENDED RELEASE ORAL at 09:30

## 2021-03-30 RX ADMIN — HEPARIN SODIUM 5000 UNIT(S): 5000 INJECTION INTRAVENOUS; SUBCUTANEOUS at 00:23

## 2021-03-30 RX ADMIN — PANTOPRAZOLE SODIUM 40 MILLIGRAM(S): 20 TABLET, DELAYED RELEASE ORAL at 05:50

## 2021-03-30 RX ADMIN — ESCITALOPRAM OXALATE 20 MILLIGRAM(S): 10 TABLET, FILM COATED ORAL at 09:31

## 2021-03-30 NOTE — DISCHARGE NOTE PROVIDER - CARE PROVIDER_API CALL
Pooja Howard (DO; ANT)  Cardiology  [address] [place] [address]  Phone: [phone]  Fax: [phone]  Established Patient  Follow Up Time: 1 week

## 2021-03-30 NOTE — ED ADULT NURSE REASSESSMENT NOTE - NS ED NURSE REASSESS COMMENT FT1
report received from Sabina LORA, pt a+ox4, vss, calm and cooperative, denies any pain at this time but still c/o slight chest pressure. no worse than when he arrived.  pt tolerating medications well, NSR on tele, callbell and belongings within reach.

## 2021-03-30 NOTE — DISCHARGE NOTE NURSING/CASE MANAGEMENT/SOCIAL WORK - PATIENT PORTAL LINK FT
You can access the FollowMyHealth Patient Portal offered by [facility] by registering at the following website: [email or web address]. By joining [facility]’s FollowMyHealth portal, you will also be able to view your health information using other applications (apps) compatible with our system.

## 2021-03-30 NOTE — DISCHARGE NOTE PROVIDER - HOSPITAL COURSE
65 yo male with a pmh/o HTN, COPD, HLD, DMII insulin dependent, CAD s/p 3V CABG and PCI who presents to ED secondary to sensation of lightheadedness associated with chest pressure. Pt states earlier today, while at nutritionist, he stood up and felt lightheaded. Pt states sensation lasted throughout day so called EMS. Once EMS arrived began to feel chest pressure which is centralized, non radiating, not quantifiable on scale, no a/a factors, constant, associated with nausea and palpitations.      65 yo male with a pmh/o HTN, COPD, HLD, DMII insulin dependent, CAD s/p 3V CABG and PCI who presents to ED secondary to sensation of lightheadedness associated with chest pressure, admitted due to:    #Acute Chest pain  -admitted  to telemetry no tele events   -EKG inverted T waves anterior leads   -NTG prn chest pain  -ASA, BB, statin cont.  -pt with renal insuff. ACE held  -serial troponin  -TTE ordered   -HbA1c, -lipid panel  -Cardiology consulted  -fall precautions  -K>4, Mg>2  -f/u AM cbc, bmp, mg, phos, coag  Cardiology consulted   summary, this is a 66-year-old male with past medical history of stable CAD, status post CABG, admitted for near syncope, and vague chest discomfort.  He is ruled out for ACS.  His echocardiagram  showed preserved LVEF.  Recommend continuation of antiplatelet therapy and statin.  Recommend outpatient stress test and Holter.  Continue metoprolol.  From CV standpoint.  -heparin sq+intermittent pneumatic compression for DVT prophylaxis      #Transaminitis  -no prior as per EMR-> RUQ US to r/o acute pathology  -hepatitis panel  -trend on serum chemistry  -likely with aspect of hepatic steatosis  US abdomen IMPRESSION:  Fatty liver  Follow up with PCP as outpatient     Acute Renal Insufficiency  -ACE not started  -s/p hydration in ED  -hold nephrotoxic meds  -renally adjust meds  -i/o's per routine    Anemia  -slightly lower than baseline  -monitor h/h, no s/s bleeding currently  -f/u as outpt with PMD for iron studies    DMII with Hyperglycemia  -d/w pharmacy to convert novolin 70/30 45 u bid -> ademelog 13 with meals (AISS to cover)+ Lantus 40 qhs (split 20 AM, 20 PM as takes novolin pen bid)  -HbA1c ordered 7.6   -Carb restriction  -holding oral medications, on multiple  -STAT accucheck    HTN/HLD/CAD  -cont lipitor  -cont imdur  -cont metoprolol    Anxiety  -cont buspar  -cont lexapro  -cont gabapentin    BPH  -cont alfuzosin  -cont proscar    COPD  -not on home O2  -albuterol prn  -pulse ox q 8 hrs    Patient improved, no chest pain when walking was optimized for discharge home .  Follow up with Cardiology within 1 weeks for outpatient stress test

## 2021-03-30 NOTE — DISCHARGE NOTE PROVIDER - NSDCCPCAREPLAN_GEN_ALL_CORE_FT
PRINCIPAL DISCHARGE DIAGNOSIS  Diagnosis: Chest pain  Assessment and Plan of Treatment: Follow up with cardiology LUZMARIA Howard DO, [facility]  [phone]  Within 1 week for outpatient stress test      SECONDARY DISCHARGE DIAGNOSES  Diagnosis: Near syncope  Assessment and Plan of Treatment:     Diagnosis: DM (diabetes mellitus)  Assessment and Plan of Treatment:     Diagnosis: HTN (hypertension)  Assessment and Plan of Treatment:     Diagnosis: Elevated liver function tests  Assessment and Plan of Treatment:     Diagnosis: CAD (coronary artery disease)  Assessment and Plan of Treatment:

## 2021-03-30 NOTE — DISCHARGE NOTE PROVIDER - NSDCMRMEDTOKEN_GEN_ALL_CORE_FT
acetaminophen 325 mg oral tablet: 2 tab(s) orally every 6 hours, As needed, Temp greater or equal to 38C (100.4F), Mild Pain (1 - 3)  alfuzosin 10 mg oral tablet, extended release: 1 tab(s) orally once a day  aspirin 81 mg oral delayed release tablet: 1 tab(s) orally once a day  atorvastatin 80 mg oral tablet: 1 tab(s) orally once a day  busPIRone 5 mg oral tablet: 1 tab(s) orally 2 times a day with breakfast and lunch  COVID-19 Vaccine: Moderna 3/26/21  escitalopram 20 mg oral tablet: 1 tab(s) orally once a day  finasteride 5 mg oral tablet: 1 tab(s) orally once a day  ***pt unable to verify if he&#x27;s taking***  fluticasone 50 mcg/inh nasal spray: 1 spray(s) in each nostril once a day, As Needed  gabapentin 100 mg oral capsule: 1 cap(s) orally 3 times a day, As Needed - for anxiety  isosorbide mononitrate 30 mg oral tablet, extended release: 1 tab(s) orally once a day (in the morning)  ***pt unsure if he still takes- refilled 3/9***  Jardiance 25 mg oral tablet: 1 tab(s) orally once a day (in the morning)  metFORMIN 500 mg oral tablet, extended release: 2 tab(s) orally once a day with breakfast  methotrexate 2.5 mg oral tablet: 4 tab(s) orally once a week on Monday  metoprolol tartrate 50 mg oral tablet: 1 tab(s) orally 2 times a day  NovoLIN 70/30 subcutaneous suspension: 45 unit(s) subcutaneous 2 times a day  Protonix 40 mg oral delayed release tablet: 1 tab(s) orally once a day  Trulicity Pen 0.75 mg/0.5 mL subcutaneous solution: 0.75 milligram(s) subcutaneously once a week on Monday

## 2021-03-30 NOTE — CONSULT NOTE ADULT - SUBJECTIVE AND OBJECTIVE BOX
Patient is a 66y old  Male who presents with a chief complaint of chest pain (29 Mar 2021 20:09)    ________________________________  MANUEL MILLER is a 66y year old Male with a past medical history of coronary disease status post CABG, hypertension, hyperlipidemia, diabetes, COPD, HLD, seen at  chest discomfort, lightheadedness and near syncope when he got up with associated nausea but no diaphoresis.  No true syncope.  No chest discomfort or palpitations.     Echocardiogram preserved LVEF performed earlier today.  No arrhythmias on telemetry.  Rule out for ACS.      EKG showed nonspecific changes from prior.  Echo performed today showed no significant wall motion abnormalities.  Cardiac enzymes negative.  No arrhythmias on telemetry.      Orthostatic vital signs negative.      ________________________________  Review of systems: A 10 point review of system has been performed, and is negative except for what has been mentioned in the above history of present illness.     PAST MEDICAL & SURGICAL HISTORY:  HTN (hypertension)    DM (diabetes mellitus)    COPD (chronic obstructive pulmonary disease)    Coronary artery disease involving coronary bypass graft of native heart with angina pectoris    S/P CABG x 3    S/P arterial stent    FAMILY HISTORY:  Mother is  at 83 from copd, dm    Father is  at 58 from colon cancer    SOCIAL HISTORY: The patient denies any history of tobacco abuse, alcohol abuse or illicit drug use.    ALLERGIES:  penicillin (Unknown)    Home Medications:  alfuzosin 10 mg oral tablet, extended release: 1 tab(s) orally once a day (29 Mar 2021 18:27)  atorvastatin 80 mg oral tablet: 1 tab(s) orally once a day (29 Mar 2021 18:27)  busPIRone 5 mg oral tablet: 1 tab(s) orally 2 times a day with breakfast and lunch (29 Mar 2021 18:27)  COVID-19 Vaccine: Moderna 3/26/21 (29 Mar 2021 18:27)  escitalopram 20 mg oral tablet: 1 tab(s) orally once a day (29 Mar 2021 18:27)  finasteride 5 mg oral tablet: 1 tab(s) orally once a day  ***pt unable to verify if he&#x27;s taking*** (29 Mar 2021 18:27)  fluticasone 50 mcg/inh nasal spray: 1 spray(s) in each nostril once a day, As Needed (29 Mar 2021 18:27)  gabapentin 100 mg oral capsule: 1 cap(s) orally 3 times a day, As Needed - for anxiety (29 Mar 2021 18:27)  isosorbide mononitrate 30 mg oral tablet, extended release: 1 tab(s) orally once a day (in the morning)  ***pt unsure if he still takes- refilled 3/9*** (29 Mar 2021 18:27)  Jardiance 25 mg oral tablet: 1 tab(s) orally once a day (in the morning) (29 Mar 2021 18:27)  meloxicam 15 mg oral tablet: 1 tab(s) orally once a day, As Needed (29 Mar 2021 18:27)  metFORMIN 500 mg oral tablet, extended release: 2 tab(s) orally once a day with breakfast (29 Mar 2021 18:27)  methotrexate 2.5 mg oral tablet: 4 tab(s) orally once a week on Monday (29 Mar 2021 18:27)  metoprolol tartrate 50 mg oral tablet: 1 tab(s) orally 2 times a day (29 Mar 2021 18:27)  NovoLIN 70/30 subcutaneous suspension: 45 unit(s) subcutaneous 2 times a day (29 Mar 2021 18:27)  Protonix 40 mg oral delayed release tablet: 1 tab(s) orally once a day (29 Mar 2021 18:27)  Trulicity Pen 0.75 mg/0.5 mL subcutaneous solution: 0.75 milligram(s) subcutaneously once a week on Monday (29 Mar 2021 18:27)    MEDICATIONS  (STANDING):  alfuzosin 10 milliGRAM(s) Oral at bedtime  aspirin enteric coated 81 milliGRAM(s) Oral daily  atorvastatin 80 milliGRAM(s) Oral at bedtime  busPIRone 5 milliGRAM(s) Oral two times a day  dextrose 40% Gel 15 Gram(s) Oral once  dextrose 5%. 1000 milliLiter(s) (50 mL/Hr) IV Continuous <Continuous>  dextrose 5%. 1000 milliLiter(s) (100 mL/Hr) IV Continuous <Continuous>  dextrose 50% Injectable 25 Gram(s) IV Push once  dextrose 50% Injectable 12.5 Gram(s) IV Push once  dextrose 50% Injectable 25 Gram(s) IV Push once  escitalopram 20 milliGRAM(s) Oral daily  finasteride 5 milliGRAM(s) Oral daily  glucagon  Injectable 1 milliGRAM(s) IntraMuscular once  heparin   Injectable 5000 Unit(s) SubCutaneous every 8 hours  insulin glargine Injectable (LANTUS) 20 Unit(s) SubCutaneous two times a day  insulin lispro (ADMELOG) corrective regimen sliding scale   SubCutaneous three times a day before meals  isosorbide   mononitrate ER Tablet (IMDUR) 30 milliGRAM(s) Oral daily  metoprolol tartrate 50 milliGRAM(s) Oral two times a day  pantoprazole    Tablet 40 milliGRAM(s) Oral before breakfast    MEDICATIONS  (PRN):  acetaminophen   Tablet .. 650 milliGRAM(s) Oral every 6 hours PRN Temp greater or equal to 38C (100.4F), Mild Pain (1 - 3)  fluticasone propionate 50 MICROgram(s)/spray Nasal Spray 1 Spray(s) Both Nostrils two times a day PRN congestion  gabapentin 100 milliGRAM(s) Oral three times a day PRN for anxiety  nitroglycerin     SubLingual 0.4 milliGRAM(s) SubLingual every 5 minutes PRN Chest Pain  ondansetron Injectable 4 milliGRAM(s) IV Push every 6 hours PRN Nausea    Vital Signs Last 24 Hrs  T(C): 36.2 (30 Mar 2021 09:17), Max: 36.6 (29 Mar 2021 23:25)  T(F): 97.1 (30 Mar 2021 09:17), Max: 97.9 (29 Mar 2021 23:25)  HR: 64 (30 Mar 2021 10:00) (60 - 69)  BP: 129/72 (30 Mar 2021 10:00) (98/51 - 154/78)  BP(mean): 86 (30 Mar 2021 10:00) (62 - 95)  RR: 15 (30 Mar 2021 10:00) (15 - 23)  SpO2: 98% (30 Mar 2021 10:00) (96% - 100%)  I&O's Summary    ________________________________  GENERAL APPEARANCE:  No acute distress  HEAD: normocephalic, atraumatic  NECK: supple, no jugular venous distention, no carotid bruit    HEART: Regular rate and rhythm, S1, S2 normal, 2/6 regurgitant murmur    CHEST:  No anterior chest wall tenderness    LUNGS:  Clear to auscultation, without any wheezing, rhonchi or rales    ABDOMEN: soft, nontender, nondistended, with positive bowel sounds appreciated  EXTREMITIES: no clubbing, cyanosis, or edema.   NEURO: Alert and oriented x3  PSYC:  Normal affect  SKIN:  Dry  ________________________________   TELEMETRY:    ECG:    LABS:                        11.3   4.58  )-----------( 115      ( 30 Mar 2021 06:58 )             34.6             -30    139  |  110<H>  |  23  ----------------------------<  103<H>  4.4   |  26  |  0.94    Ca    9.1      30 Mar 2021 06:58  Phos  3.6       Mg     2.2     30    TPro  7.0  /  Alb  3.9  /  TBili  1.1  /  DBili  x   /  AST  92<H>  /  ALT  153<H>  /  AlkPhos  56        LIVER FUNCTIONS - ( 29 Mar 2021 12:38 )  Alb: 3.9 g/dL / Pro: 7.0 gm/dL / ALK PHOS: 56 U/L / ALT: 153 U/L / AST: 92 U/L / GGT: x         PT/INR - ( 29 Mar 2021 12:38 )   PT: 13.4 sec;   INR: 1.15 ratio         PTT - ( 29 Mar 2021 12:38 )  PTT:28.2 sec     @ 19:18  Trop-I  <0.015  CK      --  CK-MB   --     @ 15:25  Trop-I  <0.015  CK      --  CK-MB   --     @ 12:38  Trop-I  <0.015  CK      --  CK-MB   --  Urinalysis Basic - ( 29 Mar 2021 21:13 )    Color: Yellow / Appearance: Clear / S.025 / pH: x  Gluc: x / Ketone: Trace  / Bili: Negative / Urobili: Negative mg/dL   Blood: x / Protein: 30 mg/dL / Nitrite: Negative   Leuk Esterase: Negative / RBC: 3-5 /HPF / WBC 0-2   Sq Epi: x / Non Sq Epi: Occasional / Bacteria: Occasional      Pro BNP  50  @ 12:38  D Dimer  --  @ 12:38    PT/INR - ( 29 Mar 2021 12:38 )   PT: 13.4 sec;   INR: 1.15 ratio         PTT - ( 29 Mar 2021 12:38 )  PTT:28.2 sec  Urinalysis Basic - ( 29 Mar 2021 21:13 )    Color: Yellow / Appearance: Clear / S.025 / pH: x  Gluc: x / Ketone: Trace  / Bili: Negative / Urobili: Negative mg/dL   Blood: x / Protein: 30 mg/dL / Nitrite: Negative   Leuk Esterase: Negative / RBC: 3-5 /HPF / WBC 0-2   Sq Epi: x / Non Sq Epi: Occasional / Bacteria: Occasional        J. HRICKO  CARDIAC MARKERS ( 29 Mar 2021 19:18 )  <0.015 ng/mL / x     / x     / x     / x      CARDIAC MARKERS ( 29 Mar 2021 15:25 )  <0.015 ng/mL / x     / x     / x     / x      CARDIAC MARKERS ( 29 Mar 2021 12:38 )  <0.015 ng/mL / x     / x     / x     / x          ________________________________    RADIOLOGY & ADDITIONAL STUDIES: Chest x-ray clear  ________________________________    ASSESSMENT:  Chest discomfort and near syncope  Coronary disease status post CABG  Hypertension  Hyperlipidemia  Type 2 diabetes  Psoriasis    PLAN:  In summary, this is a 66-year-old male with past medical history of stable CAD, status post CABG, admitted for near syncope, and vague chest discomfort.    He is ruled out for ACS.  His echocardiac showed preserved LVEF.  Recommend continuation of antiplatelet therapy and statin.  Recommend outpatient stress test and Holter.  Continue metoprolol.    From CV standpoint.      __________________________________________________________________________  Thank you for allowing me to participate in the care of your patient. Please contact me should any questions arise.    LUZMARIA Howard DO, [facility]  [phone]

## 2021-04-12 DIAGNOSIS — Z80.0 FAMILY HISTORY OF MALIGNANT NEOPLASM OF DIGESTIVE ORGANS: ICD-10-CM

## 2021-04-12 DIAGNOSIS — D64.9 ANEMIA, UNSPECIFIED: ICD-10-CM

## 2021-04-12 DIAGNOSIS — Z66 DO NOT RESUSCITATE: ICD-10-CM

## 2021-04-12 DIAGNOSIS — Z79.899 OTHER LONG TERM (CURRENT) DRUG THERAPY: ICD-10-CM

## 2021-04-12 DIAGNOSIS — L40.9 PSORIASIS, UNSPECIFIED: ICD-10-CM

## 2021-04-12 DIAGNOSIS — Z79.02 LONG TERM (CURRENT) USE OF ANTITHROMBOTICS/ANTIPLATELETS: ICD-10-CM

## 2021-04-12 DIAGNOSIS — N40.0 BENIGN PROSTATIC HYPERPLASIA WITHOUT LOWER URINARY TRACT SYMPTOMS: ICD-10-CM

## 2021-04-12 DIAGNOSIS — Z95.1 PRESENCE OF AORTOCORONARY BYPASS GRAFT: ICD-10-CM

## 2021-04-12 DIAGNOSIS — E78.5 HYPERLIPIDEMIA, UNSPECIFIED: ICD-10-CM

## 2021-04-12 DIAGNOSIS — N28.9 DISORDER OF KIDNEY AND URETER, UNSPECIFIED: ICD-10-CM

## 2021-04-12 DIAGNOSIS — I10 ESSENTIAL (PRIMARY) HYPERTENSION: ICD-10-CM

## 2021-04-12 DIAGNOSIS — Z88.0 ALLERGY STATUS TO PENICILLIN: ICD-10-CM

## 2021-04-12 DIAGNOSIS — I25.10 ATHEROSCLEROTIC HEART DISEASE OF NATIVE CORONARY ARTERY WITHOUT ANGINA PECTORIS: ICD-10-CM

## 2021-04-12 DIAGNOSIS — J44.9 CHRONIC OBSTRUCTIVE PULMONARY DISEASE, UNSPECIFIED: ICD-10-CM

## 2021-04-12 DIAGNOSIS — F41.9 ANXIETY DISORDER, UNSPECIFIED: ICD-10-CM

## 2021-04-12 DIAGNOSIS — R07.89 OTHER CHEST PAIN: ICD-10-CM

## 2021-04-12 DIAGNOSIS — E11.65 TYPE 2 DIABETES MELLITUS WITH HYPERGLYCEMIA: ICD-10-CM

## 2021-04-12 DIAGNOSIS — Z83.6 FAMILY HISTORY OF OTHER DISEASES OF THE RESPIRATORY SYSTEM: ICD-10-CM

## 2021-05-04 NOTE — ED PROVIDER NOTE - CPE EDP NEURO NORM
Based on the Pediatric Bleeding Risk Assessment Questionnaire that is utilized (formulated from the PBQ), no increased risk for bleeding is identified at this time.
normal...

## 2021-05-22 NOTE — ED PROVIDER NOTE - CPE EDP NEURO NORM
Clinically might be hypothyroid.  Check TSH.  Continue same dose of methimazole for now.  
Alendronate was refilled as in orders.  
Blood pressure is not adequately controlled.  Increase hydrochlorothiazide to 25 mg a day.  Continue all other medications.  
Knee pain is tolerable with over-the-counter analgesics.  
Last EGFR was 55.  Recheck CMP today.  
Lipid control is acceptable.  Continue atorvastatin.  Recheck LDL today.  
No new social or medical needs identified today.  Check labs as in orders.  
Obesity is going in the wrong direction, reinforced diet and exercise  
Stable and asymptomatic, continue to monitor.  
Stable and asymptomatic, will continue to monitor.  
Stable diameter over 5 years.  We will recheck ultrasound of the aorta in another year.  
Still symptomatic.  Continue oxybutynin.  
normal...

## 2021-06-19 ENCOUNTER — TRANSCRIPTION ENCOUNTER (OUTPATIENT)
Age: 66
End: 2021-06-19

## 2021-06-29 ENCOUNTER — EMERGENCY (EMERGENCY)
Facility: HOSPITAL | Age: 66
LOS: 0 days | Discharge: HOME | End: 2021-06-29
Attending: EMERGENCY MEDICINE
Payer: MEDICARE

## 2021-06-29 VITALS
DIASTOLIC BLOOD PRESSURE: 78 MMHG | SYSTOLIC BLOOD PRESSURE: 150 MMHG | RESPIRATION RATE: 18 BRPM | OXYGEN SATURATION: 97 % | HEIGHT: 66 IN | TEMPERATURE: 98 F | WEIGHT: 220.02 LBS | HEART RATE: 80 BPM

## 2021-06-29 VITALS
TEMPERATURE: 99 F | SYSTOLIC BLOOD PRESSURE: 151 MMHG | OXYGEN SATURATION: 99 % | HEART RATE: 81 BPM | RESPIRATION RATE: 17 BRPM | DIASTOLIC BLOOD PRESSURE: 71 MMHG

## 2021-06-29 DIAGNOSIS — Z95.1 PRESENCE OF AORTOCORONARY BYPASS GRAFT: Chronic | ICD-10-CM

## 2021-06-29 DIAGNOSIS — R42 DIZZINESS AND GIDDINESS: ICD-10-CM

## 2021-06-29 DIAGNOSIS — E11.9 TYPE 2 DIABETES MELLITUS WITHOUT COMPLICATIONS: ICD-10-CM

## 2021-06-29 DIAGNOSIS — R07.2 PRECORDIAL PAIN: ICD-10-CM

## 2021-06-29 DIAGNOSIS — Z95.5 PRESENCE OF CORONARY ANGIOPLASTY IMPLANT AND GRAFT: ICD-10-CM

## 2021-06-29 DIAGNOSIS — I25.10 ATHEROSCLEROTIC HEART DISEASE OF NATIVE CORONARY ARTERY WITHOUT ANGINA PECTORIS: ICD-10-CM

## 2021-06-29 DIAGNOSIS — Z79.82 LONG TERM (CURRENT) USE OF ASPIRIN: ICD-10-CM

## 2021-06-29 DIAGNOSIS — Z79.84 LONG TERM (CURRENT) USE OF ORAL HYPOGLYCEMIC DRUGS: ICD-10-CM

## 2021-06-29 DIAGNOSIS — E78.5 HYPERLIPIDEMIA, UNSPECIFIED: ICD-10-CM

## 2021-06-29 DIAGNOSIS — R11.0 NAUSEA: ICD-10-CM

## 2021-06-29 DIAGNOSIS — Z79.02 LONG TERM (CURRENT) USE OF ANTITHROMBOTICS/ANTIPLATELETS: ICD-10-CM

## 2021-06-29 DIAGNOSIS — J44.9 CHRONIC OBSTRUCTIVE PULMONARY DISEASE, UNSPECIFIED: ICD-10-CM

## 2021-06-29 DIAGNOSIS — Z79.899 OTHER LONG TERM (CURRENT) DRUG THERAPY: ICD-10-CM

## 2021-06-29 DIAGNOSIS — Z95.9 PRESENCE OF CARDIAC AND VASCULAR IMPLANT AND GRAFT, UNSPECIFIED: Chronic | ICD-10-CM

## 2021-06-29 DIAGNOSIS — Z88.0 ALLERGY STATUS TO PENICILLIN: ICD-10-CM

## 2021-06-29 DIAGNOSIS — R55 SYNCOPE AND COLLAPSE: ICD-10-CM

## 2021-06-29 DIAGNOSIS — Z95.1 PRESENCE OF AORTOCORONARY BYPASS GRAFT: ICD-10-CM

## 2021-06-29 DIAGNOSIS — I10 ESSENTIAL (PRIMARY) HYPERTENSION: ICD-10-CM

## 2021-06-29 DIAGNOSIS — Z20.822 CONTACT WITH AND (SUSPECTED) EXPOSURE TO COVID-19: ICD-10-CM

## 2021-06-29 DIAGNOSIS — R06.02 SHORTNESS OF BREATH: ICD-10-CM

## 2021-06-29 LAB
ALBUMIN SERPL ELPH-MCNC: 4.1 G/DL — SIGNIFICANT CHANGE UP (ref 3.3–5)
ALP SERPL-CCNC: 67 U/L — SIGNIFICANT CHANGE UP (ref 40–120)
ALT FLD-CCNC: 66 U/L — SIGNIFICANT CHANGE UP (ref 12–78)
ANION GAP SERPL CALC-SCNC: 9 MMOL/L — SIGNIFICANT CHANGE UP (ref 5–17)
AST SERPL-CCNC: 36 U/L — SIGNIFICANT CHANGE UP (ref 15–37)
BASOPHILS # BLD AUTO: 0.04 K/UL — SIGNIFICANT CHANGE UP (ref 0–0.2)
BASOPHILS NFR BLD AUTO: 0.4 % — SIGNIFICANT CHANGE UP (ref 0–2)
BILIRUB SERPL-MCNC: 0.7 MG/DL — SIGNIFICANT CHANGE UP (ref 0.2–1.2)
BUN SERPL-MCNC: 15 MG/DL — SIGNIFICANT CHANGE UP (ref 7–23)
CALCIUM SERPL-MCNC: 9.4 MG/DL — SIGNIFICANT CHANGE UP (ref 8.5–10.1)
CHLORIDE SERPL-SCNC: 105 MMOL/L — SIGNIFICANT CHANGE UP (ref 96–108)
CO2 SERPL-SCNC: 24 MMOL/L — SIGNIFICANT CHANGE UP (ref 22–31)
CREAT SERPL-MCNC: 1.16 MG/DL — SIGNIFICANT CHANGE UP (ref 0.5–1.3)
EOSINOPHIL # BLD AUTO: 0.09 K/UL — SIGNIFICANT CHANGE UP (ref 0–0.5)
EOSINOPHIL NFR BLD AUTO: 0.8 % — SIGNIFICANT CHANGE UP (ref 0–6)
GLUCOSE SERPL-MCNC: 199 MG/DL — HIGH (ref 70–99)
HCT VFR BLD CALC: 41.9 % — SIGNIFICANT CHANGE UP (ref 39–50)
HGB BLD-MCNC: 13.4 G/DL — SIGNIFICANT CHANGE UP (ref 13–17)
IMM GRANULOCYTES NFR BLD AUTO: 0.6 % — SIGNIFICANT CHANGE UP (ref 0–1.5)
LYMPHOCYTES # BLD AUTO: 1.58 K/UL — SIGNIFICANT CHANGE UP (ref 1–3.3)
LYMPHOCYTES # BLD AUTO: 14.8 % — SIGNIFICANT CHANGE UP (ref 13–44)
MCHC RBC-ENTMCNC: 25.2 PG — LOW (ref 27–34)
MCHC RBC-ENTMCNC: 32 GM/DL — SIGNIFICANT CHANGE UP (ref 32–36)
MCV RBC AUTO: 78.9 FL — LOW (ref 80–100)
MONOCYTES # BLD AUTO: 1.24 K/UL — HIGH (ref 0–0.9)
MONOCYTES NFR BLD AUTO: 11.6 % — SIGNIFICANT CHANGE UP (ref 2–14)
NEUTROPHILS # BLD AUTO: 7.66 K/UL — HIGH (ref 1.8–7.4)
NEUTROPHILS NFR BLD AUTO: 71.8 % — SIGNIFICANT CHANGE UP (ref 43–77)
PLATELET # BLD AUTO: 161 K/UL — SIGNIFICANT CHANGE UP (ref 150–400)
POTASSIUM SERPL-MCNC: 4.1 MMOL/L — SIGNIFICANT CHANGE UP (ref 3.5–5.3)
POTASSIUM SERPL-SCNC: 4.1 MMOL/L — SIGNIFICANT CHANGE UP (ref 3.5–5.3)
PROT SERPL-MCNC: 7.7 GM/DL — SIGNIFICANT CHANGE UP (ref 6–8.3)
RBC # BLD: 5.31 M/UL — SIGNIFICANT CHANGE UP (ref 4.2–5.8)
RBC # FLD: 15.1 % — HIGH (ref 10.3–14.5)
SARS-COV-2 RNA SPEC QL NAA+PROBE: SIGNIFICANT CHANGE UP
SODIUM SERPL-SCNC: 138 MMOL/L — SIGNIFICANT CHANGE UP (ref 135–145)
TROPONIN I SERPL-MCNC: <0.015 NG/ML — SIGNIFICANT CHANGE UP (ref 0.01–0.04)
TROPONIN I SERPL-MCNC: <0.015 NG/ML — SIGNIFICANT CHANGE UP (ref 0.01–0.04)
WBC # BLD: 10.67 K/UL — HIGH (ref 3.8–10.5)
WBC # FLD AUTO: 10.67 K/UL — HIGH (ref 3.8–10.5)

## 2021-06-29 PROCEDURE — 84484 ASSAY OF TROPONIN QUANT: CPT | Mod: 59

## 2021-06-29 PROCEDURE — 93005 ELECTROCARDIOGRAM TRACING: CPT

## 2021-06-29 PROCEDURE — 87635 SARS-COV-2 COVID-19 AMP PRB: CPT

## 2021-06-29 PROCEDURE — 71045 X-RAY EXAM CHEST 1 VIEW: CPT | Mod: 26

## 2021-06-29 PROCEDURE — 85025 COMPLETE CBC W/AUTO DIFF WBC: CPT

## 2021-06-29 PROCEDURE — 93010 ELECTROCARDIOGRAM REPORT: CPT

## 2021-06-29 PROCEDURE — 96360 HYDRATION IV INFUSION INIT: CPT

## 2021-06-29 PROCEDURE — 80053 COMPREHEN METABOLIC PANEL: CPT

## 2021-06-29 PROCEDURE — 99284 EMERGENCY DEPT VISIT MOD MDM: CPT | Mod: 25

## 2021-06-29 PROCEDURE — 71045 X-RAY EXAM CHEST 1 VIEW: CPT

## 2021-06-29 PROCEDURE — 36415 COLL VENOUS BLD VENIPUNCTURE: CPT

## 2021-06-29 PROCEDURE — 99285 EMERGENCY DEPT VISIT HI MDM: CPT | Mod: CS

## 2021-06-29 RX ORDER — SODIUM CHLORIDE 9 MG/ML
1000 INJECTION INTRAMUSCULAR; INTRAVENOUS; SUBCUTANEOUS ONCE
Refills: 0 | Status: COMPLETED | OUTPATIENT
Start: 2021-06-29 | End: 2021-06-29

## 2021-06-29 RX ORDER — ASPIRIN/CALCIUM CARB/MAGNESIUM 324 MG
325 TABLET ORAL ONCE
Refills: 0 | Status: COMPLETED | OUTPATIENT
Start: 2021-06-29 | End: 2021-06-29

## 2021-06-29 RX ADMIN — SODIUM CHLORIDE 1000 MILLILITER(S): 9 INJECTION INTRAMUSCULAR; INTRAVENOUS; SUBCUTANEOUS at 16:45

## 2021-06-29 RX ADMIN — SODIUM CHLORIDE 1000 MILLILITER(S): 9 INJECTION INTRAMUSCULAR; INTRAVENOUS; SUBCUTANEOUS at 15:45

## 2021-06-29 RX ADMIN — Medication 325 MILLIGRAM(S): at 15:45

## 2021-06-29 NOTE — ED PROVIDER NOTE - PMH
COPD (chronic obstructive pulmonary disease)    Coronary artery disease involving coronary bypass graft of native heart with angina pectoris    DM (diabetes mellitus)    HLD (hyperlipidemia)    HTN (hypertension)

## 2021-06-29 NOTE — ED PROVIDER NOTE - CLINICAL SUMMARY MEDICAL DECISION MAKING FREE TEXT BOX
Dehydration on exam.  EKG nonischemic, similar to prior.  Troponin x 2 negative. CXR clear.  Discussed with Dr. Lozoya.  Recommend patient stay for overnight observation, cardiology evaluation.  Pt declines, would like to go home at this time.  Asymptomatic on reevaluation.  D/c home f/u with cardiology.

## 2021-06-29 NOTE — ED PROVIDER NOTE - PATIENT PORTAL LINK FT
You can access the FollowMyHealth Patient Portal offered by [facility] by registering at the following website: [email or web address]. By joining [facility]’s FollowMyHealth portal, you will also be able to view your health information using other applications (apps) compatible with our system.

## 2021-06-29 NOTE — ED STATDOCS - PROGRESS NOTE DETAILS
Say Pavon for attending Dr. Mendez: 67 y/o male with PMHx of CAD s/p 3 stents, s/p CABG x3, DM, COPD, HTN presents to ED for midsternal chest pressure. Reports nausea, feeling near syncope throughout the day today. Will send pt to main ED for further evaluation.

## 2021-06-29 NOTE — ED ADULT TRIAGE NOTE - CHIEF COMPLAINT QUOTE
pt presents to ED c/o chest pressure, states hx of 3 stents. states he was working outside now with dizziness

## 2021-06-29 NOTE — ED PROVIDER NOTE - OBJECTIVE STATEMENT
67 y/o male with a PMHx of CAD s/p stents and CABG, COPD, HTN, HLD, DM presents to the ED c/o dizziness described as lightheadedness x3 days. Associated with nausea, generalized weakness and SOB. + substernal chest pressure since today, constant since onset, nonradiating. 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 Denies fever, chills, vomiting, cough, abd pain, leg swelling. Denies recent travel.

## 2021-08-23 PROBLEM — E78.5 HYPERLIPIDEMIA, UNSPECIFIED: Chronic | Status: ACTIVE | Noted: 2021-06-29

## 2021-09-05 ENCOUNTER — APPOINTMENT (OUTPATIENT)
Dept: DISASTER EMERGENCY | Facility: CLINIC | Age: 66
End: 2021-09-05

## 2021-09-05 DIAGNOSIS — Z01.818 ENCOUNTER FOR OTHER PREPROCEDURAL EXAMINATION: ICD-10-CM

## 2021-09-06 LAB — SARS-COV-2 N GENE NPH QL NAA+PROBE: NOT DETECTED

## 2021-09-08 ENCOUNTER — OUTPATIENT (OUTPATIENT)
Dept: OUTPATIENT SERVICES | Facility: HOSPITAL | Age: 66
LOS: 1 days | Discharge: HOME | End: 2021-09-08
Payer: MEDICARE

## 2021-09-08 VITALS
DIASTOLIC BLOOD PRESSURE: 79 MMHG | HEIGHT: 66 IN | RESPIRATION RATE: 9 BRPM | SYSTOLIC BLOOD PRESSURE: 136 MMHG | OXYGEN SATURATION: 96 % | TEMPERATURE: 97 F | HEART RATE: 68 BPM | WEIGHT: 220.02 LBS

## 2021-09-08 DIAGNOSIS — Z80.0 FAMILY HISTORY OF MALIGNANT NEOPLASM OF DIGESTIVE ORGANS: ICD-10-CM

## 2021-09-08 DIAGNOSIS — Z95.9 PRESENCE OF CARDIAC AND VASCULAR IMPLANT AND GRAFT, UNSPECIFIED: Chronic | ICD-10-CM

## 2021-09-08 DIAGNOSIS — Z95.1 PRESENCE OF AORTOCORONARY BYPASS GRAFT: Chronic | ICD-10-CM

## 2021-09-08 PROCEDURE — 82962 GLUCOSE BLOOD TEST: CPT

## 2021-09-08 RX ORDER — GABAPENTIN 400 MG/1
1 CAPSULE ORAL
Qty: 0 | Refills: 0 | DISCHARGE

## 2021-09-08 NOTE — ASU PATIENT PROFILE, ADULT - NSICDXPASTMEDICALHX_GEN_ALL_CORE_FT
PAST MEDICAL HISTORY:  COPD (chronic obstructive pulmonary disease)     Coronary artery disease involving coronary bypass graft of native heart with angina pectoris     DM (diabetes mellitus)     HLD (hyperlipidemia)     HTN (hypertension)

## 2021-09-13 DIAGNOSIS — Z80.0 FAMILY HISTORY OF MALIGNANT NEOPLASM OF DIGESTIVE ORGANS: ICD-10-CM

## 2021-09-13 DIAGNOSIS — K21.9 GASTRO-ESOPHAGEAL REFLUX DISEASE WITHOUT ESOPHAGITIS: ICD-10-CM

## 2021-09-13 DIAGNOSIS — E78.00 PURE HYPERCHOLESTEROLEMIA, UNSPECIFIED: ICD-10-CM

## 2021-09-13 DIAGNOSIS — I10 ESSENTIAL (PRIMARY) HYPERTENSION: ICD-10-CM

## 2021-09-13 DIAGNOSIS — Z79.82 LONG TERM (CURRENT) USE OF ASPIRIN: ICD-10-CM

## 2021-09-13 DIAGNOSIS — Z12.11 ENCOUNTER FOR SCREENING FOR MALIGNANT NEOPLASM OF COLON: ICD-10-CM

## 2021-09-13 DIAGNOSIS — Z88.0 ALLERGY STATUS TO PENICILLIN: ICD-10-CM

## 2021-09-13 DIAGNOSIS — N40.0 BENIGN PROSTATIC HYPERPLASIA WITHOUT LOWER URINARY TRACT SYMPTOMS: ICD-10-CM

## 2021-09-13 DIAGNOSIS — M19.90 UNSPECIFIED OSTEOARTHRITIS, UNSPECIFIED SITE: ICD-10-CM

## 2021-09-13 DIAGNOSIS — F32.9 MAJOR DEPRESSIVE DISORDER, SINGLE EPISODE, UNSPECIFIED: ICD-10-CM

## 2021-09-13 DIAGNOSIS — I25.10 ATHEROSCLEROTIC HEART DISEASE OF NATIVE CORONARY ARTERY WITHOUT ANGINA PECTORIS: ICD-10-CM

## 2021-09-13 DIAGNOSIS — F41.9 ANXIETY DISORDER, UNSPECIFIED: ICD-10-CM

## 2021-09-13 DIAGNOSIS — E11.40 TYPE 2 DIABETES MELLITUS WITH DIABETIC NEUROPATHY, UNSPECIFIED: ICD-10-CM

## 2021-09-13 DIAGNOSIS — Z95.5 PRESENCE OF CORONARY ANGIOPLASTY IMPLANT AND GRAFT: ICD-10-CM

## 2021-09-13 DIAGNOSIS — Z87.891 PERSONAL HISTORY OF NICOTINE DEPENDENCE: ICD-10-CM

## 2021-09-13 DIAGNOSIS — Z95.1 PRESENCE OF AORTOCORONARY BYPASS GRAFT: ICD-10-CM

## 2021-09-13 DIAGNOSIS — J44.9 CHRONIC OBSTRUCTIVE PULMONARY DISEASE, UNSPECIFIED: ICD-10-CM

## 2021-09-13 DIAGNOSIS — K57.30 DIVERTICULOSIS OF LARGE INTESTINE WITHOUT PERFORATION OR ABSCESS WITHOUT BLEEDING: ICD-10-CM

## 2021-11-18 NOTE — ED ADULT NURSE NOTE - NSFALLRSKASSESSDT_ED_ALL_ED
[] [address]  [address]  [phone]  F: [phone] [] [address]  [address]   [address]  P: [phone]  F: [phone] [x] [address]  Therapy  [address]  P: [phone]  F: [phone] [] [address]  P: [phone]  F: [phone] [] [address]  [facility]   [address]   [place]: [phone]  F: [phone]      Physical Therapy Daily Treatment Note    Date:  2021  Patient Name:  Ramos Renteria    :  1961  MRN: [id number]  Physician: Huang Salvador MD                             Insurance: [facility] 60 visits per calendar year combined with SLP/PT no copay, no auth required.   Medical Diagnosis: Postmastectomy Lymphedema of left arm       Rehab Codes: I89.0, I97.2  Onset Date: 21   Next Dr. Canales Running: ?  Visit# / total visits:      Cancels/No Shows: 0    Subjective:  Pt reports she has been doing well, fullness in L UE but goes away easier and hand is less swollen. Compliant w/ HEP daily. Pain:  [] Yes  [x] No Location: L UE  Pain Rating: (0-10 scale) 0/10  Pain altered Tx:  [x] No  [] Yes  Action:  Comments:     Objective:  Modalities:   Precautions: L BrCa, L mastectomy 23 LN removed , Chemo 1-2012 then radiation. Tamoxifen 5 yrs, anestrozol for 4 yrs-jt pain, no longer taking  Manual: Utilized PORi breast protocol to LEFT upper quarter for gentle manual lymphatic drainage, myofascial release and joint mobility to facilitate better function ROM and dynamics of lymph system.   Exercises:  Exercise Reps/ Time Weight/ Level Comments             Diaphragmatic breathing 5 cycles       Elbow winging supine 1m  HEP     Wand flexion  10x  HEP     Bye, bye ex 3x   Flex, abd          post shld rolls, shrugs  10x       Scapular retraction  10x 5 sec      wall slide  10x HEP flex    pec stretch at door  3x30   varied arm position          SL scap Abd, ER 10x     OH flex at wall 10x     Wall angels 10x           UE tband:  *            Other: L shoulder AROM:  160° pulling in axilla before RX,164°  ULTT:L -       Treatment Charges: Mins Units   []  Modalities     []  Ther Exercise 10 1   [x]  Manual Therapy 45 3   []  Ther Activities     []  Aquatics     []  Vasocompression     []  Other     Total Treatment time 55        Assessment: [x] Progressing toward goals. Cont'd w/ PORI breast protocol to L upper quarter. Pt maintaining ROM well. Cont's w/ mod scar tissue restriction laterally>medially and fullness throughout L UE to hand but lessoning. Focused on manual to chest wall this date. Rev HEP and completed per log, added OH flex and wall angels this date. Emphasized HEP daily in comfortable ROM as well as postural awareness. Will cont bi-weekly for manual and ex progression as jaydon. [] No change. [] Other:  [x] Patient would continue to benefit from skilled physical therapy services in order to: address the following goals    STG: (to be met in 12 treatments)  1. ? Pain: Stabilize shoulder pain and ensure optimal management of pain during all ADL's (home, occupation, community and recreation)  2. Optimize AROM of bilateral shoulders for functional activity to improve QOL. 3. Increase strength in bilateral shoulders and scapula to grossly 5/5 and L  by 3+ lbs for good postural stability and functional tasks  4. Independent with Home Exercise Programs  5. Education on signs and symptoms, precautions regarding lymphedema. LTG: (to be met in 18 treatments)  1. Pt to report improved sleep  2. Decr BFI score by 1 for improved jaydon to activity and overall increased function  3.    Continue activity to decrease risk factors associated with increased time being sedentary while       undergoing chemotherapy, radiation, surgery. 4.   Improve tissue mobility of chest wall and axilla to allow for more normal motion, function and             lymphatic mobility and drainage  5. Control/mitigate side effects/late effects of Cancer treatment      Pt. Education:  [x] Yes  [] No  [x] Reviewed Prior HEP/Ed  Method of Education: [x] Verbal  [x] Demo  [] Written  Comprehension of Education:  [x] Verbalizes understanding. [] Demonstrates understanding. [] Needs review. [] Demonstrates/verbalizes HEP/Ed previously given. Plan: [x] Continue current frequency toward long and short term goals.     [] Specific Instructions for subsequent treatments:       Time In: 1205 pm            Time Out: 1300 pm    Electronically signed by:  Delmis Dumont, PT , NEELAM 29-Mar-2021 16:05

## 2021-12-18 ENCOUNTER — EMERGENCY (EMERGENCY)
Facility: HOSPITAL | Age: 66
LOS: 0 days | Discharge: HOME | End: 2021-12-18
Attending: FAMILY MEDICINE
Payer: MEDICARE

## 2021-12-18 VITALS
TEMPERATURE: 95 F | RESPIRATION RATE: 20 BRPM | OXYGEN SATURATION: 98 % | HEART RATE: 67 BPM | DIASTOLIC BLOOD PRESSURE: 76 MMHG | SYSTOLIC BLOOD PRESSURE: 108 MMHG

## 2021-12-18 VITALS — WEIGHT: 220.02 LBS | HEIGHT: 66 IN

## 2021-12-18 DIAGNOSIS — Z79.4 LONG TERM (CURRENT) USE OF INSULIN: ICD-10-CM

## 2021-12-18 DIAGNOSIS — Z79.84 LONG TERM (CURRENT) USE OF ORAL HYPOGLYCEMIC DRUGS: ICD-10-CM

## 2021-12-18 DIAGNOSIS — Z95.9 PRESENCE OF CARDIAC AND VASCULAR IMPLANT AND GRAFT, UNSPECIFIED: Chronic | ICD-10-CM

## 2021-12-18 DIAGNOSIS — Z95.5 PRESENCE OF CORONARY ANGIOPLASTY IMPLANT AND GRAFT: ICD-10-CM

## 2021-12-18 DIAGNOSIS — E11.9 TYPE 2 DIABETES MELLITUS WITHOUT COMPLICATIONS: ICD-10-CM

## 2021-12-18 DIAGNOSIS — I10 ESSENTIAL (PRIMARY) HYPERTENSION: ICD-10-CM

## 2021-12-18 DIAGNOSIS — Z95.1 PRESENCE OF AORTOCORONARY BYPASS GRAFT: ICD-10-CM

## 2021-12-18 DIAGNOSIS — Z95.1 PRESENCE OF AORTOCORONARY BYPASS GRAFT: Chronic | ICD-10-CM

## 2021-12-18 DIAGNOSIS — R42 DIZZINESS AND GIDDINESS: ICD-10-CM

## 2021-12-18 DIAGNOSIS — E78.5 HYPERLIPIDEMIA, UNSPECIFIED: ICD-10-CM

## 2021-12-18 DIAGNOSIS — R53.83 OTHER FATIGUE: ICD-10-CM

## 2021-12-18 DIAGNOSIS — Z88.0 ALLERGY STATUS TO PENICILLIN: ICD-10-CM

## 2021-12-18 DIAGNOSIS — R53.1 WEAKNESS: ICD-10-CM

## 2021-12-18 DIAGNOSIS — I25.10 ATHEROSCLEROTIC HEART DISEASE OF NATIVE CORONARY ARTERY WITHOUT ANGINA PECTORIS: ICD-10-CM

## 2021-12-18 LAB
ALBUMIN SERPL ELPH-MCNC: 3.5 G/DL — SIGNIFICANT CHANGE UP (ref 3.3–5)
ALP SERPL-CCNC: 67 U/L — SIGNIFICANT CHANGE UP (ref 40–120)
ALT FLD-CCNC: 42 U/L — SIGNIFICANT CHANGE UP (ref 12–78)
ANION GAP SERPL CALC-SCNC: 4 MMOL/L — LOW (ref 5–17)
APPEARANCE UR: CLEAR — SIGNIFICANT CHANGE UP
APTT BLD: 28.8 SEC — SIGNIFICANT CHANGE UP (ref 27.5–35.5)
AST SERPL-CCNC: 29 U/L — SIGNIFICANT CHANGE UP (ref 15–37)
BASOPHILS # BLD AUTO: 0.02 K/UL — SIGNIFICANT CHANGE UP (ref 0–0.2)
BASOPHILS NFR BLD AUTO: 0.3 % — SIGNIFICANT CHANGE UP (ref 0–2)
BILIRUB SERPL-MCNC: 0.8 MG/DL — SIGNIFICANT CHANGE UP (ref 0.2–1.2)
BILIRUB UR-MCNC: NEGATIVE — SIGNIFICANT CHANGE UP
BUN SERPL-MCNC: 19 MG/DL — SIGNIFICANT CHANGE UP (ref 7–23)
CALCIUM SERPL-MCNC: 8.6 MG/DL — SIGNIFICANT CHANGE UP (ref 8.5–10.1)
CHLORIDE SERPL-SCNC: 111 MMOL/L — HIGH (ref 96–108)
CO2 SERPL-SCNC: 26 MMOL/L — SIGNIFICANT CHANGE UP (ref 22–31)
COLOR SPEC: YELLOW — SIGNIFICANT CHANGE UP
CREAT SERPL-MCNC: 0.96 MG/DL — SIGNIFICANT CHANGE UP (ref 0.5–1.3)
DIFF PNL FLD: NEGATIVE — SIGNIFICANT CHANGE UP
EOSINOPHIL # BLD AUTO: 0.13 K/UL — SIGNIFICANT CHANGE UP (ref 0–0.5)
EOSINOPHIL NFR BLD AUTO: 1.7 % — SIGNIFICANT CHANGE UP (ref 0–6)
GLUCOSE SERPL-MCNC: 136 MG/DL — HIGH (ref 70–99)
GLUCOSE UR QL: 1000 MG/DL
HCT VFR BLD CALC: 37.8 % — LOW (ref 39–50)
HGB BLD-MCNC: 12.8 G/DL — LOW (ref 13–17)
IMM GRANULOCYTES NFR BLD AUTO: 0.4 % — SIGNIFICANT CHANGE UP (ref 0–1.5)
INR BLD: 1.1 RATIO — SIGNIFICANT CHANGE UP (ref 0.88–1.16)
KETONES UR-MCNC: NEGATIVE — SIGNIFICANT CHANGE UP
LEUKOCYTE ESTERASE UR-ACNC: NEGATIVE — SIGNIFICANT CHANGE UP
LYMPHOCYTES # BLD AUTO: 1.84 K/UL — SIGNIFICANT CHANGE UP (ref 1–3.3)
LYMPHOCYTES # BLD AUTO: 24.1 % — SIGNIFICANT CHANGE UP (ref 13–44)
MCHC RBC-ENTMCNC: 28.8 PG — SIGNIFICANT CHANGE UP (ref 27–34)
MCHC RBC-ENTMCNC: 33.9 GM/DL — SIGNIFICANT CHANGE UP (ref 32–36)
MCV RBC AUTO: 84.9 FL — SIGNIFICANT CHANGE UP (ref 80–100)
MONOCYTES # BLD AUTO: 0.78 K/UL — SIGNIFICANT CHANGE UP (ref 0–0.9)
MONOCYTES NFR BLD AUTO: 10.2 % — SIGNIFICANT CHANGE UP (ref 2–14)
NEUTROPHILS # BLD AUTO: 4.82 K/UL — SIGNIFICANT CHANGE UP (ref 1.8–7.4)
NEUTROPHILS NFR BLD AUTO: 63.3 % — SIGNIFICANT CHANGE UP (ref 43–77)
NITRITE UR-MCNC: NEGATIVE — SIGNIFICANT CHANGE UP
NT-PROBNP SERPL-SCNC: 47 PG/ML — SIGNIFICANT CHANGE UP (ref 0–125)
PH UR: 6 — SIGNIFICANT CHANGE UP (ref 5–8)
PLATELET # BLD AUTO: 137 K/UL — LOW (ref 150–400)
POTASSIUM SERPL-MCNC: 4.2 MMOL/L — SIGNIFICANT CHANGE UP (ref 3.5–5.3)
POTASSIUM SERPL-SCNC: 4.2 MMOL/L — SIGNIFICANT CHANGE UP (ref 3.5–5.3)
PROT SERPL-MCNC: 7.2 GM/DL — SIGNIFICANT CHANGE UP (ref 6–8.3)
PROT UR-MCNC: 30 MG/DL
PROTHROM AB SERPL-ACNC: 12.7 SEC — SIGNIFICANT CHANGE UP (ref 10.6–13.6)
RAPID RVP RESULT: SIGNIFICANT CHANGE UP
RBC # BLD: 4.45 M/UL — SIGNIFICANT CHANGE UP (ref 4.2–5.8)
RBC # FLD: 16.3 % — HIGH (ref 10.3–14.5)
SARS-COV-2 RNA SPEC QL NAA+PROBE: SIGNIFICANT CHANGE UP
SODIUM SERPL-SCNC: 141 MMOL/L — SIGNIFICANT CHANGE UP (ref 135–145)
SP GR SPEC: 1.01 — SIGNIFICANT CHANGE UP (ref 1.01–1.02)
TROPONIN I, HIGH SENSITIVITY RESULT: 9.61 NG/L — SIGNIFICANT CHANGE UP
UROBILINOGEN FLD QL: NEGATIVE MG/DL — SIGNIFICANT CHANGE UP
WBC # BLD: 7.62 K/UL — SIGNIFICANT CHANGE UP (ref 3.8–10.5)
WBC # FLD AUTO: 7.62 K/UL — SIGNIFICANT CHANGE UP (ref 3.8–10.5)

## 2021-12-18 PROCEDURE — 0225U NFCT DS DNA&RNA 21 SARSCOV2: CPT

## 2021-12-18 PROCEDURE — 99284 EMERGENCY DEPT VISIT MOD MDM: CPT | Mod: 25

## 2021-12-18 PROCEDURE — 36415 COLL VENOUS BLD VENIPUNCTURE: CPT

## 2021-12-18 PROCEDURE — 80053 COMPREHEN METABOLIC PANEL: CPT

## 2021-12-18 PROCEDURE — 99285 EMERGENCY DEPT VISIT HI MDM: CPT

## 2021-12-18 PROCEDURE — 93010 ELECTROCARDIOGRAM REPORT: CPT

## 2021-12-18 PROCEDURE — 83880 ASSAY OF NATRIURETIC PEPTIDE: CPT

## 2021-12-18 PROCEDURE — 70450 CT HEAD/BRAIN W/O DYE: CPT | Mod: 26,MA

## 2021-12-18 PROCEDURE — 81001 URINALYSIS AUTO W/SCOPE: CPT

## 2021-12-18 PROCEDURE — 71045 X-RAY EXAM CHEST 1 VIEW: CPT

## 2021-12-18 PROCEDURE — 85730 THROMBOPLASTIN TIME PARTIAL: CPT

## 2021-12-18 PROCEDURE — 93005 ELECTROCARDIOGRAM TRACING: CPT

## 2021-12-18 PROCEDURE — 71045 X-RAY EXAM CHEST 1 VIEW: CPT | Mod: 26

## 2021-12-18 PROCEDURE — 70450 CT HEAD/BRAIN W/O DYE: CPT | Mod: MA

## 2021-12-18 PROCEDURE — 87086 URINE CULTURE/COLONY COUNT: CPT

## 2021-12-18 PROCEDURE — 85025 COMPLETE CBC W/AUTO DIFF WBC: CPT

## 2021-12-18 PROCEDURE — 84484 ASSAY OF TROPONIN QUANT: CPT

## 2021-12-18 PROCEDURE — 85610 PROTHROMBIN TIME: CPT

## 2021-12-18 RX ORDER — SODIUM CHLORIDE 9 MG/ML
500 INJECTION INTRAMUSCULAR; INTRAVENOUS; SUBCUTANEOUS ONCE
Refills: 0 | Status: COMPLETED | OUTPATIENT
Start: 2021-12-18 | End: 2021-12-18

## 2021-12-18 RX ADMIN — SODIUM CHLORIDE 500 MILLILITER(S): 9 INJECTION INTRAMUSCULAR; INTRAVENOUS; SUBCUTANEOUS at 15:09

## 2021-12-18 NOTE — ED PROVIDER NOTE - CARE PROVIDER_API CALL
Stanley Ruggiero)  Internal Medicine  [address] [place] [address]  Phone: [phone]  Fax: [phone]  Follow Up Time: 1-3 Days

## 2021-12-18 NOTE — ED PROVIDER NOTE - PATIENT PORTAL LINK FT
You can access the FollowMyHealth Patient Portal offered by [facility] by registering at the following website: [email or web address]. By joining [facility]’s FollowMyHealth portal, you will also be able to view your health information using other applications (apps) compatible with our system.

## 2021-12-18 NOTE — ED STATDOCS - PROGRESS NOTE DETAILS
Say Valentin for attending Dr. Myrick: 67 y/o male with a PMHx of COPD, CAD s/p stents x3, DM, HTN, HLD presents to the ED c/o intermittent dizziness x1 week. Pt reports 3 episodes of dizziness over the last week. Pt states he feels like he is going to pass out. Denies cough, fevers, chills. No other complaints at this time. Will send pt to main ED for further evaluation.

## 2021-12-18 NOTE — ED ADULT TRIAGE NOTE - AS HEIGHT TYPE
Pt remains free from falls this hospital stay. She also remains free from physical injury. Pt able to ambulate independently with cane. Pt able to perform ADLs w little to no assist. Pt on room air. Smokin cessation education given as well as rx to aid smoking cessation. Appetite good. Skin intact. Chronic back and knee pain controlled with Percocet and gabapentin as ordered. stated

## 2021-12-18 NOTE — ED ADULT NURSE NOTE - CAS DISCH BELONGINGS RETURNED
From: Ashu Swanson  To: Ramírez Pino MD  Sent: 9/5/2019 2:21 PM CDT  Subject: Imaging Question    Hi,    I will try to get an appointment with a Cardiologist.    There are no Labs for Naail in the system, can you send them in again, please.    Regards,    Ashu Swanson  [phone]    RE: Imaging Question  Your ct scan showed NO significant coronary artery. I do not see any reason to do a stress test. It might be better just to see a cardiologist.    Also, I ordered labs for Naail. Did he get the head ct?   Not applicable

## 2021-12-18 NOTE — ED PROVIDER NOTE - OBJECTIVE STATEMENT
65 y/o male with a PMHx of HLD, CAD s/p stent and CABG, COPD, DM, HTN presents to the ED c/o  lightheadedness and fatigue since 3 weeks  ago. States it started one day  in bed with associated  CP  and fatigue which  lasted a week and resolved. reports he felt well a week ago but feels fatigued and weak again. No fever, cough, new meds or issues eating/drinking. No HA, CP, SOB . Just feeling very week and dizzy. No hx of smoking, drinking or illegal drug use. 65 y/o male with a PMHx of HLD, CAD s/p stent and CABG, COPD, DM, HTN presents to the ED c/o  lightheadedness and fatigue since 3 weeks  ago. States it started one day  in bed with associated  CP  and fatigue which  lasted a week and resolved. reports he felt well a week ago but feels fatigued and weak again. No fever, cough, new meds or issues eating/drinking. No HA, CP, SOB . Just feeling very weak and dizzy. No hx of smoking, drinking or illegal drug use.

## 2021-12-18 NOTE — ED ADULT TRIAGE NOTE - CHIEF COMPLAINT QUOTE
Pt. to the ED C/O Dizziness and General Malaise with Near Syncope Episodes x 2 weeks- Pt. states he was evaluated by Cardiologist last Friday- Pt. reports hx of Cardiac Stents --Denies CP and SOB

## 2021-12-19 LAB
CULTURE RESULTS: SIGNIFICANT CHANGE UP
SPECIMEN SOURCE: SIGNIFICANT CHANGE UP

## 2021-12-29 ENCOUNTER — EMERGENCY (EMERGENCY)
Facility: HOSPITAL | Age: 66
LOS: 0 days | Discharge: HOME | End: 2021-12-29
Attending: EMERGENCY MEDICINE
Payer: MEDICARE

## 2021-12-29 VITALS
RESPIRATION RATE: 18 BRPM | TEMPERATURE: 98 F | HEART RATE: 78 BPM | DIASTOLIC BLOOD PRESSURE: 67 MMHG | OXYGEN SATURATION: 99 % | SYSTOLIC BLOOD PRESSURE: 108 MMHG

## 2021-12-29 VITALS — HEIGHT: 66 IN | WEIGHT: 195.11 LBS

## 2021-12-29 DIAGNOSIS — R42 DIZZINESS AND GIDDINESS: ICD-10-CM

## 2021-12-29 DIAGNOSIS — Z79.82 LONG TERM (CURRENT) USE OF ASPIRIN: ICD-10-CM

## 2021-12-29 DIAGNOSIS — I25.10 ATHEROSCLEROTIC HEART DISEASE OF NATIVE CORONARY ARTERY WITHOUT ANGINA PECTORIS: ICD-10-CM

## 2021-12-29 DIAGNOSIS — Z95.1 PRESENCE OF AORTOCORONARY BYPASS GRAFT: Chronic | ICD-10-CM

## 2021-12-29 DIAGNOSIS — E78.5 HYPERLIPIDEMIA, UNSPECIFIED: ICD-10-CM

## 2021-12-29 DIAGNOSIS — Z95.9 PRESENCE OF CARDIAC AND VASCULAR IMPLANT AND GRAFT, UNSPECIFIED: Chronic | ICD-10-CM

## 2021-12-29 DIAGNOSIS — J44.9 CHRONIC OBSTRUCTIVE PULMONARY DISEASE, UNSPECIFIED: ICD-10-CM

## 2021-12-29 DIAGNOSIS — Z79.84 LONG TERM (CURRENT) USE OF ORAL HYPOGLYCEMIC DRUGS: ICD-10-CM

## 2021-12-29 DIAGNOSIS — E11.9 TYPE 2 DIABETES MELLITUS WITHOUT COMPLICATIONS: ICD-10-CM

## 2021-12-29 DIAGNOSIS — Z79.4 LONG TERM (CURRENT) USE OF INSULIN: ICD-10-CM

## 2021-12-29 DIAGNOSIS — I10 ESSENTIAL (PRIMARY) HYPERTENSION: ICD-10-CM

## 2021-12-29 LAB
ALBUMIN SERPL ELPH-MCNC: 3.7 G/DL — SIGNIFICANT CHANGE UP (ref 3.3–5)
ALP SERPL-CCNC: 69 U/L — SIGNIFICANT CHANGE UP (ref 40–120)
ALT FLD-CCNC: 45 U/L — SIGNIFICANT CHANGE UP (ref 12–78)
ANION GAP SERPL CALC-SCNC: 7 MMOL/L — SIGNIFICANT CHANGE UP (ref 5–17)
AST SERPL-CCNC: 35 U/L — SIGNIFICANT CHANGE UP (ref 15–37)
BASOPHILS # BLD AUTO: 0.04 K/UL — SIGNIFICANT CHANGE UP (ref 0–0.2)
BASOPHILS NFR BLD AUTO: 0.5 % — SIGNIFICANT CHANGE UP (ref 0–2)
BILIRUB SERPL-MCNC: 0.9 MG/DL — SIGNIFICANT CHANGE UP (ref 0.2–1.2)
BUN SERPL-MCNC: 16 MG/DL — SIGNIFICANT CHANGE UP (ref 7–23)
CALCIUM SERPL-MCNC: 9.1 MG/DL — SIGNIFICANT CHANGE UP (ref 8.5–10.1)
CHLORIDE SERPL-SCNC: 109 MMOL/L — HIGH (ref 96–108)
CO2 SERPL-SCNC: 24 MMOL/L — SIGNIFICANT CHANGE UP (ref 22–31)
CREAT SERPL-MCNC: 1.03 MG/DL — SIGNIFICANT CHANGE UP (ref 0.5–1.3)
EOSINOPHIL # BLD AUTO: 0.14 K/UL — SIGNIFICANT CHANGE UP (ref 0–0.5)
EOSINOPHIL NFR BLD AUTO: 1.9 % — SIGNIFICANT CHANGE UP (ref 0–6)
FLUAV AG NPH QL: SIGNIFICANT CHANGE UP
FLUBV AG NPH QL: SIGNIFICANT CHANGE UP
GLUCOSE SERPL-MCNC: 141 MG/DL — HIGH (ref 70–99)
HCT VFR BLD CALC: 38.8 % — LOW (ref 39–50)
HGB BLD-MCNC: 13.2 G/DL — SIGNIFICANT CHANGE UP (ref 13–17)
IMM GRANULOCYTES NFR BLD AUTO: 0.4 % — SIGNIFICANT CHANGE UP (ref 0–1.5)
LYMPHOCYTES # BLD AUTO: 1.87 K/UL — SIGNIFICANT CHANGE UP (ref 1–3.3)
LYMPHOCYTES # BLD AUTO: 25.4 % — SIGNIFICANT CHANGE UP (ref 13–44)
MCHC RBC-ENTMCNC: 29.2 PG — SIGNIFICANT CHANGE UP (ref 27–34)
MCHC RBC-ENTMCNC: 34 GM/DL — SIGNIFICANT CHANGE UP (ref 32–36)
MCV RBC AUTO: 85.8 FL — SIGNIFICANT CHANGE UP (ref 80–100)
MONOCYTES # BLD AUTO: 0.7 K/UL — SIGNIFICANT CHANGE UP (ref 0–0.9)
MONOCYTES NFR BLD AUTO: 9.5 % — SIGNIFICANT CHANGE UP (ref 2–14)
NEUTROPHILS # BLD AUTO: 4.58 K/UL — SIGNIFICANT CHANGE UP (ref 1.8–7.4)
NEUTROPHILS NFR BLD AUTO: 62.3 % — SIGNIFICANT CHANGE UP (ref 43–77)
NT-PROBNP SERPL-SCNC: 31 PG/ML — SIGNIFICANT CHANGE UP (ref 0–125)
PLATELET # BLD AUTO: 135 K/UL — LOW (ref 150–400)
POTASSIUM SERPL-MCNC: 4.1 MMOL/L — SIGNIFICANT CHANGE UP (ref 3.5–5.3)
POTASSIUM SERPL-SCNC: 4.1 MMOL/L — SIGNIFICANT CHANGE UP (ref 3.5–5.3)
PROT SERPL-MCNC: 7.6 GM/DL — SIGNIFICANT CHANGE UP (ref 6–8.3)
RBC # BLD: 4.52 M/UL — SIGNIFICANT CHANGE UP (ref 4.2–5.8)
RBC # FLD: 15.9 % — HIGH (ref 10.3–14.5)
RSV RNA NPH QL NAA+NON-PROBE: SIGNIFICANT CHANGE UP
SARS-COV-2 RNA SPEC QL NAA+PROBE: SIGNIFICANT CHANGE UP
SODIUM SERPL-SCNC: 140 MMOL/L — SIGNIFICANT CHANGE UP (ref 135–145)
TROPONIN I, HIGH SENSITIVITY RESULT: 8.72 NG/L — SIGNIFICANT CHANGE UP
TROPONIN I, HIGH SENSITIVITY RESULT: 8.76 NG/L — SIGNIFICANT CHANGE UP
WBC # BLD: 7.36 K/UL — SIGNIFICANT CHANGE UP (ref 3.8–10.5)
WBC # FLD AUTO: 7.36 K/UL — SIGNIFICANT CHANGE UP (ref 3.8–10.5)

## 2021-12-29 PROCEDURE — 93010 ELECTROCARDIOGRAM REPORT: CPT

## 2021-12-29 PROCEDURE — 83880 ASSAY OF NATRIURETIC PEPTIDE: CPT

## 2021-12-29 PROCEDURE — 71046 X-RAY EXAM CHEST 2 VIEWS: CPT | Mod: 26

## 2021-12-29 PROCEDURE — 36415 COLL VENOUS BLD VENIPUNCTURE: CPT

## 2021-12-29 PROCEDURE — 85025 COMPLETE CBC W/AUTO DIFF WBC: CPT

## 2021-12-29 PROCEDURE — 96360 HYDRATION IV INFUSION INIT: CPT

## 2021-12-29 PROCEDURE — 84484 ASSAY OF TROPONIN QUANT: CPT

## 2021-12-29 PROCEDURE — 99285 EMERGENCY DEPT VISIT HI MDM: CPT

## 2021-12-29 PROCEDURE — 96361 HYDRATE IV INFUSION ADD-ON: CPT

## 2021-12-29 PROCEDURE — 93005 ELECTROCARDIOGRAM TRACING: CPT

## 2021-12-29 PROCEDURE — 0241U: CPT

## 2021-12-29 PROCEDURE — 71046 X-RAY EXAM CHEST 2 VIEWS: CPT

## 2021-12-29 PROCEDURE — 99283 EMERGENCY DEPT VISIT LOW MDM: CPT | Mod: 25

## 2021-12-29 PROCEDURE — 80053 COMPREHEN METABOLIC PANEL: CPT

## 2021-12-29 RX ORDER — MECLIZINE HCL 12.5 MG
25 TABLET ORAL ONCE
Refills: 0 | Status: COMPLETED | OUTPATIENT
Start: 2021-12-29 | End: 2021-12-29

## 2021-12-29 RX ORDER — SODIUM CHLORIDE 9 MG/ML
1000 INJECTION INTRAMUSCULAR; INTRAVENOUS; SUBCUTANEOUS ONCE
Refills: 0 | Status: COMPLETED | OUTPATIENT
Start: 2021-12-29 | End: 2021-12-29

## 2021-12-29 RX ORDER — MECLIZINE HCL 12.5 MG
1 TABLET ORAL
Qty: 21 | Refills: 0
Start: 2021-12-29 | End: 2022-01-04

## 2021-12-29 RX ADMIN — SODIUM CHLORIDE 1000 MILLILITER(S): 9 INJECTION INTRAMUSCULAR; INTRAVENOUS; SUBCUTANEOUS at 17:32

## 2021-12-29 RX ADMIN — SODIUM CHLORIDE 1000 MILLILITER(S): 9 INJECTION INTRAMUSCULAR; INTRAVENOUS; SUBCUTANEOUS at 16:32

## 2021-12-29 RX ADMIN — Medication 25 MILLIGRAM(S): at 16:32

## 2021-12-29 NOTE — ED PROVIDER NOTE - CARE PLAN
Principal Discharge DX:	Dizziness   1 Principal Discharge DX:	Dizziness  Secondary Diagnosis:	Vertigo

## 2021-12-29 NOTE — ED ADULT NURSE NOTE - NS ED NURSE LEVEL OF CONSCIOUSNESS ORIENTATION
[address] [facility] Patient Status:  Inpatient    1966 MRN [id number]   Location [facility] 2W/SW Attending Micheal Best MD   Hosp Day # 2 PCP Claude Parker MD     Critical Care Progress Note    Assessment/Plan:  1.  Acute respiratory ryan Output             1450 ml   Net             2430 ml       /75 (BP Location: Left arm)   Pulse 68   Temp 98 °F (36.7 °C) (Temporal)   Resp 18   Ht 5' 5.75\" (1.67 m)   Wt 216 lb 12.8 oz (98.3 kg)   LMP  (Within Years)   SpO2 99%   BMI 35.26 kg/m² Oriented - self; Oriented - place; Oriented - time

## 2021-12-29 NOTE — ED PROVIDER NOTE - OBJECTIVE STATEMENT
67y/o M w/ h/o HTN, COPD, HLD, DMII insulin dependent, CAD s/p 3DES p/w dizziness. Pt states that he has had dizziness for the past 2.5 wks was seen in ED 11d ago for same complaint and was d/c home with negative blood work and CTH. Pt never followed up with pmd due to them being on vacation. Dizziness described as room spinning intermittent improved with rest. No tinnitus, recent URI, fevers, chills, headache, vomiting, chest pain, cough, sob, abdominal pain, back pain, dysuria, numbness, tingling.

## 2021-12-29 NOTE — ED PROVIDER NOTE - PHYSICAL EXAMINATION
Gen: NAD, non-toxic appearing  Head: normal appearing  HEENT: normal conjunctiva, oral mucosa moist  Lung: no respiratory distress, speaking in full sentences, CTA b/l     CV: regular rate and rhythm, no murmurs  Abd: soft, non distended, non tender   MSK: no visible deformities  NEURO: pupils 4 mm, PERRL, EOMI (CN III, IV, VI), facial sensation intact to light touch in all 3 divisions bilat (CN V), face is symmetric with normal eye closure, eye opening, and smile (CN VII), hearing is normal to rubbing fingers (CN VII), palate elevates symmetrically, phonation is normal (CN IX, X),  shoulder shrug intact bilat (CN XI), tongue is midline with nl movements and no atrophy (CN XII), finger to nose test nl bilat, negative pronator drift bilat, negative Romberg, speech is clear; 5/5 motor strength BUE and BLE: deltoids, biceps, triceps, wrist flexors/extensors, hand , hip flexors, knee flexors/extensors, plantar/dorsiflexors, hallux flexors/extensors; sensation intact to light touch BUE and BLE: C5-T1 and L3-S1   Skin: Warm  Psych: normal affect

## 2021-12-29 NOTE — ED STATDOCS - PROGRESS NOTE DETAILS
Say FUENTES for attending Dr. Miguel: 67 yo male with a PMHx of HTN, COPD, HLD, DMII insulin dependent, CAD s/p 3V CABG and PCI presents to the ED c/o lightheadedness, weakness, and dizziness x several weeks. Pt states he feels like he is going to faint. Pt re[prts he was in the ED last week for same, however, symptoms have persisted. Will send pt to main ED for further evaluation.

## 2021-12-29 NOTE — ED PROVIDER NOTE - CLINICAL SUMMARY MEDICAL DECISION MAKING FREE TEXT BOX
67y/o M w/ h/o HTN, COPD, HLD, DMII insulin dependent, CAD s/p 3DES p/w dizziness recently seen with negative work up 2.5 intermittent consistent with peropheral vertigo. nothing focal on exam. plan check labs, trial IVF and meclizine and reassess. 65y/o M w/ h/o HTN, COPD, HLD, DMII insulin dependent, CAD s/p 3DES p/w dizziness recently seen with negative work up 2.5 intermittent consistent with peripheral vertigo. nothing focal on exam. plan check labs, trial IVF and meclizine and reassess.

## 2021-12-29 NOTE — ED PROVIDER NOTE - NSFOLLOWUPINSTRUCTIONS_ED_ALL_ED_FT
Dizziness    Dizziness can manifest as a feeling of unsteadiness or light-headedness. You may feel like you are about to faint. This condition can be caused by a number of things, including medicines, dehydration, or illness. Drink enough fluid to keep your urine clear or pale yellow. Do not drink alcohol and limit your caffeine intake. Avoid quick or sudden movements.  Rise slowly from chairs and steady yourself until you feel okay. In the morning, first sit up on the side of the bed.    SEEK IMMEDIATE MEDICAL CARE IF YOU HAVE ANY OF THE FOLLOWING SYMPTOMS: vomiting, changes in your vision or speech, weakness in your arms or legs, trouble speaking or swallowing, chest pain, abdominal pain, shortness of breath, sweating, bleeding, headache, neck pain, or fever.     -Please follow up with your Primary Care Doctor within 24-48 hours and bring your paperwork    -Please follow up with your cardiologist and bring your paperwork

## 2021-12-29 NOTE — ED PROVIDER NOTE - ATTENDING CONTRIBUTION TO CARE
Dr. Mclaughlin: I have personally performed a face to face bedside history and physical examination of this patient. I have discussed the history, examination, review of systems, assessment and plan of management with the resident. I have reviewed the electronic medical record and amended it to reflect my history, review of systems, physical exam, assessment and plan.

## 2021-12-29 NOTE — ED PROVIDER NOTE - PROGRESS NOTE DETAILS
Say Asher for attending Dr. Mclaughlin,  65 yo male with a PMHx of HTN, COPD, HLD, DMII insulin dependent, CAD s/p 3V CABG and PCI presents to the ED c/o lightheadedness, weakness, and dizziness x 2  weeks assocated with nauseas and feeling drowsy. exacerbated by head movements and when he walks, stands. Pt feels like he will faint, intermitted today's episode started around lunch time. Denies SOB, CP,. partially alleviated by closing eyes.| Vaccinated x3|Cardiologist: DR. Stein.  PCP:     PE:    Constitutional: overweight white male, elderly, alert non toxic. PERRLA EIOMIi +nystagmus with exacerbated of dizzy feeling. Dizziness also precipitated by left right motion.   Respiratory: reparations clear  Ent: mucus membrane mildly dry.   Cardiac: : regular rate of rhythm, normal radial pulse.   : differentiated no CVA tenderness, +bowel no active pitch, soft non tender abd  MSK: MAEx4, no focal tenderness  Skin: normal.  Neuro: alert, oriented 2-12 intact normal speech no cerebellar signs no focal sensory deficients Francisco Javier, PGY3: pt able to ambulate without assistance or dizziness. feels like symptoms improved after ivf and meclizine. VSS. Time was taken to answer all of patients questions and concerns. Return precaution instructions were given and patient understands and feels comfortable with disposition. pt will follow up with his cardiologist and pmd. Say Asher for attending Dr. Mclaughlin,  67 yo male with a PMHx of HTN, COPD, HLD, DMII insulin dependent, CAD s/p 3V CABG and PCI presents to the ED c/o lightheadedness, weakness, and dizziness x 2 - 3  weeks, intermittent, associated with nausea and feeling drowsy. exacerbated by head movements and when he walks, stands. Pt desrcribes dizziness as both a spinning sensation & feeling lightheaded/faint, + poorly able to discriminate between the 2.   Today's episode started around lunch time. Denies SOB, CP,. partially alleviated by closing eyes.| Vaccinated x3|Cardiologist: DR. Moody.  PCP:     PE:    Constitutional: overweight white male, elderly, alert non toxic. PERRLA EIOMI +horizontal nystagmus with +exacerbation of dizzy feeling. Dizziness also precipitated by left right motion.   Respiratory: normal respirations, lungs clear  Ent: mucus membrane mildly dry.   Cardiac: : regular rate of rhythm, normal radial pulse.   : dDeferred, no CVA tenderness,   Abd: +bowel hypoactive pitch, soft non tender abd  MSK: MAEx4, no focal tenderness  Skin: normal.  Neuro: alert, oriented 2-12 intact normal speech no cerebellar signs no focal sensory deficits

## 2021-12-29 NOTE — ED ADULT NURSE NOTE - OBJECTIVE STATEMENT
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 [facility] pt vitals stable, in no distress, ekg done placed on monitor

## 2021-12-29 NOTE — ED ADULT TRIAGE NOTE - CHIEF COMPLAINT QUOTE
Pt. to the ED C/O Dizziness, Light Handedness and Near Syncope- Pt. states he was evaluated in ED last week and DC home- Pt. states recent evaluation by Cardiology- Hx of Stents, Open Heart Surgery, DM and HTN

## 2021-12-29 NOTE — ED PROVIDER NOTE - PATIENT PORTAL LINK FT
You can access the FollowMyHealth Patient Portal offered by [facility] by registering at the following website: [email or web address]. By joining [facility]’s FollowMyHealth portal, you will also be able to view your health information using other applications (apps) compatible with our system.

## 2021-12-29 NOTE — ED PROVIDER NOTE - NS ED ROS FT
GENERAL: No fever, no chills  EYES: no change in vision  HEENT: no trouble swallowing, no trouble speaking  CARDIAC: no chest pain, no palpitations  PULMONARY: no cough, no SOB  GI: no abdominal pain, no nausea, no vomiting, no diarrhea, no constipation  : no dysuria, no frequency, no change in appearance, no odor of urine  SKIN: no rashes  NEURO: +dizziness; no headache, no weakness  MSK: no joint pain

## 2022-01-01 NOTE — ED ADULT NURSE NOTE - NS ED NURSE DC INFO COMPLEXITY
(2) everted (after stimulation)
(2) everted (after stimulation)
Simple: Patient demonstrates quick and easy understanding

## 2022-03-25 NOTE — PROGRESS NOTE ADULT - SKIN/BREAST
----- Message from Lorena Wheat sent at 3/24/2022  5:53 PM CDT -----  Regarding: FYI  A colonoscopy was ordered for pt to schedule. Our attempts to reach the pt by phone have been unsuccessful but a Contact letter has been mailed.        Thanks,  OA Team  GI Preadmit Scheduling Dept      
negative

## 2022-04-11 ENCOUNTER — EMERGENCY (EMERGENCY)
Facility: HOSPITAL | Age: 67
LOS: 0 days | Discharge: HOME | End: 2022-04-11
Attending: EMERGENCY MEDICINE
Payer: MEDICARE

## 2022-04-11 VITALS
DIASTOLIC BLOOD PRESSURE: 94 MMHG | OXYGEN SATURATION: 100 % | HEART RATE: 83 BPM | TEMPERATURE: 98 F | RESPIRATION RATE: 20 BRPM | SYSTOLIC BLOOD PRESSURE: 157 MMHG

## 2022-04-11 VITALS — WEIGHT: 175.05 LBS | HEIGHT: 66 IN

## 2022-04-11 DIAGNOSIS — L03.114 CELLULITIS OF LEFT UPPER LIMB: ICD-10-CM

## 2022-04-11 DIAGNOSIS — E11.9 TYPE 2 DIABETES MELLITUS WITHOUT COMPLICATIONS: ICD-10-CM

## 2022-04-11 DIAGNOSIS — E78.5 HYPERLIPIDEMIA, UNSPECIFIED: ICD-10-CM

## 2022-04-11 DIAGNOSIS — J44.9 CHRONIC OBSTRUCTIVE PULMONARY DISEASE, UNSPECIFIED: ICD-10-CM

## 2022-04-11 DIAGNOSIS — I10 ESSENTIAL (PRIMARY) HYPERTENSION: ICD-10-CM

## 2022-04-11 DIAGNOSIS — Z95.9 PRESENCE OF CARDIAC AND VASCULAR IMPLANT AND GRAFT, UNSPECIFIED: Chronic | ICD-10-CM

## 2022-04-11 DIAGNOSIS — I25.10 ATHEROSCLEROTIC HEART DISEASE OF NATIVE CORONARY ARTERY WITHOUT ANGINA PECTORIS: ICD-10-CM

## 2022-04-11 DIAGNOSIS — Z95.1 PRESENCE OF AORTOCORONARY BYPASS GRAFT: Chronic | ICD-10-CM

## 2022-04-11 DIAGNOSIS — Z79.82 LONG TERM (CURRENT) USE OF ASPIRIN: ICD-10-CM

## 2022-04-11 DIAGNOSIS — Z79.4 LONG TERM (CURRENT) USE OF INSULIN: ICD-10-CM

## 2022-04-11 DIAGNOSIS — L02.512 CUTANEOUS ABSCESS OF LEFT HAND: ICD-10-CM

## 2022-04-11 PROCEDURE — 73130 X-RAY EXAM OF HAND: CPT | Mod: 26,LT

## 2022-04-11 PROCEDURE — 99283 EMERGENCY DEPT VISIT LOW MDM: CPT | Mod: 25

## 2022-04-11 PROCEDURE — 73130 X-RAY EXAM OF HAND: CPT | Mod: LT

## 2022-04-11 PROCEDURE — 10060 I&D ABSCESS SIMPLE/SINGLE: CPT

## 2022-04-11 RX ADMIN — Medication 100 MILLIGRAM(S): at 05:24

## 2022-04-11 NOTE — ED PROVIDER NOTE - PHYSICAL EXAMINATION
dorsal aspect of left hand: 3 cm area of induration, erythema and TTP without fluctuance; small pocket of fluid on POCUS

## 2022-04-11 NOTE — ED PROVIDER NOTE - OBJECTIVE STATEMENT
68 y/o M w/ h/o HTN, COPD, HLD, DMII insulin dependent, CAD s/p 3DES p/w left hand pain, erythema and swelling on the dorsal aspect for the past several weeks not improving with Prednisone PO.  Pt notes small abrasion but doesn't recall injury.  Pt not taking antibiotics.  Pt denies CP, SOB, or other complaints.

## 2022-04-11 NOTE — ED ADULT TRIAGE NOTE - CHIEF COMPLAINT QUOTE
pt arrived to ED c/o swollen left hand. small sore noted on third knuckle. pt states he has a hx of gout and dr told him this could be gout. pt states swelling started three weeks ago and has been increasing with pain. pt currently taking prednisone with no relief. pt ambulatory in triage. no signs of distress noted

## 2022-04-11 NOTE — ED ADULT NURSE NOTE - OBJECTIVE STATEMENT
pt arrived to ED c/o swollen left hand. small sore noted on third knuckle. pt states he has a hx of gout and dr told him this could be gout. pt states swelling started three weeks ago and has been increasing with pain. pt currently taking prednisone with no relief. pt ambulatory in triage. no signs of distress noted.  During initial assessment, hand appears swollen, but small sore is most prominent swelling.  Pt states that their pain at this time is the worst it has been

## 2022-04-11 NOTE — ED PROVIDER NOTE - NSFOLLOWUPINSTRUCTIONS_ED_ALL_ED_FT
Follow up with your primary care doctor in 2 days for a wound check    Skin Abscess  ImageA skin abscess is an infected area on or under your skin that contains pus and other material. An abscess can happen almost anywhere on your body. Some abscesses break open (rupture) on their own. Most continue to get worse unless they are treated. The infection can spread deeper into the body and into your blood, which can make you feel sick. Treatment usually involves draining the abscess.    Follow these instructions at home:  Abscess Care     If you have an abscess that has not drained, place a warm, clean, wet washcloth over the abscess several times a day. Do this as told by your doctor.  Follow instructions from your doctor about how to take care of your abscess. Make sure you:    Cover the abscess with a bandage (dressing).  Change your bandage or gauze as told by your doctor.  Wash your hands with soap and water before you change the bandage or gauze. If you cannot use soap and water, use hand .    Check your abscess every day for signs that the infection is getting worse. Check for:    More redness, swelling, or pain.  More fluid or blood.  Warmth.  More pus or a bad smell.    Medicines     Image   Take over-the-counter and prescription medicines only as told by your doctor.  If you were prescribed an antibiotic medicine, take it as told by your doctor. Do not stop taking the antibiotic even if you start to feel better.  General instructions     To avoid spreading the infection:    Do not share personal care items, towels, or hot tubs with others.  Avoid making skin-to-skin contact with other people.    Keep all follow-up visits as told by your doctor. This is important.  Contact a doctor if:  You have more redness, swelling, or pain around your abscess.  You have more fluid or blood coming from your abscess.  Your abscess feels warm when you touch it.  You have more pus or a bad smell coming from your abscess.  You have a fever.  Your muscles ache.  You have chills.  You feel sick.  Get help right away if:  You have very bad (severe) pain.  You see red streaks on your skin spreading away from the abscess.    Cellulitis    Cellulitis is a skin infection caused by bacteria. This condition occurs most often in the arms and lower legs but can occur anywhere over the body. Symptoms include redness, swelling, warm skin, tenderness, and chills/fever. If you were prescribed an antibiotic medicine, take it as told by your health care provider. Do not stop taking the antibiotic even if you start to feel better.    SEEK IMMEDIATE MEDICAL CARE IF YOU HAVE ANY OF THE FOLLOWING SYMPTOMS: worsening fever, red streaks coming from affected area, vomiting or diarrhea, or dizziness/lightheadedness.

## 2022-04-11 NOTE — ED PROVIDER NOTE - PATIENT PORTAL LINK FT
You can access the FollowMyHealth Patient Portal offered by [facility] by registering at the following website: [email or web address]. By joining [facility]’s FollowMyHealth portal, you will also be able to view your health information using other applications (apps) compatible with our system.

## 2022-04-20 NOTE — ED PROVIDER NOTE - TEMPLATE
Occupational Therapy     Referred by: Buzz Escobedo Neuro

## 2022-05-16 ENCOUNTER — EMERGENCY (EMERGENCY)
Facility: HOSPITAL | Age: 67
LOS: 0 days | Discharge: HOME | End: 2022-05-17
Attending: EMERGENCY MEDICINE
Payer: MEDICARE

## 2022-05-16 VITALS
HEART RATE: 63 BPM | OXYGEN SATURATION: 98 % | DIASTOLIC BLOOD PRESSURE: 78 MMHG | TEMPERATURE: 98 F | RESPIRATION RATE: 20 BRPM | SYSTOLIC BLOOD PRESSURE: 139 MMHG

## 2022-05-16 VITALS — WEIGHT: 207.01 LBS | HEIGHT: 66 IN

## 2022-05-16 DIAGNOSIS — Z79.84 LONG TERM (CURRENT) USE OF ORAL HYPOGLYCEMIC DRUGS: ICD-10-CM

## 2022-05-16 DIAGNOSIS — M79.642 PAIN IN LEFT HAND: ICD-10-CM

## 2022-05-16 DIAGNOSIS — Z95.1 PRESENCE OF AORTOCORONARY BYPASS GRAFT: ICD-10-CM

## 2022-05-16 DIAGNOSIS — Z79.82 LONG TERM (CURRENT) USE OF ASPIRIN: ICD-10-CM

## 2022-05-16 DIAGNOSIS — Z20.822 CONTACT WITH AND (SUSPECTED) EXPOSURE TO COVID-19: ICD-10-CM

## 2022-05-16 DIAGNOSIS — I25.10 ATHEROSCLEROTIC HEART DISEASE OF NATIVE CORONARY ARTERY WITHOUT ANGINA PECTORIS: ICD-10-CM

## 2022-05-16 DIAGNOSIS — E78.5 HYPERLIPIDEMIA, UNSPECIFIED: ICD-10-CM

## 2022-05-16 DIAGNOSIS — Z95.9 PRESENCE OF CARDIAC AND VASCULAR IMPLANT AND GRAFT, UNSPECIFIED: Chronic | ICD-10-CM

## 2022-05-16 DIAGNOSIS — Z79.4 LONG TERM (CURRENT) USE OF INSULIN: ICD-10-CM

## 2022-05-16 DIAGNOSIS — Z95.1 PRESENCE OF AORTOCORONARY BYPASS GRAFT: Chronic | ICD-10-CM

## 2022-05-16 DIAGNOSIS — Z88.0 ALLERGY STATUS TO PENICILLIN: ICD-10-CM

## 2022-05-16 DIAGNOSIS — I10 ESSENTIAL (PRIMARY) HYPERTENSION: ICD-10-CM

## 2022-05-16 DIAGNOSIS — Z95.5 PRESENCE OF CORONARY ANGIOPLASTY IMPLANT AND GRAFT: ICD-10-CM

## 2022-05-16 DIAGNOSIS — J44.9 CHRONIC OBSTRUCTIVE PULMONARY DISEASE, UNSPECIFIED: ICD-10-CM

## 2022-05-16 DIAGNOSIS — M10.9 GOUT, UNSPECIFIED: ICD-10-CM

## 2022-05-16 DIAGNOSIS — M79.89 OTHER SPECIFIED SOFT TISSUE DISORDERS: ICD-10-CM

## 2022-05-16 DIAGNOSIS — E11.9 TYPE 2 DIABETES MELLITUS WITHOUT COMPLICATIONS: ICD-10-CM

## 2022-05-16 LAB
ALBUMIN SERPL ELPH-MCNC: 3.3 G/DL — SIGNIFICANT CHANGE UP (ref 3.3–5)
ALP SERPL-CCNC: 70 U/L — SIGNIFICANT CHANGE UP (ref 40–120)
ALT FLD-CCNC: 43 U/L — SIGNIFICANT CHANGE UP (ref 12–78)
ANION GAP SERPL CALC-SCNC: 6 MMOL/L — SIGNIFICANT CHANGE UP (ref 5–17)
AST SERPL-CCNC: 25 U/L — SIGNIFICANT CHANGE UP (ref 15–37)
BASOPHILS # BLD AUTO: 0.02 K/UL — SIGNIFICANT CHANGE UP (ref 0–0.2)
BASOPHILS NFR BLD AUTO: 0.2 % — SIGNIFICANT CHANGE UP (ref 0–2)
BILIRUB SERPL-MCNC: 0.6 MG/DL — SIGNIFICANT CHANGE UP (ref 0.2–1.2)
BUN SERPL-MCNC: 9 MG/DL — SIGNIFICANT CHANGE UP (ref 7–23)
CALCIUM SERPL-MCNC: 8.7 MG/DL — SIGNIFICANT CHANGE UP (ref 8.5–10.1)
CHLORIDE SERPL-SCNC: 108 MMOL/L — SIGNIFICANT CHANGE UP (ref 96–108)
CO2 SERPL-SCNC: 22 MMOL/L — SIGNIFICANT CHANGE UP (ref 22–31)
CREAT SERPL-MCNC: 0.89 MG/DL — SIGNIFICANT CHANGE UP (ref 0.5–1.3)
EGFR: 95 ML/MIN/1.73M2 — SIGNIFICANT CHANGE UP
EOSINOPHIL # BLD AUTO: 0.09 K/UL — SIGNIFICANT CHANGE UP (ref 0–0.5)
EOSINOPHIL NFR BLD AUTO: 1 % — SIGNIFICANT CHANGE UP (ref 0–6)
ERYTHROCYTE [SEDIMENTATION RATE] IN BLOOD: 26 MM/HR — HIGH (ref 0–20)
FLUAV AG NPH QL: SIGNIFICANT CHANGE UP
FLUBV AG NPH QL: SIGNIFICANT CHANGE UP
GLUCOSE SERPL-MCNC: 301 MG/DL — HIGH (ref 70–99)
HCT VFR BLD CALC: 40.4 % — SIGNIFICANT CHANGE UP (ref 39–50)
HGB BLD-MCNC: 13.5 G/DL — SIGNIFICANT CHANGE UP (ref 13–17)
IMM GRANULOCYTES NFR BLD AUTO: 0.3 % — SIGNIFICANT CHANGE UP (ref 0–1.5)
LACTATE SERPL-SCNC: 2.1 MMOL/L — HIGH (ref 0.7–2)
LYMPHOCYTES # BLD AUTO: 1.21 K/UL — SIGNIFICANT CHANGE UP (ref 1–3.3)
LYMPHOCYTES # BLD AUTO: 13.4 % — SIGNIFICANT CHANGE UP (ref 13–44)
MCHC RBC-ENTMCNC: 27.9 PG — SIGNIFICANT CHANGE UP (ref 27–34)
MCHC RBC-ENTMCNC: 33.4 GM/DL — SIGNIFICANT CHANGE UP (ref 32–36)
MCV RBC AUTO: 83.5 FL — SIGNIFICANT CHANGE UP (ref 80–100)
MONOCYTES # BLD AUTO: 0.87 K/UL — SIGNIFICANT CHANGE UP (ref 0–0.9)
MONOCYTES NFR BLD AUTO: 9.6 % — SIGNIFICANT CHANGE UP (ref 2–14)
NEUTROPHILS # BLD AUTO: 6.8 K/UL — SIGNIFICANT CHANGE UP (ref 1.8–7.4)
NEUTROPHILS NFR BLD AUTO: 75.5 % — SIGNIFICANT CHANGE UP (ref 43–77)
PLATELET # BLD AUTO: 136 K/UL — LOW (ref 150–400)
POTASSIUM SERPL-MCNC: 4.1 MMOL/L — SIGNIFICANT CHANGE UP (ref 3.5–5.3)
POTASSIUM SERPL-SCNC: 4.1 MMOL/L — SIGNIFICANT CHANGE UP (ref 3.5–5.3)
PROT SERPL-MCNC: 7.3 GM/DL — SIGNIFICANT CHANGE UP (ref 6–8.3)
RBC # BLD: 4.84 M/UL — SIGNIFICANT CHANGE UP (ref 4.2–5.8)
RBC # FLD: 16 % — HIGH (ref 10.3–14.5)
RSV RNA NPH QL NAA+NON-PROBE: SIGNIFICANT CHANGE UP
SARS-COV-2 RNA SPEC QL NAA+PROBE: SIGNIFICANT CHANGE UP
SODIUM SERPL-SCNC: 136 MMOL/L — SIGNIFICANT CHANGE UP (ref 135–145)
URATE SERPL-MCNC: 4.3 MG/DL — SIGNIFICANT CHANGE UP (ref 3.4–8.8)
WBC # BLD: 9.02 K/UL — SIGNIFICANT CHANGE UP (ref 3.8–10.5)
WBC # FLD AUTO: 9.02 K/UL — SIGNIFICANT CHANGE UP (ref 3.8–10.5)

## 2022-05-16 PROCEDURE — 85025 COMPLETE CBC W/AUTO DIFF WBC: CPT

## 2022-05-16 PROCEDURE — 86140 C-REACTIVE PROTEIN: CPT

## 2022-05-16 PROCEDURE — 99285 EMERGENCY DEPT VISIT HI MDM: CPT | Mod: FS

## 2022-05-16 PROCEDURE — 84550 ASSAY OF BLOOD/URIC ACID: CPT

## 2022-05-16 PROCEDURE — 87040 BLOOD CULTURE FOR BACTERIA: CPT

## 2022-05-16 PROCEDURE — 93010 ELECTROCARDIOGRAM REPORT: CPT

## 2022-05-16 PROCEDURE — 85652 RBC SED RATE AUTOMATED: CPT

## 2022-05-16 PROCEDURE — 0241U: CPT

## 2022-05-16 PROCEDURE — 93005 ELECTROCARDIOGRAM TRACING: CPT

## 2022-05-16 PROCEDURE — 73130 X-RAY EXAM OF HAND: CPT | Mod: 26,LT

## 2022-05-16 PROCEDURE — 96375 TX/PRO/DX INJ NEW DRUG ADDON: CPT

## 2022-05-16 PROCEDURE — 80053 COMPREHEN METABOLIC PANEL: CPT

## 2022-05-16 PROCEDURE — 73130 X-RAY EXAM OF HAND: CPT | Mod: LT

## 2022-05-16 PROCEDURE — 36415 COLL VENOUS BLD VENIPUNCTURE: CPT

## 2022-05-16 PROCEDURE — 83605 ASSAY OF LACTIC ACID: CPT

## 2022-05-16 PROCEDURE — 99285 EMERGENCY DEPT VISIT HI MDM: CPT | Mod: 25

## 2022-05-16 PROCEDURE — 96374 THER/PROPH/DIAG INJ IV PUSH: CPT

## 2022-05-16 RX ORDER — CEFTRIAXONE 500 MG/1
1000 INJECTION, POWDER, FOR SOLUTION INTRAMUSCULAR; INTRAVENOUS ONCE
Refills: 0 | Status: DISCONTINUED | OUTPATIENT
Start: 2022-05-16 | End: 2022-05-16

## 2022-05-16 RX ORDER — ONDANSETRON 8 MG/1
4 TABLET, FILM COATED ORAL ONCE
Refills: 0 | Status: COMPLETED | OUTPATIENT
Start: 2022-05-16 | End: 2022-05-16

## 2022-05-16 RX ORDER — CEFTRIAXONE 500 MG/1
1000 INJECTION, POWDER, FOR SOLUTION INTRAMUSCULAR; INTRAVENOUS ONCE
Refills: 0 | Status: COMPLETED | OUTPATIENT
Start: 2022-05-16 | End: 2022-05-16

## 2022-05-16 RX ORDER — SODIUM CHLORIDE 9 MG/ML
1000 INJECTION INTRAMUSCULAR; INTRAVENOUS; SUBCUTANEOUS ONCE
Refills: 0 | Status: COMPLETED | OUTPATIENT
Start: 2022-05-16 | End: 2022-05-16

## 2022-05-16 RX ORDER — MORPHINE SULFATE 50 MG/1
4 CAPSULE, EXTENDED RELEASE ORAL ONCE
Refills: 0 | Status: DISCONTINUED | OUTPATIENT
Start: 2022-05-16 | End: 2022-05-16

## 2022-05-16 RX ADMIN — SODIUM CHLORIDE 2000 MILLILITER(S): 9 INJECTION INTRAMUSCULAR; INTRAVENOUS; SUBCUTANEOUS at 22:33

## 2022-05-16 RX ADMIN — ONDANSETRON 4 MILLIGRAM(S): 8 TABLET, FILM COATED ORAL at 22:33

## 2022-05-16 RX ADMIN — MORPHINE SULFATE 4 MILLIGRAM(S): 50 CAPSULE, EXTENDED RELEASE ORAL at 22:33

## 2022-05-16 RX ADMIN — CEFTRIAXONE 100 MILLIGRAM(S): 500 INJECTION, POWDER, FOR SOLUTION INTRAMUSCULAR; INTRAVENOUS at 22:33

## 2022-05-16 NOTE — ED ADULT NURSE NOTE - NS ED NOTE ABUSE RESPONSE YN
PREOPERATIVE DIAGNOSIS: Right upper eyelid dermatochalasis.   POSTOPERATIVE DIAGNOSIS: Right upper eyelid dermatochalasis.   PROCEDURE: Right upper blepharoplasty.   SURGEON: Sanket Bello MD  ASSISTANT: Dianna Parks MD  ANESTHESIA: Monitored with local infiltration of a 50/50 mixture of 2% lidocaine with epinephrine and 0.5% Marcaine.   COMPLICATIONS: None.   ESTIMATED BLOOD LOSS: Less than 5 mL.   HISTORY: Margaret Healy  presented with upper lid dermatochalasis leading to mechanical ptosis of the upper lids, blocking the superior visual field and interfering with  activities of daily living. She had previous surgery in [place] which improved her symptoms on the left, but not on the right side. After the risks, benefits and alternatives to the proposed procedure were explained, informed consent was obtained.   DESCRIPTION OF PROCEDURE: Margaret Healy was brought to the operating room and placed supine on the operating table. IV sedation was given. The upper lid crease and excess upper eyelid skin was marked with marking pen and infiltrated with local anesthetic. The area was prepped and draped in the typical fashion. Attention was directed to the right side. Skin was incised following marked lines. Skin flap was excised with a high temperature cautery. A row of cautery was placed in the orbicularis along the inferior incision line. Dissection was carried over the superior lateral orbital rim on the right. A 5-0 Monocryl was used to secure the sub brow orbicularis to the superior lateral orbital rim to support the tail of the brow. Hemostasis was obtained and the skin closed with running 6-0 plain gut suture. Ophthalmic antibiotic ointment was applied to the eyelids and into the eyes. Margaret Healy tolerated the procedure well and left the operating room in stable condition.   Sanket Bello MD         Yes

## 2022-05-16 NOTE — ED STATDOCS - MUSCULOSKELETAL, MLM
dorsal aspect of left hand swollen, no erythematous, not indurated, decreased flexion and extension of all 5 fingers due to swelling, good cap refill, pulses intact,

## 2022-05-16 NOTE — ED STATDOCS - PATIENT PORTAL LINK FT
You can access the FollowMyHealth Patient Portal offered by [facility] by registering at the following website: [email or web address]. By joining [facility]’s FollowMyHealth portal, you will also be able to view your health information using other applications (apps) compatible with our system.

## 2022-05-16 NOTE — ED STATDOCS - CARE PROVIDER_API CALL
Duane Conrad)  Orthopaedic Surgery; Surgery of the Hand  [address] [place] [address]  Phone: [phone]  Fax: [phone]  Follow Up Time:

## 2022-05-16 NOTE — ED ADULT NURSE NOTE - OBJECTIVE STATEMENT
Patient reports having a cut on his left hand about 2 months ago and has had increasing swelling and redness since. Hx of DM. Patient states his BG usually runs 300's sometimes in the 400's. Sensation in tact. Radial pulse +2.

## 2022-05-16 NOTE — ED ADULT NURSE NOTE - CAS EDN DISCHARGE ASSESSMENT
no acute respiratory distress, ambulating with steady gait, left hand swelling persists, no c/o pain currently/Alert and oriented to person, place and time

## 2022-05-16 NOTE — ED STATDOCS - ATTENDING APP SHARED VISIT CONTRIBUTION OF CARE
I, Jaswinder Jiménez, performed the initial face to face bedside interview with this patient regarding history of present illness, review of symptoms and relevant past medical, social and family history.  I completed an independent physical examination.  I was the initial provider who evaluated this patient. I have signed out the follow up of any pending tests (i.e. labs, radiological studies) to the ACP.  I have communicated the patient’s plan of care and disposition with the ACP.  The history, relevant review of systems, past medical and surgical history, medical decision making, and physical examination was documented by the scribe in my presence and I attest to the accuracy of the documentation.

## 2022-05-16 NOTE — ED ADULT NURSE NOTE - CAS TRG GEN SKIN CONDITION
Bedside, Verbal and Written shift change report given to Estela Khoury RN (oncoming nurse) by Dea Sweet RN (offgoing nurse). Report included the following information SBAR, Kardex, Intake/Output, MAR, Recent Results, Cardiac Rhythm NSR and Alarm Parameters . Warm/Dry

## 2022-05-16 NOTE — ED STATDOCS - NSFOLLOWUPINSTRUCTIONS_ED_ALL_ED_FT
INSTRUCTIONS FOR PREDNISONE  DAYS 1-3: TAKE 5 TABS BY MOUTH  DAYS 4-6: TAKE 4 TABS BY MOUTH  DAYS 7-9: TAKE 3 TABS BY MOUTH  DAYS 10-12; TAKE 2 TABS BY MOUTH  DAYS 13-15 TAKE 1 TAB BY MOUTH    Gout       Gout is painful swelling of your joints. Gout is a type of arthritis. It is caused by having too much uric acid in your body. Uric acid is a chemical that is made when your body breaks down substances called purines. If your body has too much uric acid, sharp crystals can form and build up in your joints. This causes pain and swelling.    Gout attacks can happen quickly and be very painful (acute gout). Over time, the attacks can affect more joints and happen more often (chronic gout).      What are the causes?  •Too much uric acid in your blood. This can happen because:  •Your kidneys do not remove enough uric acid from your blood.      •Your body makes too much uric acid.      •You eat too many foods that are high in purines. These foods include organ meats, some seafood, and beer.        •Trauma or stress.        What increases the risk?    •Having a family history of gout.      •Being male and middle-aged.      •Being female and having gone through menopause.      •Being very overweight (obese).      •Drinking alcohol, especially beer.      •Not having enough water in the body (being dehydrated).      •Losing weight too quickly.      •Having an organ transplant.      •Having lead poisoning.      •Taking certain medicines.      •Having kidney disease.      •Having a skin condition called psoriasis.        What are the signs or symptoms?     An attack of acute gout usually happens in just one joint. The most common place is the big toe. Attacks often start at night. Other joints that may be affected include joints of the feet, ankle, knee, fingers, wrist, or elbow. Symptoms of an attack may include:  •Very bad pain.      •Warmth.      •Swelling.      •Stiffness.      •Shiny, red, or purple skin.      •Tenderness. The affected joint may be very painful to touch.      •Chills and fever.      Chronic gout may cause symptoms more often. More joints may be involved. You may also have white or yellow lumps (tophi) on your hands or feet or in other areas near your joints.      How is this treated?    •Treatment for this condition has two phases: treating an acute attack and preventing future attacks.    •Acute gout treatment may include:  •NSAIDs.      •Steroids. These are taken by mouth or injected into a joint.      •Colchicine. This medicine relieves pain and swelling. It can be given by mouth or through an IV tube.      •Preventive treatment may include:  •Taking small doses of NSAIDs or colchicine daily.      •Using a medicine that reduces uric acid levels in your blood.      •Making changes to your diet. You may need to see a food expert (dietitian) about what to eat and drink to prevent gout.          Follow these instructions at home:      During a gout attack    •If told, put ice on the painful area:  •Put ice in a plastic bag.      •Place a towel between your skin and the bag.      •Leave the ice on for 20 minutes, 2–3 times a day.        •Raise (elevate) the painful joint above the level of your heart as often as you can.      •Rest the joint as much as possible. If the joint is in your leg, you may be given crutches.      •Follow instructions from your doctor about what you cannot eat or drink.      Avoiding future gout attacks   •Eat a low-purine diet. Avoid foods and drinks such as:  •Liver.      •Kidney.      •Anchovies.      •Asparagus.      •Herring.      •Mushrooms.      •Mussels.      •Beer.        •Stay at a healthy weight. If you want to lose weight, talk with your doctor. Do not lose weight too fast.      •Start or continue an exercise plan as told by your doctor.      Eating and drinking     •Drink enough fluids to keep your pee (urine) pale yellow.    •If you drink alcohol:•Limit how much you use to:  •0–1 drink a day for women.      •0–2 drinks a day for men.        •Be aware of how much alcohol is in your drink. In the [place] one drink equals one 12 oz bottle of beer (355 mL), one 5 oz glass of wine (148 mL), or one 1½ oz glass of hard liquor (44 mL).        General instructions     •Take over-the-counter and prescription medicines only as told by your doctor.      • Do not drive or use heavy machinery while taking prescription pain medicine.      •Return to your normal activities as told by your doctor. Ask your doctor what activities are safe for you.      •Keep all follow-up visits as told by your doctor. This is important.        Contact a doctor if:    •You have another gout attack.      •You still have symptoms of a gout attack after 10 days of treatment.      •You have problems (side effects) because of your medicines.      •You have chills or a fever.      •You have burning pain when you pee (urinate).      •You have pain in your lower back or belly.        Get help right away if:    •You have very bad pain.      •Your pain cannot be controlled.      •You cannot pee.        Summary    •Gout is painful swelling of the joints.      •The most common site of pain is the big toe, but it can affect other joints.      •Medicines and avoiding some foods can help to prevent and treat gout attacks.      This information is not intended to replace advice given to you by your health care provider. Make sure you discuss any questions you have with your health care provider.

## 2022-05-16 NOTE — ED STATDOCS - NS ED ATTENDING STATEMENT MOD
This was a shared visit with the LISA. I reviewed and verified the documentation and independently performed the documented:

## 2022-05-16 NOTE — ED STATDOCS - OBJECTIVE STATEMENT
67 M hx HTN, COPD, HLD, DMII insulin dependent, CAD s/p 3DES here complaining 10/10 left hand pain and swelling for the past 2 months with symptoms worsening today. pt seen here at  ED 4/11 and had I&D with serosanguinous fluid, no pus and was dc on 100 mg BID for 10 days. 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  pt came to the ED tonight since symptoms did not improve. no reported fever.

## 2022-05-16 NOTE — ED STATDOCS - PROGRESS NOTE DETAILS
66 y/o M D/w Dr. Conrad, likely gout. Advised steroid treatment and outpatient follow up. - Abdifatah Nam PA-C 68 y/o M with PMH of HTN, DM, COPD, CAD presents with left hand pain/swelling x 2 months. Pt was seen in this ED on 4/11, had I&D performed without purulent drainage. DC'd home on doxycycline. Pt states he followed up with his PCP, started him on prednisone which he states "didn't help, but then it got better on it's own. then it worsened again." States he has 1-2 drinks of alcohol per week. Denies trauma. States he works as a , but has difficulty holding on to tools at work. PE: Well appearing. Cardiac: s1s2, RRR. Lungs: CTAB. MSK: Dorsum of left hand edematous over MCP joints 2-5, warm to touch. flexion limited in digits 2-5. No obvious open wound. A/P: ?cellulitis, ?gout plan for labs, XR, ortho-hand consult. - Abdifatah Nam PA-C

## 2022-05-16 NOTE — ED ADULT TRIAGE NOTE - CHIEF COMPLAINT QUOTE
left hand swelling x 2 months worsening today. Denies injury. hx left hand infection. Denies fever/chills.

## 2022-05-17 LAB — CRP SERPL-MCNC: 23 MG/L — HIGH

## 2022-05-18 NOTE — ED POST DISCHARGE NOTE - DETAILS
Spoke with pt and discussed results. States he will f/u with hie rheumatologist and have the blood test repeated. -Tyrone Saleh PA-C

## 2022-05-22 LAB
CULTURE RESULTS: SIGNIFICANT CHANGE UP
CULTURE RESULTS: SIGNIFICANT CHANGE UP
SPECIMEN SOURCE: SIGNIFICANT CHANGE UP
SPECIMEN SOURCE: SIGNIFICANT CHANGE UP

## 2022-05-26 ENCOUNTER — RESULT REVIEW (OUTPATIENT)
Age: 67
End: 2022-05-26

## 2022-06-20 NOTE — PROGRESS NOTE ADULT - I WAS PHYSICALLY PRESENT FOR THE KEY PORTIONS OF THE EVALUATION AND MANAGEMENT (E/M) SERVICE PROVIDED.  I AGREE WITH THE ABOVE HISTORY, PHYSICAL, AND PLAN WHICH I HAVE REVIEWED AND EDITED WHERE APPROPRIATE
Patient Specific Counseling (Will Not Stick From Patient To Patient): BG would like to determine dx through bx prior to determining full treatment regimen. Detail Level: Zone Statement Selected Detail Level: Generalized

## 2022-07-28 NOTE — H&P ADULT - HISTORY OF PRESENT ILLNESS
60 y/o male with HTN, DM, HL, depression/anxiety and CAD s/p 3V CABG at [facility] in May 2016 and PCI by dr warren presents with constant CP since 0400 today. Constant, non radiating and SOB. Trop negative x 1. EKG--NSR no acute ST elevation. CXR--no infiltrates or edema.  d-dimer negative. Pt had negative CTA chest as outpt recently. he was given ASA/nitro and mso4  On my exam, awake and alert, NAD, c/o CP. denies fevers, chills or hemoptysis. + near syncope
BIBA from home for vomiting with diarrhea since last night. c/o abdominal pain and chills. pt refusing to allow temperature to be taken in triage.

## 2022-10-19 NOTE — ASU PATIENT PROFILE, ADULT - HAS THE PATIENT USED TOBACCO IN THE PAST 30 DAYS?
Anel Baum PharmD  October 19, 2022           No

## 2023-01-12 NOTE — ED STATDOCS - OBJECTIVE STATEMENT
Medicare Wellness Visit  Plan for Preventive Care    A good way for you to stay healthy is to use preventive care.  Medicare covers many services that can help you stay healthy.* The goal of these services is to find any health problems as quickly as possible. Finding problems early can help make them easier to treat.  Your personal plan below lists the services you may need and when they are due.      Health Maintenance Summary     Colorectal Cancer Screen- (Cologuard - Every 3 Years)  Due since 12/12/2022    Medicare Advantage- Medicare Wellness Visit (Yearly - January to December)  Due since 1/1/2023    Breast Cancer Screening (Every 2 Years)  Next due on 6/14/2023    Depression Screening (Yearly)  Next due on 1/12/2024    DTaP/Tdap/Td Vaccine (3 - Td or Tdap)  Next due on 10/7/2032    Hepatitis B Vaccine (For Physician/APC Discussion)   Completed    Hepatitis C Screening   Completed    Pneumococcal Vaccine 65+   Completed    Osteoporosis Screening   Completed    Shingles Vaccine   Completed    Influenza Vaccine   Completed    COVID-19 Vaccine   Completed    Meningococcal Vaccine   Aged Out    HPV Vaccine   Aged Out           Preventive Care for Women and Men    Heart Screenings (Cardiovascular):  · Blood tests are used to check your cholesterol, lipid and triglyceride levels. High levels can increase your risk for heart disease and stroke. High levels can be treated with medications, diet and exercise. Lowering your levels can help keep your heart and blood vessels healthy.  Your provider will order these tests if they are needed.    · An ultrasound is done to see if you have an abdominal aortic aneurysm (AAA).  This is an enlargement of one of the main blood vessels that delivers blood to the body.   In the [place], 9,000 deaths are caused by AAA.  You may not even know you have this problem and as many as 1 in 3 people will have a serious problem if it is not treated.  Early diagnosis allows for more  See progress note. effective treatment and cure.  If you have a family history of AAA or are a male age 65-75 who has smoked, you are at higher risk of an AAA.  Your provider can order this test, if needed.    Colorectal Screening:  · There are many tests that are used to check for cancer of your colon and rectum. You and your provider should discuss what test is best for you and when to have it done.  Options include:  · Screening Colonoscopy: exam of the entire colon, seen through a flexible lighted tube.  · Flexible Sigmoidoscopy: exam of the last third (sigmoid portion) of the colon and rectum, seen through a flexible lighted tube.  · Cologuard DNA stool test: a sample of your stool is used to screen for cancer and unseen blood in your stool.  · Fecal Occult Blood Test: a sample of your stool is studied to find any unseen blood    Flu Shot:  · An immunization that helps to prevent influenza (the flu). You should get this every year. The best time to get the shot is in the fall.    Pneumococcal Shot:  • Vaccines help prevent pneumococcal disease, which is any type of illness caused by Streptococcus pneumoniae bacteria. There are two kinds of pneumococcal vaccines available in the [place]:   o Pneumococcal conjugate vaccines (PCV20 or Umvbmyv43®)  o Pneumococcal polysaccharide vaccine (PPSV23 or Mkctlhiqm01®)  · For those who have never received any pneumococcal conjugate vaccine, CDC recommends PVC20 for adults 65 years or older and adults 19 through 64 years old with certain medical conditions or risk factors.   · For those who have previously received PCV13, this should be followed by a dose of PPSV23.     Hepatitis B Shot:  · An immunization that helps to protect people from getting Hepatitis B. Hepatitis B is a virus that spreads through contact with infected blood or body fluids. Many people with the virus do not have symptoms.  The virus can lead to serious problems, such as liver disease. Some people are at higher risk  than others. Your doctor will tell you if you need this shot.     Diabetes Screening:  · A test to measure sugar (glucose) in your blood is called a fasting blood sugar. Fasting means you cannot have food or drink for at least 8 hours before the test. This test can detect diabetes long before you may notice symptoms.    Glaucoma Screening:  · Glaucoma screening is performed by your eye doctor. The test measures the fluid pressure inside your eyes to determine if you have glaucoma.     Hepatitis C Screening:  · A blood test to see if you have the hepatitis C virus.  Hepatitis C attacks the liver and is a major cause of chronic liver disease.  Medicare will cover a single screening for all adults born between 1945 & 1965, or high risk patients (people who have injected illegal drugs or people who have had blood transfusions).  High risk patients who continue to inject illegal drugs can be screened for Hepatitis C every year.    Smoking and Tobacco-Use Cessation Counseling:  · Tobacco is the single greatest cause of disease and early death in our country today. Medication and counseling together can increase a person’s chance of quitting for good.   · Medicare covers two quitting attempts per year, with four counseling sessions per attempt (eight sessions in a 12 month period)    Preventive Screening tests for Women    Screening Mammograms and Breast Exams:  · An x-ray of your breasts to check for breast cancer before you or your doctor may be able to feel it.  If breast cancer is found early it can usually be treated with success.    Pelvic Exams and Pap Tests:  · An exam to check for cervical and vaginal cancer. A Pap test is a lab test in which cells are taken from your cervix and sent to the lab to look for signs of cervical cancer. If cancer of the cervix is found early, chances for a cure are good. Testing can generally end at age 65, or if a woman has a hysterectomy for a benign condition. Your provider may  recommend more frequent testing if certain abnormal results are found.    Bone Mass Measurements:  · A painless x-ray of your bone density to see if you are at risk for a broken bone. Bone density refers to the thickness of bones or how tightly the bone tissue is packed.    Preventive Screening tests for Men    Prostate Screening:  · Should you have a prostate cancer test (PSA)?  It is up to you to decide if you want a prostate cancer test. Talk to your clinician to find out if the test is right for you.  Things for you to consider and talk about should include:  · Benefits and harms of the test  · Your family history  · How your race/ethnicity may influence the test  · If the test may impact other medical conditions you have  · Your values on screenings and treatments    *Medicare pays for many preventive services to keep you healthy. For some of these services, you might have to pay a deductible, coinsurance, and / or copayment.  The amounts vary depending on the type of services you need and the kind of Medicare health plan you have.    For further details on screenings offered by Medicare please visit: https://www.medicare.gov/coverage/preventive-screening-services

## 2023-01-24 NOTE — ED ADULT NURSE NOTE - HOW OFTEN DO YOU HAVE A DRINK CONTAINING ALCOHOL?
History  Chief Complaint   Patient presents with   • Oral Swelling     Reports abscess on the L lower gum  States he was at the dentist today and was told there was nothing they could do  C/O pain and concerned for cancer  Patient presents to the emergency department with 2 areas of swelling/concern for abscess on his lower teeth  He states the one in front started several days ago and this morning he noticed the one on the left lower side  It has been painful  He went to the dentist today but was unable to get an appointment until they went to  He wanted to come here for further evaluation  He is not having any difficulty swallowing or any fevers  Patient has been taking over-the-counter medication without relief  Prior to Admission Medications   Prescriptions Last Dose Informant Patient Reported? Taking?   diazepam (VALIUM) 5 mg tablet   No No   Sig: Take 1 tablet (5 mg total) by mouth 2 (two) times a day for 10 days   levETIRAcetam (KEPPRA) 500 mg tablet   No No   Sig: Take 1 tablet (500 mg total) by mouth every 12 (twelve) hours   lidocaine (LIDODERM) 5 %   No No   Sig: Apply 1 patch topically every 24 hours Remove & Discard patch within 12 hours or as directed by MD      Facility-Administered Medications: None       Past Medical History:   Diagnosis Date   • Hypertension    • Psychiatric disorder    • Seizures ([facility] [address] )        History reviewed  No pertinent surgical history  History reviewed  No pertinent family history  I have reviewed and agree with the history as documented  E-Cigarette/Vaping     E-Cigarette/Vaping Substances     Social History     Tobacco Use   • Smoking status: Some Days   • Smokeless tobacco: Never   Substance Use Topics   • Alcohol use: Yes     Comment: socially    • Drug use: Not Currently       Review of Systems   Constitutional: Negative for fever  Respiratory: Negative  Cardiovascular: Negative  Gastrointestinal: Negative      Neurological: Negative for dizziness  Physical Exam  Physical Exam  Vitals and nursing note reviewed  Constitutional:       Appearance: He is well-developed  HENT:      Head: Normocephalic and atraumatic  Right Ear: Ear canal and external ear normal       Left Ear: Ear canal and external ear normal       Mouth/Throat:        Comments: Floor of mouth is soft  Eyes:      Conjunctiva/sclera: Conjunctivae normal    Cardiovascular:      Rate and Rhythm: Normal rate and regular rhythm  Heart sounds: Normal heart sounds  Pulmonary:      Effort: Pulmonary effort is normal       Breath sounds: Normal breath sounds  Abdominal:      General: Bowel sounds are normal       Palpations: Abdomen is soft  Musculoskeletal:         General: Normal range of motion  Cervical back: Normal range of motion and neck supple  Lymphadenopathy:      Cervical: No cervical adenopathy  Skin:     General: Skin is warm  Findings: No rash  Neurological:      Mental Status: He is alert and oriented to person, place, and time  Motor: No abnormal muscle tone        Coordination: Coordination normal    Psychiatric:         Behavior: Behavior normal          Vital Signs  ED Triage Vitals   Temperature Pulse Respirations Blood Pressure SpO2   01/24/23 1043 01/24/23 1042 01/24/23 1042 01/24/23 1042 01/24/23 1042   98 5 °F (36 9 °C) 80 16 138/88 100 %      Temp Source Heart Rate Source Patient Position - Orthostatic VS BP Location FiO2 (%)   01/24/23 1043 01/24/23 1042 01/24/23 1042 01/24/23 1042 --   Oral Monitor Sitting Right arm       Pain Score       01/24/23 1042       9           Vitals:    01/24/23 1042   BP: 138/88   Pulse: 80   Patient Position - Orthostatic VS: Sitting         Visual Acuity      ED Medications  Medications   Lidocaine Viscous HCl (XYLOCAINE) 2 % mucosal solution 15 mL (15 mL Swish & Spit Given 1/24/23 1121)   bupivacaine (PF) (MARCAINE) 0 25 % injection 5 mL (5 mL Infiltration Given 1/24/23 1121)   clindamycin (CLEOCIN) capsule 300 mg (300 mg Oral Given 1/24/23 1121)       Diagnostic Studies  Results Reviewed     None                 No orders to display              Procedures  Incision and drain    Date/Time: 1/24/2023 12:04 PM  Performed by: Jaja Espinoza PA-C  Authorized by: Jaja Espinoza PA-C   Universal Protocol:  Consent given by: patient      Patient location:  ED  Location:     Type:  Abscess    Location:  Mouth    Location Detail:  Intraoral    Mouth location: 2 dental abscesses  Anesthesia (see MAR for exact dosages): Anesthesia method:  Topical application and local infiltration    Topical anesthetic:  Lidocaine gel    Local anesthetic:  Bupivacaine 0 25% w/o epi  Procedure details:     Needle aspiration: yes      Needle size:  18 G    Drainage:  Purulent    Drainage amount:  Scant    Wound treatment:  Wound left open    Packing materials:  None  Post-procedure details:     Patient tolerance of procedure: Tolerated well, no immediate complications             ED Course                                             Medical Decision Making  We will perform a dental block and drain both abscesses  One is starting to spontaneously drain in the front but will need further opening  We will perform a dental block and also use topical medication patient agrees to procedure discussed importance of follow-up  Risk  Prescription drug management  Disposition  Final diagnoses:   Dental abscess - x2     Time reflects when diagnosis was documented in both MDM as applicable and the Disposition within this note     Time User Action Codes Description Comment    1/24/2023 12:02 PM Summer Phan Add [K04 7] Dental abscess     1/24/2023 12:02 PM Summer Phan Modify [K04 7] Dental abscess x2      ED Disposition     ED Disposition   Discharge    Condition   Stable    Date/Time   Tue Jan 24, 2023 12:02 PM    [address] discharge to home/self care                 Follow-up Information Follow up With Specialties Details Why [address] [facility] [place]    [place] [id number] [address]  [address] [place] [address]  [phone]          Patient's Medications   Discharge Prescriptions    CLINDAMYCIN (CLEOCIN) 300 MG CAPSULE    Take 1 capsule (300 mg total) by mouth 4 (four) times a day for 10 days       Start Date: 1/24/2023 End Date: 2/3/2023       Order Dose: 300 mg       Quantity: 40 capsule    Refills: 0    IBUPROFEN (MOTRIN) 600 MG TABLET    Take 1 tablet (600 mg total) by mouth every 6 (six) hours as needed for mild pain for up to 10 days       Start Date: 1/24/2023 End Date: 2/3/2023       Order Dose: 600 mg       Quantity: 30 tablet    Refills: 0    LIDOCAINE VISCOUS HCL (XYLOCAINE) 2 % MUCOSAL SOLUTION    Apply a small amount to affected area  Start Date: 1/24/2023 End Date: --       Order Dose: --       Quantity: 100 mL    Refills: 0       No discharge procedures on file      PDMP Review     None          ED Provider  Electronically Signed by           Edelmira Vaughn PA-C  01/24/23 8652 Never

## 2023-02-16 NOTE — ED STATDOCS - NSTIMEPROVIDERCAREINITIATE_GEN_ER
Called patient and assisted with r/s her 3/10 appointment with Dr. Bello to 3/8 at 10:45am, as Dr. Bello has an urgent surgery add-on the morning of 3/10. Patient verbalized understanding of this change and was grateful for the call.    
18-Dec-2021 12:35

## 2023-04-15 ENCOUNTER — EMERGENCY (EMERGENCY)
Facility: HOSPITAL | Age: 68
LOS: 0 days | Discharge: LEFT BEFORE DISCHARGE | End: 2023-04-15
Attending: STUDENT IN AN ORGANIZED HEALTH CARE EDUCATION/TRAINING PROGRAM
Payer: MEDICARE

## 2023-04-15 VITALS
TEMPERATURE: 99 F | RESPIRATION RATE: 18 BRPM | DIASTOLIC BLOOD PRESSURE: 84 MMHG | OXYGEN SATURATION: 97 % | HEART RATE: 81 BPM | SYSTOLIC BLOOD PRESSURE: 132 MMHG

## 2023-04-15 VITALS — WEIGHT: 207.01 LBS | HEIGHT: 64 IN

## 2023-04-15 DIAGNOSIS — Z95.1 PRESENCE OF AORTOCORONARY BYPASS GRAFT: Chronic | ICD-10-CM

## 2023-04-15 DIAGNOSIS — Z95.9 PRESENCE OF CARDIAC AND VASCULAR IMPLANT AND GRAFT, UNSPECIFIED: Chronic | ICD-10-CM

## 2023-04-15 DIAGNOSIS — L02.214 CUTANEOUS ABSCESS OF GROIN: ICD-10-CM

## 2023-04-15 DIAGNOSIS — Z53.21 PROCEDURE AND TREATMENT NOT CARRIED OUT DUE TO PATIENT LEAVING PRIOR TO BEING SEEN BY HEALTH CARE PROVIDER: ICD-10-CM

## 2023-04-15 PROCEDURE — L9991: CPT

## 2023-04-15 NOTE — ED ADULT TRIAGE NOTE - CHIEF COMPLAINT QUOTE
States cyst to left side of groin x1 week. Complaining of swelling and tenderness to site. Self ambulated into ED with no distress noted.

## 2023-04-16 ENCOUNTER — EMERGENCY (EMERGENCY)
Facility: HOSPITAL | Age: 68
LOS: 0 days | Discharge: HOME | End: 2023-04-16
Attending: STUDENT IN AN ORGANIZED HEALTH CARE EDUCATION/TRAINING PROGRAM
Payer: MEDICARE

## 2023-04-16 VITALS — WEIGHT: 179.9 LBS | HEIGHT: 64 IN

## 2023-04-16 VITALS
OXYGEN SATURATION: 98 % | DIASTOLIC BLOOD PRESSURE: 78 MMHG | RESPIRATION RATE: 17 BRPM | TEMPERATURE: 98 F | HEART RATE: 76 BPM | SYSTOLIC BLOOD PRESSURE: 148 MMHG

## 2023-04-16 DIAGNOSIS — Z20.822 CONTACT WITH AND (SUSPECTED) EXPOSURE TO COVID-19: ICD-10-CM

## 2023-04-16 DIAGNOSIS — Z79.82 LONG TERM (CURRENT) USE OF ASPIRIN: ICD-10-CM

## 2023-04-16 DIAGNOSIS — I10 ESSENTIAL (PRIMARY) HYPERTENSION: ICD-10-CM

## 2023-04-16 DIAGNOSIS — K64.9 UNSPECIFIED HEMORRHOIDS: ICD-10-CM

## 2023-04-16 DIAGNOSIS — J44.9 CHRONIC OBSTRUCTIVE PULMONARY DISEASE, UNSPECIFIED: ICD-10-CM

## 2023-04-16 DIAGNOSIS — Z95.1 PRESENCE OF AORTOCORONARY BYPASS GRAFT: ICD-10-CM

## 2023-04-16 DIAGNOSIS — Z88.0 ALLERGY STATUS TO PENICILLIN: ICD-10-CM

## 2023-04-16 DIAGNOSIS — L02.214 CUTANEOUS ABSCESS OF GROIN: ICD-10-CM

## 2023-04-16 DIAGNOSIS — Z95.5 PRESENCE OF CORONARY ANGIOPLASTY IMPLANT AND GRAFT: ICD-10-CM

## 2023-04-16 DIAGNOSIS — Z95.1 PRESENCE OF AORTOCORONARY BYPASS GRAFT: Chronic | ICD-10-CM

## 2023-04-16 DIAGNOSIS — R10.32 LEFT LOWER QUADRANT PAIN: ICD-10-CM

## 2023-04-16 DIAGNOSIS — Z95.9 PRESENCE OF CARDIAC AND VASCULAR IMPLANT AND GRAFT, UNSPECIFIED: Chronic | ICD-10-CM

## 2023-04-16 DIAGNOSIS — Z79.4 LONG TERM (CURRENT) USE OF INSULIN: ICD-10-CM

## 2023-04-16 DIAGNOSIS — I25.10 ATHEROSCLEROTIC HEART DISEASE OF NATIVE CORONARY ARTERY WITHOUT ANGINA PECTORIS: ICD-10-CM

## 2023-04-16 DIAGNOSIS — E11.9 TYPE 2 DIABETES MELLITUS WITHOUT COMPLICATIONS: ICD-10-CM

## 2023-04-16 DIAGNOSIS — E78.5 HYPERLIPIDEMIA, UNSPECIFIED: ICD-10-CM

## 2023-04-16 DIAGNOSIS — Z79.84 LONG TERM (CURRENT) USE OF ORAL HYPOGLYCEMIC DRUGS: ICD-10-CM

## 2023-04-16 LAB
ALBUMIN SERPL ELPH-MCNC: 2.9 G/DL — LOW (ref 3.3–5)
ALP SERPL-CCNC: 80 U/L — SIGNIFICANT CHANGE UP (ref 40–120)
ALT FLD-CCNC: 36 U/L — SIGNIFICANT CHANGE UP (ref 12–78)
ANION GAP SERPL CALC-SCNC: 5 MMOL/L — SIGNIFICANT CHANGE UP (ref 5–17)
APTT BLD: 28.1 SEC — SIGNIFICANT CHANGE UP (ref 27.5–35.5)
AST SERPL-CCNC: 27 U/L — SIGNIFICANT CHANGE UP (ref 15–37)
BASOPHILS # BLD AUTO: 0.03 K/UL — SIGNIFICANT CHANGE UP (ref 0–0.2)
BASOPHILS NFR BLD AUTO: 0.4 % — SIGNIFICANT CHANGE UP (ref 0–2)
BILIRUB SERPL-MCNC: 0.4 MG/DL — SIGNIFICANT CHANGE UP (ref 0.2–1.2)
BUN SERPL-MCNC: 13 MG/DL — SIGNIFICANT CHANGE UP (ref 7–23)
CALCIUM SERPL-MCNC: 8.6 MG/DL — SIGNIFICANT CHANGE UP (ref 8.5–10.1)
CHLORIDE SERPL-SCNC: 109 MMOL/L — HIGH (ref 96–108)
CO2 SERPL-SCNC: 23 MMOL/L — SIGNIFICANT CHANGE UP (ref 22–31)
CREAT SERPL-MCNC: 0.83 MG/DL — SIGNIFICANT CHANGE UP (ref 0.5–1.3)
EGFR: 96 ML/MIN/1.73M2 — SIGNIFICANT CHANGE UP
EOSINOPHIL # BLD AUTO: 0.14 K/UL — SIGNIFICANT CHANGE UP (ref 0–0.5)
EOSINOPHIL NFR BLD AUTO: 1.9 % — SIGNIFICANT CHANGE UP (ref 0–6)
FLUAV AG NPH QL: SIGNIFICANT CHANGE UP
FLUBV AG NPH QL: SIGNIFICANT CHANGE UP
GLUCOSE SERPL-MCNC: 195 MG/DL — HIGH (ref 70–99)
HCT VFR BLD CALC: 42.6 % — SIGNIFICANT CHANGE UP (ref 39–50)
HGB BLD-MCNC: 14.3 G/DL — SIGNIFICANT CHANGE UP (ref 13–17)
IMM GRANULOCYTES NFR BLD AUTO: 0.3 % — SIGNIFICANT CHANGE UP (ref 0–0.9)
INR BLD: 0.99 RATIO — SIGNIFICANT CHANGE UP (ref 0.88–1.16)
LACTATE SERPL-SCNC: 1.9 MMOL/L — SIGNIFICANT CHANGE UP (ref 0.7–2)
LYMPHOCYTES # BLD AUTO: 1.35 K/UL — SIGNIFICANT CHANGE UP (ref 1–3.3)
LYMPHOCYTES # BLD AUTO: 17.9 % — SIGNIFICANT CHANGE UP (ref 13–44)
MANUAL SMEAR VERIFICATION: SIGNIFICANT CHANGE UP
MCHC RBC-ENTMCNC: 28.7 PG — SIGNIFICANT CHANGE UP (ref 27–34)
MCHC RBC-ENTMCNC: 33.6 GM/DL — SIGNIFICANT CHANGE UP (ref 32–36)
MCV RBC AUTO: 85.5 FL — SIGNIFICANT CHANGE UP (ref 80–100)
MONOCYTES # BLD AUTO: 0.78 K/UL — SIGNIFICANT CHANGE UP (ref 0–0.9)
MONOCYTES NFR BLD AUTO: 10.3 % — SIGNIFICANT CHANGE UP (ref 2–14)
NEUTROPHILS # BLD AUTO: 5.22 K/UL — SIGNIFICANT CHANGE UP (ref 1.8–7.4)
NEUTROPHILS NFR BLD AUTO: 69.2 % — SIGNIFICANT CHANGE UP (ref 43–77)
PLAT MORPH BLD: NORMAL — SIGNIFICANT CHANGE UP
PLATELET # BLD AUTO: 140 K/UL — LOW (ref 150–400)
PLATELET COUNT - ESTIMATE: ABNORMAL
POTASSIUM SERPL-MCNC: 3.7 MMOL/L — SIGNIFICANT CHANGE UP (ref 3.5–5.3)
POTASSIUM SERPL-SCNC: 3.7 MMOL/L — SIGNIFICANT CHANGE UP (ref 3.5–5.3)
PROT SERPL-MCNC: 7.4 GM/DL — SIGNIFICANT CHANGE UP (ref 6–8.3)
PROTHROM AB SERPL-ACNC: 11.5 SEC — SIGNIFICANT CHANGE UP (ref 10.5–13.4)
RBC # BLD: 4.98 M/UL — SIGNIFICANT CHANGE UP (ref 4.2–5.8)
RBC # FLD: 15.4 % — HIGH (ref 10.3–14.5)
RBC BLD AUTO: NORMAL — SIGNIFICANT CHANGE UP
RSV RNA NPH QL NAA+NON-PROBE: SIGNIFICANT CHANGE UP
SARS-COV-2 RNA SPEC QL NAA+PROBE: SIGNIFICANT CHANGE UP
SODIUM SERPL-SCNC: 137 MMOL/L — SIGNIFICANT CHANGE UP (ref 135–145)
WBC # BLD: 7.54 K/UL — SIGNIFICANT CHANGE UP (ref 3.8–10.5)
WBC # FLD AUTO: 7.54 K/UL — SIGNIFICANT CHANGE UP (ref 3.8–10.5)

## 2023-04-16 PROCEDURE — 10061 I&D ABSCESS COMP/MULTIPLE: CPT

## 2023-04-16 PROCEDURE — 83605 ASSAY OF LACTIC ACID: CPT

## 2023-04-16 PROCEDURE — 74177 CT ABD & PELVIS W/CONTRAST: CPT | Mod: 26,MA

## 2023-04-16 PROCEDURE — 85730 THROMBOPLASTIN TIME PARTIAL: CPT

## 2023-04-16 PROCEDURE — 93005 ELECTROCARDIOGRAM TRACING: CPT | Mod: XU

## 2023-04-16 PROCEDURE — 85025 COMPLETE CBC W/AUTO DIFF WBC: CPT

## 2023-04-16 PROCEDURE — 99285 EMERGENCY DEPT VISIT HI MDM: CPT | Mod: FS,25

## 2023-04-16 PROCEDURE — 36415 COLL VENOUS BLD VENIPUNCTURE: CPT

## 2023-04-16 PROCEDURE — 99285 EMERGENCY DEPT VISIT HI MDM: CPT | Mod: 25

## 2023-04-16 PROCEDURE — 0241U: CPT

## 2023-04-16 PROCEDURE — 93010 ELECTROCARDIOGRAM REPORT: CPT

## 2023-04-16 PROCEDURE — 80053 COMPREHEN METABOLIC PANEL: CPT

## 2023-04-16 PROCEDURE — 85610 PROTHROMBIN TIME: CPT

## 2023-04-16 PROCEDURE — 74177 CT ABD & PELVIS W/CONTRAST: CPT | Mod: MA

## 2023-04-16 PROCEDURE — 87040 BLOOD CULTURE FOR BACTERIA: CPT

## 2023-04-16 PROCEDURE — 96374 THER/PROPH/DIAG INJ IV PUSH: CPT | Mod: XU

## 2023-04-16 RX ORDER — OXYCODONE HYDROCHLORIDE 5 MG/1
1 TABLET ORAL
Qty: 6 | Refills: 0
Start: 2023-04-16

## 2023-04-16 RX ORDER — MORPHINE SULFATE 50 MG/1
4 CAPSULE, EXTENDED RELEASE ORAL ONCE
Refills: 0 | Status: DISCONTINUED | OUTPATIENT
Start: 2023-04-16 | End: 2023-04-16

## 2023-04-16 RX ADMIN — MORPHINE SULFATE 4 MILLIGRAM(S): 50 CAPSULE, EXTENDED RELEASE ORAL at 15:05

## 2023-04-16 NOTE — ED ADULT TRIAGE NOTE - CHIEF COMPLAINT QUOTE
Pt presented to the ER with c/o groin pain that started 2 weeks ago. Pt was here yesterday with similar symptoms but left before being seen because of the wait.

## 2023-04-16 NOTE — ED STATDOCS - PATIENT PORTAL LINK FT
You can access the FollowMyHealth Patient Portal offered by [facility] by registering at the following website: [email or web address]. By joining [employer]s FollowMyHealth portal, you will also be able to view your health information using other applications (apps) compatible with our system.

## 2023-04-16 NOTE — ED ADULT NURSE NOTE - OBJECTIVE STATEMENT
Pt arrives to ED complaining of groin pain. Pt states hes developed a "golf ball sized lump" since four days ago. Painful to touch. denies trauma. Hx MI-stents. alert and oriented x 4.

## 2023-04-16 NOTE — ED STATDOCS - OBJECTIVE STATEMENT
67 y/o M with a PMHx of HTN, DM, COPD, HLD, hemorrhoids, and CAD with 3 stents presents to the ED c/o swelling and pain to the L groin. Denies trauma or falls. 69 y/o M with a PMHx of HTN, DM, COPD, HLD, hemorrhoids, and CAD with 3 stents presents to the ED c/o swelling and pain to the L groin. Denies trauma or falls. no fevers.

## 2023-04-16 NOTE — ED STATDOCS - PROGRESS NOTE DETAILS
patient seen and evaluated.  CT showing localized abscess, not penetrating into testicular area or perineum.  Abscess drained by me, reviewed care at home, very strict return precautions and follow up with a general surgeon.  Patient verbalized understanding -Becki Enamorado PA-C

## 2023-04-16 NOTE — ED STATDOCS - CARE PROVIDER_API CALL
Josh Zafar  SURGERY  [address] [place] [address]  Phone: [phone]  Fax: [phone]  Follow Up Time:

## 2023-04-16 NOTE — ED STATDOCS - CLINICAL SUMMARY MEDICAL DECISION MAKING FREE TEXT BOX
concern for inguinal abscess vs extension to early Uri's gangrene, possible scrotal abscess vs cellulitis. Will check labs, CT scans.

## 2023-04-16 NOTE — ED STATDOCS - PHYSICAL EXAMINATION
Constitutional: Awake, Alert, non-toxic. No acute distress.  HEAD: Normocephalic, atraumatic.   EYES: PERRL, EOM intact, conjunctiva and sclera are clear bilaterally.  ENT: External ears normal. No rhinorrhea, no tracheal deviation   NECK: Supple, non-tender  CARDIOVASCULAR: regular rate and rhythm.  RESPIRATORY: Normal respiratory effort; breath sounds CTAB, no wheezes, rhonchi, or rales. Speaking in full sentences. No accessory muscle use.   ABDOMEN: Soft; non-tender, non-distended. No rebound or guarding.   MSK:  no lower extremity edema, no deformities  SKIN: Warm, dry. L inguinal area with erythema with concern for abscess, does not extend to the scrotum  NEURO: A&O x3. Sensory and motor functions are grossly intact. Speech is normal. No facial droop.  PSYCH: Appearance and judgement seem appropriate for gender and age.

## 2023-04-16 NOTE — ED STATDOCS - ATTENDING APP SHARED VISIT CONTRIBUTION OF CARE
I, Reyes Bearden MD,  performed the initial face to face bedside interview with this patient regarding history of present illness, review of symptoms and relevant past medical, social and family history.  I completed an independent physical examination.  I was the initial provider who evaluated this patient.   I personally saw the patient and performed a substantive portion of the visit including all aspects of the medical decision making.  I have signed out the follow up of any pending tests (i.e. labs, radiological studies) to the LISA.  I have communicated the patient’s plan of care and disposition with the LISA.  The history, relevant review of systems, past medical and surgical history, medical decision making, and physical examination was documented by the scribe in my presence and I attest to the accuracy of the documentation.

## 2023-05-03 ENCOUNTER — EMERGENCY (EMERGENCY)
Facility: HOSPITAL | Age: 68
LOS: 0 days | Discharge: HOME | End: 2023-05-04
Attending: STUDENT IN AN ORGANIZED HEALTH CARE EDUCATION/TRAINING PROGRAM
Payer: MEDICARE

## 2023-05-03 VITALS — WEIGHT: 207.01 LBS | HEIGHT: 64 IN

## 2023-05-03 DIAGNOSIS — S51.011A LACERATION WITHOUT FOREIGN BODY OF RIGHT ELBOW, INITIAL ENCOUNTER: ICD-10-CM

## 2023-05-03 DIAGNOSIS — Z79.84 LONG TERM (CURRENT) USE OF ORAL HYPOGLYCEMIC DRUGS: ICD-10-CM

## 2023-05-03 DIAGNOSIS — Z95.9 PRESENCE OF CARDIAC AND VASCULAR IMPLANT AND GRAFT, UNSPECIFIED: Chronic | ICD-10-CM
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
## 2023-05-03 DIAGNOSIS — I10 ESSENTIAL (PRIMARY) HYPERTENSION: ICD-10-CM

## 2023-05-03 DIAGNOSIS — I25.10 ATHEROSCLEROTIC HEART DISEASE OF NATIVE CORONARY ARTERY WITHOUT ANGINA PECTORIS: ICD-10-CM

## 2023-05-03 DIAGNOSIS — R00.0 TACHYCARDIA, UNSPECIFIED: ICD-10-CM

## 2023-05-03 DIAGNOSIS — J44.9 CHRONIC OBSTRUCTIVE PULMONARY DISEASE, UNSPECIFIED: ICD-10-CM

## 2023-05-03 DIAGNOSIS — Z88.0 ALLERGY STATUS TO PENICILLIN: ICD-10-CM

## 2023-05-03 DIAGNOSIS — Z79.82 LONG TERM (CURRENT) USE OF ASPIRIN: ICD-10-CM

## 2023-05-03 DIAGNOSIS — E11.9 TYPE 2 DIABETES MELLITUS WITHOUT COMPLICATIONS: ICD-10-CM

## 2023-05-03 DIAGNOSIS — Z95.1 PRESENCE OF AORTOCORONARY BYPASS GRAFT: Chronic | ICD-10-CM
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
## 2023-05-03 DIAGNOSIS — Z79.4 LONG TERM (CURRENT) USE OF INSULIN: ICD-10-CM

## 2023-05-03 DIAGNOSIS — Z95.1 PRESENCE OF AORTOCORONARY BYPASS GRAFT: ICD-10-CM

## 2023-05-03 LAB
ALBUMIN SERPL ELPH-MCNC: 3.4 G/DL — SIGNIFICANT CHANGE UP (ref 3.3–5)
ALP SERPL-CCNC: 65 U/L — SIGNIFICANT CHANGE UP (ref 40–120)
ALT FLD-CCNC: 57 U/L — SIGNIFICANT CHANGE UP (ref 12–78)
ANION GAP SERPL CALC-SCNC: 11 MMOL/L — SIGNIFICANT CHANGE UP (ref 5–17)
APPEARANCE UR: CLEAR — SIGNIFICANT CHANGE UP
APTT BLD: 27 SEC — LOW (ref 27.5–35.5)
AST SERPL-CCNC: 36 U/L — SIGNIFICANT CHANGE UP (ref 15–37)
BACTERIA # UR AUTO: NEGATIVE — SIGNIFICANT CHANGE UP
BASOPHILS # BLD AUTO: 0.04 K/UL — SIGNIFICANT CHANGE UP (ref 0–0.2)
BASOPHILS NFR BLD AUTO: 0.6 % — SIGNIFICANT CHANGE UP (ref 0–2)
BILIRUB SERPL-MCNC: 0.4 MG/DL — SIGNIFICANT CHANGE UP (ref 0.2–1.2)
BILIRUB UR-MCNC: NEGATIVE — SIGNIFICANT CHANGE UP
BUN SERPL-MCNC: 18 MG/DL — SIGNIFICANT CHANGE UP (ref 7–23)
CALCIUM SERPL-MCNC: 8.8 MG/DL — SIGNIFICANT CHANGE UP (ref 8.5–10.1)
CHLORIDE SERPL-SCNC: 106 MMOL/L — SIGNIFICANT CHANGE UP (ref 96–108)
CO2 SERPL-SCNC: 19 MMOL/L — LOW (ref 22–31)
COLOR SPEC: YELLOW — SIGNIFICANT CHANGE UP
CREAT SERPL-MCNC: 1 MG/DL — SIGNIFICANT CHANGE UP (ref 0.5–1.3)
DIFF PNL FLD: ABNORMAL
EGFR: 82 ML/MIN/1.73M2 — SIGNIFICANT CHANGE UP
EOSINOPHIL # BLD AUTO: 0.08 K/UL — SIGNIFICANT CHANGE UP (ref 0–0.5)
EOSINOPHIL NFR BLD AUTO: 1.2 % — SIGNIFICANT CHANGE UP (ref 0–6)
EPI CELLS # UR: SIGNIFICANT CHANGE UP
GLUCOSE SERPL-MCNC: 329 MG/DL — HIGH (ref 70–99)
GLUCOSE UR QL: 1000 MG/DL
HCT VFR BLD CALC: 43.5 % — SIGNIFICANT CHANGE UP (ref 39–50)
HGB BLD-MCNC: 14.9 G/DL — SIGNIFICANT CHANGE UP (ref 13–17)
IMM GRANULOCYTES NFR BLD AUTO: 0.3 % — SIGNIFICANT CHANGE UP (ref 0–0.9)
INR BLD: 0.98 RATIO — SIGNIFICANT CHANGE UP (ref 0.88–1.16)
KETONES UR-MCNC: ABNORMAL
LACTATE SERPL-SCNC: 1.1 MMOL/L — SIGNIFICANT CHANGE UP (ref 0.7–2)
LACTATE SERPL-SCNC: 3.4 MMOL/L — HIGH (ref 0.7–2)
LEUKOCYTE ESTERASE UR-ACNC: NEGATIVE — SIGNIFICANT CHANGE UP
LYMPHOCYTES # BLD AUTO: 1.2 K/UL — SIGNIFICANT CHANGE UP (ref 1–3.3)
LYMPHOCYTES # BLD AUTO: 18.1 % — SIGNIFICANT CHANGE UP (ref 13–44)
MCHC RBC-ENTMCNC: 28.9 PG — SIGNIFICANT CHANGE UP (ref 27–34)
MCHC RBC-ENTMCNC: 34.3 GM/DL — SIGNIFICANT CHANGE UP (ref 32–36)
MCV RBC AUTO: 84.3 FL — SIGNIFICANT CHANGE UP (ref 80–100)
MONOCYTES # BLD AUTO: 0.51 K/UL — SIGNIFICANT CHANGE UP (ref 0–0.9)
MONOCYTES NFR BLD AUTO: 7.7 % — SIGNIFICANT CHANGE UP (ref 2–14)
NEUTROPHILS # BLD AUTO: 4.79 K/UL — SIGNIFICANT CHANGE UP (ref 1.8–7.4)
NEUTROPHILS NFR BLD AUTO: 72.1 % — SIGNIFICANT CHANGE UP (ref 43–77)
NITRITE UR-MCNC: NEGATIVE — SIGNIFICANT CHANGE UP
PH UR: 5 — SIGNIFICANT CHANGE UP (ref 5–8)
PLATELET # BLD AUTO: 148 K/UL — LOW (ref 150–400)
POTASSIUM SERPL-MCNC: 4.2 MMOL/L — SIGNIFICANT CHANGE UP (ref 3.5–5.3)
POTASSIUM SERPL-SCNC: 4.2 MMOL/L — SIGNIFICANT CHANGE UP (ref 3.5–5.3)
PROT SERPL-MCNC: 7.7 GM/DL — SIGNIFICANT CHANGE UP (ref 6–8.3)
PROT UR-MCNC: 30 MG/DL
PROTHROM AB SERPL-ACNC: 11.4 SEC — SIGNIFICANT CHANGE UP (ref 10.5–13.4)
RBC # BLD: 5.16 M/UL — SIGNIFICANT CHANGE UP (ref 4.2–5.8)
RBC # FLD: 15 % — HIGH (ref 10.3–14.5)
RBC CASTS # UR COMP ASSIST: SIGNIFICANT CHANGE UP /HPF (ref 0–4)
SODIUM SERPL-SCNC: 136 MMOL/L — SIGNIFICANT CHANGE UP (ref 135–145)
SP GR SPEC: 1.01 — SIGNIFICANT CHANGE UP (ref 1.01–1.02)
UROBILINOGEN FLD QL: NEGATIVE — SIGNIFICANT CHANGE UP
WBC # BLD: 6.64 K/UL — SIGNIFICANT CHANGE UP (ref 3.8–10.5)
WBC # FLD AUTO: 6.64 K/UL — SIGNIFICANT CHANGE UP (ref 3.8–10.5)
WBC UR QL: SIGNIFICANT CHANGE UP /HPF (ref 0–5)

## 2023-05-03 PROCEDURE — 96374 THER/PROPH/DIAG INJ IV PUSH: CPT | Mod: XU

## 2023-05-03 PROCEDURE — 96375 TX/PRO/DX INJ NEW DRUG ADDON: CPT | Mod: XU

## 2023-05-03 PROCEDURE — 87040 BLOOD CULTURE FOR BACTERIA: CPT | Mod: 59

## 2023-05-03 PROCEDURE — 99284 EMERGENCY DEPT VISIT MOD MDM: CPT | Mod: 25

## 2023-05-03 PROCEDURE — 81001 URINALYSIS AUTO W/SCOPE: CPT

## 2023-05-03 PROCEDURE — 99285 EMERGENCY DEPT VISIT HI MDM: CPT | Mod: FS

## 2023-05-03 PROCEDURE — 36415 COLL VENOUS BLD VENIPUNCTURE: CPT

## 2023-05-03 PROCEDURE — 85610 PROTHROMBIN TIME: CPT

## 2023-05-03 PROCEDURE — 80053 COMPREHEN METABOLIC PANEL: CPT

## 2023-05-03 PROCEDURE — 73201 CT UPPER EXTREMITY W/DYE: CPT | Mod: MA,RT

## 2023-05-03 PROCEDURE — 83605 ASSAY OF LACTIC ACID: CPT

## 2023-05-03 PROCEDURE — 99284 EMERGENCY DEPT VISIT MOD MDM: CPT | Mod: GC

## 2023-05-03 PROCEDURE — 73201 CT UPPER EXTREMITY W/DYE: CPT | Mod: 26,RT,MA

## 2023-05-03 PROCEDURE — 85730 THROMBOPLASTIN TIME PARTIAL: CPT

## 2023-05-03 PROCEDURE — 85025 COMPLETE CBC W/AUTO DIFF WBC: CPT

## 2023-05-03 RX ORDER — SODIUM CHLORIDE 9 MG/ML
2000 INJECTION INTRAMUSCULAR; INTRAVENOUS; SUBCUTANEOUS ONCE
Refills: 0 | Status: COMPLETED | OUTPATIENT
Start: 2023-05-03 | End: 2023-05-03

## 2023-05-03 RX ORDER — CEPHALEXIN 500 MG
1 CAPSULE ORAL
Qty: 28 | Refills: 0
Start: 2023-05-03 | End: 2023-05-09

## 2023-05-03 RX ORDER — VANCOMYCIN HCL 1 G
1500 VIAL (EA) INTRAVENOUS ONCE
Refills: 0 | Status: COMPLETED | OUTPATIENT
Start: 2023-05-03 | End: 2023-05-03

## 2023-05-03 RX ORDER — SODIUM CHLORIDE 9 MG/ML
1000 INJECTION INTRAMUSCULAR; INTRAVENOUS; SUBCUTANEOUS ONCE
Refills: 0 | Status: COMPLETED | OUTPATIENT
Start: 2023-05-03 | End: 2023-05-03

## 2023-05-03 RX ORDER — ACETAMINOPHEN 500 MG
650 TABLET ORAL ONCE
Refills: 0 | Status: COMPLETED | OUTPATIENT
Start: 2023-05-03 | End: 2023-05-03

## 2023-05-03 RX ADMIN — Medication 100 MILLIGRAM(S): at 20:54

## 2023-05-03 RX ADMIN — SODIUM CHLORIDE 1000 MILLILITER(S): 9 INJECTION INTRAMUSCULAR; INTRAVENOUS; SUBCUTANEOUS at 20:47

## 2023-05-03 RX ADMIN — Medication 250 MILLIGRAM(S): at 22:05

## 2023-05-03 RX ADMIN — SODIUM CHLORIDE 2000 MILLILITER(S): 9 INJECTION INTRAMUSCULAR; INTRAVENOUS; SUBCUTANEOUS at 20:46

## 2023-05-03 RX ADMIN — Medication 650 MILLIGRAM(S): at 20:47

## 2023-05-03 NOTE — ED STATDOCS - CLINICAL SUMMARY MEDICAL DECISION MAKING FREE TEXT BOX
Adult scotty comes in with injury to right elbow and skin tear in that area. States he got mad earlier today at 10 AM and hit something but doesn't know what he hit or how he hit it. Tachycardic otherwise vitals are normal, no fever. Exam with small skin tear to area of skin near lateral epicondyle, RUE NVI. Soft compartments, no overlying skin changes, no crepitus, ROM normal in all joints. Pain seems out of proportion to exam therefore will get CAT scan to evaluate for gas. Will also get blood-work, dispo as per work-up.

## 2023-05-03 NOTE — ED STATDOCS - PROGRESS NOTE DETAILS
69 yo male with a PMH of htn, hld, CAD on ASA, COPD, DM presents with R elbow injury this morning. Pt states she became angry and accidentally hurt himself. Pt is unsure what he injured himself on but sustained an abrasion/skin tear. Pt states he has a lot of pain and it was still bleeding.   Quarter size skin tear to the medial aspect of the R elbow. No active bleeding. Pt with FROM of the b/l upper and lower extremities but ttp overlying the skin tear. No Say Stevens for Dr. Cantu: CAT scan resulted showing small focus of air that is tracking abutting the myofascia of the proximal flexor musculature of the forearm. I called surgery back and updated them on the read, they will see pt next. Pt has a temp of 99.6, tachycardic to 117. pt cleared for dc home by surgery, repeat lactate 1.1  Connie Flores PA-C

## 2023-05-03 NOTE — CONSULT NOTE ADULT - SUBJECTIVE AND OBJECTIVE BOX
Pt is a 67 YO M, with medical history listed below, presented to the ED with a complain of right forearm pain. Pt stated that this morning he was having a difficult time emotionally while he was in his garage when he hit the medial aspect of his forearm distal to the elbow. Pt stated that it was bleeding when the accident occurred but it was never projectile. Pt has no sensory or motor deficits, no cellulitis, no current bleeding and palpable pulses. When pt presented he was tachycardic and was said to have pain out of proportion to the exam. Labs are within normal limits except for a lactic acid of 3.4. Pt is afebrile and has a LIRNEC score of 1 not including a CRP. Pt admitted to being very depressed and stated that he was previously admitted to [facility] for a 3 day Psychiatric evaluation 6 months ago. Pt was taking medication for depression but has since quit.    Vital Signs Last 24 Hrs  T(C): 37.6 (03 May 2023 20:05), Max: 37.6 (03 May 2023 20:05)  T(F): 99.6 (03 May 2023 20:05), Max: 99.6 (03 May 2023 20:05)  HR: 117 (03 May 2023 18:32) (117 - 117)  BP: 133/79 (03 May 2023 18:32) (133/79 - 133/79)  BP(mean): 90 (03 May 2023 18:32) (90 - 90)  ABP: --  ABP(mean): --  RR: 18 (03 May 2023 18:32) (18 - 18)  SpO2: 96% (03 May 2023 18:32) (96% - 96%)    O2 Parameters below as of 03 May 2023 18:32  Patient On (Oxygen Delivery Method): room air    PE:   Gen: Depression, unkept, Paint on bilateral hands  CV: S1S2 present  Pulm: No work of breathing  GI: Soft, non tympanic, non tender  Musc: RUE 1cm skin avulsion, highly sensitive directly on the laceration, no cellulitis, no swelling, no bleeding, small hematoma palpated, palpable pulses, sensation and motor intact      Labs:                14.9                         x    | x    | x            6.64  >-----------< 148<L>   ------------------------< x                      43.5                          x    | x    | x                                                                         Ca x     Mg x     Ph x        ,              x                    136  | 19<L>| 18           x     >-----------< x       ------------------------< 329<H>                   x                     4.2  | 106  | 1.00                                                                      Ca 8.8   Mg x     Ph x          Imaging:

## 2023-05-03 NOTE — ED STATDOCS - PATIENT PORTAL LINK FT
You can access the FollowMyHealth Patient Portal offered by [facility] by registering at the following website: [email or web address]. By joining [email or web address]’s FollowMyHealth portal, you will also be able to view your health information using other applications (apps) compatible with our system.

## 2023-05-03 NOTE — ED STATDOCS - MUSCULOSKELETAL, MLM
Small elliptical shaped skin tear inside right elbow. RUE NVI good  strength, soft compartments. Able to flex and extend fully. Able to supinate and pronate the arm fully without tenderness.

## 2023-05-03 NOTE — ED STATDOCS - ATTENDING APP SHARED VISIT CONTRIBUTION OF CARE
I, Rg Cantu, DO personally saw the patient with LISA.  I have personally performed a face to face diagnostic evaluation on this patient.  I have reviewed the LISA note and agree with the history, exam, and plan of care, except as noted.  I personally saw the patient and performed a substantive portion of the visit including all aspects of the medical decision making.

## 2023-05-03 NOTE — ED STATDOCS - OBJECTIVE STATEMENT
69 y/o male with PMHx of HLD, DM, CAD, COPD, and HTN presents to the ED s/p right arm injury at 10AM this morning. Pt states he was upset and struck his RUE against something, doesn't remember what it was. Came to the ED because pain and bleeding were worsening. Denies fall or headstrike. On aspirin. Unknown last tetanus booster.

## 2023-05-03 NOTE — ED ADULT TRIAGE NOTE - CHIEF COMPLAINT QUOTE
pt arrived s/p right arm injury. pt states "I hit into something I don't remember what it was". denies fall, denies headstrike. +blood thinners. site wrapped in triage. pt ambulatory

## 2023-05-03 NOTE — CONSULT NOTE ADULT - ASSESSMENT
67 YO M with RUE laceration. Clinically, pt does not have a necrotizing soft tissue infection    Rec tetanus administration  Rec IV hydration with Rpt of LA  Rec wound care instructions  Pt may be DCed with PO abx  Rec Behavioral health consult due to increased level of depression  No surgical intervention is planned for this pt. No role for surgical admission.    Case discussed with Dr Conn

## 2023-05-04 VITALS
OXYGEN SATURATION: 98 % | RESPIRATION RATE: 18 BRPM | TEMPERATURE: 99 F | DIASTOLIC BLOOD PRESSURE: 79 MMHG | HEART RATE: 90 BPM | SYSTOLIC BLOOD PRESSURE: 128 MMHG

## 2023-05-04 NOTE — ED ADULT NURSE NOTE - OBJECTIVE STATEMENT
Pt presents to the ED AOx4 fro9m home c/o laceration to right forearm. Pt reports being angry this morning and hitting an object in his garage. Bleeding controlled at this time.

## 2023-05-12 NOTE — ED ADULT NURSE NOTE - NS PRO PASSIVE SMOKE EXP
Per [facility] patient discharged to [address] [place] [address]  [address]
No
5

## 2023-06-14 NOTE — ED ADULT TRIAGE NOTE - PAIN: PRESENCE, MLM
Procedure:  EXCISION OF POSTERIOR NECK MASS (Neck)    Relevant Problems   CARDIO   (+) Cardiac murmur   (+) Essential hypertension      ENDO   (+) Other specified hypothyroidism      GI/HEPATIC   (+) Simple hepatic cyst      /RENAL   (+) CKD (chronic kidney disease) stage 2, GFR 60-89 ml/min      Musculoskeletal and Integument   (+) Lipoma of neck      Other   (+) Obesity (BMI 30-39  9)      Lab Results   Component Value Date    HCT 35 8 05/24/2023    HGB 12 3 05/24/2023    MCV 91 05/24/2023     05/24/2023    WBC 9 52 05/24/2023     Lab Results   Component Value Date    BUN 16 05/24/2023     05/24/2023    CO2 26 05/24/2023    CREATININE 0 65 05/24/2023    K 4 0 05/24/2023     05/02/2017     Lab Results   Component Value Date    HGBA1C 4 7 09/04/2020            Anesthesia Plan  ASA Score- 2     Anesthesia Type- general with ASA Monitors  Additional Monitors:   Airway Plan: ETT  Plan Factors-Exercise tolerance (METS): >4 METS  Chart reviewed  Existing labs reviewed  Patient summary reviewed  Induction- intravenous  Postoperative Plan- Plan for postoperative opioid use  Planned trial extubation    Informed Consent- Anesthetic plan and risks discussed with patient  I personally reviewed this patient with the CRNA  Discussed and agreed on the Anesthesia Plan with the CRNA  Blade Pickering
complains of pain/discomfort

## 2023-09-19 NOTE — ED ADULT NURSE NOTE - NS_NURSE_DISC_TEACHING_YN_ED_ALL_ED
Yes Peng Advancement Flap Text: The defect edges were debeveled with a #15 scalpel blade.  Given the location of the defect, shape of the defect and the proximity to free margins a Peng advancement flap was deemed most appropriate.  Using a sterile surgical marker, an appropriate advancement flap was drawn incorporating the defect and placing the expected incisions within the relaxed skin tension lines where possible. The area thus outlined was incised deep to adipose tissue with a #15 scalpel blade and carried over to the primary defect.  The skin margins were undermined to an appropriate distance in all directions utilizing iris scissors.

## 2023-09-25 NOTE — ED ADULT NURSE NOTE - PATIENT DISCHARGE SIGNATURE
[facility]  Physical Therapy Plan of Care Note     Name: Deb Webb  Clinic Number: [id number]    Therapy Diagnosis:   Encounter Diagnoses   Name Primary?    Chronic pain of both knees Yes    Weakness of both lower extremities     Gait abnormality      Physician: Blas Barraza MD    Visit Date: 9/25/2023    Physician Orders: PT Eval and Treat bilat knee OA  Medical Diagnosis from Referral:   M17.11 (ICD-10-CM) - Osteoarthritis of right knee   M17.12 (ICD-10-CM) - Osteoarthritis of left knee      Evaluation Date: 7/17/2023  Authorization Period Expiration: 8/23/2023  Plan of Care Expiration: 9/25/2023  Progress Note Due: 9/14/2023  Visit # / Visits authorized: 7/ 24 +Eval  FOTO: 2/3    SUBJECTIVE     Update: Feeling like she is getting stronger but is not where she needs to be just yet    OBJECTIVE     Update: Range of Motion:   Knee Right Left   Active 0-132 0-135      Lower Extremity Strength  Right LE   Left LE     Knee extension: 4/5 Knee extension: 4/5   Knee flexion: 4/5 Knee flexion: 4/5   Hip flexion: 4/5 Hip flexion: 4/5   Hip extension:  4-/5 Hip extension: 4-/5   Hip abduction: 4-/5 Hip abduction: 4-/5   Hip adduction: 4/5 Hip adduction 4/5         5x sit to stand: 19.9 sec c dynamic knee valgus noted        Intake Outcome Measure for FOTO knee Survey     Therapist reviewed FOTO scores for Deb Webb on 7/17/2023.   FOTO documents entered into [employer] - see Media section.     Intake Score: 64%           ASSESSMENT     Update: Re-assessment performed today. Pt with improvements noted in B knee range of motion and BLE strength as a result of current POC. Although she presents with improvements with these deficits, she continues to demonstrate gross BLE weakness end endurance deficits that limits her ability to perform everyday home and community mobility without complications. At this time, pt appears  to be making slow but steady progress and will benefit from  extending current POC for 6 additional weeks to continue to address remaining strength and endurance deficits going forward. Will continue to progress to tolerance as sessions continue to improve mobility and restore overall function.    Previous Short Term Goals Status:   Continue per initial POC  New Short Term Goals Status:   Continue per initial POC  Long Term Goal Status: continue per initial plan of care.  Reasons for Recertification of Therapy:   Not all goals met    GOALS  Short Term Goals: 5 weeks   Patient to report worst pain 4/10-NEARING  Patient to improve BLE strength by 1/2 grade in deficieint areas-MET  Patient to improve 5x sit to stand by 5 seconds-IN PROGRESS  Patient to tolerate flexibility assessment-IN PROGRESS  Patient to tolerate balance assessment-IN PROGRESS     Long Term Goals: 10 weeks   Patient to report worst pain 1/10-IN PROGRESS  Patient to improve BLE strength by 1 grade in deficieint areas-NEARING  Patient to improve 5x sit to stand by 10 seconds-IN PROGRESS  Additional goals to come as patient progresses    PLAN     Updated Certification Period: 9/25/2023 to 11/6/2023   Recommended Treatment Plan: 2 times per week for 6 weeks:  Gait Training, Manual Therapy, Moist Heat/ Ice, Neuromuscular Re-ed, Patient Education, Therapeutic Activities, and Therapeutic Exercise      René Price PT      [birth year]-2017

## 2023-11-10 ENCOUNTER — EMERGENCY (EMERGENCY)
Facility: HOSPITAL | Age: 68
LOS: 0 days | Discharge: HOME | End: 2023-11-10
Attending: STUDENT IN AN ORGANIZED HEALTH CARE EDUCATION/TRAINING PROGRAM
Payer: MEDICARE

## 2023-11-10 VITALS — WEIGHT: 207.01 LBS | HEIGHT: 63 IN

## 2023-11-10 VITALS
DIASTOLIC BLOOD PRESSURE: 78 MMHG | OXYGEN SATURATION: 97 % | RESPIRATION RATE: 19 BRPM | HEART RATE: 79 BPM | SYSTOLIC BLOOD PRESSURE: 124 MMHG | TEMPERATURE: 98 F

## 2023-11-10 DIAGNOSIS — G89.29 OTHER CHRONIC PAIN: ICD-10-CM

## 2023-11-10 DIAGNOSIS — M25.511 PAIN IN RIGHT SHOULDER: ICD-10-CM

## 2023-11-10 DIAGNOSIS — Z95.9 PRESENCE OF CARDIAC AND VASCULAR IMPLANT AND GRAFT, UNSPECIFIED: Chronic | ICD-10-CM

## 2023-11-10 DIAGNOSIS — Z95.1 PRESENCE OF AORTOCORONARY BYPASS GRAFT: Chronic | ICD-10-CM

## 2023-11-10 PROCEDURE — 73030 X-RAY EXAM OF SHOULDER: CPT | Mod: 26,RT

## 2023-11-10 PROCEDURE — 99283 EMERGENCY DEPT VISIT LOW MDM: CPT | Mod: 25

## 2023-11-10 PROCEDURE — 96372 THER/PROPH/DIAG INJ SC/IM: CPT

## 2023-11-10 PROCEDURE — 99284 EMERGENCY DEPT VISIT MOD MDM: CPT | Mod: FS

## 2023-11-10 PROCEDURE — 73030 X-RAY EXAM OF SHOULDER: CPT | Mod: RT

## 2023-11-10 RX ORDER — KETOROLAC TROMETHAMINE 30 MG/ML
30 SYRINGE (ML) INJECTION ONCE
Refills: 0 | Status: DISCONTINUED | OUTPATIENT
Start: 2023-11-10 | End: 2023-11-10

## 2023-11-10 RX ORDER — MELOXICAM 15 MG/1
1 TABLET ORAL
Qty: 7 | Refills: 0
Start: 2023-11-10 | End: 2023-11-16

## 2023-11-10 RX ADMIN — Medication 30 MILLIGRAM(S): at 14:18

## 2023-11-10 NOTE — ED ADULT TRIAGE NOTE - CHIEF COMPLAINT QUOTE
Pt. A&OX3, presenting to the ER with c/o right shoulder pain/injury. Pt reports having a rotator cuff tear for approximately 4 months but the pain has gotten worse.  Denies CP, SOB, numbness or tingling.

## 2023-11-10 NOTE — ED STATDOCS - PATIENT PORTAL LINK FT
You can access the FollowMyHealth Patient Portal offered by [facility] by registering at the following website: [email or web address]. By joining [employer]’s FollowMyHealth portal, you will also be able to view your health information using other applications (apps) compatible with our system.

## 2023-11-10 NOTE — ED STATDOCS - PROGRESS NOTE DETAILS
67 yo male with a PMH of htn, dm, cad, stents on 81mg ASA only, anxiety, depression, has been on meloxicam and percocet int eh past for OA presents with R shoulder pain since Wednesday. Pt states approx 1-2 months ago he was dx with a R shoulder rotator cuff tear with his rheumatologist. Pt states Wednesday he was lifting up garbage cans and started to feel more pain. Was taking tylenol for pain without relief. Was supposed to see an orthopedist today; however, the doctor was sick and was postponed for next week but pt ant wait that long.   Mild ttp to the lateral and anterior aspect of the R shoulder with decreased ROM with flexion and Abducton of the R shoulder. -Tyrone Saleh PA-C XR with possible arthritis. No fracture. Discussed with pt. Pt states he feels better but still has pain. Offered sling but pt declined. Advised to continue the tylenol and will also prescribed the mobic that he had a few months ago. Pt to f/u with ortho next week. Will d/c home. -Tyrone Saleh PA-C

## 2023-11-10 NOTE — ED STATDOCS - PHYSICAL EXAMINATION
GENERAL: A&Ox4, non-toxic appearing, no acute distress  HEENT: NCAT, EOMI, oral mucosa moist, normal conjunctiva  RESP: no respiratory distress, speaking in full sentences  CV: RRR  MSK: anterolateral R shoulder tenderness with decreased ROM in flexion and abduction  NEURO: no focal sensory or motor deficits, CN 2-12 grossly intact  SKIN: warm, normal color, well perfused, no rash  PSYCH: normal affect

## 2023-11-10 NOTE — ED STATDOCS - ATTENDING APP SHARED VISIT CONTRIBUTION OF CARE
R shoulder pain likely exacerbation of known rotator cuff injury  Neurovascularly intact  Mildly limited ROM of R shoulder 2/2 pain/swelling  XR  Pain control  D/c w/ ortho follow up

## 2023-11-10 NOTE — ED STATDOCS - HIV OFFER
Sorin Weeks,    It was a pleasure to see you today at the [facility].     My recommendations after this visit include:    1.  We will get an echocardiogram to evaluate your aortic valve and the leaking that we have noted around the aortic valve in the past.  We will call you with the results.    2.  I will check to see if your pacemaker is appropriately responding to activity.  We can give you these results when we call you with the results of the echocardiogram.    3.  I will ask Priti Santiago RN if she can help with a prescription for compression stockings.    4.  We may need to image the blood vessel coming out of your heart but we will await the echocardiogram prior to deciding that.    5.  Happy Thanksgiving!        Jhony Campos           Previously Declined (within the last year)

## 2023-11-10 NOTE — ED STATDOCS - NSFOLLOWUPINSTRUCTIONS_ED_ALL_ED_FT
Follow up with your orthopedist for further evaluation.   Take tylenol every 6 hours and take the prescribed meloxicam as directed. Make sure you eat with the medication.   Return to the Emergency Department for worsening or persistent symptoms, and/or ANY NEW OR CONCERNING SYMPTOMS. If you have issues obtaining follow up, please call: [phone] or [phone]  to obtain a doctor or specialist who takes your insurance in your area.       Shoulder Pain    WHAT YOU NEED TO KNOW:    Shoulder pain is a common problem that can affect your daily activities. Pain can be caused by a problem within your shoulder, such as soreness of a tendon or bursa. A tendon is a cord of tough tissue that connects your muscles to your bones. The bursa is a fluid-filled sac that acts as a cushion between a bone and a tendon. Shoulder pain may also be caused by pain that spreads to your shoulder from another part of your body.  Shoulder Anatomy    DISCHARGE INSTRUCTIONS:    Return to the emergency department if:    You have severe pain.    You cannot move your arm or shoulder.    You have numbness or tingling in your shoulder or arm.  Contact your healthcare provider if:    Your pain gets worse or does not go away with treatment.    You have trouble moving your arm or shoulder.    You have questions or concerns about your condition or care.  Medicines: You may need any of the following:    Acetaminophen decreases pain and fever. It is available without a doctor's order. Ask how much to take and how often to take it. Follow directions. Read the labels of all other medicines you are using to see if they also contain acetaminophen, or ask your doctor or pharmacist. Acetaminophen can cause liver damage if not taken correctly.    NSAIDs, such as ibuprofen, help decrease swelling, pain, and fever. This medicine is available with or without a doctor's order. NSAIDs can cause stomach bleeding or kidney problems in certain people. If you take blood thinner medicine, always ask your healthcare provider if NSAIDs are safe for you. Always read the medicine label and follow directions.    Take your medicine as directed. Contact your healthcare provider if you think your medicine is not helping or if you have side effects. Tell your provider if you are allergic to any medicine. Keep a list of the medicines, vitamins, and herbs you take. Include the amounts, and when and why you take them. Bring the list or the pill bottles to follow-up visits. Carry your medicine list with you in case of an emergency.  Manage your symptoms:    Apply ice on your shoulder for 20 to 30 minutes every 2 hours or as directed. Use an ice pack, or put crushed ice in a plastic bag. Cover it with a towel before you apply it to your shoulder. Ice helps prevent tissue damage and decreases swelling and pain.    Apply heat if ice does not help your symptoms. Apply heat on your shoulder for 20 to 30 minutes every 2 hours for as many days as directed. Heat helps decrease pain and muscle spasms.    Limit activities as directed. Try to avoid repeated overhead movements.    Go to physical or occupational therapy as directed. A physical therapist teaches you exercises to help improve movement and strength, and to decrease pain. An occupational therapist teaches you skills to help with your daily activities.  Prevent shoulder pain:    Maintain a good range of motion in your shoulder. Ask your healthcare provider which exercises you should do on a regular basis after you have healed.    Stretch and strengthen your shoulder. Use proper technique during exercises and sports.  Follow up with your healthcare provider or orthopedist as directed: Write down your questions so you remember to ask them during your visits.

## 2024-01-25 NOTE — ED PROVIDER NOTE - CONSTITUTIONAL, MLM
----- Message from Rico Laird MD sent at 11/22/2019  2:27 PM CST -----  Please tell pt her labs are normal   ----- Message from Rico Laird MD sent at 11/22/2019  2:27 PM CST -----  Please tell pt her labs are normal   Called patient informed of results    normal... Well appearing, well nourished, awake, alert, oriented to person, place, time/situation and in no apparent distress. with coughing Braces to upper and lower teeth/normal

## 2024-02-03 ENCOUNTER — EMERGENCY (EMERGENCY)
Facility: HOSPITAL | Age: 69
LOS: 0 days | Discharge: HOME | End: 2024-02-03
Attending: EMERGENCY MEDICINE
Payer: MEDICARE

## 2024-02-03 VITALS
HEART RATE: 88 BPM | TEMPERATURE: 97 F | DIASTOLIC BLOOD PRESSURE: 105 MMHG | OXYGEN SATURATION: 97 % | SYSTOLIC BLOOD PRESSURE: 140 MMHG | RESPIRATION RATE: 22 BRPM

## 2024-02-03 VITALS — HEIGHT: 63 IN | WEIGHT: 197.09 LBS

## 2024-02-03 DIAGNOSIS — Z95.9 PRESENCE OF CARDIAC AND VASCULAR IMPLANT AND GRAFT, UNSPECIFIED: Chronic | ICD-10-CM

## 2024-02-03 DIAGNOSIS — Z79.82 LONG TERM (CURRENT) USE OF ASPIRIN: ICD-10-CM

## 2024-02-03 DIAGNOSIS — Z95.1 PRESENCE OF AORTOCORONARY BYPASS GRAFT: Chronic | ICD-10-CM

## 2024-02-03 DIAGNOSIS — R00.0 TACHYCARDIA, UNSPECIFIED: ICD-10-CM

## 2024-02-03 DIAGNOSIS — Z79.4 LONG TERM (CURRENT) USE OF INSULIN: ICD-10-CM

## 2024-02-03 DIAGNOSIS — E11.9 TYPE 2 DIABETES MELLITUS WITHOUT COMPLICATIONS: ICD-10-CM

## 2024-02-03 DIAGNOSIS — J18.9 PNEUMONIA, UNSPECIFIED ORGANISM: ICD-10-CM

## 2024-02-03 DIAGNOSIS — Z79.84 LONG TERM (CURRENT) USE OF ORAL HYPOGLYCEMIC DRUGS: ICD-10-CM

## 2024-02-03 DIAGNOSIS — I49.3 VENTRICULAR PREMATURE DEPOLARIZATION: ICD-10-CM

## 2024-02-03 DIAGNOSIS — I25.10 ATHEROSCLEROTIC HEART DISEASE OF NATIVE CORONARY ARTERY WITHOUT ANGINA PECTORIS: ICD-10-CM

## 2024-02-03 DIAGNOSIS — Z95.5 PRESENCE OF CORONARY ANGIOPLASTY IMPLANT AND GRAFT: ICD-10-CM

## 2024-02-03 DIAGNOSIS — Z20.822 CONTACT WITH AND (SUSPECTED) EXPOSURE TO COVID-19: ICD-10-CM

## 2024-02-03 DIAGNOSIS — Z88.0 ALLERGY STATUS TO PENICILLIN: ICD-10-CM

## 2024-02-03 DIAGNOSIS — I10 ESSENTIAL (PRIMARY) HYPERTENSION: ICD-10-CM

## 2024-02-03 LAB
ALBUMIN SERPL ELPH-MCNC: 2.8 G/DL — LOW (ref 3.3–5)
ALP SERPL-CCNC: 55 U/L — SIGNIFICANT CHANGE UP (ref 40–120)
ALT FLD-CCNC: 28 U/L — SIGNIFICANT CHANGE UP (ref 12–78)
ANION GAP SERPL CALC-SCNC: 7 MMOL/L — SIGNIFICANT CHANGE UP (ref 5–17)
APPEARANCE UR: CLEAR — SIGNIFICANT CHANGE UP
APTT BLD: 29.9 SEC — SIGNIFICANT CHANGE UP (ref 24.5–35.6)
AST SERPL-CCNC: 31 U/L — SIGNIFICANT CHANGE UP (ref 15–37)
BACTERIA # UR AUTO: NEGATIVE /HPF — SIGNIFICANT CHANGE UP
BASOPHILS # BLD AUTO: 0.01 K/UL — SIGNIFICANT CHANGE UP (ref 0–0.2)
BASOPHILS NFR BLD AUTO: 0.1 % — SIGNIFICANT CHANGE UP (ref 0–2)
BILIRUB SERPL-MCNC: 0.9 MG/DL — SIGNIFICANT CHANGE UP (ref 0.2–1.2)
BILIRUB UR-MCNC: NEGATIVE — SIGNIFICANT CHANGE UP
BUN SERPL-MCNC: 15 MG/DL — SIGNIFICANT CHANGE UP (ref 7–23)
CALCIUM SERPL-MCNC: 8.6 MG/DL — SIGNIFICANT CHANGE UP (ref 8.5–10.1)
CAST: 4 /LPF — SIGNIFICANT CHANGE UP (ref 0–4)
CHLORIDE SERPL-SCNC: 106 MMOL/L — SIGNIFICANT CHANGE UP (ref 96–108)
CO2 SERPL-SCNC: 20 MMOL/L — LOW (ref 22–31)
COLOR SPEC: YELLOW — SIGNIFICANT CHANGE UP
CREAT SERPL-MCNC: 0.99 MG/DL — SIGNIFICANT CHANGE UP (ref 0.5–1.3)
DIFF PNL FLD: ABNORMAL
EGFR: 83 ML/MIN/1.73M2 — SIGNIFICANT CHANGE UP
EOSINOPHIL # BLD AUTO: 0.01 K/UL — SIGNIFICANT CHANGE UP (ref 0–0.5)
EOSINOPHIL NFR BLD AUTO: 0.1 % — SIGNIFICANT CHANGE UP (ref 0–6)
FLUAV AG NPH QL: SIGNIFICANT CHANGE UP
FLUBV AG NPH QL: SIGNIFICANT CHANGE UP
GLUCOSE SERPL-MCNC: 278 MG/DL — HIGH (ref 70–99)
GLUCOSE UR QL: >=1000 MG/DL
HCT VFR BLD CALC: 42.1 % — SIGNIFICANT CHANGE UP (ref 39–50)
HGB BLD-MCNC: 14.8 G/DL — SIGNIFICANT CHANGE UP (ref 13–17)
IMM GRANULOCYTES NFR BLD AUTO: 0.3 % — SIGNIFICANT CHANGE UP (ref 0–0.9)
INR BLD: 1.09 RATIO — SIGNIFICANT CHANGE UP (ref 0.85–1.18)
KETONES UR-MCNC: 40 MG/DL
LACTATE SERPL-SCNC: 1.9 MMOL/L — SIGNIFICANT CHANGE UP (ref 0.7–2)
LEUKOCYTE ESTERASE UR-ACNC: NEGATIVE — SIGNIFICANT CHANGE UP
LYMPHOCYTES # BLD AUTO: 0.76 K/UL — LOW (ref 1–3.3)
LYMPHOCYTES # BLD AUTO: 8.4 % — LOW (ref 13–44)
MCHC RBC-ENTMCNC: 33 PG — SIGNIFICANT CHANGE UP (ref 27–34)
MCHC RBC-ENTMCNC: 35.2 GM/DL — SIGNIFICANT CHANGE UP (ref 32–36)
MCV RBC AUTO: 93.8 FL — SIGNIFICANT CHANGE UP (ref 80–100)
MONOCYTES # BLD AUTO: 1.08 K/UL — HIGH (ref 0–0.9)
MONOCYTES NFR BLD AUTO: 11.9 % — SIGNIFICANT CHANGE UP (ref 2–14)
NEUTROPHILS # BLD AUTO: 7.16 K/UL — SIGNIFICANT CHANGE UP (ref 1.8–7.4)
NEUTROPHILS NFR BLD AUTO: 79.2 % — HIGH (ref 43–77)
NITRITE UR-MCNC: NEGATIVE — SIGNIFICANT CHANGE UP
PH UR: 5.5 — SIGNIFICANT CHANGE UP (ref 5–8)
PLATELET # BLD AUTO: 91 K/UL — LOW (ref 150–400)
POTASSIUM SERPL-MCNC: 3.7 MMOL/L — SIGNIFICANT CHANGE UP (ref 3.5–5.3)
POTASSIUM SERPL-SCNC: 3.7 MMOL/L — SIGNIFICANT CHANGE UP (ref 3.5–5.3)
PROT SERPL-MCNC: 6.9 GM/DL — SIGNIFICANT CHANGE UP (ref 6–8.3)
PROT UR-MCNC: >=1000 MG/DL
PROTHROM AB SERPL-ACNC: 12.3 SEC — SIGNIFICANT CHANGE UP (ref 9.5–13)
RBC # BLD: 4.49 M/UL — SIGNIFICANT CHANGE UP (ref 4.2–5.8)
RBC # FLD: 13.6 % — SIGNIFICANT CHANGE UP (ref 10.3–14.5)
RBC CASTS # UR COMP ASSIST: 383 /HPF — HIGH (ref 0–4)
RSV RNA NPH QL NAA+NON-PROBE: SIGNIFICANT CHANGE UP
SARS-COV-2 RNA SPEC QL NAA+PROBE: SIGNIFICANT CHANGE UP
SODIUM SERPL-SCNC: 133 MMOL/L — LOW (ref 135–145)
SP GR SPEC: >1.03 — HIGH (ref 1–1.03)
SQUAMOUS # UR AUTO: 1 /HPF — SIGNIFICANT CHANGE UP (ref 0–5)
TROPONIN I, HIGH SENSITIVITY RESULT: 11.75 NG/L — SIGNIFICANT CHANGE UP
UROBILINOGEN FLD QL: 1 MG/DL — SIGNIFICANT CHANGE UP (ref 0.2–1)
WBC # BLD: 9.05 K/UL — SIGNIFICANT CHANGE UP (ref 3.8–10.5)
WBC # FLD AUTO: 9.05 K/UL — SIGNIFICANT CHANGE UP (ref 3.8–10.5)
WBC UR QL: 3 /HPF — SIGNIFICANT CHANGE UP (ref 0–5)

## 2024-02-03 PROCEDURE — 99285 EMERGENCY DEPT VISIT HI MDM: CPT | Mod: 25

## 2024-02-03 PROCEDURE — 85025 COMPLETE CBC W/AUTO DIFF WBC: CPT

## 2024-02-03 PROCEDURE — 93005 ELECTROCARDIOGRAM TRACING: CPT

## 2024-02-03 PROCEDURE — 85730 THROMBOPLASTIN TIME PARTIAL: CPT

## 2024-02-03 PROCEDURE — 80053 COMPREHEN METABOLIC PANEL: CPT

## 2024-02-03 PROCEDURE — 99285 EMERGENCY DEPT VISIT HI MDM: CPT

## 2024-02-03 PROCEDURE — 71045 X-RAY EXAM CHEST 1 VIEW: CPT

## 2024-02-03 PROCEDURE — 36415 COLL VENOUS BLD VENIPUNCTURE: CPT

## 2024-02-03 PROCEDURE — 84484 ASSAY OF TROPONIN QUANT: CPT

## 2024-02-03 PROCEDURE — 87040 BLOOD CULTURE FOR BACTERIA: CPT

## 2024-02-03 PROCEDURE — 93010 ELECTROCARDIOGRAM REPORT: CPT

## 2024-02-03 PROCEDURE — 71045 X-RAY EXAM CHEST 1 VIEW: CPT | Mod: 26

## 2024-02-03 PROCEDURE — 0241U: CPT

## 2024-02-03 PROCEDURE — 87086 URINE CULTURE/COLONY COUNT: CPT

## 2024-02-03 PROCEDURE — 81001 URINALYSIS AUTO W/SCOPE: CPT

## 2024-02-03 PROCEDURE — 85610 PROTHROMBIN TIME: CPT

## 2024-02-03 PROCEDURE — 83605 ASSAY OF LACTIC ACID: CPT

## 2024-02-03 RX ORDER — AZITHROMYCIN 500 MG/1
1 TABLET, FILM COATED ORAL
Qty: 5 | Refills: 0
Start: 2024-02-03 | End: 2024-02-07

## 2024-02-03 RX ORDER — SODIUM CHLORIDE 9 MG/ML
1000 INJECTION INTRAMUSCULAR; INTRAVENOUS; SUBCUTANEOUS ONCE
Refills: 0 | Status: COMPLETED | OUTPATIENT
Start: 2024-02-03 | End: 2024-02-03

## 2024-02-03 RX ORDER — IBUPROFEN 200 MG
400 TABLET ORAL ONCE
Refills: 0 | Status: COMPLETED | OUTPATIENT
Start: 2024-02-03 | End: 2024-02-03

## 2024-02-03 RX ORDER — AZITHROMYCIN 500 MG/1
1 TABLET, FILM COATED ORAL
Refills: 0 | DISCHARGE
Start: 2024-02-03

## 2024-02-03 RX ORDER — AZITHROMYCIN 500 MG/1
500 TABLET, FILM COATED ORAL ONCE
Refills: 0 | Status: COMPLETED | OUTPATIENT
Start: 2024-02-03 | End: 2024-02-03

## 2024-02-03 RX ADMIN — AZITHROMYCIN 500 MILLIGRAM(S): 500 TABLET, FILM COATED ORAL at 12:46

## 2024-02-03 RX ADMIN — Medication 400 MILLIGRAM(S): at 08:33

## 2024-02-03 RX ADMIN — SODIUM CHLORIDE 666.67 MILLILITER(S): 9 INJECTION INTRAMUSCULAR; INTRAVENOUS; SUBCUTANEOUS at 08:34

## 2024-02-03 NOTE — ED ADULT NURSE NOTE - NSFALLUNIVINTERV_ED_ALL_ED
Bed/Stretcher in lowest position, wheels locked, appropriate side rails in place/Call bell, personal items and telephone in reach/Instruct patient to call for assistance before getting out of bed/chair/stretcher/Non-slip footwear applied when patient is off stretcher/[place] to call system/Physically safe environment - no spills, clutter or unnecessary equipment/Purposeful proactive rounding/Room/bathroom lighting operational, light cord in reach

## 2024-02-03 NOTE — ED PROVIDER NOTE - CARDIAC, MLM
Mildly tachycardic, regular rhythm.  Heart sounds S1, S2.  No murmurs, rubs or gallops.  Normal radial pulse

## 2024-02-03 NOTE — ED PROVIDER NOTE - CLINICAL SUMMARY MEDICAL DECISION MAKING FREE TEXT BOX
Consent 1/Introductory Paragraph: The rationale for Mohs was explained to the patient and consent was obtained. The risks, benefits and alternatives to therapy were discussed in detail. Specifically, the risks of infection, scarring, bleeding, prolonged wound healing, incomplete removal, allergy to anesthesia, nerve injury and recurrence were addressed. Prior to the procedure, the treatment site was clearly identified and confirmed by the patient. All components of Universal Protocol/PAUSE Rule completed. 68-year-old WM, PMH of HTN, DM on metformin and insulin, CAD with 3 stents on aspirin, anxiety, ambulatory to ED complaining of 3 days flu type symptoms with associated fever.  + Mildly ill-appearing, no respiratory distress, non-toxic, not hypoxic.  Lungs clear.  Plan: EKG, CXR, labs 68-year-old WM, PMH of HTN, DM on metformin and insulin, CAD with 3 stents on aspirin, anxiety, ambulatory to ED complaining of 3 days flu type symptoms with associated fever.  + Mildly ill-appearing, no respiratory distress, non-toxic, not hypoxic.  Lungs clear.  Plan: EKG, CXR, labs incl. Flu/COVID swab, BCs, lactate, IVH, po Motrin.  Observe, monitor, reassess.    Addendum, ELIZ Mclaughlin MD:  CXR wet read suspicious for L lingular haziness/infiltrate.  Labs notable for normal WBC/lactate/Troponin, Flu/COVID negative.  Pt w/o hypoxia, no acute respiratory distress.  Oral Zithromax initiated in ED.  Pt informed of all study results, expressed his understanding & agrees with DC home on po Abx, PMD f/u early this upcoming week, return precautions discussed.

## 2024-02-03 NOTE — ED PROVIDER NOTE - PATIENT PORTAL LINK FT
You can access the FollowMyHealth Patient Portal offered by [facility] by registering at the following website: [email or web address]. By joining [employer]’s FollowMyHealth portal, you will also be able to view your health information using other applications (apps) compatible with our system.

## 2024-02-03 NOTE — ED PROVIDER NOTE - NSFOLLOWUPINSTRUCTIONS_ED_ALL_ED_FT
Take Zithromax antibiotic as prescribed.  Follow up early this upcoming week with your own doctor.  Return to ED if fever, short of breath, or other concerns.        Community-Acquired Pneumonia, Adult  Pneumonia is an infection of the lungs. It causes irritation and swelling in the airways of the lungs. Mucus and fluid may also build up inside the airways. This may cause coughing and trouble breathing.    One type of pneumonia can happen while you are in a hospital. A different type can happen when you are not in a hospital (community-acquired pneumonia).    What are the causes?  The human body, showing how a virus travels from the air to a person's lungs.   This condition is caused by germs (viruses, bacteria, or fungi). Some types of germs can spread from person to person. Pneumonia is not thought to spread from person to person.    What increases the risk?  You have a long-term (chronic) disease, such as:  Disease of the lungs. This may be chronic obstructive pulmonary disease (COPD) or asthma.  Heart failure.  Cystic fibrosis.  Diabetes.  Kidney disease.  Sickle cell disease.  HIV.  You have other health problems, such as:  Your body's defense system (immune system) is weak.  A condition that may cause you to breathe in fluids from your mouth and nose.  You had your spleen taken out.  You do not take good care of your teeth and mouth (poor dental hygiene).  You use or have used tobacco products.  You go where the germs that cause this illness are common.  You are older than 65 years of age.  What are the signs or symptoms?  A cough.  A fever.  Sweating or chills.  Chest pain, often when you breathe deeply or cough.  Breathing problems, such as:  Fast breathing.  Trouble breathing.  Shortness of breath.  Feeling tired (fatigued).  Muscle aches.  How is this treated?  Treatment for this condition depends on many things, such as:  The cause of your illness.  Your medicines.  Your other health problems.  Most adults can be treated at home. Sometimes, treatment must happen in a hospital.  Treatment may include medicines to kill germs.  Medicines may depend on which germ caused your illness.  Very bad pneumonia is rare. If you get it, you may:  Have a machine to help you breathe.  Have fluid taken away from around your lungs.  Follow these instructions at home:  Medicines    Take over-the-counter and prescription medicines only as told by your doctor.  Take cough medicine only if you are losing sleep. Cough medicine can keep your body from taking mucus away from your lungs.  If you were prescribed antibiotics, take them as told by your doctor. Do not stop taking them even if you start to feel better.  Lifestyle    A sign showing that a person should not drink alcohol.  A sign showing that a person should not smoke.  Do not smoke or use any products that contain nicotine or tobacco. If you need help quitting, ask your doctor.  Do not drink alcohol.  Eat a healthy diet. This includes a lot of vegetables, fruits, whole grains, low-fat dairy products, and low-fat (lean) protein.  General instructions    A comparison of three sample cups showing dark yellow, yellow, and pale yellow urine.  Rest a lot. Sleep for at least 8 hours each night.  Sleep with your head and neck raised. Put a few pillows under your head or sleep in a reclining chair.  Return to your normal activities as told by your doctor. Ask your doctor what activities are safe for you.  Drink enough fluid to keep your pee (urine) pale yellow.  If your throat is sore, gargle with a mixture of salt and water 3–4 times a day or as needed. To make salt water, completely dissolve ½–1 tsp (3–6 g) of salt in 1 cup (237 mL) of warm water.  Keep all follow-up visits.  How is this prevented?  Getting the pneumonia shot (vaccine). These shots have different types and schedules. Ask your doctor what works best for you. Think about getting this shot if:  You are older than 65 years of age.  You are 19–65 years of age and:  You are being treated for cancer.  You have long-term lung disease.  You have other problems that affect your body's defense system. Ask your doctor if you have one of these.  Getting your flu shot every year. Ask your doctor which type of shot is best for you.  Going to the dentist as often as told.  Washing your hands often with soap and water for at least 20 seconds. If you cannot use soap and water, use hand .  Contact a doctor if:  You have a fever.  You lose sleep because your cough medicine does not help.  Get help right away if:  You are short of breath and this gets worse.  You have more chest pain.  Your sickness gets worse. This is very serious if:  You are an older adult.  Your body's defense system is weak.  You cough up blood.  These symptoms may be an emergency. Get help right away. Call 911.  Do not wait to see if the symptoms will go away.  Do not drive yourself to the hospital.  Summary  Pneumonia is an infection of the lungs.  Community-acquired pneumonia affects people who have not been in the hospital. Certain germs can cause this infection.  This condition may be treated with medicines that kill germs.  For very bad pneumonia, you may need a hospital stay and treatment to help with breathing.  This information is not intended to replace advice given to you by your health care provider. Make sure you discuss any questions you have with your health care provider.

## 2024-02-03 NOTE — ED ADULT TRIAGE NOTE - CHIEF COMPLAINT QUOTE
Patient ambulatory to the ER c/o fever, body aches, chills for 3 days. TMAX 103. Patient took tylenol 1 hour PTA.

## 2024-02-03 NOTE — ED ADULT NURSE NOTE - OBJECTIVE STATEMENT
Patient ambulatory to the ER c/o fever, body aches, chills for 3 days. TMAX 103. Patient took tylenol 1 hour PTA. no other compalints at this time

## 2024-02-03 NOTE — ED PROVIDER NOTE - INTERNATIONAL TRAVEL
Pt c/o pain in upper abdominal area that radiates to suprapubic area x 5 days. States that his urine seems to have an odor as well. Pain is 9/10, sharp in nature. C/O painful urination and dark urine as well.   Eating and drinking like normal and having normal BM's
No

## 2024-02-03 NOTE — ED PROVIDER NOTE - CONSTITUTIONAL, MLM
Older WM, awake, alert, oriented to person, place, time/situation, mildly ill-appearing though non-toxic. normal...

## 2024-02-03 NOTE — ED PROVIDER NOTE - OBJECTIVE STATEMENT
68-year-old WM, PMH of HTN, DM on metformin and insulin, CAD with 3 stents on aspirin, anxiety, ambulatory to ED complaining of 3 days flu type symptoms with associated fever.  Symptoms onset January 31: Fever, general weakness/fatigue, malaise, diffuse myalgias.  Patient denies headache sore throat, SOB, cough, nasal congestion, chest pain, abdominal pain, N/V/D, rash.  Patient lives alone, no known recent ill contacts.  Patient denies recent immobility nor extended travel past 3 to 4 months.  Patient with partial symptomatic relief with p.o. Tylenol, last dose taken 1 hour PTA.  PCP: Monik.  Cardio: Darell. 68-year-old WM, PMH of HTN, DM on metformin and insulin, CAD with 3 stents on aspirin, anxiety, ambulatory to ED complaining of 3 days flu type symptoms with associated fever.  Symptoms onset January 31: fever, general weakness/fatigue, malaise, diffuse myalgias.  Patient denies headache, sore throat, SOB, cough, nasal congestion, chest pain, abdominal pain, N/V/D, rash.  Patient lives alone, no known recent ill contacts.  Patient denies recent immobility nor extended travel past 3 to 4 months.  Patient with partial symptomatic relief with p.o. Tylenol, last dose taken 1 hour PTA.  PCP: Monik.  Cardio: Darell.

## 2024-02-03 NOTE — ED PROVIDER NOTE - STUDIES
Xray Image(s) - see wet read section for interpretation EKG - see results section for interpretation/Xray Image(s) - see wet read section for interpretation

## 2024-02-03 NOTE — ED PROVIDER NOTE - ED STEMI HIDDEN
hide Clofazimine Counseling:  I discussed with the patient the risks of clofazimine including but not limited to skin and eye pigmentation, liver damage, nausea/vomiting, gastrointestinal bleeding and allergy.

## 2024-02-03 NOTE — ED PROVIDER NOTE - MUSCULOSKELETAL, MLM
Spine appears normal, range of motion is not limited, no muscle or joint tenderness.  DE LA FUENTE x 4, no foal extremity swelling/deformity.

## 2024-02-04 ENCOUNTER — INPATIENT (INPATIENT)
Facility: HOSPITAL | Age: 69
LOS: 3 days | Discharge: HOME | DRG: 871 | End: 2024-02-08
Attending: INTERNAL MEDICINE | Admitting: INTERNAL MEDICINE
Payer: MEDICARE

## 2024-02-04 VITALS — HEIGHT: 63 IN | WEIGHT: 197.09 LBS

## 2024-02-04 DIAGNOSIS — Z95.1 PRESENCE OF AORTOCORONARY BYPASS GRAFT: Chronic | ICD-10-CM

## 2024-02-04 DIAGNOSIS — J18.9 PNEUMONIA, UNSPECIFIED ORGANISM: ICD-10-CM

## 2024-02-04 DIAGNOSIS — Z95.9 PRESENCE OF CARDIAC AND VASCULAR IMPLANT AND GRAFT, UNSPECIFIED: Chronic | ICD-10-CM

## 2024-02-04 LAB
ALBUMIN SERPL ELPH-MCNC: 2.6 G/DL — LOW (ref 3.3–5)
ALP SERPL-CCNC: 55 U/L — SIGNIFICANT CHANGE UP (ref 40–120)
ALT FLD-CCNC: 30 U/L — SIGNIFICANT CHANGE UP (ref 12–78)
ANION GAP SERPL CALC-SCNC: 10 MMOL/L — SIGNIFICANT CHANGE UP (ref 5–17)
APPEARANCE UR: CLEAR — SIGNIFICANT CHANGE UP
AST SERPL-CCNC: 34 U/L — SIGNIFICANT CHANGE UP (ref 15–37)
BACTERIA # UR AUTO: NEGATIVE /HPF — SIGNIFICANT CHANGE UP
BASOPHILS # BLD AUTO: 0.02 K/UL — SIGNIFICANT CHANGE UP (ref 0–0.2)
BASOPHILS NFR BLD AUTO: 0.3 % — SIGNIFICANT CHANGE UP (ref 0–2)
BILIRUB SERPL-MCNC: 0.8 MG/DL — SIGNIFICANT CHANGE UP (ref 0.2–1.2)
BILIRUB UR-MCNC: NEGATIVE — SIGNIFICANT CHANGE UP
BUN SERPL-MCNC: 16 MG/DL — SIGNIFICANT CHANGE UP (ref 7–23)
CALCIUM SERPL-MCNC: 8.3 MG/DL — LOW (ref 8.5–10.1)
CAST: 1 /LPF — SIGNIFICANT CHANGE UP (ref 0–4)
CHLORIDE SERPL-SCNC: 101 MMOL/L — SIGNIFICANT CHANGE UP (ref 96–108)
CO2 SERPL-SCNC: 21 MMOL/L — LOW (ref 22–31)
COLOR SPEC: YELLOW — SIGNIFICANT CHANGE UP
CREAT SERPL-MCNC: 0.9 MG/DL — SIGNIFICANT CHANGE UP (ref 0.5–1.3)
CULTURE RESULTS: SIGNIFICANT CHANGE UP
DIFF PNL FLD: ABNORMAL
EGFR: 94 ML/MIN/1.73M2 — SIGNIFICANT CHANGE UP
EOSINOPHIL # BLD AUTO: 0.01 K/UL — SIGNIFICANT CHANGE UP (ref 0–0.5)
EOSINOPHIL NFR BLD AUTO: 0.1 % — SIGNIFICANT CHANGE UP (ref 0–6)
GLUCOSE SERPL-MCNC: 197 MG/DL — HIGH (ref 70–99)
GLUCOSE UR QL: >=1000 MG/DL
HCT VFR BLD CALC: 40.3 % — SIGNIFICANT CHANGE UP (ref 39–50)
HGB BLD-MCNC: 14.6 G/DL — SIGNIFICANT CHANGE UP (ref 13–17)
IMM GRANULOCYTES NFR BLD AUTO: 0.5 % — SIGNIFICANT CHANGE UP (ref 0–0.9)
KETONES UR-MCNC: 80 MG/DL
LACTATE SERPL-SCNC: 1.7 MMOL/L — SIGNIFICANT CHANGE UP (ref 0.7–2)
LEUKOCYTE ESTERASE UR-ACNC: NEGATIVE — SIGNIFICANT CHANGE UP
LYMPHOCYTES # BLD AUTO: 0.56 K/UL — LOW (ref 1–3.3)
LYMPHOCYTES # BLD AUTO: 7.3 % — LOW (ref 13–44)
MCHC RBC-ENTMCNC: 33.3 PG — SIGNIFICANT CHANGE UP (ref 27–34)
MCHC RBC-ENTMCNC: 36.2 GM/DL — HIGH (ref 32–36)
MCV RBC AUTO: 91.8 FL — SIGNIFICANT CHANGE UP (ref 80–100)
MONOCYTES # BLD AUTO: 1.11 K/UL — HIGH (ref 0–0.9)
MONOCYTES NFR BLD AUTO: 14.5 % — HIGH (ref 2–14)
NEUTROPHILS # BLD AUTO: 5.89 K/UL — SIGNIFICANT CHANGE UP (ref 1.8–7.4)
NEUTROPHILS NFR BLD AUTO: 77.3 % — HIGH (ref 43–77)
NITRITE UR-MCNC: NEGATIVE — SIGNIFICANT CHANGE UP
PH UR: 5.5 — SIGNIFICANT CHANGE UP (ref 5–8)
PLATELET # BLD AUTO: 91 K/UL — LOW (ref 150–400)
POTASSIUM SERPL-MCNC: 3.7 MMOL/L — SIGNIFICANT CHANGE UP (ref 3.5–5.3)
POTASSIUM SERPL-SCNC: 3.7 MMOL/L — SIGNIFICANT CHANGE UP (ref 3.5–5.3)
PROT SERPL-MCNC: 6.7 GM/DL — SIGNIFICANT CHANGE UP (ref 6–8.3)
PROT UR-MCNC: >=1000 MG/DL
RBC # BLD: 4.39 M/UL — SIGNIFICANT CHANGE UP (ref 4.2–5.8)
RBC # FLD: 13.4 % — SIGNIFICANT CHANGE UP (ref 10.3–14.5)
RBC CASTS # UR COMP ASSIST: 356 /HPF — HIGH (ref 0–4)
SODIUM SERPL-SCNC: 132 MMOL/L — LOW (ref 135–145)
SP GR SPEC: >1.03 — HIGH (ref 1–1.03)
SPECIMEN SOURCE: SIGNIFICANT CHANGE UP
SQUAMOUS # UR AUTO: 1 /HPF — SIGNIFICANT CHANGE UP (ref 0–5)
TROPONIN I, HIGH SENSITIVITY RESULT: 31.91 NG/L — SIGNIFICANT CHANGE UP
TROPONIN I, HIGH SENSITIVITY RESULT: 37.83 NG/L — SIGNIFICANT CHANGE UP
UROBILINOGEN FLD QL: 1 MG/DL — SIGNIFICANT CHANGE UP (ref 0.2–1)
WBC # BLD: 7.63 K/UL — SIGNIFICANT CHANGE UP (ref 3.8–10.5)
WBC # FLD AUTO: 7.63 K/UL — SIGNIFICANT CHANGE UP (ref 3.8–10.5)
WBC UR QL: 4 /HPF — SIGNIFICANT CHANGE UP (ref 0–5)

## 2024-02-04 PROCEDURE — 87040 BLOOD CULTURE FOR BACTERIA: CPT

## 2024-02-04 PROCEDURE — 71275 CT ANGIOGRAPHY CHEST: CPT | Mod: 26,MA

## 2024-02-04 PROCEDURE — 36415 COLL VENOUS BLD VENIPUNCTURE: CPT

## 2024-02-04 PROCEDURE — 80053 COMPREHEN METABOLIC PANEL: CPT

## 2024-02-04 PROCEDURE — 85027 COMPLETE CBC AUTOMATED: CPT

## 2024-02-04 PROCEDURE — 80048 BASIC METABOLIC PNL TOTAL CA: CPT

## 2024-02-04 PROCEDURE — 97116 GAIT TRAINING THERAPY: CPT | Mod: GP

## 2024-02-04 PROCEDURE — 82962 GLUCOSE BLOOD TEST: CPT

## 2024-02-04 PROCEDURE — 87449 NOS EACH ORGANISM AG IA: CPT

## 2024-02-04 PROCEDURE — 99285 EMERGENCY DEPT VISIT HI MDM: CPT

## 2024-02-04 PROCEDURE — 97162 PT EVAL MOD COMPLEX 30 MIN: CPT | Mod: GP

## 2024-02-04 PROCEDURE — 85025 COMPLETE CBC W/AUTO DIFF WBC: CPT

## 2024-02-04 PROCEDURE — 97530 THERAPEUTIC ACTIVITIES: CPT | Mod: GP

## 2024-02-04 PROCEDURE — 82570 ASSAY OF URINE CREATININE: CPT

## 2024-02-04 PROCEDURE — 83930 ASSAY OF BLOOD OSMOLALITY: CPT

## 2024-02-04 PROCEDURE — 71045 X-RAY EXAM CHEST 1 VIEW: CPT

## 2024-02-04 PROCEDURE — 93010 ELECTROCARDIOGRAM REPORT: CPT | Mod: 76

## 2024-02-04 PROCEDURE — 83935 ASSAY OF URINE OSMOLALITY: CPT

## 2024-02-04 PROCEDURE — 84300 ASSAY OF URINE SODIUM: CPT

## 2024-02-04 PROCEDURE — 83036 HEMOGLOBIN GLYCOSYLATED A1C: CPT

## 2024-02-04 RX ORDER — CEFTRIAXONE 500 MG/1
1000 INJECTION, POWDER, FOR SOLUTION INTRAMUSCULAR; INTRAVENOUS ONCE
Refills: 0 | Status: DISCONTINUED | OUTPATIENT
Start: 2024-02-04 | End: 2024-02-04

## 2024-02-04 RX ORDER — ESCITALOPRAM OXALATE 10 MG/1
1 TABLET, FILM COATED ORAL
Refills: 0 | DISCHARGE

## 2024-02-04 RX ORDER — ALFUZOSIN HYDROCHLORIDE 10 MG/1
1 TABLET, EXTENDED RELEASE ORAL
Qty: 0 | Refills: 0 | DISCHARGE

## 2024-02-04 RX ORDER — OMEGA-3 ACID ETHYL ESTERS 1 G
1 CAPSULE ORAL
Refills: 0 | DISCHARGE

## 2024-02-04 RX ORDER — FLUTICASONE FUROATE AND VILANTEROL TRIFENATATE 100; 25 UG/1; UG/1
1 POWDER RESPIRATORY (INHALATION)
Qty: 0 | Refills: 0 | DISCHARGE

## 2024-02-04 RX ORDER — KETOROLAC TROMETHAMINE 30 MG/ML
30 SYRINGE (ML) INJECTION ONCE
Refills: 0 | Status: DISCONTINUED | OUTPATIENT
Start: 2024-02-04 | End: 2024-02-04

## 2024-02-04 RX ORDER — EMPAGLIFLOZIN 10 MG/1
1 TABLET, FILM COATED ORAL
Qty: 0 | Refills: 0 | DISCHARGE

## 2024-02-04 RX ORDER — ACETAMINOPHEN 500 MG
650 TABLET ORAL EVERY 6 HOURS
Refills: 0 | Status: DISCONTINUED | OUTPATIENT
Start: 2024-02-04 | End: 2024-02-05

## 2024-02-04 RX ORDER — ONDANSETRON 8 MG/1
4 TABLET, FILM COATED ORAL ONCE
Refills: 0 | Status: COMPLETED | OUTPATIENT
Start: 2024-02-04 | End: 2024-02-04

## 2024-02-04 RX ORDER — TIRZEPATIDE 15 MG/.5ML
7.5 INJECTION, SOLUTION SUBCUTANEOUS
Refills: 0 | DISCHARGE

## 2024-02-04 RX ORDER — FINASTERIDE 5 MG/1
1 TABLET, FILM COATED ORAL
Qty: 0 | Refills: 0 | DISCHARGE

## 2024-02-04 RX ORDER — FAMOTIDINE 10 MG/ML
20 INJECTION INTRAVENOUS ONCE
Refills: 0 | Status: COMPLETED | OUTPATIENT
Start: 2024-02-04 | End: 2024-02-04

## 2024-02-04 RX ORDER — BUDESONIDE AND FORMOTEROL FUMARATE DIHYDRATE 160; 4.5 UG/1; UG/1
2 AEROSOL RESPIRATORY (INHALATION)
Qty: 0 | Refills: 0 | DISCHARGE

## 2024-02-04 RX ORDER — DULAGLUTIDE 4.5 MG/.5ML
0.75 INJECTION, SOLUTION SUBCUTANEOUS
Qty: 0 | Refills: 0 | DISCHARGE

## 2024-02-04 RX ORDER — TRAZODONE HCL 50 MG
1 TABLET ORAL
Refills: 0 | DISCHARGE

## 2024-02-04 RX ORDER — ISOSORBIDE MONONITRATE 60 MG/1
1 TABLET, EXTENDED RELEASE ORAL
Qty: 0 | Refills: 0 | DISCHARGE

## 2024-02-04 RX ORDER — ATORVASTATIN CALCIUM 80 MG/1
1 TABLET, FILM COATED ORAL
Qty: 0 | Refills: 0 | DISCHARGE

## 2024-02-04 RX ORDER — PANTOPRAZOLE SODIUM 20 MG/1
1 TABLET, DELAYED RELEASE ORAL
Qty: 0 | Refills: 0 | DISCHARGE

## 2024-02-04 RX ORDER — FLUTICASONE PROPIONATE 50 MCG
1 SPRAY, SUSPENSION NASAL
Qty: 0 | Refills: 0 | DISCHARGE

## 2024-02-04 RX ORDER — METOPROLOL TARTRATE 50 MG
1 TABLET ORAL
Qty: 0 | Refills: 0 | DISCHARGE

## 2024-02-04 RX ORDER — SODIUM CHLORIDE 9 MG/ML
2000 INJECTION INTRAMUSCULAR; INTRAVENOUS; SUBCUTANEOUS ONCE
Refills: 0 | Status: COMPLETED | OUTPATIENT
Start: 2024-02-04 | End: 2024-02-04

## 2024-02-04 RX ORDER — ACETAMINOPHEN 500 MG
1000 TABLET ORAL ONCE
Refills: 0 | Status: COMPLETED | OUTPATIENT
Start: 2024-02-04 | End: 2024-02-04

## 2024-02-04 RX ORDER — GABAPENTIN 400 MG/1
1 CAPSULE ORAL
Refills: 0 | DISCHARGE

## 2024-02-04 RX ORDER — CEFTRIAXONE 500 MG/1
1000 INJECTION, POWDER, FOR SOLUTION INTRAMUSCULAR; INTRAVENOUS ONCE
Refills: 0 | Status: COMPLETED | OUTPATIENT
Start: 2024-02-04 | End: 2024-02-04

## 2024-02-04 RX ORDER — ARIPIPRAZOLE 15 MG/1
1 TABLET ORAL
Qty: 0 | Refills: 0 | DISCHARGE

## 2024-02-04 RX ORDER — AZITHROMYCIN 500 MG/1
500 TABLET, FILM COATED ORAL ONCE
Refills: 0 | Status: COMPLETED | OUTPATIENT
Start: 2024-02-04 | End: 2024-02-04

## 2024-02-04 RX ORDER — METHOTREXATE 2.5 MG/1
4 TABLET ORAL
Qty: 0 | Refills: 0 | DISCHARGE

## 2024-02-04 RX ORDER — METOCLOPRAMIDE HCL 10 MG
10 TABLET ORAL ONCE
Refills: 0 | Status: COMPLETED | OUTPATIENT
Start: 2024-02-04 | End: 2024-02-04

## 2024-02-04 RX ORDER — METFORMIN HYDROCHLORIDE 850 MG/1
2 TABLET ORAL
Qty: 0 | Refills: 0 | DISCHARGE

## 2024-02-04 RX ORDER — ESCITALOPRAM OXALATE 10 MG/1
1 TABLET, FILM COATED ORAL
Qty: 0 | Refills: 0 | DISCHARGE

## 2024-02-04 RX ADMIN — ONDANSETRON 4 MILLIGRAM(S): 8 TABLET, FILM COATED ORAL at 14:29

## 2024-02-04 RX ADMIN — FAMOTIDINE 20 MILLIGRAM(S): 10 INJECTION INTRAVENOUS at 16:05

## 2024-02-04 RX ADMIN — Medication 10 MILLIGRAM(S): at 16:05

## 2024-02-04 RX ADMIN — SODIUM CHLORIDE 2000 MILLILITER(S): 9 INJECTION INTRAMUSCULAR; INTRAVENOUS; SUBCUTANEOUS at 14:29

## 2024-02-04 RX ADMIN — Medication 30 MILLIGRAM(S): at 16:22

## 2024-02-04 RX ADMIN — AZITHROMYCIN 255 MILLIGRAM(S): 500 TABLET, FILM COATED ORAL at 14:30

## 2024-02-04 RX ADMIN — Medication 400 MILLIGRAM(S): at 14:29

## 2024-02-04 RX ADMIN — CEFTRIAXONE 1000 MILLIGRAM(S): 500 INJECTION, POWDER, FOR SOLUTION INTRAMUSCULAR; INTRAVENOUS at 16:06

## 2024-02-04 NOTE — ED PROVIDER NOTE - PHYSICAL EXAMINATION
GENERAL: Awake. Alert. NAD. Well nourished.  HEENT: NC/AT,Conjunctiva pink, no scleral icterus. Airway patent.   LUNGS: Rales at L upper lobe.  CARDIAC: RRR.  ABDOMEN: No masses noted. Soft, NT, ND, no rebound, no guarding.  EXT: No edema, no calf tenderness, distal pulses 2+ bilaterally  NEURO: A&Ox3. Moving all extremities. Sensation and strength intact throughout.   SKIN: Warm and dry.   PSYCH: Normal affect.

## 2024-02-04 NOTE — ED PROVIDER NOTE - CLINICAL SUMMARY MEDICAL DECISION MAKING FREE TEXT BOX
68M PMH of HTN, DM on metformin and insulin, CAD with 3 stents on aspirin, and anxiety, recent visit to ED yesterday for flu like sx, diagnosed with pneumonia yesterday and dc'd with azithromycin PO, presenting to the emergency department today for worsening shortness of breath, myalgias, nausea and fever. Given hx and physical, ddx includes but is not limited to pneumonia, viral syndrome, flu, covid, lower concern for PE (no dvt signs) but given tachypnea and sob will eval for PE with CTA. Low concern for ACS (no chest pain, sx in the setting of infection and known pneumonia). low concern for COPD exacerbation (patient moving air well, no wheezing noted on exam). Plan for ecg, labs, cta, iv abx, tba.

## 2024-02-04 NOTE — ED ADULT NURSE NOTE - OBJECTIVE STATEMENT
Pt ambulatory to ED c/o fever, chills, cough, body aches. Pt here at  yesterday diagnosed with PNA sent home worsening now.  Pt states he is taking PO ABX with no improvement.  Denies chest pain. PIV obtained, labs sent, medicated as ordered pt to CT on a cardiac monitor. Plan of care explained with verbalized understanding.

## 2024-02-04 NOTE — ED PROVIDER NOTE - ATTENDING CONTRIBUTION TO CARE
I, Rg Cantu DO,  performed the initial face to face bedside interview with this patient regarding history of present illness, review of symptoms and relevant past medical, social and family history.  I completed an independent physical examination.  I was the initial provider who evaluated this patient. I have signed out the follow up of any pending tests (i.e. labs, radiological studies) to the resident.  I have communicated the patient’s plan of care and disposition with the resident.  The history, relevant review of systems, past medical and surgical history, medical decision making, and physical examination was documented by the scribe in my presence and I attest to the accuracy of the documentation.        shared pe  · Physical Examination: GENERAL: Awake. Alert. NAD. Well nourished.  	HEENT: NC/AT,Conjunctiva pink, no scleral icterus. Airway patent.   	LUNGS: Rales at L upper lobe.  	CARDIAC: RRR.  	ABDOMEN: No masses noted. Soft, NT, ND, no rebound, no guarding.  	EXT: No edema, no calf tenderness, distal pulses 2+ bilaterally  	NEURO: A&Ox3. Moving all extremities. Sensation and strength intact throughout.   	SKIN: Warm and dry.   PSYCH: Normal affect.

## 2024-02-04 NOTE — ED ADULT TRIAGE NOTE - CHIEF COMPLAINT QUOTE
Pt states he was seen in ED yesterday and diagnosed with pneumonia. Pt states he is taking PO ABX with no improvement. Endorses shortness of breath, body aches, and fever/chills. Denies chest pain. STAT EKG to be completed.

## 2024-02-04 NOTE — ED PROVIDER NOTE - OBJECTIVE STATEMENT
68M PMH of HTN, DM on metformin and insulin, CAD with 3 stents on aspirin, and anxiety, recent visit to ED yesterday for flu like sx, diagnosed with pneumonia yesterday and dc'd with azithromycin PO, presenting to the emergency department today for worsening shortness of breath, myalgias, nausea and fever.  Patient denies abdominal pain, vomiting. No diarrhea or constipation.

## 2024-02-05 LAB
A1C WITH ESTIMATED AVERAGE GLUCOSE RESULT: 5.5 % — SIGNIFICANT CHANGE UP (ref 4–5.6)
ALBUMIN SERPL ELPH-MCNC: 2.5 G/DL — LOW (ref 3.3–5)
ALP SERPL-CCNC: 55 U/L — SIGNIFICANT CHANGE UP (ref 40–120)
ALT FLD-CCNC: 35 U/L — SIGNIFICANT CHANGE UP (ref 12–78)
ANION GAP SERPL CALC-SCNC: 8 MMOL/L — SIGNIFICANT CHANGE UP (ref 5–17)
AST SERPL-CCNC: 36 U/L — SIGNIFICANT CHANGE UP (ref 15–37)
BASOPHILS # BLD AUTO: 0.01 K/UL — SIGNIFICANT CHANGE UP (ref 0–0.2)
BASOPHILS NFR BLD AUTO: 0.1 % — SIGNIFICANT CHANGE UP (ref 0–2)
BILIRUB SERPL-MCNC: 0.7 MG/DL — SIGNIFICANT CHANGE UP (ref 0.2–1.2)
BUN SERPL-MCNC: 13 MG/DL — SIGNIFICANT CHANGE UP (ref 7–23)
CALCIUM SERPL-MCNC: 8.4 MG/DL — LOW (ref 8.5–10.1)
CHLORIDE SERPL-SCNC: 101 MMOL/L — SIGNIFICANT CHANGE UP (ref 96–108)
CO2 SERPL-SCNC: 23 MMOL/L — SIGNIFICANT CHANGE UP (ref 22–31)
CREAT ?TM UR-MCNC: 68 MG/DL — SIGNIFICANT CHANGE UP
CREAT SERPL-MCNC: 0.88 MG/DL — SIGNIFICANT CHANGE UP (ref 0.5–1.3)
EGFR: 94 ML/MIN/1.73M2 — SIGNIFICANT CHANGE UP
EOSINOPHIL # BLD AUTO: 0.01 K/UL — SIGNIFICANT CHANGE UP (ref 0–0.5)
EOSINOPHIL NFR BLD AUTO: 0.1 % — SIGNIFICANT CHANGE UP (ref 0–6)
ESTIMATED AVERAGE GLUCOSE: 111 MG/DL — SIGNIFICANT CHANGE UP (ref 68–114)
GLUCOSE BLDC GLUCOMTR-MCNC: 151 MG/DL — HIGH (ref 70–99)
GLUCOSE BLDC GLUCOMTR-MCNC: 175 MG/DL — HIGH (ref 70–99)
GLUCOSE BLDC GLUCOMTR-MCNC: 194 MG/DL — HIGH (ref 70–99)
GLUCOSE BLDC GLUCOMTR-MCNC: 194 MG/DL — HIGH (ref 70–99)
GLUCOSE BLDC GLUCOMTR-MCNC: 228 MG/DL — HIGH (ref 70–99)
GLUCOSE BLDC GLUCOMTR-MCNC: 279 MG/DL — HIGH (ref 70–99)
GLUCOSE SERPL-MCNC: 198 MG/DL — HIGH (ref 70–99)
HCT VFR BLD CALC: 39 % — SIGNIFICANT CHANGE UP (ref 39–50)
HGB BLD-MCNC: 13.9 G/DL — SIGNIFICANT CHANGE UP (ref 13–17)
IMM GRANULOCYTES NFR BLD AUTO: 0.5 % — SIGNIFICANT CHANGE UP (ref 0–0.9)
LYMPHOCYTES # BLD AUTO: 0.63 K/UL — LOW (ref 1–3.3)
LYMPHOCYTES # BLD AUTO: 8.5 % — LOW (ref 13–44)
MCHC RBC-ENTMCNC: 32.9 PG — SIGNIFICANT CHANGE UP (ref 27–34)
MCHC RBC-ENTMCNC: 35.6 GM/DL — SIGNIFICANT CHANGE UP (ref 32–36)
MCV RBC AUTO: 92.2 FL — SIGNIFICANT CHANGE UP (ref 80–100)
MONOCYTES # BLD AUTO: 1.03 K/UL — HIGH (ref 0–0.9)
MONOCYTES NFR BLD AUTO: 13.9 % — SIGNIFICANT CHANGE UP (ref 2–14)
NEUTROPHILS # BLD AUTO: 5.69 K/UL — SIGNIFICANT CHANGE UP (ref 1.8–7.4)
NEUTROPHILS NFR BLD AUTO: 76.9 % — SIGNIFICANT CHANGE UP (ref 43–77)
OSMOLALITY SERPL: 279 MOSMOL/KG — LOW (ref 280–301)
OSMOLALITY UR: 472 MOSM/KG — SIGNIFICANT CHANGE UP (ref 50–1200)
PLATELET # BLD AUTO: 96 K/UL — LOW (ref 150–400)
POTASSIUM SERPL-MCNC: 4.1 MMOL/L — SIGNIFICANT CHANGE UP (ref 3.5–5.3)
POTASSIUM SERPL-SCNC: 4.1 MMOL/L — SIGNIFICANT CHANGE UP (ref 3.5–5.3)
PROT SERPL-MCNC: 6.8 GM/DL — SIGNIFICANT CHANGE UP (ref 6–8.3)
RBC # BLD: 4.23 M/UL — SIGNIFICANT CHANGE UP (ref 4.2–5.8)
RBC # FLD: 13.3 % — SIGNIFICANT CHANGE UP (ref 10.3–14.5)
SODIUM SERPL-SCNC: 132 MMOL/L — LOW (ref 135–145)
SODIUM UR-SCNC: 24 MMOL/L — SIGNIFICANT CHANGE UP
WBC # BLD: 7.41 K/UL — SIGNIFICANT CHANGE UP (ref 3.8–10.5)
WBC # FLD AUTO: 7.41 K/UL — SIGNIFICANT CHANGE UP (ref 3.8–10.5)

## 2024-02-05 PROCEDURE — 99223 1ST HOSP IP/OBS HIGH 75: CPT

## 2024-02-05 RX ORDER — ACETAMINOPHEN 500 MG
650 TABLET ORAL EVERY 6 HOURS
Refills: 0 | Status: DISCONTINUED | OUTPATIENT
Start: 2024-02-05 | End: 2024-02-08

## 2024-02-05 RX ORDER — GLUCAGON INJECTION, SOLUTION 0.5 MG/.1ML
1 INJECTION, SOLUTION SUBCUTANEOUS ONCE
Refills: 0 | Status: DISCONTINUED | OUTPATIENT
Start: 2024-02-05 | End: 2024-02-08

## 2024-02-05 RX ORDER — ENOXAPARIN SODIUM 100 MG/ML
40 INJECTION SUBCUTANEOUS EVERY 24 HOURS
Refills: 0 | Status: DISCONTINUED | OUTPATIENT
Start: 2024-02-05 | End: 2024-02-05

## 2024-02-05 RX ORDER — METOPROLOL TARTRATE 50 MG
50 TABLET ORAL
Refills: 0 | Status: DISCONTINUED | OUTPATIENT
Start: 2024-02-05 | End: 2024-02-08

## 2024-02-05 RX ORDER — INSULIN LISPRO 100/ML
VIAL (ML) SUBCUTANEOUS
Refills: 0 | Status: DISCONTINUED | OUTPATIENT
Start: 2024-02-05 | End: 2024-02-08

## 2024-02-05 RX ORDER — METOPROLOL TARTRATE 50 MG
1 TABLET ORAL
Refills: 0 | DISCHARGE

## 2024-02-05 RX ORDER — ONDANSETRON 8 MG/1
4 TABLET, FILM COATED ORAL EVERY 8 HOURS
Refills: 0 | Status: DISCONTINUED | OUTPATIENT
Start: 2024-02-05 | End: 2024-02-08

## 2024-02-05 RX ORDER — TRAZODONE HCL 50 MG
50 TABLET ORAL AT BEDTIME
Refills: 0 | Status: DISCONTINUED | OUTPATIENT
Start: 2024-02-05 | End: 2024-02-08

## 2024-02-05 RX ORDER — ESCITALOPRAM OXALATE 10 MG/1
10 TABLET, FILM COATED ORAL DAILY
Refills: 0 | Status: DISCONTINUED | OUTPATIENT
Start: 2024-02-05 | End: 2024-02-08

## 2024-02-05 RX ORDER — SODIUM CHLORIDE 9 MG/ML
1000 INJECTION INTRAMUSCULAR; INTRAVENOUS; SUBCUTANEOUS
Refills: 0 | Status: DISCONTINUED | OUTPATIENT
Start: 2024-02-05 | End: 2024-02-07

## 2024-02-05 RX ORDER — SODIUM CHLORIDE 9 MG/ML
1000 INJECTION, SOLUTION INTRAVENOUS
Refills: 0 | Status: DISCONTINUED | OUTPATIENT
Start: 2024-02-05 | End: 2024-02-08

## 2024-02-05 RX ORDER — GABAPENTIN 400 MG/1
100 CAPSULE ORAL THREE TIMES A DAY
Refills: 0 | Status: DISCONTINUED | OUTPATIENT
Start: 2024-02-05 | End: 2024-02-08

## 2024-02-05 RX ORDER — LANOLIN ALCOHOL/MO/W.PET/CERES
3 CREAM (GRAM) TOPICAL AT BEDTIME
Refills: 0 | Status: DISCONTINUED | OUTPATIENT
Start: 2024-02-05 | End: 2024-02-08

## 2024-02-05 RX ORDER — DEXTROSE 50 % IN WATER 50 %
25 SYRINGE (ML) INTRAVENOUS ONCE
Refills: 0 | Status: DISCONTINUED | OUTPATIENT
Start: 2024-02-05 | End: 2024-02-08

## 2024-02-05 RX ORDER — AZITHROMYCIN 500 MG/1
500 TABLET, FILM COATED ORAL DAILY
Refills: 0 | Status: COMPLETED | OUTPATIENT
Start: 2024-02-05 | End: 2024-02-08

## 2024-02-05 RX ORDER — INSULIN LISPRO 100/ML
VIAL (ML) SUBCUTANEOUS AT BEDTIME
Refills: 0 | Status: DISCONTINUED | OUTPATIENT
Start: 2024-02-05 | End: 2024-02-08

## 2024-02-05 RX ORDER — ASPIRIN/CALCIUM CARB/MAGNESIUM 324 MG
81 TABLET ORAL DAILY
Refills: 0 | Status: DISCONTINUED | OUTPATIENT
Start: 2024-02-05 | End: 2024-02-08

## 2024-02-05 RX ORDER — DEXTROSE 50 % IN WATER 50 %
15 SYRINGE (ML) INTRAVENOUS ONCE
Refills: 0 | Status: DISCONTINUED | OUTPATIENT
Start: 2024-02-05 | End: 2024-02-08

## 2024-02-05 RX ORDER — CEFTRIAXONE 500 MG/1
1000 INJECTION, POWDER, FOR SOLUTION INTRAMUSCULAR; INTRAVENOUS EVERY 24 HOURS
Refills: 0 | Status: DISCONTINUED | OUTPATIENT
Start: 2024-02-05 | End: 2024-02-08

## 2024-02-05 RX ADMIN — GABAPENTIN 100 MILLIGRAM(S): 400 CAPSULE ORAL at 03:18

## 2024-02-05 RX ADMIN — Medication 15 MILLIGRAM(S): at 21:04

## 2024-02-05 RX ADMIN — Medication 650 MILLIGRAM(S): at 08:09

## 2024-02-05 RX ADMIN — GABAPENTIN 100 MILLIGRAM(S): 400 CAPSULE ORAL at 05:10

## 2024-02-05 RX ADMIN — Medication 15 MILLIGRAM(S): at 09:20

## 2024-02-05 RX ADMIN — ESCITALOPRAM OXALATE 10 MILLIGRAM(S): 10 TABLET, FILM COATED ORAL at 09:20

## 2024-02-05 RX ADMIN — GABAPENTIN 100 MILLIGRAM(S): 400 CAPSULE ORAL at 13:32

## 2024-02-05 RX ADMIN — Medication 6: at 12:52

## 2024-02-05 RX ADMIN — AZITHROMYCIN 500 MILLIGRAM(S): 500 TABLET, FILM COATED ORAL at 09:20

## 2024-02-05 RX ADMIN — Medication 81 MILLIGRAM(S): at 09:20

## 2024-02-05 RX ADMIN — Medication 2: at 08:06

## 2024-02-05 RX ADMIN — Medication 2: at 17:02

## 2024-02-05 RX ADMIN — Medication 650 MILLIGRAM(S): at 01:34

## 2024-02-05 RX ADMIN — Medication 50 MILLIGRAM(S): at 09:22

## 2024-02-05 RX ADMIN — CEFTRIAXONE 1000 MILLIGRAM(S): 500 INJECTION, POWDER, FOR SOLUTION INTRAMUSCULAR; INTRAVENOUS at 16:02

## 2024-02-05 RX ADMIN — Medication 50 MILLIGRAM(S): at 21:04

## 2024-02-05 RX ADMIN — GABAPENTIN 100 MILLIGRAM(S): 400 CAPSULE ORAL at 21:04

## 2024-02-05 NOTE — DIETITIAN INITIAL EVALUATION ADULT - NS FNS DIET ORDER
Diet, DASH/TLC:   Sodium & Cholesterol Restricted  Consistent Carbohydrate {No Snacks} (CSTCHO) (02-05-24 @ 03:27)

## 2024-02-05 NOTE — DIETITIAN INITIAL EVALUATION ADULT - PERTINENT MEDS FT
MEDICATIONS  (STANDING):  aspirin enteric coated 81 milliGRAM(s) Oral daily  azithromycin   Tablet 500 milliGRAM(s) Oral daily  busPIRone 15 milliGRAM(s) Oral two times a day  cefTRIAXone Injectable. 1000 milliGRAM(s) IV Push every 24 hours  dextrose 5%. 1000 milliLiter(s) (50 mL/Hr) IV Continuous <Continuous>  dextrose 50% Injectable 25 Gram(s) IV Push once  escitalopram 10 milliGRAM(s) Oral daily  gabapentin 100 milliGRAM(s) Oral three times a day  glucagon  Injectable 1 milliGRAM(s) IntraMuscular once  insulin lispro (ADMELOG) corrective regimen sliding scale   SubCutaneous three times a day before meals  insulin lispro (ADMELOG) corrective regimen sliding scale   SubCutaneous at bedtime  metoprolol tartrate 50 milliGRAM(s) Oral two times a day  sodium chloride 0.9%. 1000 milliLiter(s) (83 mL/Hr) IV Continuous <Continuous>    MEDICATIONS  (PRN):  acetaminophen     Tablet .. 650 milliGRAM(s) Oral every 6 hours PRN Temp greater or equal to 38C (100.4F), Mild Pain (1 - 3), Moderate Pain (4 - 6)  aluminum hydroxide/magnesium hydroxide/simethicone Suspension 30 milliLiter(s) Oral every 4 hours PRN Dyspepsia  dextrose Oral Gel 15 Gram(s) Oral once PRN Blood Glucose LESS THAN 70 milliGRAM(s)/deciliter  melatonin 3 milliGRAM(s) Oral at bedtime PRN Insomnia  ondansetron Injectable 4 milliGRAM(s) IV Push every 8 hours PRN Nausea and/or Vomiting  traZODone 50 milliGRAM(s) Oral at bedtime PRN inosmonia

## 2024-02-05 NOTE — H&P ADULT - NSHPREVIEWOFSYSTEMS_GEN_ALL_CORE
REVIEW OF SYSTEMS:  CONSTITUTIONAL: + weakness, + fevers + chills  EYES/ENT: No visual changes;  No vertigo or throat pain   NECK: No pain or stiffness  RESPIRATORY: No cough, wheezing, hemoptysis; + shortness of breath  CARDIOVASCULAR: No chest pain or palpitations  GASTROINTESTINAL: No abdominal or epigastric pain. No nausea, vomiting, or hematemesis; No diarrhea or constipation. No melena or hematochezia.  GENITOURINARY: No dysuria, frequency or hematuria  NEUROLOGICAL: No numbness or weakness  SKIN: No itching, rashes

## 2024-02-05 NOTE — PROGRESS NOTE ADULT - SUBJECTIVE AND OBJECTIVE BOX
Consult received this morning. Chart reviewed. the patient was seen by Dr. Montanez in past.     Called Dr. Oscar -- informed him-- will defer pulm care.

## 2024-02-05 NOTE — PHYSICAL THERAPY INITIAL EVALUATION ADULT - NSPTDMEREC_GEN_A_CORE
Pt will requires a rolling walker at home due to their dx of difficulty walking to help complete their MRADL's./rolling walker

## 2024-02-05 NOTE — H&P ADULT - NSHPPHYSICALEXAM_GEN_ALL_CORE
Vital Signs Last 24 Hrs  T(C): 36.4 (05 Feb 2024 02:23), Max: 40.2 (04 Feb 2024 14:30)  T(F): 97.6 (05 Feb 2024 02:23), Max: 104.3 (04 Feb 2024 14:30)  HR: 93 (05 Feb 2024 02:23) (80 - 131)  BP: 157/74 (05 Feb 2024 02:23) (133/88 - 176/76)  BP(mean): 104 (04 Feb 2024 13:24) (104 - 104)  RR: 18 (05 Feb 2024 02:23) (17 - 20)  SpO2: 100% (05 Feb 2024 02:23) (96% - 100%)    Parameters below as of 05 Feb 2024 02:23  Patient On (Oxygen Delivery Method): room air        General: WN/WD NAD  Head- Atraumatic, normocephalic   Neurology: A&Ox3, nonfocal  HEENT- PERRLA, moist muscous membrane  Neck-supple, no JVD  Respiratory: Rhonchi MORALES   CVS:  S1S2, no murmurs, rubs or gallops  Abdominal: Soft, NT, ND +BS,   Genitourinary- voiding, non palpable bladder  Extremities: No edema, + peripheral pulses  Skin- no rash, no ulcer  Psychiatric- mood stable

## 2024-02-05 NOTE — DIETITIAN INITIAL EVALUATION ADULT - OTHER INFO
Pt is a 67 yo M with PMHx of HTN, T2DM, CAD s/p stents, CHU who p/w fever, chills, myalgias x 3 days. Pt came to ED 1 day prior to admission, Dx with PNA and discharged on Azithromycin however returned bc symptoms continued to worsen.  Admit for sepsis, MORALES pneumonia, L pleural effusion     Remains FULL CODE. W/ T2DM - POCTs elevated (194, 279) and hgb A1C of 5.5% on 2/5/24 - good glycemic control for age. Given 2 units of corrective insulin x 24 hrs. Reports UBW of ~210# x 8 mo; unable to obtain bed scale wt 2/2 pt sitting in chair. Admit dosing wt of 197.3 taken on 2/5/24 - appears accurate. Unintentional wt loss of 12.7# / 6% wt loss x 8 mo ; not clinically significant. Appears overweight/obese, NFPE reveals mild muscle/ fat wasting. Does NOT meet criteria for PCM; however, pt is at HIGH RISK. Liberalize diet to CHO Consistent ONLY to maximize caloric and nutrient intake. Add premier protein shake BID (provides 160kcal, 30g protein) - pt is receptive. RD provided verbal nutrition education on importance of consuming consistent meal times and target BGL - pt is receptive. See below for other recommendations.

## 2024-02-05 NOTE — PATIENT PROFILE ADULT - NSTOBACCO TYPE_GEN_A_CORE_RD
Prescription for xanax 0.25 mg faxed to [address] [place] [address]. confirmation received. Cigarettes

## 2024-02-05 NOTE — PHYSICAL THERAPY INITIAL EVALUATION ADULT - GENERAL OBSERVATIONS, REHAB EVAL
Pt found seated at bedside chair on 5E in NAD, agreeable to participate in PT Evaluation. Pt requires SBA for sit<>stand transfers. Pt is able to ambulate 150 feet with RW and SBA. Pt returned to bedside chair with chair alarm on, call bell and phone in reach, RN Melvi made aware.

## 2024-02-05 NOTE — H&P ADULT - HISTORY OF PRESENT ILLNESS
Pt is a 69 yo M with PMHx of HTN, T2DM, CAD s/p stents, CHU who p/w fever, chills, myalgias x 3 days. Pt came to ED 1 day prior to admission, Dx with PNA and discharged on Azithromycin however returned bc symptoms continued to worsen. Denies cough, sick contacts, cp, gross hematuria.     In ED, TMAX of 104.3, -> 80, /81, SpO2 99% at RA. Received 2L IVFs, Ceftriaxone, Azithromycin x 1. CTA Chest with MORALES infiltrate and trace pleural effusion, no PE.

## 2024-02-05 NOTE — DIETITIAN INITIAL EVALUATION ADULT - PERTINENT LABORATORY DATA
02-05    132<L>  |  101  |  13  ----------------------------<  198<H>  4.1   |  23  |  0.88    Ca    8.4<L>      05 Feb 2024 07:31    TPro  6.8  /  Alb  2.5<L>  /  TBili  0.7  /  DBili  x   /  AST  36  /  ALT  35  /  AlkPhos  55  02-05  POCT Blood Glucose.: 279 mg/dL (02-05-24 @ 11:56)  A1C with Estimated Average Glucose Result: 5.5 % (02-05-24 @ 07:31)

## 2024-02-05 NOTE — H&P ADULT - ASSESSMENT
Pt is a 67 yo M with PMHx of HTN, T2DM, CAD s/p stents, CUH who p/w fever, chills, myalgias x 3 days. Pt came to ED 1 day prior to admission, Dx with PNA and discharged on Azithromycin however returned bc symptoms continued to worsen. Denies cough, sick contacts, cp, gross hematuria.       #Sepsis 2/2 CAP   -CTA Chest as above  -pt met sepsis criteria on arrival with TMAx of 104, tachycardia, confirmed source of infection  -cultures not ordered in ED, ordered STAT  (pt has already received 1 dose of abx)  -Ceftriaxone/Azithromycin  -monitor VS   -Tylenol prn for fevers  -urine Legionella    #Mild Hyponatremia   -SIADH possible with PNA. high  urine specific gravity   -monitor Na after IVFs, will decrease if truly SIADH   -urine Cr, Na, osmlols    #Thrombocytopenia   -likely reactive from infection   -monitor cbc     #T2DM   -hold oral agents  -ISS, Accu checks per routine, AM A1c     #CAD   -c/w ASA, Metoprolol     #Proteinuria  -check urine p/c ratio  -might benefit from starting ACEi/ARB as outpatient in setting of DM     #Hematuria   -Large blood on UA, 356 RBCs   -fu UCx   -outpatient urology fu     #Depression   -c/w Lexapro 10 mg daily   -monitor Na level closely     #Protein Calorie Malnutrition   -nutrition consult     #CHU-c/w Buspirone    #DVT PPx -Lovenox     Full Code               Pt is a 67 yo M with PMHx of HTN, T2DM, CAD s/p stents, CHU who p/w fever, chills, myalgias x 3 days. Pt came to ED 1 day prior to admission, Dx with PNA and discharged on Azithromycin however returned bc symptoms continued to worsen. Denies cough, sick contacts, cp, gross hematuria.       #Sepsis 2/2 CAP   -CTA Chest as above  -pt met sepsis criteria on arrival with TMAx of 104, tachycardia, confirmed source of infection  -cultures not ordered in ED, ordered STAT  (pt has already received 1 dose of abx)  -Ceftriaxone/Azithromycin  -monitor VS   -Tylenol prn for fevers  -urine Legionella    #Mild Hyponatremia   -SIADH possible with PNA. high  urine specific gravity   -monitor Na after IVFs, will decrease if truly SIADH   -urine Cr, Na, osmlols    #Thrombocytopenia   -likely reactive from infection   -monitor cbc     #T2DM   -hold oral agents  -ISS, Accu checks per routine, AM A1c     #CAD   -c/w ASA, Metoprolol     #Proteinuria  -check urine p/c ratio  -might benefit from starting ACEi/ARB as outpatient in setting of DM     #Hematuria   -Large blood on UA, 356 RBCs   -fu UCx   -outpatient urology fu     #Depression   -c/w Lexapro 10 mg daily   -monitor Na level closely     #Protein Calorie Malnutrition   -nutrition consult     #CHU-c/w Buspirone    #DVT PPx -ICDs     Full Code

## 2024-02-05 NOTE — DIETITIAN INITIAL EVALUATION ADULT - NSPROEDAABILITYLEARN_GEN_A_NUR
none As certified below, I, or a nurse practitioner or physician assistant working with me, had a face-to-face encounter that meets the physician face-to-face encounter requirements.

## 2024-02-05 NOTE — DIETITIAN INITIAL EVALUATION ADULT - ORAL INTAKE PTA/DIET HISTORY
Pt lives at home alone; cooks/grocery shops for self w/o difficulty. Reports "fair" appetite, often consumes 2 meals/day (often skips lunch - works full time) and likely meeting <75% of ENN > 1 mo. W/ T2DM - takes mounjaro x 8 mo; no longer on insulin. F/u w/ endocrinologist every 3-6 mo

## 2024-02-05 NOTE — PHYSICAL THERAPY INITIAL EVALUATION ADULT - PERTINENT HX OF CURRENT PROBLEM, REHAB EVAL
Pt is a 67 yo M with PMHx of HTN, T2DM, CAD s/p stents, CHU who p/w fever, chills, myalgias x 3 days. Pt came to ED 1 day prior to admission, Dx with PNA and discharged on Azithromycin however returned bc symptoms continued to worsen. Denies cough, sick contacts, cp, gross hematuria.     In ED, TMAX of 104.3, -> 80, /81, SpO2 99% at RA. Received 2L IVFs, Ceftriaxone, Azithromycin x 1. CTA Chest with MORALES infiltrate and trace pleural effusion, no PE.

## 2024-02-05 NOTE — PATIENT PROFILE ADULT - FALL HARM RISK - HARM RISK INTERVENTIONS

## 2024-02-05 NOTE — PHYSICAL THERAPY INITIAL EVALUATION ADULT - ADDITIONAL COMMENTS
Camila Mehta RN   + Driving   0 SONIA

## 2024-02-05 NOTE — H&P ADULT - NSHPADDITIONALINFOADULT_GEN_ALL_CORE
Patient seen and examined after initial evaluation above by family medicine resident. Case discussed and reviewed in detail. Please not my plan below.     67 y/o M w/ PMH of HTN, DM2, COPD, dyslipidemia, CAD s/p CABG / PCI, anxiety, p/w fever     *Sepsis 2/2 Community acquired PNA   -CT: Large area of parenchymal consolidation left upper lobe, likely pneumonia. Recommend follow-up to resolution. Trace left pleural effusion  -Ceftriaxone / zithromax  -Lactate = 1.7   -ID consult   -Pulm consult  -F/u blood cx   -Albuterol PRN  -Outpatient echo     *DM2  -Humalog ISS  -Diabetic diet     *Thrombocytopenia  -F/u outpatient Hematologist for further work up     *Microcscopic hematuria  -Repeat UA     *DVT ppx   -SCDs     >75mins required for admission

## 2024-02-05 NOTE — H&P ADULT - NSHPLABSRESULTS_GEN_ALL_CORE
< from: CT Angio Chest PE Protocol w/ IV Cont (02.04.24 @ 14:57) >    PROCEDURE DATE:  02/04/2024          INTERPRETATION:  CLINICAL INFORMATION: Evaluate pneumonia, assess for   pulmonary embolism    COMPARISON: 6/23/2018    CONTRAST/COMPLICATIONS:  IV Contrast: Omnipaque 350  60 cc administered  Oral Contrast: NONE  Complications: None reported at time of study completion    PROCEDURE:  CT Angiography of the Chest.  Sagittal and coronal reformats were performed as well as 3D (MIP)   reconstructions.    FINDINGS:    LUNGS AND AIRWAYS: Left upper lobe large area of parenchymal   consolidation with air bronchograms extending to the adjacent lateral   pleura.  PLEURA: Trace left pleural effusion  MEDIASTINUM AND TATA: No lymphadenopathy.  VESSELS: No pulmonary embolism. No thoracic aortic dissection or   aneurysm. Coronary artery stents and calcifications  HEART: Mild cardiomegaly No pericardial effusion.  CHEST WALL AND LOWER NECK: Post median sternotomy  VISUALIZED UPPER ABDOMEN: Within normal limits.  BONES: Degenerative changes    IMPRESSION:  1.  No pulmonary embolism.  2.  Large area of parenchymal consolidation left upper lobe, likely   pneumonia. Recommend follow-up to resolution. Trace left pleural effusion        --- End of Report ---            BLANCA POLLARD MD; Attending Radiologist  This document has been electronicall    < end of copied text >

## 2024-02-05 NOTE — DIETITIAN INITIAL EVALUATION ADULT - ADD RECOMMEND
1) Liberalize diet to CHO Consistent ONLY to maximize caloric and nutrient intake.  2) Add premier protein shake BID (provides 160kcal, 30g protein)  3) Encourage protein-rich foods, maximize food preferences  4) MVI w/ minerals daily to ensure 100% RDA met  5) Monitor lytes/ min and replete prn.  6) Monitor bowel movements, if no BM for >3 days, consider implementing bowel regimen.  7) Monitor and optimize BG levels between 140-180 mg/dL by medical management  8) Encourage f/u with outpatient dietitian and continue to f/u endocrinologist for diabetes management  9) Confirm goals of care regarding nutrition support  RD will continue to monitor PO intake, labs, hydration, and wt prn.

## 2024-02-06 DIAGNOSIS — J18.9 PNEUMONIA, UNSPECIFIED ORGANISM: ICD-10-CM

## 2024-02-06 LAB
GLUCOSE BLDC GLUCOMTR-MCNC: 176 MG/DL — HIGH (ref 70–99)
GLUCOSE BLDC GLUCOMTR-MCNC: 189 MG/DL — HIGH (ref 70–99)
GLUCOSE BLDC GLUCOMTR-MCNC: 212 MG/DL — HIGH (ref 70–99)
GLUCOSE BLDC GLUCOMTR-MCNC: 303 MG/DL — HIGH (ref 70–99)
LEGIONELLA AG UR QL: NEGATIVE — SIGNIFICANT CHANGE UP

## 2024-02-06 PROCEDURE — 99223 1ST HOSP IP/OBS HIGH 75: CPT

## 2024-02-06 PROCEDURE — 99233 SBSQ HOSP IP/OBS HIGH 50: CPT

## 2024-02-06 PROCEDURE — 99497 ADVNCD CARE PLAN 30 MIN: CPT | Mod: 25

## 2024-02-06 RX ADMIN — Medication 2: at 12:09

## 2024-02-06 RX ADMIN — Medication 15 MILLIGRAM(S): at 09:36

## 2024-02-06 RX ADMIN — Medication 50 MILLIGRAM(S): at 20:50

## 2024-02-06 RX ADMIN — GABAPENTIN 100 MILLIGRAM(S): 400 CAPSULE ORAL at 20:50

## 2024-02-06 RX ADMIN — CEFTRIAXONE 1000 MILLIGRAM(S): 500 INJECTION, POWDER, FOR SOLUTION INTRAMUSCULAR; INTRAVENOUS at 15:45

## 2024-02-06 RX ADMIN — ONDANSETRON 4 MILLIGRAM(S): 8 TABLET, FILM COATED ORAL at 03:37

## 2024-02-06 RX ADMIN — Medication 4: at 16:57

## 2024-02-06 RX ADMIN — Medication 81 MILLIGRAM(S): at 09:35

## 2024-02-06 RX ADMIN — Medication 2: at 21:03

## 2024-02-06 RX ADMIN — Medication 650 MILLIGRAM(S): at 10:47

## 2024-02-06 RX ADMIN — GABAPENTIN 100 MILLIGRAM(S): 400 CAPSULE ORAL at 05:16

## 2024-02-06 RX ADMIN — SODIUM CHLORIDE 83 MILLILITER(S): 9 INJECTION INTRAMUSCULAR; INTRAVENOUS; SUBCUTANEOUS at 05:16

## 2024-02-06 RX ADMIN — Medication 50 MILLIGRAM(S): at 09:35

## 2024-02-06 RX ADMIN — Medication 650 MILLIGRAM(S): at 11:47

## 2024-02-06 RX ADMIN — AZITHROMYCIN 500 MILLIGRAM(S): 500 TABLET, FILM COATED ORAL at 09:35

## 2024-02-06 RX ADMIN — Medication 15 MILLIGRAM(S): at 20:50

## 2024-02-06 RX ADMIN — ESCITALOPRAM OXALATE 10 MILLIGRAM(S): 10 TABLET, FILM COATED ORAL at 09:35

## 2024-02-06 RX ADMIN — Medication 650 MILLIGRAM(S): at 19:45

## 2024-02-06 RX ADMIN — Medication 2: at 08:07

## 2024-02-06 NOTE — CONSULT NOTE ADULT - SUBJECTIVE AND OBJECTIVE BOX
Patient is a 68y old  Male who presents with a chief complaint of Sepsis 2/2 CAP (2024 01:07)    HPI:  Pt is a 69 yo M with PMHx of HTN, T2DM, CAD s/p stents, CHU who p/w fever, chills, myalgias x 3 days. Pt came to ED 1 day prior to admission, Dx with PNA and discharged on Azithromycin however returned bc symptoms continued to worsen. Denies cough, sick contacts, cp, gross hematuria.  In ED, TMAX of 104.3, -> 80, /81, SpO2 99% at RA. Received 2L IVFs, Ceftriaxone, Azithromycin x 1. CTA Chest with MORALES infiltrate and trace pleural effusion, no PE.        PMH: as above  PSH: as above  Meds: per reconciliation sheet, noted below  MEDICATIONS  (STANDING):  aspirin enteric coated 81 milliGRAM(s) Oral daily  azithromycin   Tablet 500 milliGRAM(s) Oral daily  busPIRone 15 milliGRAM(s) Oral two times a day  cefTRIAXone Injectable. 1000 milliGRAM(s) IV Push every 24 hours  dextrose 5%. 1000 milliLiter(s) (50 mL/Hr) IV Continuous <Continuous>  dextrose 50% Injectable 25 Gram(s) IV Push once  escitalopram 10 milliGRAM(s) Oral daily  gabapentin 100 milliGRAM(s) Oral three times a day  glucagon  Injectable 1 milliGRAM(s) IntraMuscular once  insulin lispro (ADMELOG) corrective regimen sliding scale   SubCutaneous three times a day before meals  insulin lispro (ADMELOG) corrective regimen sliding scale   SubCutaneous at bedtime  metoprolol tartrate 50 milliGRAM(s) Oral two times a day  sodium chloride 0.9%. 1000 milliLiter(s) (83 mL/Hr) IV Continuous <Continuous>    Allergies    penicillin (Unknown)    Intolerances      Social: no smoking, no alcohol, no illegal drugs; no recent travel, no exposure to TB  FAMILY HISTORY:  Patient's mother is   at 83 from copd, dm    Patient's father is   at 58 from colon cancer       no history of premature cardiovascular disease in first degree relatives    ROS:+ fever, chills, no HA, no dizziness, no sore throat, no blurry vision, no CP, no palpitations, + SOB, + cough, no abdominal pain, no diarrhea, no N/V, no dysuria, no leg pain, no claudication, no rash, no joint aches, no rectal pain or bleeding, no night sweats    All other systems reviewed and are negative    Vital Signs Last 24 Hrs  T(C): 37.8 (2024 08:47), Max: 40.2 (2024 14:30)  T(F): 100.1 (2024 08:47), Max: 104.3 (2024 14:30)  HR: 111 (2024 08:47) (80 - 131)  BP: 153/84 (2024 08:47) (133/88 - 176/76)  BP(mean): 104 (2024 13:24) (104 - 104)  RR: 18 (2024 02:23) (17 - 20)  SpO2: 97% (2024 08:47) (96% - 100%)    Parameters below as of 2024 08:47  Patient On (Oxygen Delivery Method): room air      Daily Height in cm: 160.02 (2024 13:04)    Daily     PE:  Constitutional: NAD  HEENT: NC/AT, EOMI, PERRLA, conjunctivae clear; ears and nose atraumatic; pharynx benign  Neck: supple; thyroid not palpable  Back: no tenderness  Respiratory: decreased breath sounds   Cardiovascular: S1S2 regular, no murmurs  Abdomen: soft, not tender, not distended, positive BS; liver and spleen WNL  Genitourinary: no suprapubic tenderness  Lymphatic: no LN palpable  Musculoskeletal: no muscle tenderness, no joint swelling or tenderness  Extremities: no pedal edema  Neurological/ Psychiatric: moving all extremities  Skin: no rashes; no palpable lesions    Labs: all available labs reviewed                        13.9   7.41  )-----------( 96       ( 2024 07:31 )             39.0     02-05    132<L>  |  101  |  13  ----------------------------<  198<H>  4.1   |  23  |  0.88    Ca    8.4<L>      2024 07:31    TPro  6.8  /  Alb  2.5<L>  /  TBili  0.7  /  DBili  x   /  AST  36  /  ALT  35  /  AlkPhos  55  02-05     LIVER FUNCTIONS - ( 2024 07:31 )  Alb: 2.5 g/dL / Pro: 6.8 gm/dL / ALK PHOS: 55 U/L / ALT: 35 U/L / AST: 36 U/L / GGT: x           Urinalysis Basic - ( 2024 07:31 )    Color: x / Appearance: x / SG: x / pH: x  Gluc: 198 mg/dL / Ketone: x  / Bili: x / Urobili: x   Blood: x / Protein: x / Nitrite: x   Leuk Esterase: x / RBC: x / WBC x   Sq Epi: x / Non Sq Epi: x / Bacteria: x          Radiology: all available radiological tests reviewed  < from: CT Angio Chest PE Protocol w/ IV Cont (24 @ 14:57) >  ACC: [id number] EXAM:  CT ANGIO CHEST PULM ART [facility]   ORDERED BY: AYDEN WEINBERG     PROCEDURE DATE:  2024          INTERPRETATION:  CLINICAL INFORMATION: Evaluate pneumonia, assess for   pulmonary embolism    COMPARISON: 2018    CONTRAST/COMPLICATIONS:  IV Contrast: Omnipaque 350  60 cc administered  Oral Contrast: NONE  Complications: None reported at time of study completion    PROCEDURE:  CT Angiography of the Chest.  Sagittal and coronal reformats were performed as well as 3D (MIP)   reconstructions.    FINDINGS:    LUNGS AND AIRWAYS: Left upper lobe large area of parenchymal   consolidation with air bronchograms extending to the adjacent lateral   pleura.  PLEURA: Trace left pleural effusion  MEDIASTINUM AND TATA: No lymphadenopathy.  VESSELS: No pulmonary embolism. No thoracic aortic dissection or   aneurysm. Coronary artery stents and calcifications  HEART: Mild cardiomegaly No pericardial effusion.  CHEST WALL AND LOWER NECK: Post median sternotomy  VISUALIZED UPPER ABDOMEN: Within normal limits.  BONES: Degenerative changes    IMPRESSION:  1.  No pulmonary embolism.  2.  Large area of parenchymal consolidation left upper lobe, likely   pneumonia. Recommend follow-up to resolution. Trace left pleural effusion        Advanced directives addressed: full resuscitation
Patient is a 68y old  Male who presents with a chief complaint of Sepsis 2/2 CAP (2024 12:26)      HPI:  Pt is a 69 yo M with PMHx of HTN, T2DM, CAD s/p stents, CHU who p/w fever, chills, myalgias x 3 days. Pt came to ED 1 day prior to admission, Dx with PNA and discharged on Azithromycin however returned bc symptoms continued to worsen. Denies cough, sick contacts, cp, gross hematuria.     In ED, TMAX of 104.3, -> 80, /81, SpO2 99% at RA. Received 2L IVFs, Ceftriaxone, Azithromycin x 1. CTA Chest with MORALES infiltrate and trace pleural effusion, no PE.  (2024 01:07)      PAST MEDICAL & SURGICAL HISTORY:  HTN (hypertension)      DM (diabetes mellitus)      COPD (chronic obstructive pulmonary disease)      Coronary artery disease involving coronary bypass graft of native heart with angina pectoris      HLD (hyperlipidemia)      S/P CABG x 3      S/P arterial stent          PREVIOUS DIAGNOSTIC TESTING:      MEDICATIONS  (STANDING):  aspirin enteric coated 81 milliGRAM(s) Oral daily  azithromycin   Tablet 500 milliGRAM(s) Oral daily  busPIRone 15 milliGRAM(s) Oral two times a day  cefTRIAXone Injectable. 1000 milliGRAM(s) IV Push every 24 hours  dextrose 5%. 1000 milliLiter(s) (50 mL/Hr) IV Continuous <Continuous>  dextrose 50% Injectable 25 Gram(s) IV Push once  escitalopram 10 milliGRAM(s) Oral daily  gabapentin 100 milliGRAM(s) Oral three times a day  glucagon  Injectable 1 milliGRAM(s) IntraMuscular once  insulin lispro (ADMELOG) corrective regimen sliding scale   SubCutaneous three times a day before meals  insulin lispro (ADMELOG) corrective regimen sliding scale   SubCutaneous at bedtime  metoprolol tartrate 50 milliGRAM(s) Oral two times a day  sodium chloride 0.9%. 1000 milliLiter(s) (83 mL/Hr) IV Continuous <Continuous>    MEDICATIONS  (PRN):  acetaminophen     Tablet .. 650 milliGRAM(s) Oral every 6 hours PRN Temp greater or equal to 38C (100.4F), Mild Pain (1 - 3), Moderate Pain (4 - 6)  aluminum hydroxide/magnesium hydroxide/simethicone Suspension 30 milliLiter(s) Oral every 4 hours PRN Dyspepsia  dextrose Oral Gel 15 Gram(s) Oral once PRN Blood Glucose LESS THAN 70 milliGRAM(s)/deciliter  melatonin 3 milliGRAM(s) Oral at bedtime PRN Insomnia  ondansetron Injectable 4 milliGRAM(s) IV Push every 8 hours PRN Nausea and/or Vomiting  traZODone 50 milliGRAM(s) Oral at bedtime PRN inosmonia      FAMILY HISTORY:  Patient's mother is   at 83 from copd, dm    Patient's father is   at 58 from colon cancer        SOCIAL HISTORY:  ***    REVIEW OF SYSTEM:  Pertinent items are noted in HPI.      Vital Signs Last 24 Hrs  T(C): 36.4 (2024 21:00), Max: 37.8 (2024 08:47)  T(F): 97.6 (2024 21:00), Max: 100.1 (2024 08:47)  HR: 89 (2024 21:00) (89 - 111)  BP: 144/61 (2024 21:00) (144/61 - 153/84)  BP(mean): 93 (2024 15:49) (93 - 93)  RR: 18 (2024 21:00) (18 - 18)  SpO2: 95% (2024 21:00) (95% - 97%)    Parameters below as of 2024 21:00  Patient On (Oxygen Delivery Method): room air        I&O's Summary    2024 07:01  -  2024 07:00  --------------------------------------------------------  IN: 0 mL / OUT: 525 mL / NET: -525 mL      PHYSICAL EXAM  General Appearance: cooperative, no acute distress,   HEENT: PERRL, conjunctiva clear, EOM's intact, non injected pharynx, no exudate, TM   normal  Neck: Supple, , no adenopathy, thyroid: not enlarged, no carotid bruit or JVD  Back: Symmetric, no  tenderness,no soft tissue tenderness  Lungs: left sided rhonchi, no wheeze  Heart: Regular rate and rhythm, S1, S2 normal, no murmur, rub or gallop  Abdomen: Soft, non-tender, bowel sounds active , no hepatosplenomegaly  Extremities: no cyanosis or edema, no joint swelling  Skin: Skin color, texture normal, no rashes   Neurologic: Alert and oriented X3 , cranial nerves intact, sensory and motor normal,    ECG:    LABS:                          13.9   7.41  )-----------( 96       ( 2024 07:31 )             39.0     02-05    132<L>  |  101  |  13  ----------------------------<  198<H>  4.1   |  23  |  0.88    Ca    8.4<L>      2024 07:31    TPro  6.8  /  Alb  2.5<L>  /  TBili  0.7  /  DBili  x   /  AST  36  /  ALT  35  /  AlkPhos  55  02-05              Urinalysis Basic - ( 2024 07:31 )    Color: x / Appearance: x / SG: x / pH: x  Gluc: 198 mg/dL / Ketone: x  / Bili: x / Urobili: x   Blood: x / Protein: x / Nitrite: x   Leuk Esterase: x / RBC: x / WBC x   Sq Epi: x / Non Sq Epi: x / Bacteria: x            RADIOLOGY & ADDITIONAL STUDIES:    < from: CT Angio Chest PE Protocol w/ IV Cont (24 @ 14:57) >  FINDINGS:    LUNGS AND AIRWAYS: Left upper lobe large area of parenchymal   consolidation with air bronchograms extending to the adjacent lateral   pleura.  PLEURA: Trace left pleural effusion  MEDIASTINUM AND TATA: No lymphadenopathy.  VESSELS: No pulmonary embolism. No thoracic aortic dissection or   aneurysm. Coronary artery stents and calcifications  HEART: Mild cardiomegaly No pericardial effusion.  CHEST WALL AND LOWER NECK: Post median sternotomy  VISUALIZED UPPER ABDOMEN: Within normal limits.  BONES: Degenerative changes    IMPRESSION:  1.  No pulmonary embolism.  2.  Large area of parenchymal consolidation left upper lobe, likely   pneumonia. Recommend follow-up to resolution. Trace left pleural effusion      < end of copied text >  
HPI: Pt is a 68y old Male with hx of HTN, T2DM, CAD s/p stents, CHU who p/w fever, chills, myalgias x 3 days. Pt came to ED 1 day prior to admission, Dx with PNA and discharged on Azithromycin however returned bc symptoms continued to worsen. Denies cough, sick contacts, cp, gross hematuria.     In ED, TMAX of 104.3, -> 80, /81, SpO2 99% at RA. Received 2L IVFs, Ceftriaxone, Azithromycin x 1. CTA Chest with MORALES infiltrate and trace pleural effusion, no PE.     : Seen and examined at bedside. A&Ox3. c/o feeling discomfort "all over" due to current illness. Denies CP, SOB. On room air in NAD. Met with patient to discuss advanced directives and GOC.      PAIN: ( )Yes   (X)No  denies pain but c/o generalized discomfort and "aches"    DYSPNEA: ( ) Yes  (X) No  on room air in NAD      PAST MEDICAL & SURGICAL HISTORY:  HTN (hypertension)  DM (diabetes mellitus)  COPD (chronic obstructive pulmonary disease)  Coronary artery disease involving coronary bypass graft of native heart with angina pectoris  HLD (hyperlipidemia)  S/P CABG x 3  S/P arterial stent      SOCIAL HX:    Hx opiate tolerance ( )YES  ( )NO    Baseline ADLs  (Prior to Admission)  (X) Independent   ( )Dependent      FAMILY HISTORY:  Patient's mother is   at 83 from copd, dm    Patient's father is   at 58 from colon cancer      Review of Systems:    Anxiety- denies  Depression- denies  Physical Discomfort- ++  Dyspnea- denies  Constipation- denies  Diarrhea- ++  Nausea- denies  Vomiting- denies  Anorexia- denies  Weight Loss-   Cough- denies  Secretions- denies  Fatigue- ++  Weakness- ++  Delirium- denies    All other systems reviewed and negative  Unable to obtain/Limited due to:      PHYSICAL EXAM:    Vital Signs Last 24 Hrs  T(C): 37.3 (2024 08:23), Max: 37.3 (2024 08:23)  T(F): 99.2 (2024 08:23), Max: 99.2 (2024 08:23)  HR: 86 (2024 08:23) (86 - 90)  BP: 144/83 (2024 08:23) (144/61 - 149/75)  BP(mean): 93 (2024 15:49) (93 - 93)  RR: 18 (2024 21:00) (18 - 18)  SpO2: 96% (2024 08:23) (95% - 97%)    Parameters below as of 2024 08:23  Patient On (Oxygen Delivery Method): room air      Daily     Daily     PPSV2:   30%  FAST:    General: alert, pleasant, in NAD  Mental Status: A&Ox4  HEENT: Normocephalic, atraumatic, conjunctivae clear, EOM intact, PERRL  Lungs: CTA bilaterally  Cardiac: Regular rate and rhythm. S1S2  GI: Abdomen soft, nontender, nondistended. +BSx4  : No suprapubic tenderness  Ext: MAEx4. No edema  Neuro: A&Ox4. Speech intact. No focal neuro deficits.      LABS:                        13.9   7.41  )-----------( 96       ( 2024 07:31 )             39.0     02-05    132<L>  |  101  |  13  ----------------------------<  198<H>  4.1   |  23  |  0.88    Ca    8.4<L>      2024 07:31    TPro  6.8  /  Alb  2.5<L>  /  TBili  0.7  /  DBili  x   /  AST  36  /  ALT  35  /  AlkPhos  55  02-05      Albumin: Albumin: 2.5 g/dL ( @ 07:31)      Allergies    penicillin (Unknown)    Intolerances      MEDICATIONS  (STANDING):  aspirin enteric coated 81 milliGRAM(s) Oral daily  azithromycin   Tablet 500 milliGRAM(s) Oral daily  busPIRone 15 milliGRAM(s) Oral two times a day  cefTRIAXone Injectable. 1000 milliGRAM(s) IV Push every 24 hours  dextrose 5%. 1000 milliLiter(s) (50 mL/Hr) IV Continuous <Continuous>  dextrose 50% Injectable 25 Gram(s) IV Push once  escitalopram 10 milliGRAM(s) Oral daily  gabapentin 100 milliGRAM(s) Oral three times a day  glucagon  Injectable 1 milliGRAM(s) IntraMuscular once  insulin lispro (ADMELOG) corrective regimen sliding scale   SubCutaneous three times a day before meals  insulin lispro (ADMELOG) corrective regimen sliding scale   SubCutaneous at bedtime  metoprolol tartrate 50 milliGRAM(s) Oral two times a day  sodium chloride 0.9%. 1000 milliLiter(s) (83 mL/Hr) IV Continuous <Continuous>    MEDICATIONS  (PRN):  acetaminophen     Tablet .. 650 milliGRAM(s) Oral every 6 hours PRN Temp greater or equal to 38C (100.4F), Mild Pain (1 - 3), Moderate Pain (4 - 6)  aluminum hydroxide/magnesium hydroxide/simethicone Suspension 30 milliLiter(s) Oral every 4 hours PRN Dyspepsia  dextrose Oral Gel 15 Gram(s) Oral once PRN Blood Glucose LESS THAN 70 milliGRAM(s)/deciliter  melatonin 3 milliGRAM(s) Oral at bedtime PRN Insomnia  ondansetron Injectable 4 milliGRAM(s) IV Push every 8 hours PRN Nausea and/or Vomiting  traZODone 50 milliGRAM(s) Oral at bedtime PRN inosmonia      RADIOLOGY/ADDITIONAL STUDIES:  < from: CT Angio Chest PE Protocol w/ IV Cont (24 @ 14:57) >    ACC: [id number] EXAM:  CT ANGIO CHEST PULM ART [facility]   ORDERED BY: AYDEN WEINBERG     PROCEDURE DATE:  2024          INTERPRETATION:  CLINICAL INFORMATION: Evaluate pneumonia, assess for   pulmonary embolism    COMPARISON: 2018    CONTRAST/COMPLICATIONS:  IV Contrast: Omnipaque 350  60 cc administered  Oral Contrast: NONE  Complications: None reported at time of study completion    PROCEDURE:  CT Angiography of the Chest.  Sagittal and coronal reformats were performed as well as 3D (MIP)   reconstructions.    FINDINGS:    LUNGS AND AIRWAYS: Left upper lobe large area of parenchymal   consolidation with air bronchograms extending to the adjacent lateral   pleura.  PLEURA: Trace left pleural effusion  MEDIASTINUM AND TATA: No lymphadenopathy.  VESSELS: No pulmonary embolism. No thoracic aortic dissection or   aneurysm. Coronary artery stents and calcifications  HEART: Mild cardiomegaly No pericardial effusion.  CHEST WALL AND LOWER NECK: Post median sternotomy  VISUALIZED UPPER ABDOMEN: Within normal limits.  BONES: Degenerative changes    IMPRESSION:  1.  No pulmonary embolism.  2.  Large area of parenchymal consolidation left upper lobe, likely   pneumonia. Recommend follow-up to resolution. Trace left pleural effusion        --- End of Report ---            BLANCA POLLARD MD; Attending Radiologist  This document has been electronically signed. 2024  3:07PM    < end of copied text >    < from: Xray Chest 1 View- PORTABLE-Urgent (Xray Chest 1 View- PORTABLE-Urgent .) (24 @ 08:54) >    ACC: [id number] EXAM:  XR CHEST PORTABLE URGENT 1V   ORDERED BY:   LAZ FERNANDO     PROCEDURE DATE:  2024          INTERPRETATION:  AP chest on February 3, 2024 at 8:43 AM. Patient has   flulike symptoms.    Heart magnified by technique.    Sternotomy again noted.    Present film shows an increasing pleural density in the left mid lateral   chest which is new since 2021.    IMPRESSION: Increasing density left mid lateral chest either pleural or   consolidated infiltrate in nature.    --- End of Report ---            YA LEAL MD; Attending Radiologist  This document has been electronically signed. Feb  3 2024  2:42PM    < end of copied text >

## 2024-02-06 NOTE — CONSULT NOTE ADULT - ASSESSMENT
Pt is a 67 yo M with PMHx of HTN, T2DM, CAD s/p stents, CHU who p/w fever, chills, myalgias x 3 days. Pt came to ED 1 day prior to admission, Dx with PNA and discharged on Azithromycin however returned bc symptoms continued to worsen. Denies cough, sick contacts, cp, gross hematuria.     In ED, TMAX of 104.3, -> 80, /81, SpO2 99% at RA. Received 2L IVFs, Ceftriaxone, Azithromycin x 1. CTA Chest with MORALES infiltrate and trace pleural effusion, no PE.  (05 Feb 2024 01:07)      Assessment / plan;  New MORALES consolidation / pneumonia  eval for sepsis with high fever, r/o bacteremia  trace left sided pleural effusion  no consolidation or mass on last ct chest 8/2023 as outpt  no Pe on CTA  MARRERO due to above  CAD with stents / no CHF    agree with antibiotics  fu cultures  mobilize OOB  will fuw ith you  repeat ct chest in 2montsh r/o underlying mass
Pt is a 67 yo M with PMHx of HTN, T2DM, CAD s/p stents, CHU who p/w fever, chills, myalgias x 3 days. Pt came to ED 1 day prior to admission, Dx with PNA and discharged on Azithromycin however returned bc symptoms continued to worsen. Denies cough, sick contacts, cp, gross hematuria.  In ED, TMAX of 104.3, -> 80, /81, SpO2 99% at RA. Received 2L IVFs, Ceftriaxone, Azithromycin x 1. CTA Chest with MORALES infiltrate and trace pleural effusion, no PE.      1. Sepsis. MORALES pneumonia. L pleural effusion. CAP  - imaging reviewed febrile tachycardic on admit  - agree with IV rocephin 1gm daily  - on azithromycin 500mg daily  - f/u cultures, check sputum cx legionella ag  - monitor temps  - tolerating abx well so far; no side effects noted  - reason for abx use and side effects reviewed with patient  - supportive care  - fu cbc    Clinical team may change from intravenous to oral antibiotics when the following criteria are met:   1. Patient is clinically improving/stable       a)	Improved signs and symptoms of infection from initial presentation       b)	Afebrile for 24 hours       c)	Leukocytosis trending towards normal range   2. Patient is tolerating oral intake   3. Initial blood cultures are negative     When above criteria met  may change iv antibiotics to: po azithromycin 500mg BID x 3 days total, po ceftin 500mg BID x 7 days    
Pt is a 68y old Male with hx of HTN, T2DM, CAD s/p stents, CHU who p/w fever, chills, myalgias x 3 days. Pt came to ED 1 day prior to admission, Dx with PNA and discharged on Azithromycin however returned bc symptoms continued to worsen. Denies cough, sick contacts, cp, gross hematuria.     1. Sepsis 2/2 CAP  -IV antibiotics  -blood cultures NGTD  -encourage OOB, IS  -monitor for fevers, Tylenol PRN  -pulmonary following  -ID following      Process of Care   --Reviewed dx/treatment problems and alignment with Goals of Care       Physical Aspects of Care   --Pain   patient denies at this time   c/w current management     --Bowel Regimen   denies constipation   risk for constipation d/t immobility   daily dulcolax as needed     --Dyspnea   No SOB at this time   comfortable and in NAD     --Nausea Vomiting   denies     --Weakness   PT as tolerated    encourage OOB        Psychological and Psychiatric Aspects of Care:    --Grief/ Bereavement: emotional support provided   --Hx of psychiatric dx: none   --Pastoral Care Available PRN          Social Aspects of Care   --SW involved          Cultural Aspects   --Primary Language: English           Goals of Care:  see [facility] discussion above          We discussed Palliative Care team being a supportive team when a patient has ongoing illnesses.  We also discussed that it is not an end of life care service, but can help navigate symptoms and emotional support throughout their hospital stay here.           Ethical and Legal Aspects: NA           Capacity- A&Ox3, has medical decision making capacity         HCP/Surrogate: Maday Jara, sister [phone]          Code Status: DNR/DNI trial NIV    MOLST: MOLST completed with patient 2/6/24 reflecting limitations of DNR/DNI trial NIV    Dispo Plan: home with home care vs. [facility]          Discussed With: Dr. Barr coordinated with attending and SW and RN            Time Spent: 75 minutes including the care, coordination and counseling of this patient, 50% of which was spent coordinating and counseling.

## 2024-02-06 NOTE — PROGRESS NOTE ADULT - SUBJECTIVE AND OBJECTIVE BOX
Date of service: 02-06-24 @ 10:43    pt seen and examined  feels better  less cough  no resp distress     ROS: no fever or chills; denies dizziness, no HA, no abdominal pain, no diarrhea or constipation; no dysuria, no urinary frequency, no legs pain, no rashes    MEDICATIONS  (STANDING):  aspirin enteric coated 81 milliGRAM(s) Oral daily  azithromycin   Tablet 500 milliGRAM(s) Oral daily  busPIRone 15 milliGRAM(s) Oral two times a day  cefTRIAXone Injectable. 1000 milliGRAM(s) IV Push every 24 hours  dextrose 5%. 1000 milliLiter(s) (50 mL/Hr) IV Continuous <Continuous>  dextrose 50% Injectable 25 Gram(s) IV Push once  escitalopram 10 milliGRAM(s) Oral daily  gabapentin 100 milliGRAM(s) Oral three times a day  glucagon  Injectable 1 milliGRAM(s) IntraMuscular once  insulin lispro (ADMELOG) corrective regimen sliding scale   SubCutaneous three times a day before meals  insulin lispro (ADMELOG) corrective regimen sliding scale   SubCutaneous at bedtime  metoprolol tartrate 50 milliGRAM(s) Oral two times a day  sodium chloride 0.9%. 1000 milliLiter(s) (83 mL/Hr) IV Continuous <Continuous>      Vital Signs Last 24 Hrs  T(C): 37.3 (06 Feb 2024 08:23), Max: 37.3 (06 Feb 2024 08:23)  T(F): 99.2 (06 Feb 2024 08:23), Max: 99.2 (06 Feb 2024 08:23)  HR: 86 (06 Feb 2024 08:23) (86 - 90)  BP: 144/83 (06 Feb 2024 08:23) (144/61 - 149/75)  BP(mean): 93 (05 Feb 2024 15:49) (93 - 93)  RR: 18 (05 Feb 2024 21:00) (18 - 18)  SpO2: 96% (06 Feb 2024 08:23) (95% - 97%)    Parameters below as of 06 Feb 2024 08:23  Patient On (Oxygen Delivery Method): room air      PE:  Constitutional: NAD  HEENT: NC/AT, EOMI, PERRLA, conjunctivae clear; ears and nose atraumatic; pharynx benign  Neck: supple; thyroid not palpable  Back: no tenderness  Respiratory: decreased breath sounds   Cardiovascular: S1S2 regular, no murmurs  Abdomen: soft, not tender, not distended, positive BS; liver and spleen WNL  Genitourinary: no suprapubic tenderness  Lymphatic: no LN palpable  Musculoskeletal: no muscle tenderness, no joint swelling or tenderness  Extremities: no pedal edema  Neurological/ Psychiatric: moving all extremities  Skin: no rashes; no palpable lesions    Labs: all available labs reviewed                                      13.9   7.41  )-----------( 96       ( 05 Feb 2024 07:31 )             39.0     02-05    132<L>  |  101  |  13  ----------------------------<  198<H>  4.1   |  23  |  0.88    Ca    8.4<L>      05 Feb 2024 07:31    TPro  6.8  /  Alb  2.5<L>  /  TBili  0.7  /  DBili  x   /  AST  36  /  ALT  35  /  AlkPhos  55  02-05      Urinalysis Basic - ( 05 Feb 2024 07:31 )    Color: x / Appearance: x / SG: x / pH: x  Gluc: 198 mg/dL / Ketone: x  / Bili: x / Urobili: x   Blood: x / Protein: x / Nitrite: x   Leuk Esterase: x / RBC: x / WBC x   Sq Epi: x / Non Sq Epi: x / Bacteria: x    Culture - Blood (02.05.24 @ 03:21)   Specimen Source: .Blood None  Culture Results:   No growth at 24 hours  Culture - Blood (02.05.24 @ 03:21)   Specimen Source: .Blood None  Culture Results:   No growth at 24 hours      Radiology: all available radiological tests reviewed  < from: CT Angio Chest PE Protocol w/ IV Cont (02.04.24 @ 14:57) >  ACC: [id number] EXAM:  CT ANGIO CHEST PULM ART [facility]   ORDERED BY: AYDEN WEINBERG     PROCEDURE DATE:  02/04/2024          INTERPRETATION:  CLINICAL INFORMATION: Evaluate pneumonia, assess for   pulmonary embolism    COMPARISON: 6/23/2018    CONTRAST/COMPLICATIONS:  IV Contrast: Omnipaque 350  60 cc administered  Oral Contrast: NONE  Complications: None reported at time of study completion    PROCEDURE:  CT Angiography of the Chest.  Sagittal and coronal reformats were performed as well as 3D (MIP)   reconstructions.    FINDINGS:    LUNGS AND AIRWAYS: Left upper lobe large area of parenchymal   consolidation with air bronchograms extending to the adjacent lateral   pleura.  PLEURA: Trace left pleural effusion  MEDIASTINUM AND TATA: No lymphadenopathy.  VESSELS: No pulmonary embolism. No thoracic aortic dissection or   aneurysm. Coronary artery stents and calcifications  HEART: Mild cardiomegaly No pericardial effusion.  CHEST WALL AND LOWER NECK: Post median sternotomy  VISUALIZED UPPER ABDOMEN: Within normal limits.  BONES: Degenerative changes    IMPRESSION:  1.  No pulmonary embolism.  2.  Large area of parenchymal consolidation left upper lobe, likely   pneumonia. Recommend follow-up to resolution. Trace left pleural effusion        Advanced directives addressed: full resuscitation

## 2024-02-06 NOTE — CONSULT NOTE ADULT - CONSULT REQUESTED BY NAME
Dr. Oscar
Hospitalist
Uche Oscar
FETAL ALERT  RIGHT aorttic arch with left ductus arteriosus which makes up a vascular ring , the possibilty of a double aortic arch cannot be completely ruled out

## 2024-02-06 NOTE — SBIRT NOTE ADULT - NSSBIRTCONCERNEDDRINK_GEN_A_CORE
Hospitalist Progress Note    Patient: Blade Arevalo MRN: [id number]  CSN: [id number]    YOB: 1962  Age: 48 y.o.   Sex: male    DOA: 10/29/2017 LOS:  LOS: 0 days          Chief Complaint:  Perforated diverticulitis        Assessment/Plan   Diverticulitis of colon with perforation    HTN, HLP    Continue IV antibiotics, appreciate surgery consult  Change to IV dilaudid for more effective pain relief  IV NS bolus  Monitoring on tele  Pain control  NPO and IVF hydration  DVT prophylaxis    Monitor closely, if pain persists and/or worsens through day, or fevers or SIRS develop,obtain second CT scan    Disposition :2-3 days, home  Patient Active Problem List   Diagnosis Code    Chest pain R07.9    Diverticulitis of colon with perforation K57.20    Hypertension I10       Subjective:    C/o lower abd pain, morphine has NOT helped all night, feeling 9/10 sharp pain radiating from low abd into pelvis    Review of systems:    Constitutional: denies fevers, chills, myalgias  Respiratory: denies SOB, cough  Cardiovascular: denies chest pain, palpitations  Gastrointestinal: denies nausea, vomiting, diarrhea      Vital signs/Intake and Output:  Visit Vitals    /71 (BP 1 Location: Right arm, BP Patient Position: At rest)    Pulse 72    Temp 98.6 °F (37 °C)    Resp 18    Ht 6' 1\" (1.854 m)    Wt 113.4 kg (250 lb)    SpO2 100%    BMI 32.98 kg/m2     Current Shift:  10/29 1901 - 10/30 0700  In: 100 [P.O.:100]  Out: -   Last three shifts:       Exam:    General: Well developed, alert, NAD, OX3  Head/Neck: NCAT, supple, No masses, No lymphadenopathy  CVS:Regular rate and rhythm, no M/R/G, S1/S2 heard, no thrill  Lungs:Clear to auscultation bilaterally, no wheezes, rhonchi, or rales  Abdomen: tender lower quadrants, diminished BS, soft,, No hepatomegaly  Extremities: No C/C/E, pulses palpable 2+  Skin:normal texture and turgor, no rashes, no lesions  Neuro:grossly normal , follows Yes, but not in the last year

## 2024-02-06 NOTE — PROGRESS NOTE ADULT - SUBJECTIVE AND OBJECTIVE BOX
History of Present Illness:   Pt is a 67 yo M with PMHx of HTN, T2DM, CAD s/p stents, CHU who p/w fever, chills, myalgias x 3 days. Pt came to ED 1 day prior to admission, Dx with PNA and discharged on Azithromycin however returned bc symptoms continued to worsen. Denies cough, sick contacts, cp, gross hematuria.     In ED, TMAX of 104.3, -> 80, /81, SpO2 99% at RA. Received 2L IVFs, Ceftriaxone, Azithromycin x 1. CTA Chest with MORALES infiltrate and trace pleural effusion, no PE. Pt is a 67 yo M with PMHx of HTN, T2DM, CAD s/p stents, CHU who p/w fever, chills, myalgias x 3 days. Pt came to ED 1 day prior to admission, Dx with PNA and discharged on Azithromycin however returned bc symptoms continued to worsen. Denies cough, sick contacts, cp, gross hematuria.     2.6: no dyspnea, low grade fever, no cp            REVIEW OF SYSTEMS:    CONSTITUTIONAL: No weakness, No fevers or chills  ENT: No ear ache, No sorethroat  NECK: No pain, No stiffness  RESPIRATORY: No cough, No wheezing, No hemoptysis; No dyspnea  CARDIOVASCULAR: No chest pain, No palpitations  GASTROINTESTINAL: No abd pain, No nausea, No vomiting, No hematemesis, No diarrhea or constipation. No melena, No hematochezia.  GENITOURINARY: No dysuria, No  hematuria  NEUROLOGICAL: No diplopia, No paresthesia, No motor dysfunction  MUSCULOSKELETAL: No arthralgia, No myalgia  SKIN: No rashes, or lesions   PSYCH: no anxiety, no suicidal ideation    All other review of systems is negative unless indicated above    Vital Signs Last 24 Hrs  T(C): 37.3 (06 Feb 2024 08:23), Max: 37.3 (06 Feb 2024 08:23)  T(F): 99.2 (06 Feb 2024 08:23), Max: 99.2 (06 Feb 2024 08:23)  HR: 86 (06 Feb 2024 08:23) (86 - 90)  BP: 144/83 (06 Feb 2024 08:23) (144/61 - 149/75)  BP(mean): 93 (05 Feb 2024 15:49) (93 - 93)  RR: 18 (05 Feb 2024 21:00) (18 - 18)  SpO2: 96% (06 Feb 2024 08:23) (95% - 97%)    Parameters below as of 06 Feb 2024 08:23  Patient On (Oxygen Delivery Method): room air        PHYSICAL EXAM:    GENERAL: NAD  HEENT:  NC/AT, EOMI, PERRLA, No scleral icterus, Moist mucous membranes  NECK: Supple, No JVD  CNS:  Alert & Oriented X3, Motor Strength 5/5 B/L upper and lower extremities; DTRs 2+ intact   LUNG: Normal Breath sounds, Clear to auscultation bilaterally, No rales, No rhonchi, No wheezing  HEART: RRR; No murmurs, No rubs  ABDOMEN: +BS, ST/ND/NT  GENITOURINARY: Voiding, Bladder not distended  EXTREMITIES:  2+ Peripheral Pulses, No clubbing, No cyanosis, No tibial edema  MUSCULOSKELTAL: Joints normal ROM, No TTP, No effusion  VAGINAL: deferred  SKIN: no rashes  RECTAL: deferred, not indicated  BREAST: deferred                          13.9   7.41  )-----------( 96       ( 05 Feb 2024 07:31 )             39.0     02-05    132<L>  |  101  |  13  ----------------------------<  198<H>  4.1   |  23  |  0.88    Ca    8.4<L>      05 Feb 2024 07:31    TPro  6.8  /  Alb  2.5<L>  /  TBili  0.7  /  DBili  x   /  AST  36  /  ALT  35  /  AlkPhos  55  02-05    Vancomycin levels:   Cultures:     MEDICATIONS  (STANDING):  aspirin enteric coated 81 milliGRAM(s) Oral daily  azithromycin   Tablet 500 milliGRAM(s) Oral daily  busPIRone 15 milliGRAM(s) Oral two times a day  cefTRIAXone Injectable. 1000 milliGRAM(s) IV Push every 24 hours  dextrose 5%. 1000 milliLiter(s) (50 mL/Hr) IV Continuous <Continuous>  dextrose 50% Injectable 25 Gram(s) IV Push once  escitalopram 10 milliGRAM(s) Oral daily  gabapentin 100 milliGRAM(s) Oral three times a day  glucagon  Injectable 1 milliGRAM(s) IntraMuscular once  insulin lispro (ADMELOG) corrective regimen sliding scale   SubCutaneous three times a day before meals  insulin lispro (ADMELOG) corrective regimen sliding scale   SubCutaneous at bedtime  metoprolol tartrate 50 milliGRAM(s) Oral two times a day  sodium chloride 0.9%. 1000 milliLiter(s) (83 mL/Hr) IV Continuous <Continuous>    MEDICATIONS  (PRN):  acetaminophen     Tablet .. 650 milliGRAM(s) Oral every 6 hours PRN Temp greater or equal to 38C (100.4F), Mild Pain (1 - 3), Moderate Pain (4 - 6)  aluminum hydroxide/magnesium hydroxide/simethicone Suspension 30 milliLiter(s) Oral every 4 hours PRN Dyspepsia  dextrose Oral Gel 15 Gram(s) Oral once PRN Blood Glucose LESS THAN 70 milliGRAM(s)/deciliter  melatonin 3 milliGRAM(s) Oral at bedtime PRN Insomnia  ondansetron Injectable 4 milliGRAM(s) IV Push every 8 hours PRN Nausea and/or Vomiting  traZODone 50 milliGRAM(s) Oral at bedtime PRN inosmonia      all labs reviewed  all imaging reviewed    a/p:    #Sepsis 2/2 CAP   -CTA Chest as above  -pt met sepsis criteria on arrival with TMAx of 104, tachycardia, confirmed source of infection  -Ceftriaxone/Azithromycin day#2  -urine Legionella: neg    #Mild Hyponatremia   -SIADH possible with PNA. high  urine specific gravity   -monitor Na after IVFs, will decrease if truly SIADH   -urine Cr, Na, osmlols    #Thrombocytopenia   chronic     #T2DM   -hold oral agents  -ISS, Accu checks per routine, AM A1c     #CAD   -c/w ASA, Metoprolol     #Protein Calorie Malnutrition   -nutrition consult

## 2024-02-06 NOTE — GOALS OF CARE CONVERSATION - ADVANCED CARE PLANNING - CONVERSATION DETAILS
HPI: As per medical record,   Pt is a 67 yo M with PMHx of HTN, T2DM, CAD s/p stents, CHU who p/w fever, chills, myalgias x 3 days. Pt came to ED 1 day prior to admission, Dx with PNA and discharged on Azithromycin however returned bc symptoms continued to worsen. Denies cough, sick contacts, cp, gross hematuria.   (05 Feb 2024 01:07)      PERTINENT PMH REVIEWED:  [ x ] YES [ ] NO           Primary Contact: sister Maday ([phone])    HCP [ x ] Surrogate [   ] Guardian [   ]    Mental Status: [ x ] Alert  [ x ] Oriented [  ] Confused [  ] Lethargic   Concerns of Depression [  ] denies  Anxiety [   ] denies  Baseline ADLs (prior to admission):  Independent [ ] moderately [ x ] fully   Dependent   [ ] moderately [ ]fully    Family Meeting attendees: Pt with capacity to participate in discussion    Anticipated Grief: Patient[  ] Family [  ]    Caregiver [place] Assessed: Yes [ x ] No [  ]    Muslim: Syn[employer]    Spiritual Concerns: none reported.  available PRN.    Goals of Care: Continue current medical management. Return home.     Previous Services:    ADVANCE DIRECTIVES:  [ x ] YES [ ] NO    - MOLST completed to reflect DNR/DNI with trial NIV   - Health Care Proxy naming sister Maday Jara as primary health care agent    Anticipated D/C Plan: TBD                     Summary: Pall SW & NP met with patient at bedside to introduce team and offer support. Palliative roles explained. Pt appears alert, oriented with stable mood and able to make needs known. He reports feeling terrible overall due to current dx sepsis. Pt reports that he lives alone and is independent with ADLs. He confirms that his health care agent is his sister Maday.    Team inquired about pt's wishes. He explains that he would not want CPR and/or intubation. MOLST completed to reflect DNR/DNI with trial NIV. Placed on chart and copy provided to patient.     Emotional support provided. Our team will continue to follow.

## 2024-02-06 NOTE — CONSULT NOTE ADULT - CONVERSATION DETAILS
Met with patient at bedside, alongside Abida Orellana LMSW, to introduce team and discuss advanced directives and GOC.    Patient living at home independently prior to admission. He is alert, oriented x3, is able to make own medical decisions.    He has a HCP on file, naming his sister, Maday Jara. Patient confirmed his sister is still his HCP.    We discussed his wishes in regards to code status. Patient had prior MOLST from 2021 with limitations of DNR/DNI. He confirmed that his wishes have not changed. A new MOLST form was filled out reflecting his current wishes of DNR/DNI trial NIV. Copy provided to patient and MOLST placed on chart.    GOC clear. Patient to continue current medical treatment for PNA. MOLST with DNR/DNI trial NIV on chart. Palliative will sign off.  Please reconsult if needed.

## 2024-02-06 NOTE — CONSULT NOTE ADULT - NS ATTEND AMEND GEN_ALL_CORE FT
pt in NAD comfortable  goc reviewed as mi pandya   agree w/ note  pt dnr dni w/ trial niv  will sign off goals are clear

## 2024-02-07 ENCOUNTER — TRANSCRIPTION ENCOUNTER (OUTPATIENT)
Age: 69
End: 2024-02-07

## 2024-02-07 LAB
ANION GAP SERPL CALC-SCNC: 5 MMOL/L — SIGNIFICANT CHANGE UP (ref 5–17)
BUN SERPL-MCNC: 19 MG/DL — SIGNIFICANT CHANGE UP (ref 7–23)
CALCIUM SERPL-MCNC: 8.6 MG/DL — SIGNIFICANT CHANGE UP (ref 8.5–10.1)
CHLORIDE SERPL-SCNC: 107 MMOL/L — SIGNIFICANT CHANGE UP (ref 96–108)
CO2 SERPL-SCNC: 25 MMOL/L — SIGNIFICANT CHANGE UP (ref 22–31)
CREAT SERPL-MCNC: 0.85 MG/DL — SIGNIFICANT CHANGE UP (ref 0.5–1.3)
EGFR: 95 ML/MIN/1.73M2 — SIGNIFICANT CHANGE UP
GLUCOSE BLDC GLUCOMTR-MCNC: 159 MG/DL — HIGH (ref 70–99)
GLUCOSE BLDC GLUCOMTR-MCNC: 165 MG/DL — HIGH (ref 70–99)
GLUCOSE BLDC GLUCOMTR-MCNC: 174 MG/DL — HIGH (ref 70–99)
GLUCOSE BLDC GLUCOMTR-MCNC: 306 MG/DL — HIGH (ref 70–99)
GLUCOSE SERPL-MCNC: 221 MG/DL — HIGH (ref 70–99)
HCT VFR BLD CALC: 33.7 % — LOW (ref 39–50)
HGB BLD-MCNC: 12 G/DL — LOW (ref 13–17)
MCHC RBC-ENTMCNC: 33 PG — SIGNIFICANT CHANGE UP (ref 27–34)
MCHC RBC-ENTMCNC: 35.6 GM/DL — SIGNIFICANT CHANGE UP (ref 32–36)
MCV RBC AUTO: 92.6 FL — SIGNIFICANT CHANGE UP (ref 80–100)
PLATELET # BLD AUTO: 132 K/UL — LOW (ref 150–400)
POTASSIUM SERPL-MCNC: 3.8 MMOL/L — SIGNIFICANT CHANGE UP (ref 3.5–5.3)
POTASSIUM SERPL-SCNC: 3.8 MMOL/L — SIGNIFICANT CHANGE UP (ref 3.5–5.3)
RBC # BLD: 3.64 M/UL — LOW (ref 4.2–5.8)
RBC # FLD: 13.2 % — SIGNIFICANT CHANGE UP (ref 10.3–14.5)
SODIUM SERPL-SCNC: 137 MMOL/L — SIGNIFICANT CHANGE UP (ref 135–145)
WBC # BLD: 5.83 K/UL — SIGNIFICANT CHANGE UP (ref 3.8–10.5)
WBC # FLD AUTO: 5.83 K/UL — SIGNIFICANT CHANGE UP (ref 3.8–10.5)

## 2024-02-07 PROCEDURE — 99233 SBSQ HOSP IP/OBS HIGH 50: CPT

## 2024-02-07 PROCEDURE — 71045 X-RAY EXAM CHEST 1 VIEW: CPT | Mod: 26

## 2024-02-07 RX ORDER — INSULIN LISPRO 100/ML
4 VIAL (ML) SUBCUTANEOUS
Refills: 0 | Status: DISCONTINUED | OUTPATIENT
Start: 2024-02-07 | End: 2024-02-08

## 2024-02-07 RX ADMIN — Medication 15 MILLIGRAM(S): at 21:53

## 2024-02-07 RX ADMIN — Medication 0: at 21:55

## 2024-02-07 RX ADMIN — Medication 50 MILLIGRAM(S): at 21:53

## 2024-02-07 RX ADMIN — GABAPENTIN 100 MILLIGRAM(S): 400 CAPSULE ORAL at 21:54

## 2024-02-07 RX ADMIN — GABAPENTIN 100 MILLIGRAM(S): 400 CAPSULE ORAL at 05:22

## 2024-02-07 RX ADMIN — Medication 15 MILLIGRAM(S): at 09:34

## 2024-02-07 RX ADMIN — Medication 2: at 17:00

## 2024-02-07 RX ADMIN — Medication 8: at 11:48

## 2024-02-07 RX ADMIN — AZITHROMYCIN 500 MILLIGRAM(S): 500 TABLET, FILM COATED ORAL at 09:34

## 2024-02-07 RX ADMIN — Medication 4 UNIT(S): at 17:00

## 2024-02-07 RX ADMIN — CEFTRIAXONE 1000 MILLIGRAM(S): 500 INJECTION, POWDER, FOR SOLUTION INTRAMUSCULAR; INTRAVENOUS at 17:01

## 2024-02-07 RX ADMIN — ESCITALOPRAM OXALATE 10 MILLIGRAM(S): 10 TABLET, FILM COATED ORAL at 09:34

## 2024-02-07 RX ADMIN — Medication 50 MILLIGRAM(S): at 09:34

## 2024-02-07 RX ADMIN — GABAPENTIN 100 MILLIGRAM(S): 400 CAPSULE ORAL at 13:53

## 2024-02-07 RX ADMIN — Medication 2: at 07:58

## 2024-02-07 RX ADMIN — Medication 81 MILLIGRAM(S): at 09:34

## 2024-02-07 NOTE — DISCHARGE NOTE NURSING/CASE MANAGEMENT/SOCIAL WORK - NSDCFUADDAPPT_GEN_ALL_CORE_FT
Dr Umer Ruggiero   [address] [place] [address], [place] [address]  [phone]  Monday February 12, 2024 @1:50

## 2024-02-07 NOTE — DISCHARGE NOTE NURSING/CASE MANAGEMENT/SOCIAL WORK - PATIENT PORTAL LINK FT
You can access the FollowMyHealth Patient Portal offered by [facility] by registering at the following website: [email or web address]. By joining [facility]’s FollowMyHealth portal, you will also be able to view your health information using other applications (apps) compatible with our system.

## 2024-02-07 NOTE — DISCHARGE NOTE NURSING/CASE MANAGEMENT/SOCIAL WORK - NSDCPEFALRISK_GEN_ALL_CORE
For information on Fall & Injury Prevention, visit: [email or web address] OR  [email or web address] OR  https://www.cdc.gov/steadi/patient.html

## 2024-02-07 NOTE — PROGRESS NOTE ADULT - SUBJECTIVE AND OBJECTIVE BOX
History of Present Illness:   Pt is a 69 yo M with PMHx of HTN, T2DM, CAD s/p stents, CHU who p/w fever, chills, myalgias x 3 days. Pt came to ED 1 day prior to admission, Dx with PNA and discharged on Azithromycin however returned bc symptoms continued to worsen. Denies cough, sick contacts, cp, gross hematuria.     In ED, TMAX of 104.3, -> 80, /81, SpO2 99% at RA. Received 2L IVFs, Ceftriaxone, Azithromycin x 1. CTA Chest with MORALES infiltrate and trace pleural effusion, no PE. Pt is a 69 yo M with PMHx of HTN, T2DM, CAD s/p stents, CHU who p/w fever, chills, myalgias x 3 days. Pt came to ED 1 day prior to admission, Dx with PNA and discharged on Azithromycin however returned bc symptoms continued to worsen. Denies cough, sick contacts, cp, gross hematuria.     2.6: no dyspnea, low grade fever, no cp  2.7: feels fatigued, +MARRERO            REVIEW OF SYSTEMS:    CONSTITUTIONAL: No weakness, No fevers or chills  ENT: No ear ache, No sorethroat  NECK: No pain, No stiffness  RESPIRATORY: No cough, No wheezing, No hemoptysis; No dyspnea  CARDIOVASCULAR: No chest pain, No palpitations  GASTROINTESTINAL: No abd pain, No nausea, No vomiting, No hematemesis, No diarrhea or constipation. No melena, No hematochezia.  GENITOURINARY: No dysuria, No  hematuria  NEUROLOGICAL: No diplopia, No paresthesia, No motor dysfunction  MUSCULOSKELETAL: No arthralgia, No myalgia  SKIN: No rashes, or lesions   PSYCH: no anxiety, no suicidal ideation    All other review of systems is negative unless indicated above    Vital Signs Last 24 Hrs  T(C): 36.9 (07 Feb 2024 07:25), Max: 36.9 (07 Feb 2024 07:25)  T(F): 98.4 (07 Feb 2024 07:25), Max: 98.4 (07 Feb 2024 07:25)  HR: 75 (07 Feb 2024 07:25) (74 - 77)  BP: 131/77 (07 Feb 2024 07:25) (131/77 - 154/76)  BP(mean): 96 (06 Feb 2024 15:43) (96 - 96)  RR: 18 (07 Feb 2024 07:25) (18 - 18)  SpO2: 96% (07 Feb 2024 07:25) (96% - 99%)    Parameters below as of 07 Feb 2024 07:25  Patient On (Oxygen Delivery Method): room air          PHYSICAL EXAM:    GENERAL: NAD  HEENT:  NC/AT, EOMI, PERRLA, No scleral icterus, Moist mucous membranes  NECK: Supple, No JVD  CNS:  Alert & Oriented X3, Motor Strength 5/5 B/L upper and lower extremities; DTRs 2+ intact   LUNG: Normal Breath sounds, Right base crackles   HEART: RRR; No murmurs, No rubs  ABDOMEN: +BS, ST/ND/NT  GENITOURINARY: Voiding, Bladder not distended  EXTREMITIES:  2+ Peripheral Pulses, No clubbing, No cyanosis, No tibial edema  MUSCULOSKELTAL: Joints normal ROM, No TTP, No effusion  SKIN: no rashes  RECTAL: deferred, not indicated  BREAST: deferred                          13.9   7.41  )-----------( 96       ( 05 Feb 2024 07:31 )             39.0     02-05    132<L>  |  101  |  13  ----------------------------<  198<H>  4.1   |  23  |  0.88    Ca    8.4<L>      05 Feb 2024 07:31    TPro  6.8  /  Alb  2.5<L>  /  TBili  0.7  /  DBili  x   /  AST  36  /  ALT  35  /  AlkPhos  55  02-05    Vancomycin levels:   Cultures:     MEDICATIONS  (STANDING):  aspirin enteric coated 81 milliGRAM(s) Oral daily  azithromycin   Tablet 500 milliGRAM(s) Oral daily  busPIRone 15 milliGRAM(s) Oral two times a day  cefTRIAXone Injectable. 1000 milliGRAM(s) IV Push every 24 hours  dextrose 5%. 1000 milliLiter(s) (50 mL/Hr) IV Continuous <Continuous>  dextrose 50% Injectable 25 Gram(s) IV Push once  escitalopram 10 milliGRAM(s) Oral daily  gabapentin 100 milliGRAM(s) Oral three times a day  glucagon  Injectable 1 milliGRAM(s) IntraMuscular once  insulin lispro (ADMELOG) corrective regimen sliding scale   SubCutaneous three times a day before meals  insulin lispro (ADMELOG) corrective regimen sliding scale   SubCutaneous at bedtime  metoprolol tartrate 50 milliGRAM(s) Oral two times a day  sodium chloride 0.9%. 1000 milliLiter(s) (83 mL/Hr) IV Continuous <Continuous>    MEDICATIONS  (PRN):  acetaminophen     Tablet .. 650 milliGRAM(s) Oral every 6 hours PRN Temp greater or equal to 38C (100.4F), Mild Pain (1 - 3), Moderate Pain (4 - 6)  aluminum hydroxide/magnesium hydroxide/simethicone Suspension 30 milliLiter(s) Oral every 4 hours PRN Dyspepsia  dextrose Oral Gel 15 Gram(s) Oral once PRN Blood Glucose LESS THAN 70 milliGRAM(s)/deciliter  melatonin 3 milliGRAM(s) Oral at bedtime PRN Insomnia  ondansetron Injectable 4 milliGRAM(s) IV Push every 8 hours PRN Nausea and/or Vomiting  traZODone 50 milliGRAM(s) Oral at bedtime PRN inosmonia      all labs reviewed  all imaging reviewed    a/p:    #Sepsis 2/2 CAP   -CTA Chest as above  -pt met sepsis criteria on arrival with TMAx of 104, tachycardia, confirmed source of infection  -Ceftriaxone/Azithromycin day#3  -urine Legionella: neg    #Mild Hyponatremia   -SIADH possible with PNA. high  urine specific gravity   -monitor Na after IVFs, will decrease if truly SIADH   -urine Cr, Na, osmlols    #Thrombocytopenia   chronic     #T2DM   -hold oral agents  -ISS, Accu checks per routine, AM A1c     #CAD   -c/w ASA, Metoprolol     #Protein Calorie Malnutrition   -nutrition consult        History of Present Illness:   Pt is a 67 yo M with PMHx of HTN, T2DM, CAD s/p stents, CHU who p/w fever, chills, myalgias x 3 days. Pt came to ED 1 day prior to admission, Dx with PNA and discharged on Azithromycin however returned bc symptoms continued to worsen. Denies cough, sick contacts, cp, gross hematuria.     In ED, TMAX of 104.3, -> 80, /81, SpO2 99% at RA. Received 2L IVFs, Ceftriaxone, Azithromycin x 1. CTA Chest with MORALES infiltrate and trace pleural effusion, no PE. Pt is a 67 yo M with PMHx of HTN, T2DM, CAD s/p stents, CHU who p/w fever, chills, myalgias x 3 days. Pt came to ED 1 day prior to admission, Dx with PNA and discharged on Azithromycin however returned bc symptoms continued to worsen. Denies cough, sick contacts, cp, gross hematuria.     2.6: no dyspnea, low grade fever, no cp  2.7: feels fatigued, +MARRERO            REVIEW OF SYSTEMS:    CONSTITUTIONAL: No weakness, No fevers or chills  ENT: No ear ache, No sorethroat  NECK: No pain, No stiffness  RESPIRATORY: No cough, No wheezing, No hemoptysis; No dyspnea  CARDIOVASCULAR: No chest pain, No palpitations  GASTROINTESTINAL: No abd pain, No nausea, No vomiting, No hematemesis, No diarrhea or constipation. No melena, No hematochezia.  GENITOURINARY: No dysuria, No  hematuria  NEUROLOGICAL: No diplopia, No paresthesia, No motor dysfunction  MUSCULOSKELETAL: No arthralgia, No myalgia  SKIN: No rashes, or lesions   PSYCH: no anxiety, no suicidal ideation    All other review of systems is negative unless indicated above    Vital Signs Last 24 Hrs  T(C): 36.9 (07 Feb 2024 07:25), Max: 36.9 (07 Feb 2024 07:25)  T(F): 98.4 (07 Feb 2024 07:25), Max: 98.4 (07 Feb 2024 07:25)  HR: 75 (07 Feb 2024 07:25) (74 - 77)  BP: 131/77 (07 Feb 2024 07:25) (131/77 - 154/76)  BP(mean): 96 (06 Feb 2024 15:43) (96 - 96)  RR: 18 (07 Feb 2024 07:25) (18 - 18)  SpO2: 96% (07 Feb 2024 07:25) (96% - 99%)    Parameters below as of 07 Feb 2024 07:25  Patient On (Oxygen Delivery Method): room air          PHYSICAL EXAM:    GENERAL: NAD  HEENT:  NC/AT, EOMI, PERRLA, No scleral icterus, Moist mucous membranes  NECK: Supple, No JVD  CNS:  Alert & Oriented X3, Motor Strength 5/5 B/L upper and lower extremities; DTRs 2+ intact   LUNG: Normal Breath sounds, Right base crackles   HEART: RRR; No murmurs, No rubs  ABDOMEN: +BS, ST/ND/NT  GENITOURINARY: Voiding, Bladder not distended  EXTREMITIES:  2+ Peripheral Pulses, No clubbing, No cyanosis, No tibial edema  MUSCULOSKELTAL: Joints normal ROM, No TTP, No effusion  SKIN: no rashes  RECTAL: deferred, not indicated  BREAST: deferred                          13.9   7.41  )-----------( 96       ( 05 Feb 2024 07:31 )             39.0     02-05    132<L>  |  101  |  13  ----------------------------<  198<H>  4.1   |  23  |  0.88    Ca    8.4<L>      05 Feb 2024 07:31    TPro  6.8  /  Alb  2.5<L>  /  TBili  0.7  /  DBili  x   /  AST  36  /  ALT  35  /  AlkPhos  55  02-05    Vancomycin levels:   Cultures:     MEDICATIONS  (STANDING):  aspirin enteric coated 81 milliGRAM(s) Oral daily  azithromycin   Tablet 500 milliGRAM(s) Oral daily  busPIRone 15 milliGRAM(s) Oral two times a day  cefTRIAXone Injectable. 1000 milliGRAM(s) IV Push every 24 hours  dextrose 5%. 1000 milliLiter(s) (50 mL/Hr) IV Continuous <Continuous>  dextrose 50% Injectable 25 Gram(s) IV Push once  escitalopram 10 milliGRAM(s) Oral daily  gabapentin 100 milliGRAM(s) Oral three times a day  glucagon  Injectable 1 milliGRAM(s) IntraMuscular once  insulin lispro (ADMELOG) corrective regimen sliding scale   SubCutaneous three times a day before meals  insulin lispro (ADMELOG) corrective regimen sliding scale   SubCutaneous at bedtime  metoprolol tartrate 50 milliGRAM(s) Oral two times a day  sodium chloride 0.9%. 1000 milliLiter(s) (83 mL/Hr) IV Continuous <Continuous>    MEDICATIONS  (PRN):  acetaminophen     Tablet .. 650 milliGRAM(s) Oral every 6 hours PRN Temp greater or equal to 38C (100.4F), Mild Pain (1 - 3), Moderate Pain (4 - 6)  aluminum hydroxide/magnesium hydroxide/simethicone Suspension 30 milliLiter(s) Oral every 4 hours PRN Dyspepsia  dextrose Oral Gel 15 Gram(s) Oral once PRN Blood Glucose LESS THAN 70 milliGRAM(s)/deciliter  melatonin 3 milliGRAM(s) Oral at bedtime PRN Insomnia  ondansetron Injectable 4 milliGRAM(s) IV Push every 8 hours PRN Nausea and/or Vomiting  traZODone 50 milliGRAM(s) Oral at bedtime PRN inosmonia      all labs reviewed  all imaging reviewed    a/p:    #Sepsis 2/2 CAP   -CTA Chest as above  -pt met sepsis criteria on arrival with TMAx of 104, tachycardia, confirmed source of infection  -Ceftriaxone/Azithromycin day#3  -urine Legionella: neg    #Mild Hyponatremia   -SIADH possible with PNA. high  urine specific gravity   -monitor Na after IVFs, will decrease if truly SIADH   -urine Cr, Na, osmlols    #Thrombocytopenia   chronic     #T2DM   -hold oral agents  -ISS, Accu checks per routine, AM A1c     #CAD   -c/w ASA, Metoprolol     #NO evidence of Protein Calorie Malnutrition   -nutrition consult appreciated

## 2024-02-07 NOTE — PROGRESS NOTE ADULT - SUBJECTIVE AND OBJECTIVE BOX
Date of service: 02-07-24 @ 12:44    pt seen and examined  feels better  feels weak/tired   no resp distress     ROS: no fever or chills; denies dizziness, no HA, no abdominal pain, no diarrhea or constipation; no dysuria, no urinary frequency, no legs pain, no rashes    MEDICATIONS  (STANDING):  aspirin enteric coated 81 milliGRAM(s) Oral daily  azithromycin   Tablet 500 milliGRAM(s) Oral daily  busPIRone 15 milliGRAM(s) Oral two times a day  cefTRIAXone Injectable. 1000 milliGRAM(s) IV Push every 24 hours  dextrose 5%. 1000 milliLiter(s) (50 mL/Hr) IV Continuous <Continuous>  dextrose 50% Injectable 25 Gram(s) IV Push once  escitalopram 10 milliGRAM(s) Oral daily  gabapentin 100 milliGRAM(s) Oral three times a day  glucagon  Injectable 1 milliGRAM(s) IntraMuscular once  insulin lispro (ADMELOG) corrective regimen sliding scale   SubCutaneous three times a day before meals  insulin lispro (ADMELOG) corrective regimen sliding scale   SubCutaneous at bedtime  metoprolol tartrate 50 milliGRAM(s) Oral two times a day  sodium chloride 0.9%. 1000 milliLiter(s) (83 mL/Hr) IV Continuous <Continuous>    Vital Signs Last 24 Hrs  T(C): 36.9 (07 Feb 2024 07:25), Max: 36.9 (07 Feb 2024 07:25)  T(F): 98.4 (07 Feb 2024 07:25), Max: 98.4 (07 Feb 2024 07:25)  HR: 75 (07 Feb 2024 07:25) (74 - 77)  BP: 131/77 (07 Feb 2024 07:25) (131/77 - 154/76)  BP(mean): 96 (06 Feb 2024 15:43) (96 - 96)  RR: 18 (07 Feb 2024 07:25) (18 - 18)  SpO2: 96% (07 Feb 2024 07:25) (96% - 99%)    Parameters below as of 07 Feb 2024 07:25  Patient On (Oxygen Delivery Method): room air      PE:  Constitutional: NAD  HEENT: NC/AT, EOMI, PERRLA, conjunctivae clear; ears and nose atraumatic; pharynx benign  Neck: supple; thyroid not palpable  Back: no tenderness  Respiratory: decreased breath sounds   Cardiovascular: S1S2 regular, no murmurs  Abdomen: soft, not tender, not distended, positive BS; liver and spleen WNL  Genitourinary: no suprapubic tenderness  Lymphatic: no LN palpable  Musculoskeletal: no muscle tenderness, no joint swelling or tenderness  Extremities: no pedal edema  Neurological/ Psychiatric: moving all extremities  Skin: no rashes; no palpable lesions    Labs: all available labs reviewed                             Urinalysis Basic - ( 05 Feb 2024 07:31 )    Color: x / Appearance: x / SG: x / pH: x  Gluc: 198 mg/dL / Ketone: x  / Bili: x / Urobili: x   Blood: x / Protein: x / Nitrite: x   Leuk Esterase: x / RBC: x / WBC x   Sq Epi: x / Non Sq Epi: x / Bacteria: x    Culture - Blood (02.05.24 @ 03:21)   Specimen Source: .Blood None  Culture Results:   No growth at 24 hours  Culture - Blood (02.05.24 @ 03:21)   Specimen Source: .Blood None  Culture Results:   No growth at 24 hours      Radiology: all available radiological tests reviewed  < from: CT Angio Chest PE Protocol w/ IV Cont (02.04.24 @ 14:57) >  ACC: [id number] EXAM:  CT ANGIO CHEST PULM [facility]   ORDERED BY: AYDEN WEINBERG     PROCEDURE DATE:  02/04/2024          INTERPRETATION:  CLINICAL INFORMATION: Evaluate pneumonia, assess for   pulmonary embolism    COMPARISON: 6/23/2018    CONTRAST/COMPLICATIONS:  IV Contrast: Omnipaque 350  60 cc administered  Oral Contrast: NONE  Complications: None reported at time of study completion    PROCEDURE:  CT Angiography of the Chest.  Sagittal and coronal reformats were performed as well as 3D (MIP)   reconstructions.    FINDINGS:    LUNGS AND AIRWAYS: Left upper lobe large area of parenchymal   consolidation with air bronchograms extending to the adjacent lateral   pleura.  PLEURA: Trace left pleural effusion  MEDIASTINUM AND TATA: No lymphadenopathy.  VESSELS: No pulmonary embolism. No thoracic aortic dissection or   aneurysm. Coronary artery stents and calcifications  HEART: Mild cardiomegaly No pericardial effusion.  CHEST WALL AND LOWER NECK: Post median sternotomy  VISUALIZED UPPER ABDOMEN: Within normal limits.  BONES: Degenerative changes    IMPRESSION:  1.  No pulmonary embolism.  2.  Large area of parenchymal consolidation left upper lobe, likely   pneumonia. Recommend follow-up to resolution. Trace left pleural effusion        Advanced directives addressed: full resuscitation

## 2024-02-07 NOTE — PROGRESS NOTE ADULT - SUBJECTIVE AND OBJECTIVE BOX
Patient is a 68y old  Male who presents with a chief complaint of Sepsis 2/2 CAP (2024 12:26)      HPI:  Pt is a 67 yo M with PMHx of HTN, T2DM, CAD s/p stents, CHU who p/w fever, chills, myalgias x 3 days. Pt came to ED 1 day prior to admission, Dx with PNA and discharged on Azithromycin however returned bc symptoms continued to worsen. Denies cough, sick contacts, cp, gross hematuria.     In ED, TMAX of 104.3, -> 80, /81, SpO2 99% at RA. Received 2L IVFs, Ceftriaxone, Azithromycin x 1. CTA Chest with MORALES infiltrate and trace pleural effusion, no PE.  (2024 01:07)      PAST MEDICAL & SURGICAL HISTORY:  HTN (hypertension)      DM (diabetes mellitus)      COPD (chronic obstructive pulmonary disease)      Coronary artery disease involving coronary bypass graft of native heart with angina pectoris      HLD (hyperlipidemia)      S/P CABG x 3      S/P arterial stent        MEDICATIONS  (STANDING):  aspirin enteric coated 81 milliGRAM(s) Oral daily  azithromycin   Tablet 500 milliGRAM(s) Oral daily  busPIRone 15 milliGRAM(s) Oral two times a day  cefTRIAXone Injectable. 1000 milliGRAM(s) IV Push every 24 hours  dextrose 5%. 1000 milliLiter(s) (50 mL/Hr) IV Continuous <Continuous>  dextrose 50% Injectable 25 Gram(s) IV Push once  escitalopram 10 milliGRAM(s) Oral daily  gabapentin 100 milliGRAM(s) Oral three times a day  glucagon  Injectable 1 milliGRAM(s) IntraMuscular once  insulin lispro (ADMELOG) corrective regimen sliding scale   SubCutaneous three times a day before meals  insulin lispro (ADMELOG) corrective regimen sliding scale   SubCutaneous at bedtime  metoprolol tartrate 50 milliGRAM(s) Oral two times a day  sodium chloride 0.9%. 1000 milliLiter(s) (83 mL/Hr) IV Continuous <Continuous>      MEDICATIONS  (PRN):  acetaminophen     Tablet .. 650 milliGRAM(s) Oral every 6 hours PRN Temp greater or equal to 38C (100.4F), Mild Pain (1 - 3), Moderate Pain (4 - 6)  aluminum hydroxide/magnesium hydroxide/simethicone Suspension 30 milliLiter(s) Oral every 4 hours PRN Dyspepsia  dextrose Oral Gel 15 Gram(s) Oral once PRN Blood Glucose LESS THAN 70 milliGRAM(s)/deciliter  melatonin 3 milliGRAM(s) Oral at bedtime PRN Insomnia  ondansetron Injectable 4 milliGRAM(s) IV Push every 8 hours PRN Nausea and/or Vomiting  traZODone 50 milliGRAM(s) Oral at bedtime PRN inosmonia      FAMILY HISTORY:  Patient's mother is   at 83 from copd, dm    Patient's father is   at 58 from colon cancer        SOCIAL HISTORY:  ***    REVIEW OF SYSTEM:  Pertinent items are noted in HPI.    Vital Signs Last 24 Hrs  T(C): 36.9 (2024 07:25), Max: 37.3 (2024 08:23)  T(F): 98.4 (2024 07:25), Max: 99.2 (2024 08:23)  HR: 75 (2024 07:25) (74 - 86)  BP: 131/77 (2024 07:25) (131/77 - 154/76)  BP(mean): 96 (2024 15:43) (96 - 96)  RR: 18 (2024 07:25) (18 - 18)  SpO2: 96% (2024 07:25) (96% - 99%)    Parameters below as of 2024 07:25  Patient On (Oxygen Delivery Method): room air        PHYSICAL EXAM  General Appearance: cooperative, no acute distress,   HEENT: PERRL, conjunctiva clear, EOM's intact, non injected pharynx, no exudate, TM   normal  Neck: Supple, , no adenopathy, thyroid: not enlarged, no carotid bruit or JVD  Back: Symmetric, no  tenderness,no soft tissue tenderness  Lungs: left sided rhonchi, no wheeze  Heart: Regular rate and rhythm, S1, S2 normal, no murmur, rub or gallop  Abdomen: Soft, non-tender, bowel sounds active , no hepatosplenomegaly  Extremities: no cyanosis or edema, no joint swelling  Skin: Skin color, texture normal, no rashes   Neurologic: Alert and oriented X3 , cranial nerves intact, sensory and motor normal,    ECG:    LABS:                          13.9   7.41  )-----------( 96       ( 2024 07:31 )             39.0     02-    132<L>  |  101  |  13  ----------------------------<  198<H>  4.1   |  23  |  0.88    Ca    8.4<L>      2024 07:31    TPro  6.8  /  Alb  2.5<L>  /  TBili  0.7  /  DBili  x   /  AST  36  /  ALT  35  /  AlkPhos  55  02-              Urinalysis Basic - ( 2024 07:31 )    Color: x / Appearance: x / SG: x / pH: x  Gluc: 198 mg/dL / Ketone: x  / Bili: x / Urobili: x   Blood: x / Protein: x / Nitrite: x   Leuk Esterase: x / RBC: x / WBC x   Sq Epi: x / Non Sq Epi: x / Bacteria: x            RADIOLOGY & ADDITIONAL STUDIES:    < from: CT Angio Chest PE Protocol w/ IV Cont (24 @ 14:57) >  FINDINGS:    LUNGS AND AIRWAYS: Left upper lobe large area of parenchymal   consolidation with air bronchograms extending to the adjacent lateral   pleura.  PLEURA: Trace left pleural effusion  MEDIASTINUM AND TATA: No lymphadenopathy.  VESSELS: No pulmonary embolism. No thoracic aortic dissection or   aneurysm. Coronary artery stents and calcifications  HEART: Mild cardiomegaly No pericardial effusion.  CHEST WALL AND LOWER NECK: Post median sternotomy  VISUALIZED UPPER ABDOMEN: Within normal limits.  BONES: Degenerative changes    IMPRESSION:  1.  No pulmonary embolism.  2.  Large area of parenchymal consolidation left upper lobe, likely   pneumonia. Recommend follow-up to resolution. Trace left pleural effusion      < end of copied text >

## 2024-02-08 ENCOUNTER — TRANSCRIPTION ENCOUNTER (OUTPATIENT)
Age: 69
End: 2024-02-08

## 2024-02-08 VITALS — OXYGEN SATURATION: 96 %

## 2024-02-08 LAB
CULTURE RESULTS: SIGNIFICANT CHANGE UP
CULTURE RESULTS: SIGNIFICANT CHANGE UP
GLUCOSE BLDC GLUCOMTR-MCNC: 182 MG/DL — HIGH (ref 70–99)
GLUCOSE BLDC GLUCOMTR-MCNC: 207 MG/DL — HIGH (ref 70–99)
SPECIMEN SOURCE: SIGNIFICANT CHANGE UP
SPECIMEN SOURCE: SIGNIFICANT CHANGE UP

## 2024-02-08 PROCEDURE — 99239 HOSP IP/OBS DSCHRG MGMT >30: CPT

## 2024-02-08 RX ORDER — CEFUROXIME AXETIL 250 MG
1 TABLET ORAL
Qty: 10 | Refills: 0
Start: 2024-02-08

## 2024-02-08 RX ADMIN — Medication 4 UNIT(S): at 12:10

## 2024-02-08 RX ADMIN — AZITHROMYCIN 500 MILLIGRAM(S): 500 TABLET, FILM COATED ORAL at 09:42

## 2024-02-08 RX ADMIN — Medication 4 UNIT(S): at 07:52

## 2024-02-08 RX ADMIN — Medication 81 MILLIGRAM(S): at 09:42

## 2024-02-08 RX ADMIN — Medication 50 MILLIGRAM(S): at 09:46

## 2024-02-08 RX ADMIN — Medication 4: at 12:10

## 2024-02-08 RX ADMIN — Medication 2: at 07:51

## 2024-02-08 RX ADMIN — GABAPENTIN 100 MILLIGRAM(S): 400 CAPSULE ORAL at 13:09

## 2024-02-08 RX ADMIN — ESCITALOPRAM OXALATE 10 MILLIGRAM(S): 10 TABLET, FILM COATED ORAL at 09:47

## 2024-02-08 RX ADMIN — GABAPENTIN 100 MILLIGRAM(S): 400 CAPSULE ORAL at 05:24

## 2024-02-08 NOTE — PROGRESS NOTE ADULT - SUBJECTIVE AND OBJECTIVE BOX
Date of service: 02-08-24 @ 13:56    pt seen and examined  feels better  no resp distress     ROS: no fever or chills; denies dizziness, no HA, no abdominal pain, no diarrhea or constipation; no dysuria, no urinary frequency, no legs pain, no rashes      MEDICATIONS  (STANDING):  aspirin enteric coated 81 milliGRAM(s) Oral daily  busPIRone 15 milliGRAM(s) Oral two times a day  cefTRIAXone Injectable. 1000 milliGRAM(s) IV Push every 24 hours  dextrose 5%. 1000 milliLiter(s) (50 mL/Hr) IV Continuous <Continuous>  dextrose 50% Injectable 25 Gram(s) IV Push once  escitalopram 10 milliGRAM(s) Oral daily  gabapentin 100 milliGRAM(s) Oral three times a day  glucagon  Injectable 1 milliGRAM(s) IntraMuscular once  insulin lispro (ADMELOG) corrective regimen sliding scale   SubCutaneous three times a day before meals  insulin lispro (ADMELOG) corrective regimen sliding scale   SubCutaneous at bedtime  insulin lispro Injectable (ADMELOG) 4 Unit(s) SubCutaneous three times a day before meals  metoprolol tartrate 50 milliGRAM(s) Oral two times a day    Vital Signs Last 24 Hrs  T(C): 37 (08 Feb 2024 07:18), Max: 37.2 (07 Feb 2024 21:48)  T(F): 98.6 (08 Feb 2024 07:18), Max: 98.9 (07 Feb 2024 21:48)  HR: 68 (08 Feb 2024 07:18) (68 - 75)  BP: 149/85 (08 Feb 2024 07:18) (149/85 - 158/79)  BP(mean): --  RR: 18 (08 Feb 2024 07:18) (18 - 18)  SpO2: 96% (08 Feb 2024 09:32) (96% - 98%)    Parameters below as of 08 Feb 2024 09:32  Patient On (Oxygen Delivery Method): room air      PE:  Constitutional: NAD  HEENT: NC/AT, EOMI, PERRLA, conjunctivae clear; ears and nose atraumatic; pharynx benign  Neck: supple; thyroid not palpable  Back: no tenderness  Respiratory: decreased breath sounds   Cardiovascular: S1S2 regular, no murmurs  Abdomen: soft, not tender, not distended, positive BS; liver and spleen WNL  Genitourinary: no suprapubic tenderness  Lymphatic: no LN palpable  Musculoskeletal: no muscle tenderness, no joint swelling or tenderness  Extremities: no pedal edema  Neurological/ Psychiatric: moving all extremities  Skin: no rashes; no palpable lesions    Labs: all available labs reviewed                                         12.0   5.83  )-----------( 132      ( 07 Feb 2024 18:34 )             33.7     02-07    137  |  107  |  19  ----------------------------<  221<H>  3.8   |  25  |  0.85    Ca    8.6      07 Feb 2024 18:34      Urinalysis Basic - ( 05 Feb 2024 07:31 )    Color: x / Appearance: x / SG: x / pH: x  Gluc: 198 mg/dL / Ketone: x  / Bili: x / Urobili: x   Blood: x / Protein: x / Nitrite: x   Leuk Esterase: x / RBC: x / WBC x   Sq Epi: x / Non Sq Epi: x / Bacteria: x    Culture - Blood (02.05.24 @ 03:21)   Specimen Source: .Blood None  Culture Results:   No growth at 24 hours  Culture - Blood (02.05.24 @ 03:21)   Specimen Source: .Blood None  Culture Results:   No growth at 24 hours      Radiology: all available radiological tests reviewed  < from: CT Angio Chest PE Protocol w/ IV Cont (02.04.24 @ 14:57) >  ACC: [id number] EXAM:  CT ANGIO CHEST PULM [facility]   ORDERED BY: AYDEN WEINBERG     PROCEDURE DATE:  02/04/2024          INTERPRETATION:  CLINICAL INFORMATION: Evaluate pneumonia, assess for   pulmonary embolism    COMPARISON: 6/23/2018    CONTRAST/COMPLICATIONS:  IV Contrast: Omnipaque 350  60 cc administered  Oral Contrast: NONE  Complications: None reported at time of study completion    PROCEDURE:  CT Angiography of the Chest.  Sagittal and coronal reformats were performed as well as 3D (MIP)   reconstructions.    FINDINGS:    LUNGS AND AIRWAYS: Left upper lobe large area of parenchymal   consolidation with air bronchograms extending to the adjacent lateral   pleura.  PLEURA: Trace left pleural effusion  MEDIASTINUM AND TATA: No lymphadenopathy.  VESSELS: No pulmonary embolism. No thoracic aortic dissection or   aneurysm. Coronary artery stents and calcifications  HEART: Mild cardiomegaly No pericardial effusion.  CHEST WALL AND LOWER NECK: Post median sternotomy  VISUALIZED UPPER ABDOMEN: Within normal limits.  BONES: Degenerative changes    IMPRESSION:  1.  No pulmonary embolism.  2.  Large area of parenchymal consolidation left upper lobe, likely   pneumonia. Recommend follow-up to resolution. Trace left pleural effusion        Advanced directives addressed: full resuscitation

## 2024-02-08 NOTE — DISCHARGE NOTE PROVIDER - CARE PROVIDER_API CALL
Isaac Stock  Pulmonary Disease  [address] [place] [address]  Phone: [phone]  Fax: [phone]  Follow Up Time: 1 week

## 2024-02-08 NOTE — DISCHARGE NOTE PROVIDER - HOSPITAL COURSE
History of Present Illness:   Pt is a 67 yo M with PMHx of HTN, T2DM, CAD s/p stents, CHU who p/w fever, chills, myalgias x 3 days. Pt came to ED 1 day prior to admission, Dx with PNA and discharged on Azithromycin however returned bc symptoms continued to worsen. Denies cough, sick contacts, cp, gross hematuria.     In ED, TMAX of 104.3, -> 80, /81, SpO2 99% at RA. Received 2L IVFs, Ceftriaxone, Azithromycin x 1. CTA Chest with MORALES infiltrate and trace pleural effusion, no PE. Pt is a 67 yo M with PMHx of HTN, T2DM, CAD s/p stents, CHU who p/w fever, chills, myalgias x 3 days. Pt came to ED 1 day prior to admission, Dx with PNA and discharged on Azithromycin however returned bc symptoms continued to worsen. Denies cough, sick contacts, cp, gross hematuria.     2.6: no dyspnea, low grade fever, no cp  2.7: feels fatigued, +MARRERO  2.8: feels much better today, no cp, no sob  OSat 97% on RA on ambulation           REVIEW OF SYSTEMS:    CONSTITUTIONAL: No weakness, No fevers or chills  ENT: No ear ache, No sorethroat  NECK: No pain, No stiffness  RESPIRATORY: No cough, No wheezing, No hemoptysis; No dyspnea  CARDIOVASCULAR: No chest pain, No palpitations  GASTROINTESTINAL: No abd pain, No nausea, No vomiting, No hematemesis, No diarrhea or constipation. No melena, No hematochezia.  GENITOURINARY: No dysuria, No  hematuria  NEUROLOGICAL: No diplopia, No paresthesia, No motor dysfunction  MUSCULOSKELETAL: No arthralgia, No myalgia  SKIN: No rashes, or lesions   PSYCH: no anxiety, no suicidal ideation    All other review of systems is negative unless indicated above    Vital Signs Last 24 Hrs  T(C): 37 (08 Feb 2024 07:18), Max: 37.2 (07 Feb 2024 21:48)  T(F): 98.6 (08 Feb 2024 07:18), Max: 98.9 (07 Feb 2024 21:48)  HR: 68 (08 Feb 2024 07:18) (68 - 75)  BP: 149/85 (08 Feb 2024 07:18) (149/85 - 158/79)  RR: 18 (08 Feb 2024 07:18) (18 - 18)  SpO2: 96% (08 Feb 2024 09:32) (96% - 98%)    Parameters below as of 08 Feb 2024 09:32  Patient On (Oxygen Delivery Method): room air          PHYSICAL EXAM:    GENERAL: NAD  HEENT:  NC/AT, EOMI, PERRLA, No scleral icterus, Moist mucous membranes  NECK: Supple, No JVD  CNS:  Alert & Oriented X3, Motor Strength 5/5 B/L upper and lower extremities; DTRs 2+ intact   LUNG: Normal Breath sounds, Right base crackles   HEART: RRR; No murmurs, No rubs  ABDOMEN: +BS, ST/ND/NT  GENITOURINARY: Voiding, Bladder not distended  EXTREMITIES:  2+ Peripheral Pulses, No clubbing, No cyanosis, No tibial edema  MUSCULOSKELTAL: Joints normal ROM, No TTP, No effusion  SKIN: no rashes  RECTAL: deferred, not indicated  BREAST: deferred    a/p:    #Sepsis 2/2 CAP   -CTA Chest as above  -pt met sepsis criteria on arrival with TMAx of 104, tachycardia, confirmed source of infection  -Ceftriaxone/Azithromycin day#4, will change to oral meds  Blood cx : ngtd   -urine Legionella: neg    #Mild Hyponatremia     #Thrombocytopenia   chronic     #T2DM   -restart oral agents    #CAD   -c/w ASA, Metoprolol     #NO evidence of Protein Calorie Malnutrition   -nutrition consult appreciated

## 2024-02-08 NOTE — PROGRESS NOTE ADULT - ASSESSMENT
Pt is a 67 yo M with PMHx of HTN, T2DM, CAD s/p stents, CHU who p/w fever, chills, myalgias x 3 days. Pt came to ED 1 day prior to admission, Dx with PNA and discharged on Azithromycin however returned bc symptoms continued to worsen. Denies cough, sick contacts, cp, gross hematuria.  In ED, TMAX of 104.3, -> 80, /81, SpO2 99% at RA. Received 2L IVFs, Ceftriaxone, Azithromycin x 1. CTA Chest with MORALES infiltrate and trace pleural effusion, no PE.      1. Sepsis. MORALES pneumonia. L pleural effusion. CAP  - imaging reviewed febrile tachycardic on admit  - on IV rocephin 1gm daily #3  - on azithromycin 500mg daily #3  - blood cx no growth, check sputum cx legionella ag  - monitor temps  - tolerating abx well so far; no side effects noted  - reason for abx use and side effects reviewed with patient  - supportive care  - fu cbc    Clinical team may change from intravenous to oral antibiotics when the following criteria are met:   1. Patient is clinically improving/stable       a)	Improved signs and symptoms of infection from initial presentation       b)	Afebrile for 24 hours       c)	Leukocytosis trending towards normal range   2. Patient is tolerating oral intake   3. Initial blood cultures are negative     When above criteria met  may change iv antibiotics to: po azithromycin 500mg BID x 3 days total, po ceftin 500mg BID x 7 days    
Pt is a 69 yo M with PMHx of HTN, T2DM, CAD s/p stents, CHU who p/w fever, chills, myalgias x 3 days. Pt came to ED 1 day prior to admission, Dx with PNA and discharged on Azithromycin however returned bc symptoms continued to worsen. Denies cough, sick contacts, cp, gross hematuria.     In ED, TMAX of 104.3, -> 80, /81, SpO2 99% at RA. Received 2L IVFs, Ceftriaxone, Azithromycin x 1. CTA Chest with MORALES infiltrate and trace pleural effusion, no PE.  (05 Feb 2024 01:07)      Assessment / plan;  New MORALES consolidation / pneumonia  eval for sepsis with high fever, r/o bacteremia  trace left sided pleural effusion  no consolidation or mass on last ct chest 8/2023 as outpt  no Pe on CTA  MARRERO due to above  CAD with stents / no CHF    agree with antibiotics  fu cultures  mobilize OOB  will fuw ith you  repeat ct chest in 2montsh r/o underlying mass    consideration for Aspiration pneumonitis while on wt reducing drugs / appears non toxic without discolored sputum production  aspiration precautions  repeat cxr  may consider witch to broad spectrum antibiotics if not improved  incentive spirometry requested  keep HOB elvated
Pt is a 67 yo M with PMHx of HTN, T2DM, CAD s/p stents, CHU who p/w fever, chills, myalgias x 3 days. Pt came to ED 1 day prior to admission, Dx with PNA and discharged on Azithromycin however returned bc symptoms continued to worsen. Denies cough, sick contacts, cp, gross hematuria.  In ED, TMAX of 104.3, -> 80, /81, SpO2 99% at RA. Received 2L IVFs, Ceftriaxone, Azithromycin x 1. CTA Chest with MORALES infiltrate and trace pleural effusion, no PE.      1. Sepsis. MORALES pneumonia. L pleural effusion. CAP  - imaging reviewed febrile tachycardic on admit  - on IV rocephin 1gm daily #4  - s/p azithromycin 500mg daily #3 - dc  - blood cx no growth, check sputum cx legionella ag  - monitor temps  - tolerating abx well so far; no side effects noted  - reason for abx use and side effects reviewed with patient  - supportive care  - fu cbc    Clinical team may change from intravenous to oral antibiotics when the following criteria are met:   1. Patient is clinically improving/stable       a)	Improved signs and symptoms of infection from initial presentation       b)	Afebrile for 24 hours       c)	Leukocytosis trending towards normal range   2. Patient is tolerating oral intake   3. Initial blood cultures are negative     When above criteria met  may change iv antibiotics to: po ceftin 500mg BID x 7 days    
Pt is a 67 yo M with PMHx of HTN, T2DM, CAD s/p stents, CHU who p/w fever, chills, myalgias x 3 days. Pt came to ED 1 day prior to admission, Dx with PNA and discharged on Azithromycin however returned bc symptoms continued to worsen. Denies cough, sick contacts, cp, gross hematuria.  In ED, TMAX of 104.3, -> 80, /81, SpO2 99% at RA. Received 2L IVFs, Ceftriaxone, Azithromycin x 1. CTA Chest with MORALES infiltrate and trace pleural effusion, no PE.      1. Sepsis. MORALES pneumonia. L pleural effusion. CAP  - imaging reviewed febrile tachycardic on admit  - on IV rocephin 1gm daily #5  - s/p azithromycin 500mg daily #3 - dc  - blood cx no growth, check sputum cx legionella ag  - monitor temps  - tolerating abx well so far; no side effects noted  - reason for abx use and side effects reviewed with patient  - supportive care  - fu cbc    Clinical team may change from intravenous to oral antibiotics when the following criteria are met:   1. Patient is clinically improving/stable       a)	Improved signs and symptoms of infection from initial presentation       b)	Afebrile for 24 hours       c)	Leukocytosis trending towards normal range   2. Patient is tolerating oral intake   3. Initial blood cultures are negative     When above criteria met  may change iv antibiotics to: po ceftin 500mg BID x 7 days    
Pt is a 69 yo M with PMHx of HTN, T2DM, CAD s/p stents, CHU who p/w fever, chills, myalgias x 3 days. Pt came to ED 1 day prior to admission, Dx with PNA and discharged on Azithromycin however returned bc symptoms continued to worsen. Denies cough, sick contacts, cp, gross hematuria.     In ED, TMAX of 104.3, -> 80, /81, SpO2 99% at RA. Received 2L IVFs, Ceftriaxone, Azithromycin x 1. CTA Chest with MORALES infiltrate and trace pleural effusion, no PE.  (05 Feb 2024 01:07)      Assessment / plan;  New MORALES consolidation / pneumonia  eval for sepsis with high fever, r/o bacteremia  trace left sided pleural effusion  no consolidation or mass on last ct chest 8/2023 as outpt  no Pe on CTA  MARRERO due to above  CAD with stents / no CHF    agree with antibiotics  fu cultures  mobilize OOB  will fuw ith you  repeat ct chest in 2montsh r/o underlying mass    consideration for Aspiration pneumonitis while on wt reducing drugs / appears non toxic without discolored sputum production  aspiration precautions  repeat cxr discussed with him today 2/8  complete course of antibiotics  incentive spirometry requested  keep HOB elevated  see dr zhu as outpt for repeat films  DC planning as per primary team

## 2024-02-08 NOTE — PROGRESS NOTE ADULT - SUBJECTIVE AND OBJECTIVE BOX
Patient is a 68y old  Male who presents with a chief complaint of Sepsis 2/2 CAP (2024 12:26)      HPI:  Pt is a 67 yo M with PMHx of HTN, T2DM, CAD s/p stents, CHU who p/w fever, chills, myalgias x 3 days. Pt came to ED 1 day prior to admission, Dx with PNA and discharged on Azithromycin however returned bc symptoms continued to worsen. Denies cough, sick contacts, cp, gross hematuria.     In ED, TMAX of 104.3, -> 80, /81, SpO2 99% at RA. Received 2L IVFs, Ceftriaxone, Azithromycin x 1. CTA Chest with MORALES infiltrate and trace pleural effusion, no PE.  (2024 01:07)      PAST MEDICAL & SURGICAL HISTORY:  HTN (hypertension)      DM (diabetes mellitus)      COPD (chronic obstructive pulmonary disease)      Coronary artery disease involving coronary bypass graft of native heart with angina pectoris      HLD (hyperlipidemia)      S/P CABG x 3      S/P arterial stent        MEDICATIONS  (STANDING):  aspirin enteric coated 81 milliGRAM(s) Oral daily  azithromycin   Tablet 500 milliGRAM(s) Oral daily  busPIRone 15 milliGRAM(s) Oral two times a day  cefTRIAXone Injectable. 1000 milliGRAM(s) IV Push every 24 hours  dextrose 5%. 1000 milliLiter(s) (50 mL/Hr) IV Continuous <Continuous>  dextrose 50% Injectable 25 Gram(s) IV Push once  escitalopram 10 milliGRAM(s) Oral daily  gabapentin 100 milliGRAM(s) Oral three times a day  glucagon  Injectable 1 milliGRAM(s) IntraMuscular once  insulin lispro (ADMELOG) corrective regimen sliding scale   SubCutaneous three times a day before meals  insulin lispro (ADMELOG) corrective regimen sliding scale   SubCutaneous at bedtime  metoprolol tartrate 50 milliGRAM(s) Oral two times a day  sodium chloride 0.9%. 1000 milliLiter(s) (83 mL/Hr) IV Continuous <Continuous>      MEDICATIONS  (PRN):  acetaminophen     Tablet .. 650 milliGRAM(s) Oral every 6 hours PRN Temp greater or equal to 38C (100.4F), Mild Pain (1 - 3), Moderate Pain (4 - 6)  aluminum hydroxide/magnesium hydroxide/simethicone Suspension 30 milliLiter(s) Oral every 4 hours PRN Dyspepsia  dextrose Oral Gel 15 Gram(s) Oral once PRN Blood Glucose LESS THAN 70 milliGRAM(s)/deciliter  melatonin 3 milliGRAM(s) Oral at bedtime PRN Insomnia  ondansetron Injectable 4 milliGRAM(s) IV Push every 8 hours PRN Nausea and/or Vomiting  traZODone 50 milliGRAM(s) Oral at bedtime PRN inosmonia      FAMILY HISTORY:  Patient's mother is   at 83 from copd, dm    Patient's father is   at 58 from colon cancer        SOCIAL HISTORY:  ***    REVIEW OF SYSTEM:  Pertinent items are noted in HPI.    Vital Signs Last 24 Hrs  T(C): 37 (2024 07:18), Max: 37.2 (2024 21:48)  T(F): 98.6 (2024 07:18), Max: 98.9 (2024 21:48)  HR: 68 (2024 07:18) (68 - 75)  BP: 149/85 (2024 07:18) (149/85 - 158/79)  BP(mean): --  RR: 18 (2024 07:18) (18 - 18)  SpO2: 97% (2024 07:18) (96% - 98%)    Parameters below as of 2024 07:18  Patient On (Oxygen Delivery Method): room air            PHYSICAL EXAM  General Appearance: cooperative, no acute distress,   HEENT: PERRL, conjunctiva clear, EOM's intact, non injected pharynx, no exudate, TM   normal  Neck: Supple, , no adenopathy, thyroid: not enlarged, no carotid bruit or JVD  Back: Symmetric, no  tenderness,no soft tissue tenderness  Lungs: left sided rhonchi, no wheeze  Heart: Regular rate and rhythm, S1, S2 normal, no murmur, rub or gallop  Abdomen: Soft, non-tender, bowel sounds active , no hepatosplenomegaly  Extremities: no cyanosis or edema, no joint swelling  Skin: Skin color, texture normal, no rashes   Neurologic: Alert and oriented X3 , cranial nerves intact, sensory and motor normal,    ECG:    LABS:                          13.9   7.41  )-----------( 96       ( 2024 07:31 )             39.0     02-    132<L>  |  101  |  13  ----------------------------<  198<H>  4.1   |  23  |  0.88    Ca    8.4<L>      2024 07:31    TPro  6.8  /  Alb  2.5<L>  /  TBili  0.7  /  DBili  x   /  AST  36  /  ALT  35  /  AlkPhos  55  02-              Urinalysis Basic - ( 2024 07:31 )    Color: x / Appearance: x / SG: x / pH: x  Gluc: 198 mg/dL / Ketone: x  / Bili: x / Urobili: x   Blood: x / Protein: x / Nitrite: x   Leuk Esterase: x / RBC: x / WBC x   Sq Epi: x / Non Sq Epi: x / Bacteria: x            RADIOLOGY & ADDITIONAL STUDIES:    < from: CT Angio Chest PE Protocol w/ IV Cont (24 @ 14:57) >  FINDINGS:    LUNGS AND AIRWAYS: Left upper lobe large area of parenchymal   consolidation with air bronchograms extending to the adjacent lateral   pleura.  PLEURA: Trace left pleural effusion  MEDIASTINUM AND TATA: No lymphadenopathy.  VESSELS: No pulmonary embolism. No thoracic aortic dissection or   aneurysm. Coronary artery stents and calcifications  HEART: Mild cardiomegaly No pericardial effusion.  CHEST WALL AND LOWER NECK: Post median sternotomy  VISUALIZED UPPER ABDOMEN: Within normal limits.  BONES: Degenerative changes    IMPRESSION:  1.  No pulmonary embolism.  2.  Large area of parenchymal consolidation left upper lobe, likely   pneumonia. Recommend follow-up to resolution. Trace left pleural effusion      < end of copied text >

## 2024-02-08 NOTE — DISCHARGE NOTE PROVIDER - NSDCMRMEDTOKEN_GEN_ALL_CORE_FT
aspirin 81 mg oral delayed release tablet: 1 tab(s) orally once a day  busPIRone 15 mg oral tablet: 1 tab(s) orally 2 times a day  cefuroxime 250 mg oral tablet: 1 tab(s) orally 2 times a day  escitalopram 10 mg oral tablet: 1 tab(s) orally once a day  gabapentin 100 mg oral capsule: 1 cap(s) orally once a day  Jardiance 25 mg oral tablet: 1 tab(s) orally once a day (in the morning)  metFORMIN 500 mg oral tablet, extended release: 2 tab(s) orally once a day with breakfast  Metoprolol Tartrate 50 mg oral tablet: 1 tab(s) orally 2 times a day  Mounjaro 7.5 mg/0.5 mL subcutaneous solution: 7.5 milligram(s) subcutaneously once a week  Multiple Vitamins oral tablet: 1 tab(s) orally once a day  Omega-3 1000 mg oral capsule: 1 cap(s) orally once a day  traZODone 50 mg oral tablet: 1 tab(s) orally once a day (at bedtime)

## 2024-02-08 NOTE — PROGRESS NOTE ADULT - PROVIDER SPECIALTY LIST ADULT
Pulmonology
Infectious Disease
Infectious Disease
Pulmonology
Hospitalist
Hospitalist
Infectious Disease
Pulmonology

## 2024-02-08 NOTE — DISCHARGE NOTE PROVIDER - NSDCFUADDAPPT_GEN_ALL_CORE_FT
Dr Umer Ruggiero   [address] [place] [address], [place] [address]  [phone]  Monday February 12, 2024 @1:50

## 2024-02-08 NOTE — PROGRESS NOTE ADULT - REASON FOR ADMISSION
Sepsis 2/2 CAP

## 2024-02-08 NOTE — CDI QUERY NOTE - NSCDIOTHERTXTBX_GEN_ALL_CORE_HH
There is conflicting documentation regarding the patient's nutrition status. The patient received a nutrition assessment by a Registered Dietician on 2/5/2024. The documentation of this assessment states: the patient does not meet criteria for malnutrition.  Chart documentation also states the patient has protein calorie malnutrition.    Can the nutrition diagnosis and criteria be clarified, after study?    - Protein calorie malnutrition ruled out   - Protein calorie malnutrition ruled in (please specify severity and treatment plan)    - Other (please specify)    Chart Documentation:    Dietitian initial evaluation 2/5/2024   Body Mass Index: 34.9  Weight Change: Loss  Pounds Lost/Gained: 13 Pound(s)  % Change: 6 %  Time Frame: x 8 mo notclinsig  Ideal Body Weight (lbs): 124  Physical Assessment: underweight  Nutrition Focused Physical Exam: yes...  Muscle Mass (Loss of Muscle) Clavicles...  Clavicles Depletion is mild  Body Fat (loss of subcutaneous fat) Orbital...  Orbital Depletion is mild  Patient meets criteria for malnutrition: no    Adult-Hospitalist progress note 2/7/2024   Protein Calorie Malnutrition   -nutrition consult

## 2024-02-08 NOTE — DISCHARGE NOTE PROVIDER - NSDCCPCAREPLAN_GEN_ALL_CORE_FT
PRINCIPAL DISCHARGE DIAGNOSIS  Diagnosis: Pneumonia  Assessment and Plan of Treatment: complete oral antibiotics

## 2024-02-09 ENCOUNTER — TRANSCRIPTION ENCOUNTER (OUTPATIENT)
Age: 69
End: 2024-02-09

## 2024-02-14 DIAGNOSIS — Z88.0 ALLERGY STATUS TO PENICILLIN: ICD-10-CM

## 2024-02-14 DIAGNOSIS — E87.1 HYPO-OSMOLALITY AND HYPONATREMIA: ICD-10-CM

## 2024-02-14 DIAGNOSIS — Z95.1 PRESENCE OF AORTOCORONARY BYPASS GRAFT: ICD-10-CM

## 2024-02-14 DIAGNOSIS — J44.0 CHRONIC OBSTRUCTIVE PULMONARY DISEASE WITH (ACUTE) LOWER RESPIRATORY INFECTION: ICD-10-CM

## 2024-02-14 DIAGNOSIS — J18.9 PNEUMONIA, UNSPECIFIED ORGANISM: ICD-10-CM

## 2024-02-14 DIAGNOSIS — Z83.3 FAMILY HISTORY OF DIABETES MELLITUS: ICD-10-CM

## 2024-02-14 DIAGNOSIS — F41.1 GENERALIZED ANXIETY DISORDER: ICD-10-CM

## 2024-02-14 DIAGNOSIS — Z95.5 PRESENCE OF CORONARY ANGIOPLASTY IMPLANT AND GRAFT: ICD-10-CM

## 2024-02-14 DIAGNOSIS — J90 PLEURAL EFFUSION, NOT ELSEWHERE CLASSIFIED: ICD-10-CM

## 2024-02-14 DIAGNOSIS — A41.9 SEPSIS, UNSPECIFIED ORGANISM: ICD-10-CM

## 2024-02-14 DIAGNOSIS — I25.10 ATHEROSCLEROTIC HEART DISEASE OF NATIVE CORONARY ARTERY WITHOUT ANGINA PECTORIS: ICD-10-CM

## 2024-02-14 DIAGNOSIS — E11.9 TYPE 2 DIABETES MELLITUS WITHOUT COMPLICATIONS: ICD-10-CM

## 2024-02-14 DIAGNOSIS — Z82.5 FAMILY HISTORY OF ASTHMA AND OTHER CHRONIC LOWER RESPIRATORY DISEASES: ICD-10-CM

## 2024-02-14 DIAGNOSIS — D69.6 THROMBOCYTOPENIA, UNSPECIFIED: ICD-10-CM

## 2024-02-14 DIAGNOSIS — I25.810 ATHEROSCLEROSIS OF CORONARY ARTERY BYPASS GRAFT(S) WITHOUT ANGINA PECTORIS: ICD-10-CM

## 2024-02-14 DIAGNOSIS — Z66 DO NOT RESUSCITATE: ICD-10-CM

## 2024-02-14 DIAGNOSIS — I10 ESSENTIAL (PRIMARY) HYPERTENSION: ICD-10-CM

## 2024-02-23 ENCOUNTER — APPOINTMENT (OUTPATIENT)
Dept: CARE COORDINATION | Facility: HOME HEALTH | Age: 69
End: 2024-02-23
Payer: MEDICARE

## 2024-02-23 VITALS
DIASTOLIC BLOOD PRESSURE: 70 MMHG | RESPIRATION RATE: 16 BRPM | SYSTOLIC BLOOD PRESSURE: 110 MMHG | OXYGEN SATURATION: 97 % | HEART RATE: 72 BPM

## 2024-02-23 DIAGNOSIS — I51.9 HEART DISEASE, UNSPECIFIED: ICD-10-CM

## 2024-02-23 DIAGNOSIS — J18.9 PNEUMONIA, UNSPECIFIED ORGANISM: ICD-10-CM

## 2024-02-23 DIAGNOSIS — F41.9 ANXIETY DISORDER, UNSPECIFIED: ICD-10-CM

## 2024-02-23 DIAGNOSIS — I10 ESSENTIAL (PRIMARY) HYPERTENSION: ICD-10-CM

## 2024-02-23 PROCEDURE — 99344 HOME/RES VST NEW MOD MDM 60: CPT

## 2024-02-23 RX ORDER — FENOFIBRATE 145 MG/1
145 TABLET, COATED ORAL
Refills: 0 | Status: DISCONTINUED | COMMUNITY
End: 2024-02-23

## 2024-02-23 RX ORDER — BLOOD SUGAR DIAGNOSTIC
STRIP MISCELLANEOUS
Qty: 400 | Refills: 0 | Status: DISCONTINUED | COMMUNITY
Start: 2016-11-09 | End: 2024-02-23

## 2024-02-23 RX ORDER — TIRZEPATIDE 7.5 MG/.5ML
7.5 INJECTION, SOLUTION SUBCUTANEOUS WEEKLY
Refills: 0 | Status: ACTIVE | COMMUNITY
Start: 2024-02-23

## 2024-02-23 RX ORDER — FLUOXETINE HYDROCHLORIDE 20 MG/1
20 CAPSULE ORAL
Qty: 30 | Refills: 0 | Status: DISCONTINUED | COMMUNITY
Start: 2016-12-03 | End: 2024-02-23

## 2024-02-23 RX ORDER — METOPROLOL TARTRATE 50 MG/1
50 TABLET, FILM COATED ORAL
Qty: 180 | Refills: 0 | Status: ACTIVE | COMMUNITY

## 2024-02-23 RX ORDER — TRAZODONE HYDROCHLORIDE 50 MG/1
50 TABLET ORAL
Refills: 0 | Status: ACTIVE | COMMUNITY
Start: 2016-12-22

## 2024-02-23 RX ORDER — RANOLAZINE 500 MG/1
500 TABLET, EXTENDED RELEASE ORAL
Refills: 0 | Status: ACTIVE | COMMUNITY
Start: 2024-02-23

## 2024-02-23 RX ORDER — ASPIRIN ENTERIC COATED TABLETS 81 MG 81 MG/1
81 TABLET, DELAYED RELEASE ORAL DAILY
Refills: 0 | Status: ACTIVE | COMMUNITY
Start: 2024-02-23

## 2024-02-23 RX ORDER — BUSPIRONE HYDROCHLORIDE 15 MG/1
15 TABLET ORAL TWICE DAILY
Refills: 0 | Status: ACTIVE | COMMUNITY
Start: 2024-02-23

## 2024-02-23 RX ORDER — METFORMIN HYDROCHLORIDE 500 MG/1
500 TABLET, COATED ORAL DAILY
Refills: 0 | Status: ACTIVE | COMMUNITY

## 2024-02-23 RX ORDER — EMPAGLIFLOZIN 25 MG/1
25 TABLET, FILM COATED ORAL DAILY
Refills: 0 | Status: ACTIVE | COMMUNITY
Start: 2024-02-23

## 2024-02-23 RX ORDER — ESCITALOPRAM OXALATE 10 MG/1
10 TABLET, FILM COATED ORAL DAILY
Refills: 0 | Status: ACTIVE | COMMUNITY
Start: 2024-02-23

## 2024-02-23 RX ORDER — INSULIN ASPART 100 [IU]/ML
100 INJECTION, SOLUTION INTRAVENOUS; SUBCUTANEOUS
Refills: 0 | Status: DISCONTINUED | COMMUNITY
End: 2024-02-23

## 2024-02-23 RX ORDER — GABAPENTIN 100 MG/1
100 CAPSULE ORAL 3 TIMES DAILY
Refills: 0 | Status: ACTIVE | COMMUNITY
Start: 2024-02-23

## 2024-02-23 RX ORDER — CHLORHEXIDINE GLUCONATE 4 %
LIQUID (ML) TOPICAL DAILY
Qty: 30 | Refills: 1 | Status: ACTIVE | COMMUNITY
Start: 2024-02-23

## 2024-02-23 RX ORDER — INSULIN GLARGINE 100 [IU]/ML
100 INJECTION, SOLUTION SUBCUTANEOUS
Refills: 0 | Status: DISCONTINUED | COMMUNITY
End: 2024-02-23

## 2024-02-23 RX ORDER — ATORVASTATIN CALCIUM 40 MG/1
40 TABLET, FILM COATED ORAL
Qty: 90 | Refills: 0 | Status: DISCONTINUED | COMMUNITY
Start: 2016-07-21 | End: 2024-02-23

## 2024-02-23 RX ORDER — CLONAZEPAM 0.5 MG/1
0.5 TABLET ORAL
Refills: 0 | Status: DISCONTINUED | COMMUNITY
End: 2024-02-23

## 2024-02-23 RX ORDER — FUROSEMIDE 40 MG/1
40 TABLET ORAL
Refills: 0 | Status: DISCONTINUED | COMMUNITY
End: 2024-02-23

## 2024-02-23 RX ORDER — CLOPIDOGREL BISULFATE 75 MG/1
75 TABLET, FILM COATED ORAL
Qty: 90 | Refills: 0 | Status: DISCONTINUED | COMMUNITY
Start: 2016-07-27 | End: 2024-02-23

## 2024-02-23 RX ORDER — ALFUZOSIN HYDROCHLORIDE 10 MG/1
10 TABLET, EXTENDED RELEASE ORAL
Refills: 0 | Status: DISCONTINUED | COMMUNITY
End: 2024-02-23

## 2024-02-23 NOTE — HISTORY OF PRESENT ILLNESS
[Post-hospitalization from ___ Hospital] : Post-hospitalization from [unfilled] Hospital [Admitted on: ___] : The patient was admitted on [unfilled] [Discharged on ___] : discharged on [unfilled] [FreeTextEntry2] : FROM [facility] NOTE PROVIDER: 68 yo M with PMHx of HTN, T2DM, CAD s/p stents, CHU who p/w fever, chills, myalgias x 3 days. Pt came to ED 1 day prior to admission, Dx with PNA and discharged on Azithromycin however returned bc symptoms continued to worsen. Denies cough, sick contacts, cp, gross hematuria.    In ED, TMAX of 104.3, -> 80, /81, SpO2 99% at RA. Received 2L IVFs, Ceftriaxone, Azithromycin x 1. CTA Chest with MORALES infiltrate and trace pleural effusion, no PE. Pt is a 69 yo M with PMHx of HTN, T2DM, CAD s/p stents, CHU who p/w fever, chills, myalgias x 3 days. Pt came to ED 1 day prior to admission, Dx with PNA and discharged on Azithromycin however returned bc symptoms continued to worsen. Denies cough, sick contacts, cp, gross hematuria.   Upon exam A&O x 3 NAD. Overall feels better since discharge. Had f/u visit yesterday with Dr. Dayami cotter and he was put on "steroids" unsure of dose needs to  from [facility]. Patient reports approx 35# weight loss this past year, noting he did start mounjaro 5 months ago for his DM. He told Dr. CUEVA and he is going to order CT of his chest. HTN: BP controlled on current meds. CAD: h/o stents no reports of CP. Anxiety on home meds. Declined HC services.

## 2024-02-23 NOTE — PHYSICAL EXAM
[No Acute Distress] : no acute distress [Well Nourished] : well nourished [Well Developed] : well developed [Normal Sclera/Conjunctiva] : normal sclera/conjunctiva [Well-Appearing] : well-appearing [Normal Outer Ear/Nose] : the outer ears and nose were normal in appearance [Normal Oropharynx] : the oropharynx was normal [Supple] : supple [No Respiratory Distress] : no respiratory distress  [No Accessory Muscle Use] : no accessory muscle use [Normal Rate] : normal rate  [Regular Rhythm] : with a regular rhythm [Normal S1, S2] : normal S1 and S2 [Pedal Pulses Present] : the pedal pulses are present [No Edema] : there was no peripheral edema [Soft] : abdomen soft [No Extremity Clubbing/Cyanosis] : no extremity clubbing/cyanosis [Non Tender] : non-tender [Non-distended] : non-distended [Normal Bowel Sounds] : normal bowel sounds [No Spinal Tenderness] : no spinal tenderness [No Rash] : no rash [Normal Gait] : normal gait [Coordination Grossly Intact] : coordination grossly intact [No Focal Deficits] : no focal deficits [Normal Affect] : the affect was normal [Alert and Oriented x3] : oriented to person, place, and time [de-identified] : slight fine inspiratory crackles LLL

## 2024-02-23 NOTE — REVIEW OF SYSTEMS
[Anxiety] : anxiety [Negative] : Integumentary [FreeTextEntry2] : still has decreased energy levels post hospital/PNA

## 2024-02-23 NOTE — PLAN
[FreeTextEntry1] : A/P: # PNA: - s/p hospitalization for sepsi/PNA - complete course po ceftin on dc - to take course of steroids per Pulm - avoid sick contacts, maintain UTD immunizations, hand hygiene - call for worsening of sx  # HTN/CAD: - no reports of CP - BP controlled on current meds - con't BB, ranexa, ASA  # Anxiety: - stable on home meds - con't buspar, gabapentin, lexapro - report any worsening sx to PCP [facility]

## 2024-04-16 NOTE — ED PROCEDURE NOTE - NS ED ATTENDING STATEMENT MOD
Consent: The patient's consent was obtained including but not limited to risks of crusting, scabbing, blistering, scarring, darker or lighter pigmentary change, recurrence, incomplete removal and infection. Show Aperture Variable?: Yes Render Post-Care Instructions In Note?: no Detail Level: Detailed Duration Of Freeze Thaw-Cycle (Seconds): 0 Post-Care Instructions: I reviewed with the patient in detail post-care instructions. Patient is to wear sunprotection, and avoid picking at any of the treated lesions. Pt may apply Vaseline to crusted or scabbing areas. Number Of Freeze-Thaw Cycles: 1 freeze-thaw cycle This was a shared visit with the LISA. I reviewed and verified the documentation and independently performed the documented:

## 2024-07-01 NOTE — PATIENT PROFILE ADULT. - SUBSTANCE USE COMMENT, PROFILE
Preventive Care Visit  [facility]  Nathanael Cowan PA-C, Family Medicine  Jul 1, 2024      Assessment & Plan     Routine general medical examination at a health care facility  Stable wellness.  BMI higher than ideal.  Not in obesity range.  Discussed diet and exercise habits.  Focusing on this and monitoring in 1 year.    Essential hypertension  Stable on current regimen.  Will maintain and recheck annually.  Labs pending.  If within normal limits, seen next year.  - lisinopril (ZESTRIL) 10 MG tablet; Take 1 tablet (10 mg) by mouth daily  Medication use and side effects discussed with the patient. Patient is in complete understanding and agreement with plan.     Pure hypercholesterolemia  Stable on current regimen.Will maintain and recheck annually.  Labs pending.  If within normal limits, seen next year.  - Lipid panel reflex to direct LDL Fasting; Future  - atorvastatin (LIPITOR) 40 MG tablet; Take 1 tablet (40 mg) by mouth daily  - Comprehensive metabolic panel (BMP + Alb, Alk Phos, ALT, AST, Total. Bili, TP); Future  Medication use and side effects discussed with the patient. Patient is in complete understanding and agreement with plan.     Hemochromatosis, unspecified hemochromatosis type  Past history seen on increased ferritin levels.  Discussed ferritin elevation is not specific to hemochromatosis however has had transferrin in the past and per EMR review in Care Everywhere review this is within normal limits.  Does have strong family history.  Recommending rechecking today and consulting with hematology.  Referral placed.  - CBC with platelets; Future  - Iron and iron binding capacity; Future  - Ferritin; Future  - Adult Oncology/Hematology  Referral; Future    Screening for HIV (human immunodeficiency virus)  Obtained in 2015 and updated.    Need for hepatitis C screening test  Obtained in 2022 and updated.    Dermatitis  Area of concern appears as possible  "dermatitis.  Present for 1 to 2 weeks.  Mild itching.  Recommending trial topical steroid and if no improvement in 2 weeks consider possible biopsy versus dermatology referral.  Sooner if concerns or worsening.  - triamcinolone (KENALOG) 0.1 % external cream; Apply topically 2 times daily  Medication use and side effects discussed with the patient. Patient is in complete understanding and agreement with plan.     Patient has been advised of split billing requirements and indicates understanding: Yes        BMI  Estimated body mass index is 29.36 kg/m  as calculated from the following:    Height as of this encounter: 1.74 m (5' 8.5\").    Weight as of this encounter: 88.9 kg (196 lb).   Weight management plan: Discussed healthy diet and exercise guidelines    Counseling  Appropriate preventive services were discussed with this patient, including applicable screening as appropriate for fall prevention, nutrition, physical activity, Tobacco-use cessation, weight loss and cognition.  Checklist reviewing preventive services available has been given to the patient.  Reviewed patient's diet, addressing concerns and/or questions.   He is at risk for lack of exercise and has been provided with information to increase physical activity for the benefit of his well-being.   He is at risk for psychosocial distress and has been provided with information to reduce risk.         Meg Stauffer is a 48 year old, presenting for the following:  Physical        7/1/2024     7:45 AM   Additional Questions   Roomed by Joy PATEL LPN        Via the Health Maintenance questionnaire, the patient has reported the following services have been completed -Colonscopy: [facility] 2023-01-13, this information has been sent to the abstraction team.  Health Care Directive  Patient does not have a Health Care Directive or Living Will: Discussed advance care planning with patient; however, patient declined at this time.    HPI  Patient is a " 48-year-old male with a past history of hypertension, hypercholesteremia, and hemochromatosis.  He presents today for evaluation of his wellness visit as well as annual follow-up for his medications.    In regards to hypertension and hyperlipidemia, stable on current lisinopril Lipitor without side effects.  Tolerates well manages well.    In regards to hemochromatosis, patient has never been formally diagnosed with this.  He has had elevated ferritin levels in the past and both his father and paternal uncle both have hemochromatosis.  He has never seen a hematologist.  No history of elevated LFTs or blood clots.  He states in the past he has resorted to donating blood 2-4 times a year.  Has not done this recently.    Approximate 1 to 2-week history of rash/irritation on left lower leg.  Mild itching.  No known cause.  No treatments tried.  No history of similar symptoms.      6/28/2024   General Health   How would you rate your overall physical health? Good   Feel stress (tense, anxious, or unable to sleep) Only a little      (!) STRESS CONCERN      6/28/2024   Nutrition   Three or more servings of calcium each day? Yes   Diet: Regular (no restrictions)   How many servings of fruit and vegetables per day? (!) 2-3   How many sweetened beverages each day? 0-1            6/28/2024   Exercise   Days per week of moderate/strenous exercise 3 days   Average minutes spent exercising at this level 50 min            6/28/2024   Social Factors   Frequency of gathering with friends or relatives Three times a week   Worry food won't last until get money to buy more No   Food not last or not have enough money for food? No   Do you have housing? (Housing is defined as stable permanent housing and does not include staying ouside in a car, in a tent, in an abandoned building, in an overnight shelter, or couch-surfing.) No   Are you worried about losing your housing? No   Lack of transportation? No   Unable to get utilities  "(heat,electricity)? No   Want help with housing or utility concern? No      (!) HOUSING CONCERN PRESENT      6/28/2024   Dental   Dentist two times every year? Yes            6/28/2024   TB Screening   Were you born outside of the [place]? No            Today's PHQ-2 Score:       6/30/2024     9:57 AM   PHQ-2 ( 1999 [employer])   Q1: Little interest or pleasure in doing things 0   Q2: Feeling down, depressed or hopeless 0   PHQ-2 Score 0   Q1: Little interest or pleasure in doing things Not at all   Q2: Feeling down, depressed or hopeless Not at all   PHQ-2 Score 0           6/28/2024   Substance Use   Alcohol more than 3/day or more than 7/wk No   Do you use any other substances recreationally? No        Social History     Tobacco Use    Smoking status: Passive Smoke Exposure - Never Smoker    Smokeless tobacco: Never    Tobacco comments:     Occassional Cigar   Substance Use Topics    Alcohol use: Yes    Drug use: Never             6/28/2024   One time HIV Screening   Previous HIV test? Yes          6/28/2024   STI Screening   New sexual partner(s) since last STI/HIV test? No      ASCVD Risk   The ASCVD Risk score (Jane RUDD, et al., 2019) failed to calculate for the following reasons:    Cannot find a previous HDL lab    Cannot find a previous total cholesterol lab    The BP treatment status is invalid        6/28/2024   Contraception/Family Planning   Questions about contraception or family planning No           Reviewed and updated as needed this visit by Provider   Tobacco  Allergies  Meds  Problems  Med Hx  Surg Hx  Fam Hx                 Objective    Exam  /82   Pulse 63   Temp 97.4  F (36.3  C) (Oral)   Resp 20   Ht 1.74 m (5' 8.5\")   Wt 88.9 kg (196 lb)   SpO2 99%   BMI 29.36 kg/m     Estimated body mass index is 29.36 kg/m  as calculated from the following:    Height as of this encounter: 1.74 m (5' 8.5\").    Weight as of this encounter: 88.9 kg (196 lb).    Physical Exam  GENERAL: " alert and no distress  EYES: Eyes grossly normal to inspection, PERRL and conjunctivae and sclerae normal  HENT: ear canals and TM's normal, nose and mouth without ulcers or lesions  NECK: no adenopathy, no asymmetry, masses, or scars  RESP: lungs clear to auscultation - no rales, rhonchi or wheezes  CV: regular rate and rhythm, normal S1 S2, no S3 or S4, no murmur, click or rub, no peripheral edema  ABDOMEN: soft, nontender, no hepatosplenomegaly, no masses and bowel sounds normal  MS: no gross musculoskeletal defects noted, no edema  SKIN: Superficial dry erythematous patch approximately 2 cm in diameter on left lower leg.  Otherwise, no suspicious lesions or rashes  NEURO: Normal strength and tone, mentation intact and speech normal  PSYCH: mentation appears normal, affect normal/bright  LYMPH: no cervical adenopathy        Signed Electronically by: Nathanael Cowan PA-C     quit 10 years ago

## 2024-09-10 NOTE — ED PROVIDER NOTE - PRIOR EKG STATUS
You can access the FollowMyHealth Patient Portal offered by [facility] by registering at the following website: [email or web address]. By joining [facility]’s FollowMyHealth portal, you will also be able to view your health information using other applications (apps) compatible with our system.
the EKG is unchanged from prior EKG/unchanged from 2/21/20

## 2024-11-14 NOTE — ED ADULT NURSE NOTE - NSICDXPASTMEDICALHX_GEN_ALL_CORE_FT
PAST MEDICAL HISTORY:  COPD (chronic obstructive pulmonary disease)     Coronary artery disease involving coronary bypass graft of native heart with angina pectoris     DM (diabetes mellitus)     HLD (hyperlipidemia)     HTN (hypertension)        None

## 2024-12-14 ENCOUNTER — EMERGENCY (EMERGENCY)
Facility: HOSPITAL | Age: 69
LOS: 0 days | Discharge: LEFT BEFORE DISCHARGE | End: 2024-12-14
Attending: FAMILY MEDICINE
Payer: MEDICARE

## 2024-12-14 VITALS — DIASTOLIC BLOOD PRESSURE: 81 MMHG | SYSTOLIC BLOOD PRESSURE: 132 MMHG | RESPIRATION RATE: 16 BRPM | HEART RATE: 65 BPM

## 2024-12-14 VITALS — WEIGHT: 187.61 LBS | HEIGHT: 63 IN

## 2024-12-14 DIAGNOSIS — E11.9 TYPE 2 DIABETES MELLITUS WITHOUT COMPLICATIONS: ICD-10-CM

## 2024-12-14 DIAGNOSIS — Z53.29 PROCEDURE AND TREATMENT NOT CARRIED OUT BECAUSE OF PATIENT'S DECISION FOR OTHER REASONS: ICD-10-CM

## 2024-12-14 DIAGNOSIS — Z95.9 PRESENCE OF CARDIAC AND VASCULAR IMPLANT AND GRAFT, UNSPECIFIED: Chronic | ICD-10-CM

## 2024-12-14 DIAGNOSIS — Z88.0 ALLERGY STATUS TO PENICILLIN: ICD-10-CM

## 2024-12-14 DIAGNOSIS — R26.81 UNSTEADINESS ON FEET: ICD-10-CM

## 2024-12-14 DIAGNOSIS — R53.1 WEAKNESS: ICD-10-CM

## 2024-12-14 DIAGNOSIS — F41.9 ANXIETY DISORDER, UNSPECIFIED: ICD-10-CM

## 2024-12-14 DIAGNOSIS — Z95.5 PRESENCE OF CORONARY ANGIOPLASTY IMPLANT AND GRAFT: ICD-10-CM

## 2024-12-14 DIAGNOSIS — I10 ESSENTIAL (PRIMARY) HYPERTENSION: ICD-10-CM

## 2024-12-14 DIAGNOSIS — Z79.82 LONG TERM (CURRENT) USE OF ASPIRIN: ICD-10-CM

## 2024-12-14 DIAGNOSIS — I25.10 ATHEROSCLEROTIC HEART DISEASE OF NATIVE CORONARY ARTERY WITHOUT ANGINA PECTORIS: ICD-10-CM

## 2024-12-14 DIAGNOSIS — Z79.84 LONG TERM (CURRENT) USE OF ORAL HYPOGLYCEMIC DRUGS: ICD-10-CM

## 2024-12-14 DIAGNOSIS — Z95.1 PRESENCE OF AORTOCORONARY BYPASS GRAFT: Chronic | ICD-10-CM

## 2024-12-14 DIAGNOSIS — Z95.1 PRESENCE OF AORTOCORONARY BYPASS GRAFT: ICD-10-CM

## 2024-12-14 DIAGNOSIS — Z79.4 LONG TERM (CURRENT) USE OF INSULIN: ICD-10-CM

## 2024-12-14 LAB
ALBUMIN SERPL ELPH-MCNC: 3.2 G/DL — LOW (ref 3.3–5)
ALP SERPL-CCNC: 52 U/L — SIGNIFICANT CHANGE UP (ref 40–120)
ALT FLD-CCNC: 27 U/L — SIGNIFICANT CHANGE UP (ref 12–78)
ANION GAP SERPL CALC-SCNC: 5 MMOL/L — SIGNIFICANT CHANGE UP (ref 5–17)
AST SERPL-CCNC: 27 U/L — SIGNIFICANT CHANGE UP (ref 15–37)
BASOPHILS # BLD AUTO: 0.03 K/UL — SIGNIFICANT CHANGE UP (ref 0–0.2)
BASOPHILS NFR BLD AUTO: 0.3 % — SIGNIFICANT CHANGE UP (ref 0–2)
BILIRUB SERPL-MCNC: 0.7 MG/DL — SIGNIFICANT CHANGE UP (ref 0.2–1.2)
BUN SERPL-MCNC: 18 MG/DL — SIGNIFICANT CHANGE UP (ref 7–23)
CALCIUM SERPL-MCNC: 8.4 MG/DL — LOW (ref 8.5–10.1)
CHLORIDE SERPL-SCNC: 108 MMOL/L — SIGNIFICANT CHANGE UP (ref 96–108)
CO2 SERPL-SCNC: 23 MMOL/L — SIGNIFICANT CHANGE UP (ref 22–31)
CREAT SERPL-MCNC: 0.91 MG/DL — SIGNIFICANT CHANGE UP (ref 0.5–1.3)
EGFR: 92 ML/MIN/1.73M2 — SIGNIFICANT CHANGE UP
EGFR: 92 ML/MIN/1.73M2 — SIGNIFICANT CHANGE UP
EOSINOPHIL # BLD AUTO: 0.13 K/UL — SIGNIFICANT CHANGE UP (ref 0–0.5)
EOSINOPHIL NFR BLD AUTO: 1.3 % — SIGNIFICANT CHANGE UP (ref 0–6)
GLUCOSE SERPL-MCNC: 116 MG/DL — HIGH (ref 70–99)
HCT VFR BLD CALC: 42 % — SIGNIFICANT CHANGE UP (ref 39–50)
HGB BLD-MCNC: 14.7 G/DL — SIGNIFICANT CHANGE UP (ref 13–17)
IMM GRANULOCYTES NFR BLD AUTO: 0.3 % — SIGNIFICANT CHANGE UP (ref 0–0.9)
LYMPHOCYTES # BLD AUTO: 1.98 K/UL — SIGNIFICANT CHANGE UP (ref 1–3.3)
LYMPHOCYTES # BLD AUTO: 19.8 % — SIGNIFICANT CHANGE UP (ref 13–44)
MAGNESIUM SERPL-MCNC: 2.1 MG/DL — SIGNIFICANT CHANGE UP (ref 1.6–2.6)
MCHC RBC-ENTMCNC: 32.2 PG — SIGNIFICANT CHANGE UP (ref 27–34)
MCHC RBC-ENTMCNC: 35 G/DL — SIGNIFICANT CHANGE UP (ref 32–36)
MCV RBC AUTO: 92.1 FL — SIGNIFICANT CHANGE UP (ref 80–100)
MONOCYTES # BLD AUTO: 0.91 K/UL — HIGH (ref 0–0.9)
MONOCYTES NFR BLD AUTO: 9.1 % — SIGNIFICANT CHANGE UP (ref 2–14)
NEUTROPHILS # BLD AUTO: 6.91 K/UL — SIGNIFICANT CHANGE UP (ref 1.8–7.4)
NEUTROPHILS NFR BLD AUTO: 69.2 % — SIGNIFICANT CHANGE UP (ref 43–77)
NT-PROBNP SERPL-SCNC: 91 PG/ML — SIGNIFICANT CHANGE UP (ref 0–125)
PLATELET # BLD AUTO: 131 K/UL — LOW (ref 150–400)
POTASSIUM SERPL-MCNC: 3.6 MMOL/L — SIGNIFICANT CHANGE UP (ref 3.5–5.3)
POTASSIUM SERPL-SCNC: 3.6 MMOL/L — SIGNIFICANT CHANGE UP (ref 3.5–5.3)
PROT SERPL-MCNC: 6.7 GM/DL — SIGNIFICANT CHANGE UP (ref 6–8.3)
RBC # BLD: 4.56 M/UL — SIGNIFICANT CHANGE UP (ref 4.2–5.8)
RBC # FLD: 13.6 % — SIGNIFICANT CHANGE UP (ref 10.3–14.5)
SODIUM SERPL-SCNC: 136 MMOL/L — SIGNIFICANT CHANGE UP (ref 135–145)
TROPONIN I, HIGH SENSITIVITY RESULT: 6.94 NG/L — SIGNIFICANT CHANGE UP
WBC # BLD: 9.99 K/UL — SIGNIFICANT CHANGE UP (ref 3.8–10.5)
WBC # FLD AUTO: 9.99 K/UL — SIGNIFICANT CHANGE UP (ref 3.8–10.5)

## 2024-12-14 PROCEDURE — 83880 ASSAY OF NATRIURETIC PEPTIDE: CPT

## 2024-12-14 PROCEDURE — 80053 COMPREHEN METABOLIC PANEL: CPT

## 2024-12-14 PROCEDURE — 93005 ELECTROCARDIOGRAM TRACING: CPT

## 2024-12-14 PROCEDURE — 36415 COLL VENOUS BLD VENIPUNCTURE: CPT

## 2024-12-14 PROCEDURE — 72125 CT NECK SPINE W/O DYE: CPT | Mod: MC

## 2024-12-14 PROCEDURE — 71045 X-RAY EXAM CHEST 1 VIEW: CPT

## 2024-12-14 PROCEDURE — 84484 ASSAY OF TROPONIN QUANT: CPT

## 2024-12-14 PROCEDURE — 70450 CT HEAD/BRAIN W/O DYE: CPT | Mod: MC

## 2024-12-14 PROCEDURE — 99285 EMERGENCY DEPT VISIT HI MDM: CPT | Mod: FS

## 2024-12-14 PROCEDURE — 71045 X-RAY EXAM CHEST 1 VIEW: CPT | Mod: 26

## 2024-12-14 PROCEDURE — 70450 CT HEAD/BRAIN W/O DYE: CPT | Mod: 26,MC

## 2024-12-14 PROCEDURE — 93010 ELECTROCARDIOGRAM REPORT: CPT

## 2024-12-14 PROCEDURE — 99285 EMERGENCY DEPT VISIT HI MDM: CPT | Mod: 25

## 2024-12-14 PROCEDURE — 83735 ASSAY OF MAGNESIUM: CPT

## 2024-12-14 PROCEDURE — 85025 COMPLETE CBC W/AUTO DIFF WBC: CPT

## 2024-12-14 PROCEDURE — 96374 THER/PROPH/DIAG INJ IV PUSH: CPT

## 2024-12-14 PROCEDURE — 72125 CT NECK SPINE W/O DYE: CPT | Mod: 26,MC

## 2024-12-14 RX ORDER — KETOROLAC TROMETHAMINE 30 MG/ML
15 INJECTION, SOLUTION INTRAMUSCULAR; INTRAVENOUS ONCE
Refills: 0 | Status: DISCONTINUED | OUTPATIENT
Start: 2024-12-14 | End: 2024-12-14

## 2024-12-14 RX ADMIN — KETOROLAC TROMETHAMINE 15 MILLIGRAM(S): 30 INJECTION, SOLUTION INTRAMUSCULAR; INTRAVENOUS at 18:18

## 2025-01-19 NOTE — ED PROVIDER NOTE - OBJECTIVE STATEMENT
64 y/o male with a PMHx of OA, diverticulitis, DM, HTN, COPD, CAD, s/p CABG presents to the ED c/o SOB for about 2 weeks. Pt states that he feels short of breath on exertion. Pt currently uses inhalers 3 times a day. PT has taken insulin today for DM. Normal PO intake.  Denies dyspnea while lying down, nausea. Former smoker- 15 years ago. No ETOH- 5 years ago
19-Jan-2025

## 2025-01-22 NOTE — ED PROVIDER NOTE - CRITERIA ADMIT PEDS PT
"  Patient Name: Sharath Huizar    : 1949  MRN: [id number]      Subjective:     Patient ID: Sharath is a 75 y.o. male    Chief Complaint:  Follow-up (Results, also pt wants to talk to you about his congestion that he been having for about a week)    History of Present Illness    Sharath presents today for follow up of abnormal labs.    He reports significant weight loss and poor appetite. He experiences frequent bowel movements occurring every 1-2 hours. He denies weakness, nerve pain, or tingling sensations.    CBC shows decline in platelet count from 337 to 139, low white blood cell count, and continued anemia. He denies any recent changes in diet.    History of throat cancer and heart attack.    He is a current smoker and uses alcohol occasionally.     ROS:  General: -fever, -chills, -fatigue, -weight gain, +weight loss, +appetite changes  Eyes: -vision changes, -redness, -discharge  ENT: -ear pain, -nasal congestion, -sore throat  Cardiovascular: -chest pain, -palpitations, -lower extremity edema  Respiratory: -cough, -shortness of breath  Gastrointestinal: -abdominal pain, -nausea, -vomiting, -diarrhea, -constipation, -blood in stool, +change in bowel habits  Genitourinary: -dysuria, -hematuria, -frequency  Musculoskeletal: -joint pain, -muscle pain  Skin: -rash, -lesion  Neurological: -headache, -dizziness, -numbness, -tingling, -weakness  Psychiatric: -anxiety, -depression, -sleep difficulty         Objective:   /64 (BP Location: Left arm, Patient Position: Sitting)   Pulse 79   Temp 98.5 °F (36.9 °C) (Oral)   Resp 19   Ht 5' 6" (1.676 m)   Wt 53.6 kg (118 lb 2.7 oz)   SpO2 95%   BMI 19.07 kg/m²     Physical Exam  Constitutional:       General: He is not in acute distress.     Appearance: He is well-developed. He is not ill-appearing.   HENT:      Head: Normocephalic and atraumatic.      Right Ear: Tympanic membrane normal.      Left Ear: Tympanic membrane normal.   Eyes:      General: No " scleral icterus.     Conjunctiva/sclera: Conjunctivae normal.   Cardiovascular:      Rate and Rhythm: Normal rate and regular rhythm.   Pulmonary:      Effort: Pulmonary effort is normal.      Breath sounds: Normal breath sounds. No wheezing or rales.   Abdominal:      General: There is no distension.      Palpations: Abdomen is soft.      Tenderness: There is no abdominal tenderness. There is no right CVA tenderness, left CVA tenderness, guarding or rebound.   Musculoskeletal:         General: No tenderness. Normal range of motion.      Cervical back: Normal range of motion.   Lymphadenopathy:      Cervical: No cervical adenopathy.   Skin:     General: Skin is warm and dry.   Neurological:      Mental Status: He is alert and oriented to person, place, and time.      Cranial Nerves: No cranial nerve deficit.   Psychiatric:         Mood and Affect: Mood normal.         Behavior: Behavior normal.         Thought Content: Thought content normal.        Component Date Value Ref Range Status    Urine Culture, Routine 12/05/2024 No significant growth   Final    Specimen UA 12/05/2024 Urine, Clean Catch   Final    Color, UA 12/05/2024 Yellow  Yellow, Straw, Ina Final    Appearance, UA 12/05/2024 Clear  Clear Final    pH, UA 12/05/2024 7.0  5.0 - 8.0 Final    Specific Gravity, UA 12/05/2024 1.015  1.005 - 1.030 Final    Protein, UA 12/05/2024 Negative  Negative Final    Comment: Recommend a 24 hour urine protein or a urine   protein/creatinine ratio if globulin induced proteinuria is  clinically suspected.      Glucose, UA 12/05/2024 Negative  Negative Final    Ketones, UA 12/05/2024 Negative  Negative Final    Bilirubin (UA) 12/05/2024 Negative  Negative Final    Occult Blood UA 12/05/2024 Negative  Negative Final    Nitrite, UA 12/05/2024 Negative  Negative Final    Urobilinogen, UA 12/05/2024 Negative  <2.0 EU/dL Final    Leukocytes, UA 12/05/2024 Negative  Negative Final   Lab Visit on 12/05/2024   Component Date  Value Ref Range Status    Sodium 12/05/2024 140  136 - 145 mmol/L Final    Potassium 12/05/2024 4.7  3.5 - 5.1 mmol/L Final    Chloride 12/05/2024 110  95 - 110 mmol/L Final    CO2 12/05/2024 21 (L)  23 - 29 mmol/L Final    Glucose 12/05/2024 92  70 - 110 mg/dL Final    BUN 12/05/2024 18  8 - 23 mg/dL Final    Creatinine 12/05/2024 1.1  0.5 - 1.4 mg/dL Final    Calcium 12/05/2024 9.2  8.7 - 10.5 mg/dL Final    Total Protein 12/05/2024 7.0  6.0 - 8.4 g/dL Final    Albumin 12/05/2024 3.6  3.5 - 5.2 g/dL Final    Total Bilirubin 12/05/2024 0.4  0.1 - 1.0 mg/dL Final    Comment: For infants and newborns, interpretation of results should be based  on gestational age, weight and in agreement with clinical  observations.    Premature Infant recommended reference ranges:  Up to 24 hours.............<8.0 mg/dL  Up to 48 hours............<12.0 mg/dL  3-5 days..................<15.0 mg/dL  6-29 days.................<15.0 mg/dL      Alkaline Phosphatase 12/05/2024 78  40 - 150 U/L Final    AST 12/05/2024 21  10 - 40 U/L Final    ALT 12/05/2024 13  10 - 44 U/L Final    eGFR 12/05/2024 >60  >60 mL/min/1.73 m^2 Final    Anion Gap 12/05/2024 9  8 - 16 mmol/L Final    Cholesterol 12/05/2024 152  120 - 199 mg/dL Final    Comment: The National Cholesterol Education Program (NCEP) has set the  following guidelines (reference ranges) for Cholesterol:  Optimal.....................<200 mg/dL  Borderline High.............200-239 mg/dL  High........................> or = 240 mg/dL      Triglycerides 12/05/2024 47  30 - 150 mg/dL Final    Comment: The National Cholesterol Education Program (NCEP) has set the  following guidelines (reference values) for triglycerides:  Normal......................<150 mg/dL  Borderline High.............150-199 mg/dL  High........................200-499 mg/dL      HDL 12/05/2024 60  40 - 75 mg/dL Final    Comment: The National Cholesterol Education Program (NCEP) has set the  following guidelines (reference  values) for HDL Cholesterol:  Low...............<40 mg/dL  Optimal...........>60 mg/dL      LDL Cholesterol 12/05/2024 82.6  63.0 - 159.0 mg/dL Final    Comment: The National Cholesterol Education Program (NCEP) has set the  following guidelines (reference values) for LDL Cholesterol:  Optimal.......................<130 mg/dL  Borderline High...............130-159 mg/dL  High..........................160-189 mg/dL  Very High.....................>190 mg/dL      HDL/Cholesterol Ratio 12/05/2024 39.5  20.0 - 50.0 % Final    Total Cholesterol/HDL Ratio 12/05/2024 2.5  2.0 - 5.0 Final    Non-HDL Cholesterol 12/05/2024 92  mg/dL Final    Comment: Risk category and Non-HDL cholesterol goals:  Coronary heart disease (CHD)or equivalent (10-year risk of CHD >20%):  Non-HDL cholesterol goal     <130 mg/dL  Two or more CHD risk factors and 10-year risk of CHD <= 20%:  Non-HDL cholesterol goal     <160 mg/dL  0 to 1 CHD risk factor:  Non-HDL cholesterol goal     <190 mg/dL      WBC 12/05/2024 3.66 (L)  3.90 - 12.70 K/uL Final    RBC 12/05/2024 3.64 (L)  4.60 - 6.20 M/uL Final    Hemoglobin 12/05/2024 12.3 (L)  14.0 - 18.0 g/dL Final    Hematocrit 12/05/2024 39.1 (L)  40.0 - 54.0 % Final    MCV 12/05/2024 107 (H)  82 - 98 fL Final    MCH 12/05/2024 33.8 (H)  27.0 - 31.0 pg Final    MCHC 12/05/2024 31.5 (L)  32.0 - 36.0 g/dL Final    RDW 12/05/2024 15.2 (H)  11.5 - 14.5 % Final    Platelets 12/05/2024 139 (L)  150 - 450 K/uL Final    MPV 12/05/2024 11.4  9.2 - 12.9 fL Final    Immature Granulocytes 12/05/2024 0.3  0.0 - 0.5 % Final    Gran # (ANC) 12/05/2024 1.9  1.8 - 7.7 K/uL Final    Immature Grans (Abs) 12/05/2024 0.01  0.00 - 0.04 K/uL Final    Comment: Mild elevation in immature granulocytes is non specific and   can be seen in a variety of conditions including stress response,   acute inflammation, trauma and pregnancy. Correlation with other   laboratory and clinical findings is essential.      Lymph # 12/05/2024 1.7  1.0  - 4.8 K/uL Final    Mono # 12/05/2024 0.1 (L)  0.3 - 1.0 K/uL Final    Eos # 12/05/2024 0.0  0.0 - 0.5 K/uL Final    Baso # 12/05/2024 0.01  0.00 - 0.20 K/uL Final    nRBC 12/05/2024 0  0 /100 WBC Final    Gran % 12/05/2024 51.3  38.0 - 73.0 % Final    Lymph % 12/05/2024 45.1  18.0 - 48.0 % Final    Mono % 12/05/2024 2.2 (L)  4.0 - 15.0 % Final    Eosinophil % 12/05/2024 0.8  0.0 - 8.0 % Final    Basophil % 12/05/2024 0.3  0.0 - 1.9 % Final    Differential Method 12/05/2024 Automated   Final    TSH 12/05/2024 0.787  0.400 - 4.000 uIU/mL Final      Assessment        ICD-10-CM ICD-9-CM   1. Macrocytic anemia  D53.9 281.9   2. Thrombocytopenia  D69.6 287.5   3. Unintended weight loss  R63.4 783.21   4. Other emphysema  J43.8 492.8   5. Hypertension, essential  I10 401.9   6. Chronic systolic heart failure  I50.22 428.22   7. Atherosclerosis of aorta  I70.0 440.0   8. Screening for colorectal cancer  Z12.11 V76.51    Z12.12 V76.41         Plan:   Assessment & Plan    IMPRESSION:  Evaluated significant drop in platelet count (337 to 139) and lower white blood cell count  Assessed potential causes of anemia and weight loss  Considered potential cancer recurrence due to patient's history of throat cancer  Ruled out smoking and daily alcohol use as contributing factors  Recommend colonoscopy over stool test due to anemia to check for internal bleeding        1. Macrocytic anemia/  2. Thrombocytopenia  -     Vitamin B12; Future; Expected date: 01/13/2025  -     Folate; Future; Expected date: 01/13/2025  -     Iron and TIBC; Future; Expected date: 01/13/2025  -     PROTEIN ELECTROPHORESIS, SERUM; Future; Expected date: 01/13/2025  -     Flow Cytometry Analysis (Peripheral Blood); Future; Expected date: 01/13/2025  -     KATRIN Screen w/Reflex; Future; Expected date: 01/13/2025  Noted continued anemia in patient's blood count.  Discussed possibility of microscopic internal bleeding as a cause.  Recommend colonoscopy to check for  internal bleeding and ordered additional labs to investigate further.  Explained significance of platelet count drop from 337 to 139 in 3 months.  Ordered additional labs to investigate the cause of thrombocytopenia.    3. Unintended weight loss  -     X-Ray Chest PA And Lateral; Future; Expected date: 01/13/2025  -     US Abdomen Complete; Future; Expected date: 01/13/2025  -     LIPASE; Future; Expected date: 01/13/2025  Expressed concern and ordered additional tests, including CXR and sonogram, to investigate the cause.  Noted patient's report of decreased appetite, especially during recent illness.    4. Other emphysema  Overview:  03-  CT Chest  Apical pleural scarring and tiny emphysema blebs, central lobular and paraseptal emphysema   No acute respiratory symptoms reported.    Strongly recommended smoking cessation.    Importance of smoking cessation discussed with patient. 3 minutes spent counseling patient on risks of smoking, benefits of stopping and ways of stopping. Patient not ready to quit.     5. Hypertension, essential  Continue current regimen.      6. Chronic systolic heart failure  Overview:  02-  Echocardiogram  Moderately decreased left ventricular systolic function. The estimated ejection fraction is 30%  Patient has not seen his cardiologist since 2021.    At present he has no signs of acute decompensation.    Strongly recommended patient schedule appointment with his cardiologist.    7. Atherosclerosis of aorta  Overview:  Ultrasound abdominal aorta 4/2018  Compliance with atorvastatin recommended.      8. Screening for colorectal cancer  -     Ambulatory referral/consult to Endo Procedure ; Future; Expected date: 01/14/2025  Discussed options for colorectal cancer screening.  We discussed colonoscopy verses stool testing and risks and benefits each.   Patient elected to proceed with Colonoscopy. Order placed.             -Wilmer Bangura Jr., MD, AAHIVS      This note  was generated with the assistance of ambient listening technology. Verbal consent was obtained by the patient and accompanying visitor(s) for the recording of patient appointment to facilitate this note. I attest to having reviewed and edited the generated note for accuracy, though some syntax or spelling errors may persist. Please contact the author of this note for any clarification.      There are no Patient Instructions on file for this visit.      Follow up in about 2 weeks (around 1/27/2025) for Results (Audio Visit).   Future Appointments   Date Time Provider Department Center   1/27/2025  3:30 PM Becky Walters AU.D [facility] AUDIO [facility]   1/30/2025 10:15 AM [facility] US ROOM 2 [facility]   2/4/2025  1:00 PM Wilmer Bangura Jr., MD [facility] IM [facility]   2/20/2025  1:00 PM Becky Walters AU.D [facility] AUDIO [facility]   2/20/2025  1:45 PM Genoveva Vallejo MD [facility]            No

## 2025-04-23 NOTE — PATIENT PROFILE ADULT - FUNCTIONAL SCREEN CURRENT LEVEL: COMMUNICATION, MLM
0 = understands/communicates without difficulty [facility] provides services at a reduced cost to those who are determined to be eligible through [facility]’s financial assistance program. Information regarding [facility]’s financial assistance program can be found by going to [email or web address] or by calling [phone].

## 2025-05-06 ENCOUNTER — INPATIENT (INPATIENT)
Facility: HOSPITAL | Age: 70
LOS: 0 days | Discharge: ANOTHER HOSPITAL | DRG: 282 | End: 2025-05-07
Attending: STUDENT IN AN ORGANIZED HEALTH CARE EDUCATION/TRAINING PROGRAM | Admitting: HOSPITALIST
Payer: MEDICARE

## 2025-05-06 VITALS
SYSTOLIC BLOOD PRESSURE: 176 MMHG | RESPIRATION RATE: 18 BRPM | HEART RATE: 82 BPM | OXYGEN SATURATION: 100 % | DIASTOLIC BLOOD PRESSURE: 95 MMHG | TEMPERATURE: 97 F

## 2025-05-06 DIAGNOSIS — Z95.1 PRESENCE OF AORTOCORONARY BYPASS GRAFT: Chronic | ICD-10-CM

## 2025-05-06 DIAGNOSIS — Z95.9 PRESENCE OF CARDIAC AND VASCULAR IMPLANT AND GRAFT, UNSPECIFIED: Chronic | ICD-10-CM

## 2025-05-06 DIAGNOSIS — I21.4 NON-ST ELEVATION (NSTEMI) MYOCARDIAL INFARCTION: ICD-10-CM

## 2025-05-06 LAB
ALBUMIN SERPL ELPH-MCNC: 3.3 G/DL — SIGNIFICANT CHANGE UP (ref 3.3–5)
ALP SERPL-CCNC: 67 U/L — SIGNIFICANT CHANGE UP (ref 40–120)
ALT FLD-CCNC: 29 U/L — SIGNIFICANT CHANGE UP (ref 12–78)
ANION GAP SERPL CALC-SCNC: 6 MMOL/L — SIGNIFICANT CHANGE UP (ref 5–17)
APTT BLD: 27.5 SEC — SIGNIFICANT CHANGE UP (ref 26.1–36.8)
AST SERPL-CCNC: 31 U/L — SIGNIFICANT CHANGE UP (ref 15–37)
BASOPHILS # BLD AUTO: 0.03 K/UL — SIGNIFICANT CHANGE UP (ref 0–0.2)
BASOPHILS NFR BLD AUTO: 0.5 % — SIGNIFICANT CHANGE UP (ref 0–2)
BILIRUB SERPL-MCNC: 0.5 MG/DL — SIGNIFICANT CHANGE UP (ref 0.2–1.2)
BLD GP AB SCN SERPL QL: SIGNIFICANT CHANGE UP
BUN SERPL-MCNC: 12 MG/DL — SIGNIFICANT CHANGE UP (ref 7–23)
CALCIUM SERPL-MCNC: 8.9 MG/DL — SIGNIFICANT CHANGE UP (ref 8.5–10.1)
CHLORIDE SERPL-SCNC: 108 MMOL/L — SIGNIFICANT CHANGE UP (ref 96–108)
CO2 SERPL-SCNC: 23 MMOL/L — SIGNIFICANT CHANGE UP (ref 22–31)
CREAT SERPL-MCNC: 0.81 MG/DL — SIGNIFICANT CHANGE UP (ref 0.5–1.3)
EGFR: 95 ML/MIN/1.73M2 — SIGNIFICANT CHANGE UP
EGFR: 95 ML/MIN/1.73M2 — SIGNIFICANT CHANGE UP
EOSINOPHIL # BLD AUTO: 0.13 K/UL — SIGNIFICANT CHANGE UP (ref 0–0.5)
EOSINOPHIL NFR BLD AUTO: 2.2 % — SIGNIFICANT CHANGE UP (ref 0–6)
GLUCOSE BLDC GLUCOMTR-MCNC: 139 MG/DL — HIGH (ref 70–99)
GLUCOSE SERPL-MCNC: 128 MG/DL — HIGH (ref 70–99)
HCT VFR BLD CALC: 39.2 % — SIGNIFICANT CHANGE UP (ref 39–50)
HGB BLD-MCNC: 13.9 G/DL — SIGNIFICANT CHANGE UP (ref 13–17)
IMM GRANULOCYTES # BLD AUTO: 0.01 K/UL — SIGNIFICANT CHANGE UP (ref 0–0.07)
IMM GRANULOCYTES NFR BLD AUTO: 0.2 % — SIGNIFICANT CHANGE UP (ref 0–0.9)
INR BLD: 0.96 RATIO — SIGNIFICANT CHANGE UP (ref 0.85–1.16)
LYMPHOCYTES # BLD AUTO: 1.43 K/UL — SIGNIFICANT CHANGE UP (ref 1–3.3)
LYMPHOCYTES NFR BLD AUTO: 24.7 % — SIGNIFICANT CHANGE UP (ref 13–44)
MCHC RBC-ENTMCNC: 31.9 PG — SIGNIFICANT CHANGE UP (ref 27–34)
MCHC RBC-ENTMCNC: 35.5 G/DL — SIGNIFICANT CHANGE UP (ref 32–36)
MCV RBC AUTO: 89.9 FL — SIGNIFICANT CHANGE UP (ref 80–100)
MONOCYTES # BLD AUTO: 0.65 K/UL — SIGNIFICANT CHANGE UP (ref 0–0.9)
MONOCYTES NFR BLD AUTO: 11.2 % — SIGNIFICANT CHANGE UP (ref 2–14)
NEUTROPHILS # BLD AUTO: 3.53 K/UL — SIGNIFICANT CHANGE UP (ref 1.8–7.4)
NEUTROPHILS NFR BLD AUTO: 61.2 % — SIGNIFICANT CHANGE UP (ref 43–77)
NRBC # BLD AUTO: 0 K/UL — SIGNIFICANT CHANGE UP (ref 0–0)
NRBC # FLD: 0 K/UL — SIGNIFICANT CHANGE UP (ref 0–0)
NRBC BLD AUTO-RTO: 0 /100 WBCS — SIGNIFICANT CHANGE UP (ref 0–0)
NT-PROBNP SERPL-SCNC: 167 PG/ML — HIGH (ref 0–125)
PLATELET # BLD AUTO: 143 K/UL — LOW (ref 150–400)
PMV BLD: 10 FL — SIGNIFICANT CHANGE UP (ref 7–13)
POTASSIUM SERPL-MCNC: 3.7 MMOL/L — SIGNIFICANT CHANGE UP (ref 3.5–5.3)
POTASSIUM SERPL-SCNC: 3.7 MMOL/L — SIGNIFICANT CHANGE UP (ref 3.5–5.3)
PROT SERPL-MCNC: 7 GM/DL — SIGNIFICANT CHANGE UP (ref 6–8.3)
PROTHROM AB SERPL-ACNC: 11.3 SEC — SIGNIFICANT CHANGE UP (ref 9.9–13.4)
RBC # BLD: 4.36 M/UL — SIGNIFICANT CHANGE UP (ref 4.2–5.8)
RBC # FLD: 13.5 % — SIGNIFICANT CHANGE UP (ref 10.3–14.5)
SODIUM SERPL-SCNC: 137 MMOL/L — SIGNIFICANT CHANGE UP (ref 135–145)
TROPONIN I, HIGH SENSITIVITY RESULT: 326.76 NG/L — HIGH
TROPONIN I, HIGH SENSITIVITY RESULT: 804.76 NG/L — HIGH
WBC # BLD: 5.78 K/UL — SIGNIFICANT CHANGE UP (ref 3.8–10.5)
WBC # FLD AUTO: 5.78 K/UL — SIGNIFICANT CHANGE UP (ref 3.8–10.5)

## 2025-05-06 PROCEDURE — 71045 X-RAY EXAM CHEST 1 VIEW: CPT | Mod: 26

## 2025-05-06 PROCEDURE — 0241U: CPT

## 2025-05-06 PROCEDURE — 93005 ELECTROCARDIOGRAM TRACING: CPT

## 2025-05-06 PROCEDURE — 99285 EMERGENCY DEPT VISIT HI MDM: CPT

## 2025-05-06 PROCEDURE — 93308 TTE F-UP OR LMTD: CPT

## 2025-05-06 PROCEDURE — 86850 RBC ANTIBODY SCREEN: CPT

## 2025-05-06 PROCEDURE — 86901 BLOOD TYPING SEROLOGIC RH(D): CPT

## 2025-05-06 PROCEDURE — 93010 ELECTROCARDIOGRAM REPORT: CPT

## 2025-05-06 PROCEDURE — 80048 BASIC METABOLIC PNL TOTAL CA: CPT

## 2025-05-06 PROCEDURE — 82962 GLUCOSE BLOOD TEST: CPT

## 2025-05-06 PROCEDURE — 83036 HEMOGLOBIN GLYCOSYLATED A1C: CPT

## 2025-05-06 PROCEDURE — 86900 BLOOD TYPING SEROLOGIC ABO: CPT

## 2025-05-06 PROCEDURE — 99223 1ST HOSP IP/OBS HIGH 75: CPT

## 2025-05-06 PROCEDURE — 84484 ASSAY OF TROPONIN QUANT: CPT

## 2025-05-06 PROCEDURE — 36415 COLL VENOUS BLD VENIPUNCTURE: CPT

## 2025-05-06 RX ORDER — ATORVASTATIN CALCIUM 80 MG/1
80 TABLET, FILM COATED ORAL AT BEDTIME
Refills: 0 | Status: DISCONTINUED | OUTPATIENT
Start: 2025-05-06 | End: 2025-05-07

## 2025-05-06 RX ORDER — ONDANSETRON HCL/PF 4 MG/2 ML
4 VIAL (ML) INJECTION EVERY 8 HOURS
Refills: 0 | Status: DISCONTINUED | OUTPATIENT
Start: 2025-05-06 | End: 2025-05-07

## 2025-05-06 RX ORDER — NITROGLYCERIN 20 MG/G
0.4 OINTMENT TOPICAL ONCE
Refills: 0 | Status: COMPLETED | OUTPATIENT
Start: 2025-05-06 | End: 2025-05-06

## 2025-05-06 RX ORDER — BUSPIRONE HYDROCHLORIDE 15 MG/1
15 TABLET ORAL
Refills: 0 | Status: DISCONTINUED | OUTPATIENT
Start: 2025-05-06 | End: 2025-05-07

## 2025-05-06 RX ORDER — ASPIRIN 325 MG
81 TABLET ORAL DAILY
Refills: 0 | Status: DISCONTINUED | OUTPATIENT
Start: 2025-05-06 | End: 2025-05-07

## 2025-05-06 RX ORDER — ACETAMINOPHEN 500 MG/5ML
650 LIQUID (ML) ORAL EVERY 6 HOURS
Refills: 0 | Status: DISCONTINUED | OUTPATIENT
Start: 2025-05-06 | End: 2025-05-07

## 2025-05-06 RX ORDER — PRASUGREL HYDROCHLORIDE 10 MG/1
60 TABLET, COATED ORAL ONCE
Refills: 0 | Status: COMPLETED | OUTPATIENT
Start: 2025-05-06 | End: 2025-05-06

## 2025-05-06 RX ORDER — ENOXAPARIN SODIUM 100 MG/ML
80 INJECTION SUBCUTANEOUS ONCE
Refills: 0 | Status: COMPLETED | OUTPATIENT
Start: 2025-05-06 | End: 2025-05-06

## 2025-05-06 RX ORDER — ONDANSETRON HCL/PF 4 MG/2 ML
4 VIAL (ML) INJECTION ONCE
Refills: 0 | Status: COMPLETED | OUTPATIENT
Start: 2025-05-06 | End: 2025-05-06

## 2025-05-06 RX ORDER — ALBUTEROL SULFATE 2.5 MG/3ML
2 VIAL, NEBULIZER (ML) INHALATION
Refills: 0 | DISCHARGE

## 2025-05-06 RX ORDER — CALCIPOTRIENE AND BETAMETHASONE DIPROPIONATE .5; 5 MG/G; UG/G
1 SUSPENSION TOPICAL
Refills: 0 | DISCHARGE

## 2025-05-06 RX ORDER — LISINOPRIL 5 MG/1
10 TABLET ORAL DAILY
Refills: 0 | Status: DISCONTINUED | OUTPATIENT
Start: 2025-05-06 | End: 2025-05-07

## 2025-05-06 RX ORDER — GABAPENTIN 400 MG/1
1 CAPSULE ORAL
Refills: 0 | DISCHARGE

## 2025-05-06 RX ORDER — DEXTROSE 50 % IN WATER 50 %
25 SYRINGE (ML) INTRAVENOUS ONCE
Refills: 0 | Status: DISCONTINUED | OUTPATIENT
Start: 2025-05-06 | End: 2025-05-07

## 2025-05-06 RX ORDER — AZELASTINE HYDROCHLORIDE 137 UG/1
1 SPRAY, METERED NASAL
Refills: 0 | DISCHARGE

## 2025-05-06 RX ORDER — MELATONIN 5 MG
3 TABLET ORAL AT BEDTIME
Refills: 0 | Status: DISCONTINUED | OUTPATIENT
Start: 2025-05-06 | End: 2025-05-07

## 2025-05-06 RX ORDER — METOPROLOL SUCCINATE 50 MG/1
50 TABLET, EXTENDED RELEASE ORAL
Refills: 0 | Status: DISCONTINUED | OUTPATIENT
Start: 2025-05-06 | End: 2025-05-07

## 2025-05-06 RX ORDER — ESCITALOPRAM OXALATE 20 MG/1
10 TABLET ORAL DAILY
Refills: 0 | Status: DISCONTINUED | OUTPATIENT
Start: 2025-05-06 | End: 2025-05-07

## 2025-05-06 RX ORDER — SODIUM CHLORIDE 9 G/1000ML
1000 INJECTION, SOLUTION INTRAVENOUS
Refills: 0 | Status: DISCONTINUED | OUTPATIENT
Start: 2025-05-06 | End: 2025-05-07

## 2025-05-06 RX ORDER — ROSUVASTATIN CALCIUM 20 MG/1
10 TABLET, FILM COATED ORAL AT BEDTIME
Refills: 0 | Status: DISCONTINUED | OUTPATIENT
Start: 2025-05-06 | End: 2025-05-06

## 2025-05-06 RX ORDER — MAGNESIUM, ALUMINUM HYDROXIDE 200-200 MG
30 TABLET,CHEWABLE ORAL EVERY 4 HOURS
Refills: 0 | Status: DISCONTINUED | OUTPATIENT
Start: 2025-05-06 | End: 2025-05-07

## 2025-05-06 RX ORDER — TRAMADOL HYDROCHLORIDE 50 MG/1
1 TABLET, FILM COATED ORAL
Refills: 0 | DISCHARGE

## 2025-05-06 RX ORDER — INSULIN LISPRO 100 U/ML
INJECTION, SOLUTION INTRAVENOUS; SUBCUTANEOUS
Refills: 0 | Status: DISCONTINUED | OUTPATIENT
Start: 2025-05-06 | End: 2025-05-07

## 2025-05-06 RX ORDER — TRAMADOL HYDROCHLORIDE 50 MG/1
50 TABLET, FILM COATED ORAL
Refills: 0 | Status: DISCONTINUED | OUTPATIENT
Start: 2025-05-06 | End: 2025-05-07

## 2025-05-06 RX ORDER — ASPIRIN 325 MG
324 TABLET ORAL ONCE
Refills: 0 | Status: COMPLETED | OUTPATIENT
Start: 2025-05-06 | End: 2025-05-06

## 2025-05-06 RX ORDER — TRAZODONE HCL 100 MG
50 TABLET ORAL AT BEDTIME
Refills: 0 | Status: DISCONTINUED | OUTPATIENT
Start: 2025-05-06 | End: 2025-05-07

## 2025-05-06 RX ORDER — GABAPENTIN 400 MG/1
300 CAPSULE ORAL THREE TIMES A DAY
Refills: 0 | Status: DISCONTINUED | OUTPATIENT
Start: 2025-05-06 | End: 2025-05-07

## 2025-05-06 RX ORDER — NITROGLYCERIN 20 MG/G
0.4 OINTMENT TOPICAL
Refills: 0 | Status: COMPLETED | OUTPATIENT
Start: 2025-05-06 | End: 2025-05-06

## 2025-05-06 RX ORDER — FLUTICASONE PROPIONATE 50 UG/1
2 SPRAY, METERED NASAL
Refills: 0 | DISCHARGE

## 2025-05-06 RX ORDER — GLUCAGON 3 MG/1
1 POWDER NASAL ONCE
Refills: 0 | Status: DISCONTINUED | OUTPATIENT
Start: 2025-05-06 | End: 2025-05-07

## 2025-05-06 RX ORDER — DEXTROSE 50 % IN WATER 50 %
12.5 SYRINGE (ML) INTRAVENOUS ONCE
Refills: 0 | Status: DISCONTINUED | OUTPATIENT
Start: 2025-05-06 | End: 2025-05-07

## 2025-05-06 RX ORDER — DEXTROSE 50 % IN WATER 50 %
15 SYRINGE (ML) INTRAVENOUS ONCE
Refills: 0 | Status: DISCONTINUED | OUTPATIENT
Start: 2025-05-06 | End: 2025-05-07

## 2025-05-06 RX ADMIN — NITROGLYCERIN 0.4 MILLIGRAM(S): 20 OINTMENT TOPICAL at 23:36

## 2025-05-06 RX ADMIN — Medication 10 MILLIGRAM(S): at 21:35

## 2025-05-06 RX ADMIN — ATORVASTATIN CALCIUM 80 MILLIGRAM(S): 80 TABLET, FILM COATED ORAL at 23:29

## 2025-05-06 RX ADMIN — ENOXAPARIN SODIUM 80 MILLIGRAM(S): 100 INJECTION SUBCUTANEOUS at 20:48

## 2025-05-06 RX ADMIN — Medication 40 MILLIGRAM(S): at 23:31

## 2025-05-06 RX ADMIN — NITROGLYCERIN 0.4 MILLIGRAM(S): 20 OINTMENT TOPICAL at 19:16

## 2025-05-06 RX ADMIN — PRASUGREL HYDROCHLORIDE 60 MILLIGRAM(S): 10 TABLET, COATED ORAL at 23:29

## 2025-05-06 RX ADMIN — Medication 324 MILLIGRAM(S): at 18:46

## 2025-05-06 RX ADMIN — NITROGLYCERIN 0.4 MILLIGRAM(S): 20 OINTMENT TOPICAL at 19:21

## 2025-05-06 RX ADMIN — NITROGLYCERIN 0.4 MILLIGRAM(S): 20 OINTMENT TOPICAL at 18:46

## 2025-05-06 RX ADMIN — Medication 4 MILLIGRAM(S): at 21:35

## 2025-05-06 RX ADMIN — Medication 2 MILLIGRAM(S): at 21:06

## 2025-05-06 NOTE — ED ADULT NURSE REASSESSMENT NOTE - NS ED NURSE REASSESS COMMENT FT1
Spoke with MD Strange regarding elevated BP, telephone order for 10mg Hydralazine, and Zofran for nausea

## 2025-05-06 NOTE — H&P ADULT - NSHPPHYSICALEXAM_GEN_ALL_CORE
Vital Signs Last 24 Hrs  T(C): 36.3 (06 May 2025 22:30), Max: 36.5 (06 May 2025 21:41)  T(F): 97.3 (06 May 2025 22:30), Max: 97.7 (06 May 2025 21:41)  HR: 76 (06 May 2025 22:30) (70 - 82)  BP: 155/86 (06 May 2025 22:30) (145/93 - 177/92)  BP(mean): 82 (06 May 2025 21:47) (82 - 120)  RR: 18 (06 May 2025 22:30) (16 - 20)  SpO2: 98% (06 May 2025 22:30) (95% - 100%)    Parameters below as of 06 May 2025 22:30  Patient On (Oxygen Delivery Method): room air        General: WN/WD NAD  Head- Atraumatic, normocephalic   Neurology: A&Ox3, nonfocal, CN II to XII intact, power intact 5/5 in all muscle group  HEENT- PERRLA, moist muscous membrane  Neck-supple, no JVD  Respiratory: Air entry equal b/l, CTA   CVS:  S1S2, no murmurs, rubs or gallops  Abdominal: Soft, NT, ND +BS,   Genitourinary- voiding, non palpable bladder  Extremities: No edema, + peripheral pulses  Skin- no rash, no ulcer  Psychiatric- mood stable   LN- no lymphadenopathy

## 2025-05-06 NOTE — H&P ADULT - TIME BILLING
I spent a total of 75  minutes in face-to-face time with the patient and on the floor managing patient including coordination of care. Overt 50% of the time spent in discussion of the diagnosis, counseling and treatment plan.

## 2025-05-06 NOTE — ED ADULT TRIAGE NOTE - CHIEF COMPLAINT QUOTE
Pt presents to [facility] complaining of chest pain. Pt endorsing mid strenal CP starting 40 minutes PTA. Pt has hx of stent placement. Pt is A & O x4, GCS 15. Pt to be taken for stat EKG.

## 2025-05-06 NOTE — PHARMACOTHERAPY INTERVENTION NOTE - COMMENTS
Medication reconciliation completed.  Reviewed Medication list and confirmed med allergies with patient; confirmed with Dr. First Medsean.

## 2025-05-06 NOTE — ED ADULT NURSE NOTE - CAS EDN DISCHARGE INTERVENTIONS
Pt presents to ED today with c/o fatigue. Pt describes symptoms as feeling weak and tired. Associated symptoms include hypoxia. Pt denies SOB, chest pain, n/v/d, chills, extremity numbess/tingling.    Patient identifiers verified and correct for Carlitos Chandana.    Level of Consciousness: The patient is awake, alert and aware of environment with an appropriate affect, the patient is oriented x 3 and speaking appropriately.  Appearance: Patient resting comfortably and in no acute distress, patient is clean and well groomed, patient's clothing is properly fastened.  Skin: The skin is warm and dry, color consistent with ethnicity, patient has normal skin turgor and moist mucus membranes, skin intact, no breakdown or bruising noted.  Musculoskeletal: Moves all extremities well in full range of motion. No obvious deformities or swelling noted. +generalized weakness/fatigue  Respiratory: Airway open and patent, respirations spontaneous, even and unlabored. No accessory muscles in use. Breath sounds diminished/ pt wears 2L home o2 at all times  Cardiac: Patient has a normal rate, no swelling or edema noted, capillary refill < 3 seconds. good pulses palpated peripherally.  Abdomen: Soft, non-tender to palpation. No distention noted.  Neurologic: PERRLA, face exhibits symmetrical expression, hand grasps equal and even bilaterally, reports normal sensation to all extremities and face.  Psychosocial: Normal affect. Good eye contact. Appropriate in content.    Patient verbalized understanding of status and plan of care. Patient changed into hospital gown with assistance. Side rails up x 2, call light in reach, bed low and locked. Cardiac monitor applied to patient; alarms on & audible. WCTM.     IV intact

## 2025-05-06 NOTE — ED STATDOCS - PROGRESS NOTE DETAILS
Say Westbrook  69 y/o M with PMHx of CAD s/p CABG x3, HLD, HTN, COPD, DM presents to the ED c/o severe chest pain starting 1 hour PTA. Pt is a pt of Dr Lozoya. Will send to main for further eval.

## 2025-05-06 NOTE — H&P ADULT - HISTORY OF PRESENT ILLNESS
69 y/o male developed chest pain, sudden in onset while working in afternoon, located in precordium area, intensity 10/10, feel like car is sitting on his chest,  no aggravating factor, no relieving factor associated with feeling of shortness of breath    #has h/o CAD s/p stent and not compliant with ASA

## 2025-05-06 NOTE — PATIENT PROFILE ADULT - FALL HARM RISK - HARM RISK INTERVENTIONS
Assistance with ambulation/Assistance OOB with selected safe patient handling equipment/Communicate Risk of Fall with Harm to all staff/Discuss with provider need for PT consult/Monitor gait and stability/Provide patient with walking aids - walker, cane, crutches/Reinforce activity limits and safety measures with patient and family/Tailored Fall Risk Interventions/Use of alarms - bed, chair and/or voice tab/Visual Cue: Yellow wristband and red socks/Bed in lowest position, wheels locked, appropriate side rails in place/Call bell, personal items and telephone in reach/Instruct patient to call for assistance before getting out of bed or chair/Non-slip footwear when patient is out of bed/[place] to call system/Physically safe environment - no spills, clutter or unnecessary equipment/Purposeful Proactive Rounding/Room/bathroom lighting operational, light cord in reach

## 2025-05-06 NOTE — ED PROVIDER NOTE - CLINICAL SUMMARY MEDICAL DECISION MAKING FREE TEXT BOX
69 y/o male with hx of CAD w/ stents, non-compliant with asa, htn, dm p/w chest pain starting 1 hour ago. Exertional. 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 Denies sob, nausea/vomiting. 10/10 pain, pressure like. Patient of dr. garay. Ros otherwise neg.    hypertensive on arrival  sating well on RA, no distress    heart sounds normal, lungs clr. legs no edema    c/f acs  ekg reviewed with twi in lead III, no other st changes  asa load, SLN  labs, cxr, reassess

## 2025-05-06 NOTE — ED PROVIDER NOTE - PHYSICAL EXAMINATION
Constitutional: NAD, alert, verbal  HEENT: NCAT, EOMi, PERRL  Cardiac: RRR no MRG  Resp: clear, no wheezing or crackles  GI: ab soft ntnd, no r/g  MSK/Ext: no edema  Neuro: DE LA FUENTE  Skin: No rashes

## 2025-05-06 NOTE — H&P ADULT - ASSESSMENT
Your CT scan did not show any serious findings.  You may be constipated.  You can take an over-the-counter stool softener for this.  Taking medications as directed.    Follow-up with your primary doctor or your OB/gynecologist for re-evaluation in the next 5-7 days if your symptoms are not improving.    Return to the ER immediately for any new or significantly worsening symptoms such as uncontrolled vomiting, fever greater than 100.4°, weakness, heavy vaginal bleeding, heavy vaginal discharge or any other worrisome symptoms.  
#NSTEMI  -loading asa was given in ER  -give Effient loading dose   -cardiac cath tomorrow   -s/p lovenox therapeutic dose in ED  oxygen support, morphine nitroglycerine   -monitor in tele closely  -statin high dose   -metoprolol     #DM  -ISS     #CAD s/p stent   -ct asa/statin/BB    #HTN- ct to monitor   -BB   -add lisinopril for better bp control     #HLD- liipitor     #dvt pr     #code status- DNR/DNI with trial of NIV -confirmed with pt     #Discussed with Dr. Howard

## 2025-05-06 NOTE — ED ADULT NURSE NOTE - CHIEF COMPLAINT QUOTE
Pt presents to [facility] complaining of chest pain. Pt endorsing mid strenal CP starting 40 minutes PTA. Pt has hx of stent placement. Pt is A & O x4, GCS 15. Pt to be taken for stat EKG.

## 2025-05-06 NOTE — PATIENT PROFILE ADULT - IS THERE A SUSPICION OF ABUSE/NEGLIGENCE?
Plan: Follow up x 1 month.
Initiate Treatment: :\\n1. Oracea 40 mg capsule,immediate - delay release PO Frequency: QD Sig: Take one capsule by mouth once daily.\\n2. Erythromycin with ethanol 2 % topical gel TP Sig: Apply a thin layer twice daily to face after gentle cleansing and follow up in clinic x 1 month as directed.
Detail Level: Zone
no

## 2025-05-06 NOTE — ED ADULT NURSE REASSESSMENT NOTE - NS ED NURSE REASSESS COMMENT FT1
pt still complaining of 10/10 chest pain, MD Brown aware and verbally ordered another dose of sublingual nitroglycerin, authored RN gave second dose at 1907pm stat

## 2025-05-07 ENCOUNTER — TRANSCRIPTION ENCOUNTER (OUTPATIENT)
Age: 70
End: 2025-05-07

## 2025-05-07 ENCOUNTER — RESULT REVIEW (OUTPATIENT)
Age: 70
End: 2025-05-07

## 2025-05-07 VITALS
DIASTOLIC BLOOD PRESSURE: 89 MMHG | SYSTOLIC BLOOD PRESSURE: 149 MMHG | RESPIRATION RATE: 18 BRPM | HEART RATE: 74 BPM | OXYGEN SATURATION: 98 % | TEMPERATURE: 97 F

## 2025-05-07 LAB
A1C WITH ESTIMATED AVERAGE GLUCOSE RESULT: 6.2 % — HIGH (ref 4–5.6)
ADD ON TEST-SPECIMEN IN LAB: SIGNIFICANT CHANGE UP
ANION GAP SERPL CALC-SCNC: 8 MMOL/L — SIGNIFICANT CHANGE UP (ref 5–17)
BUN SERPL-MCNC: 10 MG/DL — SIGNIFICANT CHANGE UP (ref 7–23)
CALCIUM SERPL-MCNC: 9.3 MG/DL — SIGNIFICANT CHANGE UP (ref 8.5–10.1)
CHLORIDE SERPL-SCNC: 107 MMOL/L — SIGNIFICANT CHANGE UP (ref 96–108)
CO2 SERPL-SCNC: 22 MMOL/L — SIGNIFICANT CHANGE UP (ref 22–31)
CREAT SERPL-MCNC: 0.65 MG/DL — SIGNIFICANT CHANGE UP (ref 0.5–1.3)
EGFR: 102 ML/MIN/1.73M2 — SIGNIFICANT CHANGE UP
EGFR: 102 ML/MIN/1.73M2 — SIGNIFICANT CHANGE UP
ESTIMATED AVERAGE GLUCOSE: 131 MG/DL — HIGH (ref 68–114)
FLUAV AG NPH QL: SIGNIFICANT CHANGE UP
FLUBV AG NPH QL: SIGNIFICANT CHANGE UP
GLUCOSE BLDC GLUCOMTR-MCNC: 126 MG/DL — HIGH (ref 70–99)
GLUCOSE SERPL-MCNC: 125 MG/DL — HIGH (ref 70–99)
POTASSIUM SERPL-MCNC: 3.6 MMOL/L — SIGNIFICANT CHANGE UP (ref 3.5–5.3)
POTASSIUM SERPL-SCNC: 3.6 MMOL/L — SIGNIFICANT CHANGE UP (ref 3.5–5.3)
RSV RNA NPH QL NAA+NON-PROBE: SIGNIFICANT CHANGE UP
SARS-COV-2 RNA SPEC QL NAA+PROBE: SIGNIFICANT CHANGE UP
SODIUM SERPL-SCNC: 137 MMOL/L — SIGNIFICANT CHANGE UP (ref 135–145)
SOURCE RESPIRATORY: SIGNIFICANT CHANGE UP
TROPONIN I, HIGH SENSITIVITY RESULT: HIGH NG/L

## 2025-05-07 PROCEDURE — 99239 HOSP IP/OBS DSCHRG MGMT >30: CPT

## 2025-05-07 PROCEDURE — 93308 TTE F-UP OR LMTD: CPT | Mod: 26

## 2025-05-07 PROCEDURE — 93010 ELECTROCARDIOGRAM REPORT: CPT

## 2025-05-07 RX ORDER — NITROGLYCERIN 20 MG/G
20 OINTMENT TOPICAL
Qty: 50 | Refills: 0 | Status: DISCONTINUED | OUTPATIENT
Start: 2025-05-07 | End: 2025-05-07

## 2025-05-07 RX ORDER — METOPROLOL SUCCINATE 50 MG/1
5 TABLET, EXTENDED RELEASE ORAL ONCE
Refills: 0 | Status: COMPLETED | OUTPATIENT
Start: 2025-05-07 | End: 2025-05-07

## 2025-05-07 RX ORDER — HEPARIN SODIUM 1000 [USP'U]/ML
INJECTION INTRAVENOUS; SUBCUTANEOUS
Qty: 25000 | Refills: 0 | Status: DISCONTINUED | OUTPATIENT
Start: 2025-05-07 | End: 2025-05-07

## 2025-05-07 RX ORDER — ATORVASTATIN CALCIUM 80 MG/1
1 TABLET, FILM COATED ORAL
Qty: 0 | Refills: 0 | DISCHARGE
Start: 2025-05-07

## 2025-05-07 RX ORDER — HEPARIN SODIUM 1000 [USP'U]/ML
10 INJECTION INTRAVENOUS; SUBCUTANEOUS
Qty: 0 | Refills: 0 | DISCHARGE
Start: 2025-05-07

## 2025-05-07 RX ORDER — HEPARIN SODIUM 1000 [USP'U]/ML
5000 INJECTION INTRAVENOUS; SUBCUTANEOUS ONCE
Refills: 0 | Status: COMPLETED | OUTPATIENT
Start: 2025-05-07 | End: 2025-05-07

## 2025-05-07 RX ORDER — HEPARIN SODIUM 1000 [USP'U]/ML
5000 INJECTION INTRAVENOUS; SUBCUTANEOUS EVERY 6 HOURS
Refills: 0 | Status: DISCONTINUED | OUTPATIENT
Start: 2025-05-07 | End: 2025-05-07

## 2025-05-07 RX ORDER — NITROGLYCERIN 20 MG/G
1 OINTMENT TOPICAL
Qty: 0 | Refills: 0 | DISCHARGE
Start: 2025-05-07

## 2025-05-07 RX ORDER — METOPROLOL SUCCINATE 50 MG/1
1 TABLET, EXTENDED RELEASE ORAL
Qty: 0 | Refills: 0 | DISCHARGE
Start: 2025-05-07

## 2025-05-07 RX ORDER — HEPARIN SODIUM 1000 [USP'U]/ML
4000 INJECTION INTRAVENOUS; SUBCUTANEOUS ONCE
Refills: 0 | Status: DISCONTINUED | OUTPATIENT
Start: 2025-05-07 | End: 2025-05-07

## 2025-05-07 RX ORDER — HEPARIN SODIUM 1000 [USP'U]/ML
4000 INJECTION INTRAVENOUS; SUBCUTANEOUS EVERY 6 HOURS
Refills: 0 | Status: DISCONTINUED | OUTPATIENT
Start: 2025-05-07 | End: 2025-05-07

## 2025-05-07 RX ORDER — ROSUVASTATIN CALCIUM 20 MG/1
1 TABLET, FILM COATED ORAL
Refills: 0 | DISCHARGE

## 2025-05-07 RX ORDER — NITROGLYCERIN 20 MG/G
1 OINTMENT TOPICAL DAILY
Refills: 0 | Status: DISCONTINUED | OUTPATIENT
Start: 2025-05-07 | End: 2025-05-07

## 2025-05-07 RX ORDER — METOPROLOL SUCCINATE 50 MG/1
5 TABLET, EXTENDED RELEASE ORAL ONCE
Refills: 0 | Status: DISCONTINUED | OUTPATIENT
Start: 2025-05-07 | End: 2025-05-07

## 2025-05-07 RX ADMIN — Medication 650 MILLIGRAM(S): at 00:53

## 2025-05-07 RX ADMIN — HEPARIN SODIUM 1000 UNIT(S)/HR: 1000 INJECTION INTRAVENOUS; SUBCUTANEOUS at 10:44

## 2025-05-07 RX ADMIN — HEPARIN SODIUM 5000 UNIT(S): 1000 INJECTION INTRAVENOUS; SUBCUTANEOUS at 10:45

## 2025-05-07 RX ADMIN — NITROGLYCERIN 1 PATCH: 20 OINTMENT TOPICAL at 10:22

## 2025-05-07 RX ADMIN — Medication 81 MILLIGRAM(S): at 10:52

## 2025-05-07 RX ADMIN — METOPROLOL SUCCINATE 50 MILLIGRAM(S): 50 TABLET, EXTENDED RELEASE ORAL at 10:52

## 2025-05-07 RX ADMIN — Medication 40 MILLIGRAM(S): at 06:17

## 2025-05-07 RX ADMIN — METOPROLOL SUCCINATE 5 MILLIGRAM(S): 50 TABLET, EXTENDED RELEASE ORAL at 10:12

## 2025-05-07 RX ADMIN — Medication 2 MILLIGRAM(S): at 10:06

## 2025-05-07 RX ADMIN — Medication 50 MILLIGRAM(S): at 00:53

## 2025-05-07 NOTE — DISCHARGE NOTE NURSING/CASE MANAGEMENT/SOCIAL WORK - PATIENT PORTAL LINK FT
You can access the FollowMyHealth Patient Portal offered by [facility] by registering at the following website: [email or web address]. By joining [facility]’s FollowMyHealth portal, you will also be able to view your health information using other applications (apps) compatible with our system.

## 2025-05-07 NOTE — DISCHARGE NOTE NURSING/CASE MANAGEMENT/SOCIAL WORK - NSDCPNINST_GEN_ALL_CORE
Thank you for choosing [facility]. It has been our pleasure taking care of you. Please let us know if you have any questions, concerns or needs. For a complete copy of medical records please visit : [email or web address]

## 2025-05-07 NOTE — DISCHARGE NOTE PROVIDER - CARE PROVIDER_API CALL
SOFIYA QURESHI  [address] [place] [address]  [place] [facility] [place] [address]  Phone: [phone]  Fax: [phone]  Follow Up Time:

## 2025-05-07 NOTE — DISCHARGE NOTE PROVIDER - HOSPITAL COURSE
HPI from admission:"69 y/o male developed chest pain, sudden in onset while working in afternoon, located in precordium area, intensity 10/10, feel like car is sitting on his chest,  no aggravating factor, no relieving factor associated with feeling of shortness of breath"    Interval Events: Lovenox 80 given over night per night team. Patient still with angina this morning -- Dr. Howard (Cardiology) notified. Repeat EKG obtained without changes. Trop levels up-trending. Vitals stable. Ordered Nitro patch and Heparin gtt (unable to start Nitro gtt without higher level of care). IV lopressor given. Patient is being transferred to [place] under Dr. Arrieta for [facility] (high risk). Plan discussed with Dr. Howard and patient.     Hospital course (by problem):   #NSTEMI  -loading asa was given in ER  -Therapeutic dose of Lovenox given overnight   -give Effient loading dose   -s/p lovenox therapeutic dose in ED. Heparin gtt started this morning   oxygen support, morphine, nitro patch  -statin high dose, lopressor    #DM  -ISS     #CAD s/p stent   -ct asa/statin/BB  On Heparin gtt as above    #HTN- ct to monitor   -BB, Lisinopril ordered    #HLD - Lipitor     #dvt pr     #code status- DNR/DNI with trial of NIV -confirmed with pt     Patient was discharged to: [facility]     Physical exam at the time of discharge:  LOS: 1d    VITALS:   T(C): 36.3 (05-07-25 @ 08:28), Max: 36.5 (05-06-25 @ 21:41)  HR: 74 (05-07-25 @ 08:28) (70 - 100)  BP: 149/89 (05-07-25 @ 08:28) (144/88 - 177/92)  RR: 18 (05-07-25 @ 08:28) (16 - 20)  SpO2: 98% (05-07-25 @ 08:28) (93% - 100%)    PHYSICAL EXAM:  GENERAL: Mild distress  HEAD:  Atraumatic, Normocephalic  EYES: EOMI, PERRLA, conjunctiva and sclera clear  ENMT: No tonsillar erythema, exudates, or enlargement; Moist mucous membranes  NECK: Supple, No JVD, Normal thyroid  HEART: Regular rate and rhythm; No murmurs, rubs, or gallops  RESPIRATORY: Unlabored respirations. CTA B/L, No W/R/R  ABDOMEN: Soft, Nontender, Nondistended; Bowel sounds present  NEUROLOGY: A&Ox3, nonfocal, moving all extremities  EXTREMITIES:  2+ Peripheral Pulses, No clubbing, cyanosis, or edema  SKIN: warm, dry, normal color, no rash or abnormal lesions

## 2025-05-07 NOTE — DISCHARGE NOTE NURSING/CASE MANAGEMENT/SOCIAL WORK - FINANCIAL ASSISTANCE
[facility] provides services at a reduced cost to those who are determined to be eligible through [facility]’s financial assistance program. Information regarding [facility]’s financial assistance program can be found by going to [email or web address] or by calling [phone].

## 2025-05-07 NOTE — CONSULT NOTE ADULT - SUBJECTIVE AND OBJECTIVE BOX
Patient is a 70y old  Male who presents with a chief complaint of chest pain (06 May 2025 22:48)    ________________________________  MANUEL MILLER is a 70y year old Male with a past medical history of CAD s/p CABG presents w/ chest pain, sudden in onset while working in afternoon, located in precordium area, intensity 10/10, feel like car is sitting on his chest,  no aggravating factor, no relieving factor associated with feeling of shortness of breath    #has h/o CAD s/p stent and not compliant with ASA  (06 May 2025 22:48)       ________________________________  Review of systems: A 10 point review of system has been performed, and is negative except for what has been mentioned in the above history of present illness.     PAST MEDICAL & SURGICAL HISTORY:  HTN (hypertension)      DM (diabetes mellitus)      COPD (chronic obstructive pulmonary disease)      Coronary artery disease involving coronary bypass graft of native heart with angina pectoris      HLD (hyperlipidemia)      S/P CABG x 3      S/P arterial stent        FAMILY HISTORY:  Patient's mother is   at 83 from copd, dm    Patient's father is   at 58 from colon cancer         SOCIAL HISTORY: The patient denies any tobacco abuse, alcohol abuse or illicit drug use.    ALLERGIES:  penicillin (Unknown)    Home Medications:  albuterol 90 mcg/inh inhalation aerosol: 2 puff(s) inhaled every 4 hours as needed for  shortness of breath and/or wheezing (06 May 2025 21:06)  aspirin 81 mg oral delayed release tablet: 1 tab(s) orally every other day ***pt confirms he does not take it as directed*** (06 May 2025 21:04)  azelastine 137 mcg/inh (0.1%) nasal spray: 1 spray(s) in each nostril 1 to 2 times a day as needed for  congestion (06 May 2025 21:06)  busPIRone 15 mg oral tablet: 1 tab(s) orally 2 times a day (06 May 2025 21:01)  calcipotriene-betamethasone 0.005%-0.064% topical foam: Apply topically to affected area once a day as needed for  rash (06 May 2025 21:06)  escitalopram 10 mg oral tablet: 1 tab(s) orally once a day (06 May 2025 21:01)  Flonase 50 mcg/inh nasal spray: 2 spray(s) in each nostril once a day as needed for  congestion (06 May 2025 21:07)  gabapentin 300 mg oral capsule: 1 cap(s) orally 3 times a day (06 May 2025 21:07)  Jardiance 25 mg oral tablet: 1 tab(s) orally once a day (in the morning) (06 May 2025 21:01)  metFORMIN 500 mg oral tablet, extended release: 2 tab(s) orally once a day with breakfast (06 May 2025 21:01)  Metoprolol Tartrate 50 mg oral tablet: 1 tab(s) orally 2 times a day (06 May 2025 21:01)  Mounjaro 7.5 mg/0.5 mL subcutaneous solution: 7.5 milligram(s) subcutaneously once a week (06 May 2025 21:01)  rosuvastatin 10 mg oral tablet: 1 tab(s) orally once a day (06 May 2025 21:07)  traMADol 50 mg oral tablet: 1 tab(s) orally 2 times a day (06 May 2025 21:07)  traZODone 50 mg oral tablet: 1 tab(s) orally once a day (at bedtime) as needed for  insomnia (06 May 2025 21:01)    MEDICATIONS  (STANDING):  aspirin enteric coated 81 milliGRAM(s) Oral daily  atorvastatin 80 milliGRAM(s) Oral at bedtime  busPIRone 15 milliGRAM(s) Oral two times a day  dextrose 5%. 1000 milliLiter(s) (50 mL/Hr) IV Continuous <Continuous>  dextrose 5%. 1000 milliLiter(s) (100 mL/Hr) IV Continuous <Continuous>  dextrose 50% Injectable 25 Gram(s) IV Push once  dextrose 50% Injectable 12.5 Gram(s) IV Push once  dextrose 50% Injectable 25 Gram(s) IV Push once  escitalopram 10 milliGRAM(s) Oral daily  gabapentin 300 milliGRAM(s) Oral three times a day  glucagon  Injectable 1 milliGRAM(s) IntraMuscular once  heparin   Injectable 4000 Unit(s) IV Push once  heparin  Infusion.  Unit(s)/Hr (10 mL/Hr) IV Continuous <Continuous>  insulin lispro (ADMELOG) corrective regimen sliding scale   SubCutaneous three times a day before meals  lisinopril 10 milliGRAM(s) Oral daily  metoprolol tartrate 50 milliGRAM(s) Oral two times a day  nitroglycerin  Infusion 20 MICROgram(s)/Min (6 mL/Hr) IV Continuous <Continuous>  pantoprazole  Injectable 40 milliGRAM(s) IV Push daily  sodium chloride 0.9%. 1000 milliLiter(s) (75 mL/Hr) IV Continuous <Continuous>  traMADol 50 milliGRAM(s) Oral two times a day    MEDICATIONS  (PRN):  acetaminophen     Tablet .. 650 milliGRAM(s) Oral every 6 hours PRN Temp greater or equal to 38C (100.4F), Mild Pain (1 - 3)  aluminum hydroxide/magnesium hydroxide/simethicone Suspension 30 milliLiter(s) Oral every 4 hours PRN Dyspepsia  dextrose Oral Gel 15 Gram(s) Oral once PRN Blood Glucose LESS THAN 70 milliGRAM(s)/deciliter  heparin   Injectable 4000 Unit(s) IV Push every 6 hours PRN For aPTT less than 40  melatonin 3 milliGRAM(s) Oral at bedtime PRN Insomnia  morphine  - Injectable 2 milliGRAM(s) IV Push every 4 hours PRN Severe Pain (7 - 10)  ondansetron Injectable 4 milliGRAM(s) IV Push every 8 hours PRN Nausea and/or Vomiting  traZODone 50 milliGRAM(s) Oral at bedtime PRN for insomnia    Vital Signs Last 24 Hrs  T(C): 36.3 (07 May 2025 08:28), Max: 36.5 (06 May 2025 21:41)  T(F): 97.4 (07 May 2025 08:28), Max: 97.7 (06 May 2025 21:41)  HR: 74 (07 May 2025 08:28) (70 - 100)  BP: 149/89 (07 May 2025 08:28) (144/88 - 177/92)  BP(mean): 82 (06 May 2025 21:47) (82 - 120)  RR: 18 (07 May 2025 08:28) (16 - 20)  SpO2: 98% (07 May 2025 08:28) (93% - 100%)    Parameters below as of 07 May 2025 08:28  Patient On (Oxygen Delivery Method): room air      I&O's Summary    ________________________________  GENERAL APPEARANCE:  No acute distress  HEAD: normocephalic, atraumatic  NECK: supple, no jugular venous distention, no carotid bruit    HEART: Regular rate and rhythm, S1, S2 normal, 1/6 murmur    CHEST:  No anterior chest wall tenderness    LUNGS:  Clear to auscultation, without any wheezing, rhonchi or rales    ABDOMEN: soft, nontender, nondistended, with positive bowel sounds appreciated  EXTREMITIES: no edema.   NEURO: Alert and oriented x3  PSYC:  Normal affect  SKIN:  Dry  ________________________________   TELEMETRY: Sinus Rhythm      ECG: Sinus Rhythm with no ST T changes     LABS:                        13.9   5.78  )-----------( 143      ( 06 May 2025 18:42 )             39.2             05-07    137  |  107  |  10  ----------------------------<  125[H]  3.6   |  22  |  0.65    Ca    9.3      07 May 2025 07:47    TPro  7.0  /  Alb  3.3  /  TBili  0.5  /  DBili  x   /  AST  31  /  ALT  29  /  AlkPhos  67  05-06      LIVER FUNCTIONS - ( 06 May 2025 18:42 )  Alb: 3.3 g/dL / Pro: 7.0 gm/dL / ALK PHOS: 67 U/L / ALT: 29 U/L / AST: 31 U/L / GGT: x         PT/INR - ( 06 May 2025 18:42 )   PT: 11.3 sec;   INR: 0.96 ratio         PTT - ( 06 May 2025 18:42 )  PTT:27.5 sec  Urinalysis Basic - ( 07 May 2025 07:47 )    Color: x / Appearance: x / SG: x / pH: x  Gluc: 125 mg/dL / Ketone: x  / Bili: x / Urobili: x   Blood: x / Protein: x / Nitrite: x   Leuk Esterase: x / RBC: x / WBC x   Sq Epi: x / Non Sq Epi: x / Bacteria: x        PT/INR - ( 06 May 2025 18:42 )   PT: 11.3 sec;   INR: 0.96 ratio         PTT - ( 06 May 2025 18:42 )  PTT:27.5 sec  Urinalysis Basic - ( 07 May 2025 07:47 )    Color: x / Appearance: x / SG: x / pH: x  Gluc: 125 mg/dL / Ketone: x  / Bili: x / Urobili: x   Blood: x / Protein: x / Nitrite: x   Leuk Esterase: x / RBC: x / WBC x   Sq Epi: x / Non Sq Epi: x / Bacteria: x     ________________________________    RADIOLOGY & ADDITIONAL STUDIES:   ________________________________    ASSESSMENT:  NSTEMI  CAD s/p CABG    PLAN:  Pt with NSTEMI  Still w/ CP, start Ntrio drip for angina.  Cont BB.  Cath at tertiary [facility] as patient has complex coronary anatomy.     ____________________________________________  (Dragon Dictation software used). Thank you for allowing me to participate in the care of your patient. Please contact me should any questions arise.    LUZMARIA Howard DO, FACC  Office: [phone]    Patient is a 70y old  Male who presents with a chief complaint of chest pain (06 May 2025 22:48)    ________________________________  MANUEL MILLER is a 70y year old Male with a past medical history of CAD s/p CABG May 2016 with cardiac catheterization in 2016 and PCI to ramus artery, patent LIMA to LAD, with cardiac catheterization 2016 with PCI to the RCA, repeat cardiac catheterization 2018 showing patent LIZAMA to LAD, patent saphenous vein graft to PDA, occluded saphenous vein graft to ramus, patent ramus stent.    He also has a history of hypertension, hyperlipidemia, type 2 diabetes and COPD.      Patient presents for evaluation of chest discomfort, sudden onset, pressure-like in nature, consistent with prior episodes of angina, normally for non-STEMI.  Given Lovenox and Effient overnight.    Continues have chest discomfort.  Troponins trending up.  Limited echo showed normal LVEF, with inferior lateral wall motion hypokinesis, which is mild.  Repeat EKG shows no STEMI.     ________________________________  Review of systems: A 10 point review of system has been performed, and is negative except for what has been mentioned in the above history of present illness.     PAST MEDICAL & SURGICAL HISTORY:  HTN (hypertension)      DM (diabetes mellitus)      COPD (chronic obstructive pulmonary disease)      Coronary artery disease involving coronary bypass graft of native heart with angina pectoris      HLD (hyperlipidemia)      S/P CABG x 3      S/P arterial stent        FAMILY HISTORY:  Patient's mother is   at 83 from copd, dm    Patient's father is   at 58 from colon cancer         SOCIAL HISTORY: The patient denies any tobacco abuse, alcohol abuse or illicit drug use.    ALLERGIES:  penicillin (Unknown)    Home Medications:  albuterol 90 mcg/inh inhalation aerosol: 2 puff(s) inhaled every 4 hours as needed for  shortness of breath and/or wheezing (06 May 2025 21:06)  aspirin 81 mg oral delayed release tablet: 1 tab(s) orally every other day ***pt confirms he does not take it as directed*** (06 May 2025 21:04)  azelastine 137 mcg/inh (0.1%) nasal spray: 1 spray(s) in each nostril 1 to 2 times a day as needed for  congestion (06 May 2025 21:06)  busPIRone 15 mg oral tablet: 1 tab(s) orally 2 times a day (06 May 2025 21:01)  calcipotriene-betamethasone 0.005%-0.064% topical foam: Apply topically to affected area once a day as needed for  rash (06 May 2025 21:06)  escitalopram 10 mg oral tablet: 1 tab(s) orally once a day (06 May 2025 21:01)  Flonase 50 mcg/inh nasal spray: 2 spray(s) in each nostril once a day as needed for  congestion (06 May 2025 21:07)  gabapentin 300 mg oral capsule: 1 cap(s) orally 3 times a day (06 May 2025 21:07)  Jardiance 25 mg oral tablet: 1 tab(s) orally once a day (in the morning) (06 May 2025 21:01)  metFORMIN 500 mg oral tablet, extended release: 2 tab(s) orally once a day with breakfast (06 May 2025 21:01)  Metoprolol Tartrate 50 mg oral tablet: 1 tab(s) orally 2 times a day (06 May 2025 21:01)  Mounjaro 7.5 mg/0.5 mL subcutaneous solution: 7.5 milligram(s) subcutaneously once a week (06 May 2025 21:01)  rosuvastatin 10 mg oral tablet: 1 tab(s) orally once a day (06 May 2025 21:07)  traMADol 50 mg oral tablet: 1 tab(s) orally 2 times a day (06 May 2025 21:07)  traZODone 50 mg oral tablet: 1 tab(s) orally once a day (at bedtime) as needed for  insomnia (06 May 2025 21:01)    MEDICATIONS  (STANDING):  aspirin enteric coated 81 milliGRAM(s) Oral daily  atorvastatin 80 milliGRAM(s) Oral at bedtime  busPIRone 15 milliGRAM(s) Oral two times a day  dextrose 5%. 1000 milliLiter(s) (50 mL/Hr) IV Continuous <Continuous>  dextrose 5%. 1000 milliLiter(s) (100 mL/Hr) IV Continuous <Continuous>  dextrose 50% Injectable 25 Gram(s) IV Push once  dextrose 50% Injectable 12.5 Gram(s) IV Push once  dextrose 50% Injectable 25 Gram(s) IV Push once  escitalopram 10 milliGRAM(s) Oral daily  gabapentin 300 milliGRAM(s) Oral three times a day  glucagon  Injectable 1 milliGRAM(s) IntraMuscular once  heparin   Injectable 4000 Unit(s) IV Push once  heparin  Infusion.  Unit(s)/Hr (10 mL/Hr) IV Continuous <Continuous>  insulin lispro (ADMELOG) corrective regimen sliding scale   SubCutaneous three times a day before meals  lisinopril 10 milliGRAM(s) Oral daily  metoprolol tartrate 50 milliGRAM(s) Oral two times a day  nitroglycerin  Infusion 20 MICROgram(s)/Min (6 mL/Hr) IV Continuous <Continuous>  pantoprazole  Injectable 40 milliGRAM(s) IV Push daily  sodium chloride 0.9%. 1000 milliLiter(s) (75 mL/Hr) IV Continuous <Continuous>  traMADol 50 milliGRAM(s) Oral two times a day    MEDICATIONS  (PRN):  acetaminophen     Tablet .. 650 milliGRAM(s) Oral every 6 hours PRN Temp greater or equal to 38C (100.4F), Mild Pain (1 - 3)  aluminum hydroxide/magnesium hydroxide/simethicone Suspension 30 milliLiter(s) Oral every 4 hours PRN Dyspepsia  dextrose Oral Gel 15 Gram(s) Oral once PRN Blood Glucose LESS THAN 70 milliGRAM(s)/deciliter  heparin   Injectable 4000 Unit(s) IV Push every 6 hours PRN For aPTT less than 40  melatonin 3 milliGRAM(s) Oral at bedtime PRN Insomnia  morphine  - Injectable 2 milliGRAM(s) IV Push every 4 hours PRN Severe Pain (7 - 10)  ondansetron Injectable 4 milliGRAM(s) IV Push every 8 hours PRN Nausea and/or Vomiting  traZODone 50 milliGRAM(s) Oral at bedtime PRN for insomnia    Vital Signs Last 24 Hrs  T(C): 36.3 (07 May 2025 08:28), Max: 36.5 (06 May 2025 21:41)  T(F): 97.4 (07 May 2025 08:28), Max: 97.7 (06 May 2025 21:41)  HR: 74 (07 May 2025 08:28) (70 - 100)  BP: 149/89 (07 May 2025 08:28) (144/88 - 177/92)  BP(mean): 82 (06 May 2025 21:47) (82 - 120)  RR: 18 (07 May 2025 08:28) (16 - 20)  SpO2: 98% (07 May 2025 08:28) (93% - 100%)    Parameters below as of 07 May 2025 08:28  Patient On (Oxygen Delivery Method): room air      I&O's Summary    ________________________________  GENERAL APPEARANCE:  No acute distress  HEAD: normocephalic, atraumatic  NECK: supple, no jugular venous distention, no carotid bruit    HEART: Regular rate and rhythm, S1, S2 normal, 1/6 murmur    CHEST:  +anterior chest wall tenderness    LUNGS:  Clear to auscultation, without any wheezing, rhonchi or rales    ABDOMEN: soft, nontender, nondistended, with positive bowel sounds appreciated  EXTREMITIES: no edema.   NEURO: Alert and oriented x3  PSYC:  Normal affect  SKIN:  Dry  ________________________________   TELEMETRY: Sinus Rhythm      ECG: Sinus Rhythm with no ST T changes     LABS:                        13.9   5.78  )-----------( 143      ( 06 May 2025 18:42 )             39.2             05-07    137  |  107  |  10  ----------------------------<  125[H]  3.6   |  22  |  0.65    Ca    9.3      07 May 2025 07:47    TPro  7.0  /  Alb  3.3  /  TBili  0.5  /  DBili  x   /  AST  31  /  ALT  29  /  AlkPhos  67  05-06      LIVER FUNCTIONS - ( 06 May 2025 18:42 )  Alb: 3.3 g/dL / Pro: 7.0 gm/dL / ALK PHOS: 67 U/L / ALT: 29 U/L / AST: 31 U/L / GGT: x         PT/INR - ( 06 May 2025 18:42 )   PT: 11.3 sec;   INR: 0.96 ratio         PTT - ( 06 May 2025 18:42 )  PTT:27.5 sec  Urinalysis Basic - ( 07 May 2025 07:47 )    Color: x / Appearance: x / SG: x / pH: x  Gluc: 125 mg/dL / Ketone: x  / Bili: x / Urobili: x   Blood: x / Protein: x / Nitrite: x   Leuk Esterase: x / RBC: x / WBC x   Sq Epi: x / Non Sq Epi: x / Bacteria: x        PT/INR - ( 06 May 2025 18:42 )   PT: 11.3 sec;   INR: 0.96 ratio         PTT - ( 06 May 2025 18:42 )  PTT:27.5 sec  Urinalysis Basic - ( 07 May 2025 07:47 )    Color: x / Appearance: x / SG: x / pH: x  Gluc: 125 mg/dL / Ketone: x  / Bili: x / Urobili: x   Blood: x / Protein: x / Nitrite: x   Leuk Esterase: x / RBC: x / WBC x   Sq Epi: x / Non Sq Epi: x / Bacteria: x     ________________________________    RADIOLOGY & ADDITIONAL STUDIES: PROCEDURE DATE:  2025          INTERPRETATION:  TECHNIQUE: Single portable view of the chest.    COMPARISON:  2024    CLINICAL HISTORY: Chest Pain    FINDINGS:    Single frontal view of the chest demonstrates the lungs to be clear. The   cardiomediastinal silhouette is unremarkable. No acute osseous   abnormalities. Overlying EKG leads and wires are noted. Mediastinal   sternotomy wires. Osteopenia. Chronic left-sided rib fractures. Chronic   left humeral neck fracture.    IMPRESSION: No acute cardiopulmonary disease process.    --- End of Report ---    ________________________________    ASSESSMENT:  NSTEMI  CAD status post CABG , with PCI, most recent angiogram in 2018  Hypertension  Hyperlipidemia  Type 2 diabetes  COPD    PLAN:  In summary, this is a 30-year-old male with a past medical history as above.  Status post treatment with Lovenox.  Assisted chest pain.  Will need cardiac catheterization, however given complex anatomy, required multiple PCI, following CABG, recommend transfer to tertiary care center.  Start heparin drip.  Status post Lovenox.  Continue antiplatelet therapy.  Continue statin.  Continue metoprolol.  Hold lisinopril.  IV metoprolol also given, with some improvement of chest discomfort.        ____________________________________________  (Dragon Dictation software used). Thank you for allowing me to participate in the care of your patient. Please contact me should any questions arise.    LUZMARIA Howard DO, FACC  Office: [phone]

## 2025-05-07 NOTE — DISCHARGE NOTE PROVIDER - NSDCMRMEDTOKEN_GEN_ALL_CORE_FT
albuterol 90 mcg/inh inhalation aerosol: 2 puff(s) inhaled every 4 hours as needed for  shortness of breath and/or wheezing  aspirin 81 mg oral delayed release tablet: 1 tab(s) orally every other day ***pt confirms he does not take it as directed***  atorvastatin 80 mg oral tablet: 1 tab(s) orally once a day (at bedtime)  azelastine 137 mcg/inh (0.1%) nasal spray: 1 spray(s) in each nostril 1 to 2 times a day as needed for  congestion  busPIRone 15 mg oral tablet: 1 tab(s) orally 2 times a day  calcipotriene-betamethasone 0.005%-0.064% topical foam: Apply topically to affected area once a day as needed for  rash  escitalopram 10 mg oral tablet: 1 tab(s) orally once a day  Flonase 50 mcg/inh nasal spray: 2 spray(s) in each nostril once a day as needed for  congestion  gabapentin 300 mg oral capsule: 1 cap(s) orally 3 times a day  heparin 100 units/mL-D5% intravenous solution: 10 unit(s) intravenous once a day  Jardiance 25 mg oral tablet: 1 tab(s) orally once a day (in the morning) HOLDING INPATIENT  metFORMIN 500 mg oral tablet, extended release: 2 tab(s) orally once a day HOLDING INPATIENT  metoprolol tartrate 50 mg oral tablet: 1 tab(s) orally 2 times a day  morphine: 2 milligram(s) intravenous every 6 hours as needed for  severe pain  Mounjaro 7.5 mg/0.5 mL subcutaneous solution: 7.5 milligram(s) subcutaneously once a week HOLDING INPATIENT  nitroglycerin 0.1 mg/hr transdermal film, extended release: 1 patch transdermal once a day  pantoprazole 40 mg intravenous injection: 40 milligram(s) intravenous once a day  sodium chloride 0.9% injectable solution: 75 milliliter(s) injectable once a day 75 CC CONTINIOUS  traMADol 50 mg oral tablet: 1 tab(s) orally 2 times a day  traZODone 50 mg oral tablet: 1 tab(s) orally once a day (at bedtime) as needed for  insomnia

## 2025-05-07 NOTE — DISCHARGE NOTE PROVIDER - NSDCCPCAREPLAN_GEN_ALL_CORE_FT
PRINCIPAL DISCHARGE DIAGNOSIS  Diagnosis: NSTEMI (non-ST elevation myocardial infarction)  Assessment and Plan of Treatment: You are being transferred to [facility] for further cardiac intervention. Accepted by Dr. Arrieta.

## 2025-05-15 DIAGNOSIS — E78.5 HYPERLIPIDEMIA, UNSPECIFIED: ICD-10-CM

## 2025-05-15 DIAGNOSIS — J44.9 CHRONIC OBSTRUCTIVE PULMONARY DISEASE, UNSPECIFIED: ICD-10-CM

## 2025-05-15 DIAGNOSIS — I21.4 NON-ST ELEVATION (NSTEMI) MYOCARDIAL INFARCTION: ICD-10-CM

## 2025-05-15 DIAGNOSIS — Z79.85 LONG-TERM (CURRENT) USE OF INJECTABLE NON-INSULIN ANTIDIABETIC DRUGS: ICD-10-CM

## 2025-05-15 DIAGNOSIS — Z79.84 LONG TERM (CURRENT) USE OF ORAL HYPOGLYCEMIC DRUGS: ICD-10-CM

## 2025-05-15 DIAGNOSIS — I25.118 ATHEROSCLEROTIC HEART DISEASE OF NATIVE CORONARY ARTERY WITH OTHER FORMS OF ANGINA PECTORIS: ICD-10-CM

## 2025-05-15 DIAGNOSIS — Z79.82 LONG TERM (CURRENT) USE OF ASPIRIN: ICD-10-CM

## 2025-05-15 DIAGNOSIS — E11.9 TYPE 2 DIABETES MELLITUS WITHOUT COMPLICATIONS: ICD-10-CM

## 2025-05-15 DIAGNOSIS — T39.016A UNDERDOSING OF ASPIRIN, INITIAL ENCOUNTER: ICD-10-CM

## 2025-05-15 DIAGNOSIS — I10 ESSENTIAL (PRIMARY) HYPERTENSION: ICD-10-CM

## 2025-05-15 DIAGNOSIS — Z95.5 PRESENCE OF CORONARY ANGIOPLASTY IMPLANT AND GRAFT: ICD-10-CM

## 2025-05-15 DIAGNOSIS — Z66 DO NOT RESUSCITATE: ICD-10-CM

## 2025-05-15 DIAGNOSIS — I25.718 ATHEROSCLEROSIS OF AUTOLOGOUS VEIN CORONARY ARTERY BYPASS GRAFT(S) WITH OTHER FORMS OF ANGINA PECTORIS: ICD-10-CM

## 2025-05-15 DIAGNOSIS — Z88.0 ALLERGY STATUS TO PENICILLIN: ICD-10-CM

## 2025-06-06 NOTE — DIETITIAN INITIAL EVALUATION ADULT - EDUCATION DIETARY MODIFICATIONS
Occupational Therapy    Visit Type: treatment  SUBJECTIVE  Patient / Family Goal: return to previous functional status and maximize function    Pain     Location: back (nephroureteral tube site)    At onset of session (out of 10): 6    OBJECTIVE        Sitting Balance  (TAMIE = base of support)  Static      - Trial 1 details: stand by assist    Standing Balance  (TAMIE = base of support)  Firm Surface: Double Leg      - Static, Eyes Open       - Trial 1 details: stand by assist and with double UE support     - Dynamic, Eyes Open       - Trial 1 details: stand by assist and with double UE support       Bed Mobility  - Supine to sit: stand by assist, with verbal cues  - Sit to supine: stand by assist, with verbal cues  Transfers  Assistive devices: 2-wheeled walker  Description: BUE use  - Sit to stand: stand by assist, with verbal cues  - Stand to sit: stand by assist, with verbal cues      Functional Ambulation  - Assistance: stand by assist and with verbal cues  - Assistive device: 2-wheeled walker  - Distance (ft):120  Activities of Daily Living (ADLs)  Grooming/Oral Hygiene:   - Grooming assist: stand by assist  - Position: standing at sink  - Assist needed for: wash/dry hands  Lower Body Dressing:   - Footwear:       - Assistance: stand by assist       - Position: edge of bed  - Assist needed for: don/doff left sock and don/doff right sock  Toileting:   - Toilet transfer:        - Assist: stand by assist and with verbal cues (cues for hand placement)       - Equipment: grab bar use  - Assist: supervision  - Position: sitting  - Assist needed for: perineal hygiene  Patient presents with continued right UE edema. Per patient report, still with difficulty holding utensils, toooth brush, etc. Patient reports built up foam handles no longer in room, issued patient x2 red built up foam handles for use with UB ADL.       Interventions    Treatment provided: activity tolerance, ADL training, balance retraining, body  mechanics, breathing/relaxation, functional ambulation, compensatory techniques, positioning, safety training, transfer training and use of adaptive equipment  Skilled input: verbal instruction/cues and tactile instruction/cues  Verbal Consent: Writer verbally educated and received verbal consent for hand placement, positioning of patient, and techniques to be performed today from patient for therapist position for techniques and clothing adjustments for techniques as described above and how they are pertinent to the patient's plan of care.         Education:   - Present and ready to learn: patient  Education provided during session:  - ADL strategies, role of OT, positioning, proper body mechanics, use of assistive device and use of adaptive equipment  - Results of above outlined education: Verbalizes understanding    ASSESSMENT   Progress: progressing toward goals  Interfering components: medical status limitations    Discharge needs based on today's assessment:  - Current level of function: slightly below baseline level of function  - Therapy needs at discharge: therapy 1-3 times per week (anticipate home (may need ROSANNA?))  - Activities of daily living (ADLs) requiring support at discharge: bed mobility, transfers, dressing, toileting, bathing and grooming  - Instrumental activities of daily living (IADLs) requiring support at discharge: community mobility, home management and health/medication management  - Impairments that require further therapy intervention: pain, ROM, strength, activity tolerance and edema  AM-PAC  - Prior Level of Function: IND/MOD I ([facility] 22-24)       Key: MOD A=moderate assistance, IND/MOD I=independent/modified independent  - Generalized Current Level of Function     - Current Self-Cares: 17       Scoring Key= >21 Modified Independent; 20-21 Supervision; 18-19 Minimal assist; 13-17 Moderate assist; 9-12 Max assist; <9 Total assist      Pain at End of Session:   Pain: 6/10, location:  back    PLAN (while hospitalized)  Suggestions for next session as indicated: Standing tolerance  RUE edema reduction; BLE edema reduction     OT Frequency: 3-5 x per week      PT/OT Mobility Equipment for Discharge: RW  PT/OT ADL Equipment for Discharge: transfer tub bench     Agreement to plan and goals: patient agrees with goals and treatment plan      GOALS  Review Date: 6/6/2025  Long Term Goals: (to be met by time of discharge from hospital)  Feeding: Patient will complete feeding tasks modified independent.  Status: progressing/ongoing  Grooming: Patient will complete grooming tasks at sink modified independent.  Status: progressing/ongoing  Upper body dressing: Patient will complete upper body dressing at bedside modified independent.  Status: progressing/ongoing  Lower body dressing: Patient will complete lower body dressing at bedside modified independent.  Status: progressing/ongoing  Toileting: Patient will complete toileting modified independent.  Status: progressing/ongoing  Toilet transfer: Patient will complete toilet transfer with 2-wheeled walker, modified independent.   Status: progressing/ongoing    Documented in the chart in the following areas: Assessment/Plan.    Patient at End of Session:   Location: in bed  Safety measures: alarm system in place/re-engaged and call light within reach  Handoff to: nurse        Therapy procedure time and total treatment time can be found documented on the Time Entry flowsheet   show back/(1) partially meets; needs review/practice/verbalization

## 2025-06-09 ENCOUNTER — EMERGENCY (EMERGENCY)
Facility: HOSPITAL | Age: 70
LOS: 0 days | Discharge: HOME | End: 2025-06-09
Attending: EMERGENCY MEDICINE
Payer: MEDICARE

## 2025-06-09 ENCOUNTER — TRANSCRIPTION ENCOUNTER (OUTPATIENT)
Age: 70
End: 2025-06-09

## 2025-06-09 VITALS — HEIGHT: 63 IN

## 2025-06-09 VITALS
SYSTOLIC BLOOD PRESSURE: 122 MMHG | OXYGEN SATURATION: 98 % | RESPIRATION RATE: 18 BRPM | DIASTOLIC BLOOD PRESSURE: 74 MMHG | HEART RATE: 69 BPM | TEMPERATURE: 98 F

## 2025-06-09 DIAGNOSIS — Z95.1 PRESENCE OF AORTOCORONARY BYPASS GRAFT: Chronic | ICD-10-CM

## 2025-06-09 DIAGNOSIS — Z95.9 PRESENCE OF CARDIAC AND VASCULAR IMPLANT AND GRAFT, UNSPECIFIED: Chronic | ICD-10-CM

## 2025-06-09 LAB
ALBUMIN SERPL ELPH-MCNC: 3.2 G/DL — LOW (ref 3.3–5)
ALP SERPL-CCNC: 65 U/L — SIGNIFICANT CHANGE UP (ref 40–120)
ALT FLD-CCNC: 22 U/L — SIGNIFICANT CHANGE UP (ref 12–78)
ANION GAP SERPL CALC-SCNC: 8 MMOL/L — SIGNIFICANT CHANGE UP (ref 5–17)
AST SERPL-CCNC: 30 U/L — SIGNIFICANT CHANGE UP (ref 15–37)
BASE EXCESS BLDV CALC-SCNC: -2.3 MMOL/L — LOW (ref -2–3)
BASOPHILS # BLD AUTO: 0.03 K/UL — SIGNIFICANT CHANGE UP (ref 0–0.2)
BASOPHILS NFR BLD AUTO: 0.6 % — SIGNIFICANT CHANGE UP (ref 0–2)
BILIRUB SERPL-MCNC: 0.5 MG/DL — SIGNIFICANT CHANGE UP (ref 0.2–1.2)
BUN SERPL-MCNC: 15 MG/DL — SIGNIFICANT CHANGE UP (ref 7–23)
CALCIUM SERPL-MCNC: 9 MG/DL — SIGNIFICANT CHANGE UP (ref 8.5–10.1)
CHLORIDE SERPL-SCNC: 108 MMOL/L — SIGNIFICANT CHANGE UP (ref 96–108)
CO2 SERPL-SCNC: 21 MMOL/L — LOW (ref 22–31)
CREAT SERPL-MCNC: 0.94 MG/DL — SIGNIFICANT CHANGE UP (ref 0.5–1.3)
D DIMER BLD IA.RAPID-MCNC: 177 NG/ML DDU — SIGNIFICANT CHANGE UP
EGFR: 88 ML/MIN/1.73M2 — SIGNIFICANT CHANGE UP
EGFR: 88 ML/MIN/1.73M2 — SIGNIFICANT CHANGE UP
EOSINOPHIL # BLD AUTO: 0.13 K/UL — SIGNIFICANT CHANGE UP (ref 0–0.5)
EOSINOPHIL NFR BLD AUTO: 2.7 % — SIGNIFICANT CHANGE UP (ref 0–6)
GAS PNL BLDV: SIGNIFICANT CHANGE UP
GLUCOSE SERPL-MCNC: 162 MG/DL — HIGH (ref 70–99)
HCO3 BLDV-SCNC: 21 MMOL/L — LOW (ref 22–29)
HCT VFR BLD CALC: 44.1 % — SIGNIFICANT CHANGE UP (ref 39–50)
HGB BLD-MCNC: 15 G/DL — SIGNIFICANT CHANGE UP (ref 13–17)
IMM GRANULOCYTES # BLD AUTO: 0.03 K/UL — SIGNIFICANT CHANGE UP (ref 0–0.07)
IMM GRANULOCYTES NFR BLD AUTO: 0.6 % — SIGNIFICANT CHANGE UP (ref 0–0.9)
LIDOCAIN IGE QN: 40 U/L — SIGNIFICANT CHANGE UP (ref 13–75)
LYMPHOCYTES # BLD AUTO: 1.4 K/UL — SIGNIFICANT CHANGE UP (ref 1–3.3)
LYMPHOCYTES NFR BLD AUTO: 29.3 % — SIGNIFICANT CHANGE UP (ref 13–44)
MAGNESIUM SERPL-MCNC: 2.1 MG/DL — SIGNIFICANT CHANGE UP (ref 1.6–2.6)
MCHC RBC-ENTMCNC: 30.8 PG — SIGNIFICANT CHANGE UP (ref 27–34)
MCHC RBC-ENTMCNC: 34 G/DL — SIGNIFICANT CHANGE UP (ref 32–36)
MCV RBC AUTO: 90.6 FL — SIGNIFICANT CHANGE UP (ref 80–100)
MONOCYTES # BLD AUTO: 0.62 K/UL — SIGNIFICANT CHANGE UP (ref 0–0.9)
MONOCYTES NFR BLD AUTO: 13 % — SIGNIFICANT CHANGE UP (ref 2–14)
NEUTROPHILS # BLD AUTO: 2.57 K/UL — SIGNIFICANT CHANGE UP (ref 1.8–7.4)
NEUTROPHILS NFR BLD AUTO: 53.8 % — SIGNIFICANT CHANGE UP (ref 43–77)
NRBC # BLD AUTO: 0 K/UL — SIGNIFICANT CHANGE UP (ref 0–0)
NRBC # FLD: 0 K/UL — SIGNIFICANT CHANGE UP (ref 0–0)
NRBC BLD AUTO-RTO: 0 /100 WBCS — SIGNIFICANT CHANGE UP (ref 0–0)
NT-PROBNP SERPL-SCNC: 530 PG/ML — HIGH (ref 0–125)
PCO2 BLDV: 33 MMHG — LOW (ref 42–55)
PH BLDV: 7.42 — SIGNIFICANT CHANGE UP (ref 7.32–7.43)
PLATELET # BLD AUTO: 129 K/UL — LOW (ref 150–400)
PMV BLD: 10 FL — SIGNIFICANT CHANGE UP (ref 7–13)
PO2 BLDV: 106 MMHG — HIGH (ref 25–45)
POTASSIUM SERPL-MCNC: 4.3 MMOL/L — SIGNIFICANT CHANGE UP (ref 3.5–5.3)
POTASSIUM SERPL-SCNC: 4.3 MMOL/L — SIGNIFICANT CHANGE UP (ref 3.5–5.3)
PROT SERPL-MCNC: 6.9 GM/DL — SIGNIFICANT CHANGE UP (ref 6–8.3)
RBC # BLD: 4.87 M/UL — SIGNIFICANT CHANGE UP (ref 4.2–5.8)
RBC # FLD: 13.7 % — SIGNIFICANT CHANGE UP (ref 10.3–14.5)
SAO2 % BLDV: 98 % — HIGH (ref 67–88)
SODIUM SERPL-SCNC: 137 MMOL/L — SIGNIFICANT CHANGE UP (ref 135–145)
TROPONIN I, HIGH SENSITIVITY RESULT: 19.62 NG/L — SIGNIFICANT CHANGE UP
TROPONIN I, HIGH SENSITIVITY RESULT: 24.06 NG/L — SIGNIFICANT CHANGE UP
TROPONIN I, HIGH SENSITIVITY RESULT: 24.78 NG/L — SIGNIFICANT CHANGE UP
WBC # BLD: 4.78 K/UL — SIGNIFICANT CHANGE UP (ref 3.8–10.5)
WBC # FLD AUTO: 4.78 K/UL — SIGNIFICANT CHANGE UP (ref 3.8–10.5)

## 2025-06-09 PROCEDURE — 93010 ELECTROCARDIOGRAM REPORT: CPT

## 2025-06-09 PROCEDURE — 36415 COLL VENOUS BLD VENIPUNCTURE: CPT

## 2025-06-09 PROCEDURE — 83690 ASSAY OF LIPASE: CPT

## 2025-06-09 PROCEDURE — G0378: CPT

## 2025-06-09 PROCEDURE — 82803 BLOOD GASES ANY COMBINATION: CPT

## 2025-06-09 PROCEDURE — 85379 FIBRIN DEGRADATION QUANT: CPT

## 2025-06-09 PROCEDURE — 71045 X-RAY EXAM CHEST 1 VIEW: CPT | Mod: 26

## 2025-06-09 PROCEDURE — 99236 HOSP IP/OBS SAME DATE HI 85: CPT

## 2025-06-09 PROCEDURE — 84484 ASSAY OF TROPONIN QUANT: CPT | Mod: 91

## 2025-06-09 PROCEDURE — 83735 ASSAY OF MAGNESIUM: CPT

## 2025-06-09 PROCEDURE — 93005 ELECTROCARDIOGRAM TRACING: CPT

## 2025-06-09 PROCEDURE — 80053 COMPREHEN METABOLIC PANEL: CPT

## 2025-06-09 PROCEDURE — 83880 ASSAY OF NATRIURETIC PEPTIDE: CPT

## 2025-06-09 PROCEDURE — 96374 THER/PROPH/DIAG INJ IV PUSH: CPT

## 2025-06-09 PROCEDURE — 71045 X-RAY EXAM CHEST 1 VIEW: CPT

## 2025-06-09 PROCEDURE — 85025 COMPLETE CBC W/AUTO DIFF WBC: CPT

## 2025-06-09 PROCEDURE — 99285 EMERGENCY DEPT VISIT HI MDM: CPT

## 2025-06-09 PROCEDURE — 99285 EMERGENCY DEPT VISIT HI MDM: CPT | Mod: 25

## 2025-06-09 RX ORDER — DEXTROSE 50 % IN WATER 50 %
12.5 SYRINGE (ML) INTRAVENOUS ONCE
Refills: 0 | Status: DISCONTINUED | OUTPATIENT
Start: 2025-06-09 | End: 2025-06-09

## 2025-06-09 RX ORDER — ONDANSETRON HCL/PF 4 MG/2 ML
4 VIAL (ML) INJECTION EVERY 8 HOURS
Refills: 0 | Status: DISCONTINUED | OUTPATIENT
Start: 2025-06-09 | End: 2025-06-09

## 2025-06-09 RX ORDER — ASPIRIN 325 MG
81 TABLET ORAL DAILY
Refills: 0 | Status: DISCONTINUED | OUTPATIENT
Start: 2025-06-10 | End: 2025-06-09

## 2025-06-09 RX ORDER — MELATONIN 5 MG
3 TABLET ORAL AT BEDTIME
Refills: 0 | Status: DISCONTINUED | OUTPATIENT
Start: 2025-06-09 | End: 2025-06-09

## 2025-06-09 RX ORDER — METOPROLOL SUCCINATE 50 MG/1
1 TABLET, EXTENDED RELEASE ORAL
Refills: 0 | DISCHARGE

## 2025-06-09 RX ORDER — ALBUTEROL SULFATE 2.5 MG/3ML
2 VIAL, NEBULIZER (ML) INHALATION EVERY 6 HOURS
Refills: 0 | Status: DISCONTINUED | OUTPATIENT
Start: 2025-06-09 | End: 2025-06-09

## 2025-06-09 RX ORDER — ESCITALOPRAM OXALATE 20 MG/1
10 TABLET ORAL DAILY
Refills: 0 | Status: DISCONTINUED | OUTPATIENT
Start: 2025-06-09 | End: 2025-06-09

## 2025-06-09 RX ORDER — TRAMADOL HYDROCHLORIDE 50 MG/1
50 TABLET, FILM COATED ORAL
Refills: 0 | Status: DISCONTINUED | OUTPATIENT
Start: 2025-06-09 | End: 2025-06-09

## 2025-06-09 RX ORDER — SODIUM CHLORIDE 9 G/1000ML
1000 INJECTION, SOLUTION INTRAVENOUS
Refills: 0 | Status: DISCONTINUED | OUTPATIENT
Start: 2025-06-09 | End: 2025-06-09

## 2025-06-09 RX ORDER — ROSUVASTATIN CALCIUM 20 MG/1
1 TABLET, FILM COATED ORAL
Refills: 0 | DISCHARGE

## 2025-06-09 RX ORDER — DEXTROSE 50 % IN WATER 50 %
15 SYRINGE (ML) INTRAVENOUS ONCE
Refills: 0 | Status: DISCONTINUED | OUTPATIENT
Start: 2025-06-09 | End: 2025-06-09

## 2025-06-09 RX ORDER — INSULIN LISPRO 100 U/ML
INJECTION, SOLUTION INTRAVENOUS; SUBCUTANEOUS
Refills: 0 | Status: DISCONTINUED | OUTPATIENT
Start: 2025-06-09 | End: 2025-06-09

## 2025-06-09 RX ORDER — GLUCAGON 3 MG/1
1 POWDER NASAL ONCE
Refills: 0 | Status: DISCONTINUED | OUTPATIENT
Start: 2025-06-09 | End: 2025-06-09

## 2025-06-09 RX ORDER — ASPIRIN 325 MG
324 TABLET ORAL ONCE
Refills: 0 | Status: COMPLETED | OUTPATIENT
Start: 2025-06-09 | End: 2025-06-09

## 2025-06-09 RX ORDER — INSULIN LISPRO 100 U/ML
INJECTION, SOLUTION INTRAVENOUS; SUBCUTANEOUS AT BEDTIME
Refills: 0 | Status: DISCONTINUED | OUTPATIENT
Start: 2025-06-09 | End: 2025-06-09

## 2025-06-09 RX ORDER — MAGNESIUM, ALUMINUM HYDROXIDE 200-200 MG
30 TABLET,CHEWABLE ORAL EVERY 4 HOURS
Refills: 0 | Status: DISCONTINUED | OUTPATIENT
Start: 2025-06-09 | End: 2025-06-09

## 2025-06-09 RX ORDER — ROSUVASTATIN CALCIUM 20 MG/1
20 TABLET, FILM COATED ORAL AT BEDTIME
Refills: 0 | Status: DISCONTINUED | OUTPATIENT
Start: 2025-06-09 | End: 2025-06-09

## 2025-06-09 RX ORDER — CLOPIDOGREL BISULFATE 75 MG/1
75 TABLET, FILM COATED ORAL DAILY
Refills: 0 | Status: DISCONTINUED | OUTPATIENT
Start: 2025-06-09 | End: 2025-06-09

## 2025-06-09 RX ORDER — ACETAMINOPHEN 500 MG/5ML
650 LIQUID (ML) ORAL EVERY 6 HOURS
Refills: 0 | Status: DISCONTINUED | OUTPATIENT
Start: 2025-06-09 | End: 2025-06-09

## 2025-06-09 RX ORDER — INSULIN ASPART 100 [IU]/ML
0 INJECTION, SOLUTION INTRAVENOUS; SUBCUTANEOUS
Refills: 0 | DISCHARGE

## 2025-06-09 RX ORDER — ISOSORBIDE MONONITRATE 60 MG/1
1 TABLET, EXTENDED RELEASE ORAL
Qty: 30 | Refills: 0
Start: 2025-06-09 | End: 2025-07-08

## 2025-06-09 RX ORDER — GABAPENTIN 400 MG/1
300 CAPSULE ORAL DAILY
Refills: 0 | Status: DISCONTINUED | OUTPATIENT
Start: 2025-06-09 | End: 2025-06-09

## 2025-06-09 RX ORDER — ISOSORBIDE MONONITRATE 60 MG/1
30 TABLET, EXTENDED RELEASE ORAL DAILY
Refills: 0 | Status: DISCONTINUED | OUTPATIENT
Start: 2025-06-09 | End: 2025-06-09

## 2025-06-09 RX ORDER — DEXTROSE 50 % IN WATER 50 %
25 SYRINGE (ML) INTRAVENOUS ONCE
Refills: 0 | Status: DISCONTINUED | OUTPATIENT
Start: 2025-06-09 | End: 2025-06-09

## 2025-06-09 RX ORDER — CLOPIDOGREL BISULFATE 75 MG/1
1 TABLET, FILM COATED ORAL
Refills: 0 | DISCHARGE

## 2025-06-09 RX ORDER — BUSPIRONE HYDROCHLORIDE 15 MG/1
15 TABLET ORAL
Refills: 0 | Status: DISCONTINUED | OUTPATIENT
Start: 2025-06-09 | End: 2025-06-09

## 2025-06-09 RX ORDER — TRAZODONE HCL 100 MG
50 TABLET ORAL AT BEDTIME
Refills: 0 | Status: DISCONTINUED | OUTPATIENT
Start: 2025-06-09 | End: 2025-06-09

## 2025-06-09 RX ORDER — METOPROLOL SUCCINATE 50 MG/1
25 TABLET, EXTENDED RELEASE ORAL
Refills: 0 | Status: DISCONTINUED | OUTPATIENT
Start: 2025-06-09 | End: 2025-06-09

## 2025-06-09 RX ADMIN — CLOPIDOGREL BISULFATE 75 MILLIGRAM(S): 75 TABLET, FILM COATED ORAL at 10:16

## 2025-06-09 RX ADMIN — GABAPENTIN 300 MILLIGRAM(S): 400 CAPSULE ORAL at 10:16

## 2025-06-09 RX ADMIN — Medication 324 MILLIGRAM(S): at 06:13

## 2025-06-09 RX ADMIN — METOPROLOL SUCCINATE 25 MILLIGRAM(S): 50 TABLET, EXTENDED RELEASE ORAL at 10:16

## 2025-06-09 RX ADMIN — ESCITALOPRAM OXALATE 10 MILLIGRAM(S): 20 TABLET ORAL at 10:16

## 2025-06-09 RX ADMIN — Medication 40 MILLIGRAM(S): at 10:22

## 2025-06-09 RX ADMIN — Medication 2 MILLIGRAM(S): at 05:08

## 2025-06-09 RX ADMIN — ISOSORBIDE MONONITRATE 30 MILLIGRAM(S): 60 TABLET, EXTENDED RELEASE ORAL at 10:17

## 2025-06-09 NOTE — DISCHARGE NOTE NURSING/CASE MANAGEMENT/SOCIAL WORK - PATIENT PORTAL LINK FT
You can access the FollowMyHealth Patient Portal offered by [facility] by registering at the following website: [email or web address]. By joining [facility]’s FollowMyHealth portal, you will also be able to view your health information using other applications (apps) compatible with our system.

## 2025-06-09 NOTE — ED ADULT TRIAGE NOTE - CHIEF COMPLAINT QUOTE
Pt presents to the ED c/o midsternal chest pain since 6 PM. Pt reports that Friday, 5/30, he had a stent placed after severe chest pain. Denies SOB, taking meds PTA. Sent for EKG. Denies any other known cardiac hx.

## 2025-06-09 NOTE — ED ADULT NURSE NOTE - NSICDXPASTSURGICALHX_GEN_ALL_CORE_FT
[facility] Internal Medicine  [address] [place] [address]  Phone: [phone]  Fax: [phone]    Nikolay Martinez MD        February 7, 2020      Patient needs evaluation for oxygen concentrator.  Goal Sat is 88-92%    NPI: [id number]      Sincerely,        Nikolay Martinez MD
PAST SURGICAL HISTORY:  S/P arterial stent     S/P CABG x 3

## 2025-06-09 NOTE — H&P ADULT - HISTORY OF PRESENT ILLNESS
Patient is a 70y old  Male who presents with a chief complaint of chest pain.  HPI: 70M hx HTN, HLD, DM, CAD s/p CABG and recent stent (in ), COPD, p/w CP. Reports chest pain x 1-2 days, constant, dull, substernal, non radiating and no SOB. Reports similar to recent CP w NSTEMI. Trop neg x 2.     PAST MEDICAL & SURGICAL HISTORY:  HTN (hypertension)      DM (diabetes mellitus)      COPD (chronic obstructive pulmonary disease)      Coronary artery disease involving coronary bypass graft of native heart with angina pectoris      HLD (hyperlipidemia)      S/P CABG x 3      S/P arterial stent        FAMILY HISTORY:  Patient's mother is   at 83 from copd, dm    Patient's father is   at 58 from colon cancer      Social History:  neg, independent    Allergies    penicillin (Unknown)    Intolerances        MEDICATIONS  (STANDING):  aspirin enteric coated 81 milliGRAM(s) Oral daily  busPIRone 15 milliGRAM(s) Oral two times a day  clopidogrel Tablet 75 milliGRAM(s) Oral daily  dextrose 5%. 1000 milliLiter(s) (100 mL/Hr) IV Continuous <Continuous>  dextrose 5%. 1000 milliLiter(s) (50 mL/Hr) IV Continuous <Continuous>  dextrose 50% Injectable 25 Gram(s) IV Push once  dextrose 50% Injectable 12.5 Gram(s) IV Push once  dextrose 50% Injectable 25 Gram(s) IV Push once  escitalopram 10 milliGRAM(s) Oral daily  gabapentin 300 milliGRAM(s) Oral daily  glucagon  Injectable 1 milliGRAM(s) IntraMuscular once  insulin lispro (ADMELOG) corrective regimen sliding scale   SubCutaneous three times a day before meals  insulin lispro (ADMELOG) corrective regimen sliding scale   SubCutaneous at bedtime  isosorbide   mononitrate ER Tablet (IMDUR) 30 milliGRAM(s) Oral daily  metoprolol tartrate 25 milliGRAM(s) Oral two times a day  pantoprazole    Tablet 40 milliGRAM(s) Oral before breakfast  rosuvastatin 20 milliGRAM(s) Oral at bedtime    MEDICATIONS  (PRN):  acetaminophen     Tablet .. 650 milliGRAM(s) Oral every 6 hours PRN Temp greater or equal to 38C (100.4F), Mild Pain (1 - 3)  albuterol    90 MICROgram(s) HFA Inhaler 2 Puff(s) Inhalation every 6 hours PRN for shortness of breath and/or wheezing  aluminum hydroxide/magnesium hydroxide/simethicone Suspension 30 milliLiter(s) Oral every 4 hours PRN Dyspepsia  dextrose Oral Gel 15 Gram(s) Oral once PRN Blood Glucose LESS THAN 70 milliGRAM(s)/deciliter  melatonin 3 milliGRAM(s) Oral at bedtime PRN Insomnia  ondansetron Injectable 4 milliGRAM(s) IV Push every 8 hours PRN Nausea and/or Vomiting  traMADol 50 milliGRAM(s) Oral two times a day PRN Severe Pain (7 - 10)  traZODone 50 milliGRAM(s) Oral at bedtime PRN for insomnia      ROS:  General:  No fevers, chills, or unexplained weight loss  Skin: No rash or bothersome skin lesions  Musculoskeletal: No arthalgias, myalgias or joint swelling  Eyes: No visual changes or eye pain  Ears: No hearing loss , otorrhea or ear pain  Nose, Mouth, Throat: No nasal congestion, rhinorrhea, oral lesions, postnasal drip or sore throat  Cardio: No chest pain or palpitations. no lower extremity edema. no syncope. no claudication.   Respiratory: No cough, shortness of breath or wheezing   GI: No diarrhea, constipation, blood in stools, abdominal pain, vomiting or heartburn  : No urinary frequency, hematuria, incontinence, or dysuria  Neurologic: No headaches, parasthesias, confusion, dysarthria or gait instability  Psychiatric:  No anxiety or depression  Lymphatic:  No easy bruising, easy bleeding or swollen glands  Allergic: No itching, sneezing , watery eyes, clear rhinorrhea or recurrent infections    PEx  T(C): 36.3 (25 @ 07:05), Max: 36.7 (25 @ 03:24)  HR: 64 (25 @ 07:05) (64 - 70)  BP: 155/84 (25 @ 07:05) (148/81 - 175/85)  RR: 18 (25 @ 07:05) (18 - 18)  SpO2: 100% (25 @ 07:05) (99% - 100%)  Wt(kg): --  General:     no acute distress, no identifying marks , scars, or tattoos.  Skin: no rash or prominent lesions  Head: normocephalic, atraumatic     Sinuses: non-tender  Nose: no external lesions, mucosa non-inflamed, septum and turbinates normal  Throat: no erythema, exudates or lesions.  Neck: Supple without lymphadenopathy. Thyroid no thyromegaly, no palpable thyroid nodules, no palpable nodules or masses, carotid arteries no bruits.   Breasts: No palpable masses or lesions.  Heart: RRR, no murmur or gallop.  Normal S1, S2.  No S3, S4.   Lungs: CTA bilaterally, no wheezes, rhonchi, rales.  Breathing unlabored.   Chest wall: Normal insp   Abdomen:  Soft, NT/ND, normal bowel sounds, no HSM, no masses.  No peritoneal signs.   Back: spine normal without deformity or tenderness.  Normal ROM   : Exam normal.  no inguinal hernias.  Extremities: No deformities, clubbing, cyanosis, or edema.  Musculoskeletal: Normal gait and station. No decreased range of motion, instability, atrophy or abnormal strength or tone in the head, neck, spine, ribs, pelvis or extremities.   Neurologic: CN 2-12 normal. Sensation to pain, touch and proprioception normal. DTRs normal in upper and lower extremities. No pathologic reflexes.  Motor normal.  Psychiatric: Oriented X3, intact recent and remote memory, judgement and insight, normal mood and affect.                          15.0   4.78  )-----------( 129      ( 2025 02:18 )             44.1     -    137  |  108  |  15  ----------------------------<  162[H]  4.3   |  21[L]  |  0.94    Ca    9.0      2025 02:18  Mg     2.1     -    TPro  6.9  /  Alb  3.2[L]  /  TBili  0.5  /  DBili  x   /  AST  30  /  ALT  22  /  AlkPhos  65  -    CAPILLARY BLOOD GLUCOSE          Urinalysis Basic - ( 2025 02:18 )    Color: x / Appearance: x / SG: x / pH: x  Gluc: 162 mg/dL / Ketone: x  / Bili: x / Urobili: x   Blood: x / Protein: x / Nitrite: x   Leuk Esterase: x / RBC: x / WBC x   Sq Epi: x / Non Sq Epi: x / Bacteria: x          Radiology/Imaging, I have personally reviewed:  CXR 25; No focal infiltrate or congestion. Sternotomy noted. Heart is within normal limits in its transthoracic diameter. Osteoarthritic changes both shoulders. Old left humeral neck fracture deformity suggested. Old left lower posterior rib fracture also suggested better seen previously. Follow-up suggested as indicated clinically

## 2025-06-09 NOTE — DISCHARGE NOTE NURSING/CASE MANAGEMENT/SOCIAL WORK - FINANCIAL ASSISTANCE
[facility] provides services at a reduced cost to those who are determined to be eligible through [facility]’s financial assistance program. Information regarding [facility]’s financial assistance program can be found by going to [email or web address] or by calling [phone].

## 2025-06-09 NOTE — ED PROVIDER NOTE - OBJECTIVE STATEMENT
70 year old male with PMH of HTN, HLD, DM, CAD, CABG, COPD, presents with cc of substernal chest pain, similar to his anginal pain. No recent trauma. No fever or chills. No melena or hematochezia. No visual or focal neurological complaints.

## 2025-06-09 NOTE — DISCHARGE NOTE PROVIDER - NSDCCPCAREPLAN_GEN_ALL_CORE_FT
PRINCIPAL DISCHARGE DIAGNOSIS  Diagnosis: Substernal pain  Assessment and Plan of Treatment: Start imdur, follow up with your cardiologist.

## 2025-06-09 NOTE — DISCHARGE NOTE PROVIDER - CARE PROVIDER_API CALL
Kristel Arrieta  Cardiology  [address] [facility] [address] [place] [address]  Phone: [phone]  Fax: [phone]  Follow Up Time: 7-10 Days

## 2025-06-09 NOTE — H&P ADULT - CONVERSATION DETAILS
Reviewed prior MOLST, DNR/DNI, trial NIV. Discussed with pt- remains in line w his current wishes. MOLST reviewed/renewed, order placed.

## 2025-06-09 NOTE — CONSULT NOTE ADULT - SUBJECTIVE AND OBJECTIVE BOX
Patient is a 70y old  Male who presents with a chief complaint of chest pain (2025 14:40)    ________________________________  MANUEL MILLER is a 70y year old Male with a past medical history of  CAD s/p CABG May 2016 with cardiac catheterization in 2016 and PCI to ramus artery, patent LIMA to LAD, with cardiac catheterization 2016 with PCI to the RCA, repeat cardiac catheterization 2018 showing patent LIZAMA to LAD, patent saphenous vein graft to PDA, occluded saphenous vein graft to ramus, patent ramus stent who was admitted last month for a non-STEMI underwent PCI to ramus artery in 2025 and PCI RPDA via saphenous vein graft 2025  He also has a history of hypertension, hyperlipidemia, type 2 diabetes and COPD.      He now presents for evaluation of chest discomfort, described as pressure-like in nature, similar but not as intense as his recent event requiring PCI.  Symptoms improved by time I evaluated him.  Cardiac enzymes negative.  EKG shows no acute abnormalities.  Troponins negative.  Sinus rhythm with PACs on telemetry.    ________________________________  Review of systems: A 10 point review of system has been performed, and is negative except for what has been mentioned in the above history of present illness.     PAST MEDICAL & SURGICAL HISTORY:  HTN (hypertension)      DM (diabetes mellitus)      COPD (chronic obstructive pulmonary disease)      Coronary artery disease involving coronary bypass graft of native heart with angina pectoris      HLD (hyperlipidemia)      S/P CABG x 3      S/P arterial stent        FAMILY HISTORY:  Patient's mother is   at 83 from copd, dm    Patient's father is   at 58 from colon cancer      Social History:  neg, independent    penicillin (Unknown)    Home Medications:  albuterol 90 mcg/inh inhalation aerosol: 2 puff(s) inhaled every 4 hours as needed for  shortness of breath and/or wheezing (2025 08:50)  aspirin 81 mg oral delayed release tablet: 1 tab(s) orally once a day (2025 08:55)  azelastine 137 mcg/inh (0.1%) nasal spray: 1 spray(s) in each nostril 1 to 2 times a day as needed for  congestion (2025 08:50)  busPIRone 15 mg oral tablet: 1 tab(s) orally 2 times a day (2025 08:50)  clopidogrel 75 mg oral tablet: 1 tab(s) orally once a day (2025 08:51)  escitalopram 10 mg oral tablet: 1 tab(s) orally once a day (2025 08:50)  gabapentin 300 mg oral capsule: 1 cap(s) orally once a day (in the morning) ***prescribed as 3 times daily but pt only takes once*** (2025 08:59)  Jardiance 25 mg oral tablet: 1 tab(s) orally once a day (in the morning) (2025 08:46)  metFORMIN 500 mg oral tablet, extended release: 2 tab(s) orally once a day (2025 08:46)  metoprolol tartrate 25 mg oral tablet: 1 tab(s) orally 2 times a day (2025 09:00)  Mounjaro 7.5 mg/0.5 mL subcutaneous solution: 7.5 milligram(s) subcutaneously once a week ***Monday*** (2025 08:48)  NovoLOG 100 units/mL subcutaneous solution: Use as needed as directed (2025 09:00)  pantoprazole 40 mg oral delayed release tablet: 1 tab(s) orally once a day (2025 09:00)  rosuvastatin 20 mg oral tablet: 1 tab(s) orally once a day (2025 09:00)  traMADol 50 mg oral tablet: 1 tab(s) orally 2 times a day as needed (2025 09:00)  traZODone 50 mg oral tablet: 1 tab(s) orally once a day (at bedtime) as needed for  insomnia (2025 08:50)    MEDICATIONS  (STANDING):  aspirin enteric coated 81 milliGRAM(s) Oral daily  busPIRone 15 milliGRAM(s) Oral two times a day  clopidogrel Tablet 75 milliGRAM(s) Oral daily  dextrose 5%. 1000 milliLiter(s) (100 mL/Hr) IV Continuous <Continuous>  dextrose 5%. 1000 milliLiter(s) (50 mL/Hr) IV Continuous <Continuous>  dextrose 50% Injectable 25 Gram(s) IV Push once  dextrose 50% Injectable 12.5 Gram(s) IV Push once  dextrose 50% Injectable 25 Gram(s) IV Push once  escitalopram 10 milliGRAM(s) Oral daily  gabapentin 300 milliGRAM(s) Oral daily  glucagon  Injectable 1 milliGRAM(s) IntraMuscular once  insulin lispro (ADMELOG) corrective regimen sliding scale   SubCutaneous three times a day before meals  insulin lispro (ADMELOG) corrective regimen sliding scale   SubCutaneous at bedtime  isosorbide   mononitrate ER Tablet (IMDUR) 30 milliGRAM(s) Oral daily  metoprolol tartrate 25 milliGRAM(s) Oral two times a day  pantoprazole    Tablet 40 milliGRAM(s) Oral before breakfast  rosuvastatin 20 milliGRAM(s) Oral at bedtime    MEDICATIONS  (PRN):  acetaminophen     Tablet .. 650 milliGRAM(s) Oral every 6 hours PRN Temp greater or equal to 38C (100.4F), Mild Pain (1 - 3)  albuterol    90 MICROgram(s) HFA Inhaler 2 Puff(s) Inhalation every 6 hours PRN for shortness of breath and/or wheezing  aluminum hydroxide/magnesium hydroxide/simethicone Suspension 30 milliLiter(s) Oral every 4 hours PRN Dyspepsia  dextrose Oral Gel 15 Gram(s) Oral once PRN Blood Glucose LESS THAN 70 milliGRAM(s)/deciliter  melatonin 3 milliGRAM(s) Oral at bedtime PRN Insomnia  ondansetron Injectable 4 milliGRAM(s) IV Push every 8 hours PRN Nausea and/or Vomiting  traMADol 50 milliGRAM(s) Oral two times a day PRN Severe Pain (7 - 10)  traZODone 50 milliGRAM(s) Oral at bedtime PRN for insomnia    Vital Signs Last 24 Hrs  T(C): 36.5 (2025 15:58), Max: 36.7 (2025 03:24)  T(F): 97.7 (2025 15:58), Max: 98.1 (2025 03:24)  HR: 69 (2025 15:58) (64 - 77)  BP: 122/74 (2025 15:58) (122/74 - 175/85)  BP(mean): 102 (2025 05:43) (102 - 113)  RR: 18 (2025 15:58) (18 - 18)  SpO2: 98% (2025 15:58) (96% - 100%)    Parameters below as of 2025 15:58  Patient On (Oxygen Delivery Method): room air      I&O's Summary    ________________________________  GENERAL APPEARANCE:  No acute distress  HEAD: normocephalic, atraumatic  NECK: supple, no jugular venous distention, no carotid bruit    HEART: Regular rate and rhythm, S1, S2 normal, 1/6 murmur    CHEST:  No anterior chest wall tenderness    LUNGS:  Clear to auscultation, without any wheezing, rhonchi or rales    ABDOMEN: soft, nontender, nondistended, with positive bowel sounds appreciated  EXTREMITIES: no edema.   NEURO: Alert and oriented x3  PSYC:  Normal affect  SKIN:  Dry  ________________________________   TELEMETRY: Sinus rhythm PACs    ECG: Sinus rhythm with no significant ST-T wave changes    LABS:                        15.0   4.78  )-----------( 129      ( 2025 02:18 )             44.1             06-09    137  |  108  |  15  ----------------------------<  162[H]  4.3   |  21[L]  |  0.94    Ca    9.0      2025 02:18  Mg     2.1     06-    TPro  6.9  /  Alb  3.2[L]  /  TBili  0.5  /  DBili  x   /  AST  30  /  ALT  22  /  AlkPhos  65  06-09      LIVER FUNCTIONS - ( 2025 02:18 )  Alb: 3.2 g/dL / Pro: 6.9 gm/dL / ALK PHOS: 65 U/L / ALT: 22 U/L / AST: 30 U/L / GGT: x           Urinalysis Basic - ( 2025 02:18 )    Color: x / Appearance: x / SG: x / pH: x  Gluc: 162 mg/dL / Ketone: x  / Bili: x / Urobili: x   Blood: x / Protein: x / Nitrite: x   Leuk Esterase: x / RBC: x / WBC x   Sq Epi: x / Non Sq Epi: x / Bacteria: x      Pro BNP  --  @ 02:18  D Dimer  177  @ 02:18      Urinalysis Basic - ( 2025 02:18 )    Color: x / Appearance: x / SG: x / pH: x  Gluc: 162 mg/dL / Ketone: x  / Bili: x / Urobili: x   Blood: x / Protein: x / Nitrite: x   Leuk Esterase: x / RBC: x / WBC x   Sq Epi: x / Non Sq Epi: x / Bacteria: x             ________________________________    RADIOLOGY & ADDITIONAL STUDIES: PROCEDURE DATE:  2025          INTERPRETATION:  EXAM: XR CHEST URGENT    INDICATION: Chest Pain JXR    COMPARISON: May 6, 2025    IMPRESSION: No focal infiltrate or congestion. Sternotomy noted. Heart is   within normal limits in its transthoracic diameter. Osteoarthritic   changes both shoulders. Old left humeral neck fracture deformity   suggested. Old left lower posterior rib fracture also suggested better   seen previously. Follow-up suggested as indicated clinically    --- End of Report ---  ________________________________    ASSESSMENT:  Chest discomfort with history of multivessel CAD status post CABG and multiple coronary interventions-most recent last month to ramus intermedius and RPDA via saphenous vein graft  Hypertension  Hyperlipidemia  Type 2 diabetes  COPD      PLAN:  In summary, this is a 70y Male with a past medical history of multivessel CAD status post prior CABG in recent PCI as noted above, presenting now with chest discomfort.  No evidence of ACS based on EKG and cardiac enzymes.  Possibly due to microvascular angina/small vessel disease.  Add Imdur monitor for side effects.  Continue dual antiplatelet therapy.  Continue beta-blocker.  Continue statin.  Outpatient follow-up if chest discomfort does not occur with ambulation.  Will coordinate office follow-up.      ____________________________________________  (Dragon Dictation software used). Thank you for allowing me to participate in the care of your patient. Please contact me should any questions arise.    LUZMARIA Howard DO, [facility]  Office: [phone]

## 2025-06-09 NOTE — DISCHARGE NOTE PROVIDER - HOSPITAL COURSE
CC: Chest pain  HPI and Hospital Course: 70M hx HTN, HLD, DM, CAD s/p CABG and recent stent (in 5/25), COPD, p/w CP. Reports chest pain x 1-2 days, constant, dull, substernal, non radiating and no SOB. Reports similar to recent CP w NSTEMI. Trop neg x 2.     #Chest pain, likely anginal, underlying CAD s/p CABG and recent stents (5/25)  -tele  -trops neg x 2  -EKG nonischemic  -CXR no acute findings  -continue DAPT, statin, BB  -imdur added by cards  -appreciate Dr. Anita albarran, for outpt f/u    #DM  -home meds    #psych  -home meds    #DVT ppx- SCDs    #DNR/DNI, trial NIV.      D/c to home.   I have spent 87 min on day of d/c coordinating care.  See progress note for problem based plan.

## 2025-06-09 NOTE — ED PROVIDER NOTE - PROGRESS NOTE DETAILS
Anita TIJERINA: Pt with hx of HTN, HLD, CAD, CABG, stent, with chest pain similar to angina equivalent, trop negative x 1, will request medical admission to rule out ACS. Anita TIJERINA: Spoke with Dr. Duggan, hospitalist admission appreciated. obs, tele, rtm.

## 2025-06-09 NOTE — DISCHARGE NOTE PROVIDER - NSDCMRMEDTOKEN_GEN_ALL_CORE_FT
albuterol 90 mcg/inh inhalation aerosol: 2 puff(s) inhaled every 4 hours as needed for  shortness of breath and/or wheezing  aspirin 81 mg oral delayed release tablet: 1 tab(s) orally once a day  azelastine 137 mcg/inh (0.1%) nasal spray: 1 spray(s) in each nostril 1 to 2 times a day as needed for  congestion  busPIRone 15 mg oral tablet: 1 tab(s) orally 2 times a day  clopidogrel 75 mg oral tablet: 1 tab(s) orally once a day  escitalopram 10 mg oral tablet: 1 tab(s) orally once a day  gabapentin 300 mg oral capsule: 1 cap(s) orally once a day (in the morning) ***prescribed as 3 times daily but pt only takes once***  isosorbide mononitrate 30 mg oral tablet, extended release: 1 tab(s) orally once a day  Jardiance 25 mg oral tablet: 1 tab(s) orally once a day (in the morning)  metFORMIN 500 mg oral tablet, extended release: 2 tab(s) orally once a day  metoprolol tartrate 25 mg oral tablet: 1 tab(s) orally 2 times a day  Mounjaro 7.5 mg/0.5 mL subcutaneous solution: 7.5 milligram(s) subcutaneously once a week ***Monday***  NovoLOG 100 units/mL subcutaneous solution: Use as needed as directed  pantoprazole 40 mg oral delayed release tablet: 1 tab(s) orally once a day  rosuvastatin 20 mg oral tablet: 1 tab(s) orally once a day  traMADol 50 mg oral tablet: 1 tab(s) orally 2 times a day as needed  traZODone 50 mg oral tablet: 1 tab(s) orally once a day (at bedtime) as needed for  insomnia

## 2025-06-09 NOTE — H&P ADULT - ASSESSMENT
70M hx HTN, HLD, DM, CAD s/p CABG and recent stent (in 5/25), COPD, p/w CP.    #Chest pain, likely anginal, underlying CAD s/p CABG and recent stents (5/25)  -tele  -trops neg x 2  -EKG nonischemic  -CXR no acute findings  -continue DAPT, statin, BB  -imdur added by cards  -discussed w Dr. Arrieta and Dr. Howard this am re: antianginal therapy titration    #DM  -SSI, hold oral/injectable agents  -monitor BG    #psych  -home meds    #DVT ppx- SCDs    #DNR/DNI, trial NIV.    Possible d/c today 6/9.

## 2025-06-09 NOTE — ED ADULT NURSE NOTE - OBJECTIVE STATEMENT
Pt. is a 69 y/o male a&o x4 ambulatory into ED c/o chest pain. PMHx of HTN, HLD, DM, CAD, CABG, COPD, presents with cc of substernal chest pain. Pt. had stent placed by MD Arrieta on 5/30.  No recent trauma. EKG performed. Pt. denies fevers, chills. O2 100% on RA. Pt. denies any radiation of pain. 2 IV access established. Cardiac monitoring initiated.

## 2025-06-12 NOTE — ED ADULT NURSE NOTE - SUICIDE SCREENING QUESTION 2
Medication passed protocol.     Medication: Losartan passed protocol.   Last office visit date: 04/16/25  Next appointment scheduled?: No  Last Refill:  03/21/25    
No
